# Patient Record
Sex: FEMALE | Race: WHITE | Employment: UNEMPLOYED | URBAN - METROPOLITAN AREA
[De-identification: names, ages, dates, MRNs, and addresses within clinical notes are randomized per-mention and may not be internally consistent; named-entity substitution may affect disease eponyms.]

---

## 2017-11-08 ENCOUNTER — OFFICE VISIT (OUTPATIENT)
Dept: ORTHOPEDIC SURGERY | Age: 32
End: 2017-11-08

## 2017-11-08 VITALS
DIASTOLIC BLOOD PRESSURE: 48 MMHG | HEIGHT: 65 IN | BODY MASS INDEX: 34.16 KG/M2 | HEART RATE: 64 BPM | SYSTOLIC BLOOD PRESSURE: 79 MMHG | WEIGHT: 205.03 LBS

## 2017-11-08 DIAGNOSIS — S39.012A STRAIN OF LUMBAR REGION, INITIAL ENCOUNTER: ICD-10-CM

## 2017-11-08 DIAGNOSIS — M54.50 LUMBAR SPINE PAIN: Primary | ICD-10-CM

## 2017-11-08 PROCEDURE — 72100 X-RAY EXAM L-S SPINE 2/3 VWS: CPT | Performed by: PHYSICIAN ASSISTANT

## 2017-11-08 PROCEDURE — G8427 DOCREV CUR MEDS BY ELIG CLIN: HCPCS | Performed by: PHYSICIAN ASSISTANT

## 2017-11-08 PROCEDURE — 99203 OFFICE O/P NEW LOW 30 MIN: CPT | Performed by: PHYSICIAN ASSISTANT

## 2017-11-08 PROCEDURE — G8417 CALC BMI ABV UP PARAM F/U: HCPCS | Performed by: PHYSICIAN ASSISTANT

## 2017-11-08 PROCEDURE — 1036F TOBACCO NON-USER: CPT | Performed by: PHYSICIAN ASSISTANT

## 2017-11-08 PROCEDURE — G8484 FLU IMMUNIZE NO ADMIN: HCPCS | Performed by: PHYSICIAN ASSISTANT

## 2017-11-08 RX ORDER — GABAPENTIN 300 MG/1
300 CAPSULE ORAL NIGHTLY
Qty: 15 CAPSULE | Refills: 0 | Status: SHIPPED | OUTPATIENT
Start: 2017-11-08 | End: 2017-11-20 | Stop reason: SDUPTHER

## 2017-11-08 RX ORDER — METHYLPREDNISOLONE 4 MG/1
TABLET ORAL
Qty: 1 KIT | Refills: 0 | Status: SHIPPED | OUTPATIENT
Start: 2017-11-08 | End: 2018-09-22

## 2017-11-08 RX ORDER — METHOCARBAMOL 500 MG/1
500 TABLET, FILM COATED ORAL 3 TIMES DAILY
Qty: 30 TABLET | Refills: 0 | Status: SHIPPED | OUTPATIENT
Start: 2017-11-08 | End: 2017-11-18

## 2017-11-09 NOTE — PROGRESS NOTES
CHIEF COMPLAINT:    Chief Complaint   Patient presents with    Back Pain     LBP ON/OFF FOR 3 YEARS       HISTORY OF PRESENT ILLNESS:                The patient is a 34 y.o. female she presents tonight to the after hours clinic for orthopedic evaluation of her low back, primarily right-sided. She is a history of some chronic low back issues and previously saw a pain management doctor although she states she is no longer receiving pain management. She states recently she slipped as well as and suffered an aggravation injury. She describes pain across the lumbar spine but no radiating symptoms. She denies any loss of bowel or bladder control. She also sees a psychiatrist who prescribes her Cymbalta for her pain. She states in the past she had an MRI of her back ordered but never got this study.   Past Medical History:   Diagnosis Date    Anxiety     Depression           The pain assessment was noted & is as follows:  Pain Assessment  Location of Pain: Back  Location Modifiers: Posterior  Severity of Pain: 7  Quality of Pain: Aching  Duration of Pain: Persistent  Frequency of Pain: Constant]      Work Status/Functionality:     Past Medical History: Medical history form was reviewed today & can be found in the media tab  Past Medical History:   Diagnosis Date    Anxiety     Depression       Past Surgical History:     Past Surgical History:   Procedure Laterality Date     SECTION  2005    DILATION AND CURETTAGE OF UTERUS  2006         Current Medications:     Current Outpatient Prescriptions:     methylPREDNISolone (MEDROL, HECTOR,) 4 MG tablet, Take by mouth as directed on package insert, Disp: 1 kit, Rfl: 0    gabapentin (NEURONTIN) 300 MG capsule, Take 1 capsule by mouth nightly, Disp: 15 capsule, Rfl: 0    methocarbamol (ROBAXIN) 500 MG tablet, Take 1 tablet by mouth 3 times daily for 10 days, Disp: 30 tablet, Rfl: 0    DULoxetine (CYMBALTA) 60 MG extended release capsule, Take 60

## 2017-11-20 RX ORDER — GABAPENTIN 300 MG/1
CAPSULE ORAL
Qty: 15 CAPSULE | Refills: 0 | Status: SHIPPED | OUTPATIENT
Start: 2017-11-20 | End: 2017-12-04 | Stop reason: SDUPTHER

## 2017-12-04 RX ORDER — GABAPENTIN 300 MG/1
CAPSULE ORAL
Qty: 15 CAPSULE | Refills: 0 | Status: SHIPPED | OUTPATIENT
Start: 2017-12-04 | End: 2018-09-23 | Stop reason: DRUGHIGH

## 2018-03-08 ENCOUNTER — OFFICE VISIT (OUTPATIENT)
Dept: ORTHOPEDIC SURGERY | Age: 33
End: 2018-03-08

## 2018-03-08 VITALS
WEIGHT: 205.03 LBS | DIASTOLIC BLOOD PRESSURE: 85 MMHG | HEIGHT: 65 IN | HEART RATE: 80 BPM | BODY MASS INDEX: 34.16 KG/M2 | SYSTOLIC BLOOD PRESSURE: 137 MMHG

## 2018-03-08 DIAGNOSIS — G89.29 CHRONIC BILATERAL LOW BACK PAIN WITHOUT SCIATICA: Primary | ICD-10-CM

## 2018-03-08 DIAGNOSIS — M54.50 CHRONIC BILATERAL LOW BACK PAIN WITHOUT SCIATICA: Primary | ICD-10-CM

## 2018-03-08 PROCEDURE — G8417 CALC BMI ABV UP PARAM F/U: HCPCS | Performed by: PHYSICAL MEDICINE & REHABILITATION

## 2018-03-08 PROCEDURE — G8427 DOCREV CUR MEDS BY ELIG CLIN: HCPCS | Performed by: PHYSICAL MEDICINE & REHABILITATION

## 2018-03-08 PROCEDURE — 99203 OFFICE O/P NEW LOW 30 MIN: CPT | Performed by: PHYSICAL MEDICINE & REHABILITATION

## 2018-03-08 PROCEDURE — 1036F TOBACCO NON-USER: CPT | Performed by: PHYSICAL MEDICINE & REHABILITATION

## 2018-03-08 PROCEDURE — G8484 FLU IMMUNIZE NO ADMIN: HCPCS | Performed by: PHYSICAL MEDICINE & REHABILITATION

## 2018-03-08 RX ORDER — GABAPENTIN 300 MG/1
CAPSULE ORAL
Qty: 60 CAPSULE | Refills: 3 | Status: SHIPPED | OUTPATIENT
Start: 2018-03-08 | End: 2018-07-06 | Stop reason: SDUPTHER

## 2018-03-08 RX ORDER — METHOCARBAMOL 750 MG/1
TABLET, FILM COATED ORAL
Qty: 60 TABLET | Refills: 3 | Status: ON HOLD | OUTPATIENT
Start: 2018-03-08 | End: 2018-09-23

## 2018-03-08 NOTE — PROGRESS NOTES
New Patient: SPINE    CHIEF COMPLAINT:    Chief Complaint   Patient presents with    Back Pain     lumbar       HISTORY OF PRESENT ILLNESS:                The patient is a 34 y.o. female history of depression, anxiety for history of chronic aching right greater than left low back pain. Symptoms are increased with prolonged sitting bending or lifting. Relief with heat and rest and stretching. Conservative care includes yoga, NSAIDs, MDP, Cymbalta, Flexeril, Robaxin, gabapentin. She denies any lower extremity radiating pain no progressive numbness tingling weakness. No recent bowel or bladder changes. Pain Assessment  Location of Pain: Back  Severity of Pain: 7  Quality of Pain: Other (Comment)  Frequency of Pain: Constant  Aggravating Factors: Other (Comment)  Relieving Factors: Other (Comment)    The pain assessment was noted & reviewed in the medical record today. Current/Past Treatment:   · Physical Therapy:  Yoga  · Chiropractic:   no  · Injection:   no  · Medications: NSAIDs, Flexeril, Robaxin, Gabapentin     · Surgery/Consult: no    Work Status/Functionality: n/a    Past Medical History: Medical history form was reviewed today & scanned into the media tab  Past Medical History:   Diagnosis Date    Anxiety     Depression       Past Surgical History:     Past Surgical History:   Procedure Laterality Date     SECTION  2005    DILATION AND CURETTAGE OF UTERUS  2006         Current Medications:     Current Outpatient Prescriptions:     gabapentin (NEURONTIN) 300 MG capsule, TAKE 1 CAPSULE BY MOUTH EVERY NIGHT, Disp: 15 capsule, Rfl: 0    methylPREDNISolone (MEDROL, HECTOR,) 4 MG tablet, Take by mouth as directed on package insert, Disp: 1 kit, Rfl: 0    DULoxetine (CYMBALTA) 60 MG extended release capsule, Take 60 mg by mouth daily, Disp: , Rfl:     prazosin (MINIPRESS) 2 MG capsule, Take 2 mg by mouth nightly, Disp: , Rfl:     norethindrone-ethinyl estradiol (MICROGESTIN) 1-20 or induration  · Sensation: Sensation is intact without deficit  · Coordination/Balance: Good coordination   LUMBAR/SACRAL EXAMINATION:  · Inspection: Local inspection shows no step-off or bruising. Lumbar alignment is normal.  Sagittal and Coronal balance is neutral.      · Palpation:   No evidence of tenderness at the midline. No tenderness bilaterally at the paraspinal or trochanters. There is no step-off or paraspinal spasm. · Range of Motion: Mild-moderate loss  · Strength:   Strength testing is 5/5 in all muscle groups tested. · Special Tests:   Straight leg raise and crossed SLR negative. Leg length and pelvis level.  0 out of 5 Dell's signs. · Skin: There are no rashes, ulcerations or lesions. · Reflexes: Reflexes are symmetrically 2+ at the patellar and ankle tendons. Clonus absent bilaterally at the feet. · Gait & station: normal unassisted   · Additional Examinations:   · RIGHT LOWER EXTREMITY: Inspection/examination of the right lower extremity does not show any tenderness, deformity or injury. Range of motion is full. There is no gross instability. There are no rashes, ulcerations or lesions. Strength and tone are normal.  ·   · LEFT LOWER EXTREMITY:  Inspection/examination of the left lower extremity does not show any tenderness, deformity or injury. Range of motion is full. There is no gross instability. There are no rashes, ulcerations or lesions.   Strength and tone are normal.    Diagnostic Testing:    Lumbar x-rays reviewed November 2017 showing mild scoliosis and mild DDD        Impression:  1) Chronic mechanical back pain  2) H/o dep, anx      Plan:   1) Gabapentin 300mg I po BID #60 3 refills  2) Robaxin 750mg I po BID PRN #60 3 refills  3) PT for above  4) F/u after PT if no improvement         [facility]

## 2018-07-06 ENCOUNTER — TELEPHONE (OUTPATIENT)
Dept: ORTHOPEDIC SURGERY | Age: 33
End: 2018-07-06

## 2018-07-06 DIAGNOSIS — M54.9 MECHANICAL BACK PAIN: Primary | ICD-10-CM

## 2018-07-06 RX ORDER — GABAPENTIN 300 MG/1
CAPSULE ORAL
Qty: 60 CAPSULE | Refills: 3 | Status: SHIPPED | OUTPATIENT
Start: 2018-07-06 | End: 2018-09-23 | Stop reason: DRUGHIGH

## 2018-09-22 ENCOUNTER — HOSPITAL ENCOUNTER (INPATIENT)
Age: 33
LOS: 8 days | Discharge: HOME | DRG: 751 | End: 2018-10-01
Attending: EMERGENCY MEDICINE | Admitting: PSYCHIATRY & NEUROLOGY
Payer: COMMERCIAL

## 2018-09-22 DIAGNOSIS — F11.20 UNCOMPLICATED OPIOID DEPENDENCE (HCC): ICD-10-CM

## 2018-09-22 DIAGNOSIS — E87.6 HYPOKALEMIA: ICD-10-CM

## 2018-09-22 DIAGNOSIS — R45.89 SUICIDE RISK: Primary | ICD-10-CM

## 2018-09-22 DIAGNOSIS — F15.10 METHAMPHETAMINE ABUSE (HCC): ICD-10-CM

## 2018-09-22 LAB
A/G RATIO: 1.6 (ref 1.1–2.2)
ALBUMIN SERPL-MCNC: 4.3 G/DL (ref 3.4–5)
ALP BLD-CCNC: 59 U/L (ref 40–129)
ALT SERPL-CCNC: 13 U/L (ref 10–40)
AMPHETAMINE SCREEN, URINE: POSITIVE
ANION GAP SERPL CALCULATED.3IONS-SCNC: 7 MMOL/L (ref 3–16)
AST SERPL-CCNC: 19 U/L (ref 15–37)
BARBITURATE SCREEN URINE: ABNORMAL
BASOPHILS ABSOLUTE: 0 K/UL (ref 0–0.2)
BASOPHILS RELATIVE PERCENT: 0.7 %
BENZODIAZEPINE SCREEN, URINE: ABNORMAL
BILIRUB SERPL-MCNC: 0.3 MG/DL (ref 0–1)
BUN BLDV-MCNC: 8 MG/DL (ref 7–20)
CALCIUM SERPL-MCNC: 9.3 MG/DL (ref 8.3–10.6)
CANNABINOID SCREEN URINE: ABNORMAL
CHLORIDE BLD-SCNC: 99 MMOL/L (ref 99–110)
CO2: 33 MMOL/L (ref 21–32)
COCAINE METABOLITE SCREEN URINE: ABNORMAL
CREAT SERPL-MCNC: 0.6 MG/DL (ref 0.6–1.1)
EOSINOPHILS ABSOLUTE: 0.1 K/UL (ref 0–0.6)
EOSINOPHILS RELATIVE PERCENT: 1 %
ETHANOL: NORMAL MG/DL (ref 0–0.08)
GFR AFRICAN AMERICAN: >60
GFR NON-AFRICAN AMERICAN: >60
GLOBULIN: 2.7 G/DL
GLUCOSE BLD-MCNC: 103 MG/DL (ref 70–99)
HCT VFR BLD CALC: 37.1 % (ref 36–48)
HEMOGLOBIN: 12.6 G/DL (ref 12–16)
LYMPHOCYTES ABSOLUTE: 1.3 K/UL (ref 1–5.1)
LYMPHOCYTES RELATIVE PERCENT: 18.1 %
Lab: ABNORMAL
MCH RBC QN AUTO: 30.5 PG (ref 26–34)
MCHC RBC AUTO-ENTMCNC: 34 G/DL (ref 31–36)
MCV RBC AUTO: 89.5 FL (ref 80–100)
METHADONE SCREEN, URINE: ABNORMAL
MONOCYTES ABSOLUTE: 0.5 K/UL (ref 0–1.3)
MONOCYTES RELATIVE PERCENT: 6.9 %
NEUTROPHILS ABSOLUTE: 5.1 K/UL (ref 1.7–7.7)
NEUTROPHILS RELATIVE PERCENT: 73.3 %
OPIATE SCREEN URINE: ABNORMAL
OXYCODONE URINE: ABNORMAL
PDW BLD-RTO: 13.1 % (ref 12.4–15.4)
PH UA: 6
PHENCYCLIDINE SCREEN URINE: ABNORMAL
PLATELET # BLD: 313 K/UL (ref 135–450)
PMV BLD AUTO: 8.1 FL (ref 5–10.5)
POTASSIUM SERPL-SCNC: 3.2 MMOL/L (ref 3.5–5.1)
PROPOXYPHENE SCREEN: ABNORMAL
RBC # BLD: 4.15 M/UL (ref 4–5.2)
SODIUM BLD-SCNC: 139 MMOL/L (ref 136–145)
TOTAL PROTEIN: 7 G/DL (ref 6.4–8.2)
WBC # BLD: 6.9 K/UL (ref 4–11)

## 2018-09-22 PROCEDURE — 99285 EMERGENCY DEPT VISIT HI MDM: CPT

## 2018-09-22 PROCEDURE — G0480 DRUG TEST DEF 1-7 CLASSES: HCPCS

## 2018-09-22 PROCEDURE — 80307 DRUG TEST PRSMV CHEM ANLYZR: CPT

## 2018-09-22 PROCEDURE — 80053 COMPREHEN METABOLIC PANEL: CPT

## 2018-09-22 PROCEDURE — 85025 COMPLETE CBC W/AUTO DIFF WBC: CPT

## 2018-09-22 RX ORDER — POTASSIUM CHLORIDE 20 MEQ/1
40 TABLET, EXTENDED RELEASE ORAL ONCE
Status: COMPLETED | OUTPATIENT
Start: 2018-09-22 | End: 2018-09-23

## 2018-09-23 PROBLEM — F11.20 OPIATE DEPENDENCE (HCC): Status: ACTIVE | Noted: 2018-09-23

## 2018-09-23 PROBLEM — F15.90 STIMULANT USE DISORDER: Status: ACTIVE | Noted: 2018-09-23

## 2018-09-23 PROBLEM — F33.9 MDD (MAJOR DEPRESSIVE DISORDER), RECURRENT EPISODE (HCC): Status: ACTIVE | Noted: 2018-09-23

## 2018-09-23 PROBLEM — F33.2 MDD (MAJOR DEPRESSIVE DISORDER), RECURRENT SEVERE, WITHOUT PSYCHOSIS (HCC): Status: ACTIVE | Noted: 2018-09-23

## 2018-09-23 PROCEDURE — 6370000000 HC RX 637 (ALT 250 FOR IP): Performed by: NURSE PRACTITIONER

## 2018-09-23 PROCEDURE — 6370000000 HC RX 637 (ALT 250 FOR IP): Performed by: EMERGENCY MEDICINE

## 2018-09-23 PROCEDURE — 1240000000 HC EMOTIONAL WELLNESS R&B

## 2018-09-23 RX ORDER — BUPRENORPHINE AND NALOXONE 8; 2 MG/1; MG/1
2 FILM, SOLUBLE BUCCAL; SUBLINGUAL DAILY
Status: DISCONTINUED | OUTPATIENT
Start: 2018-09-24 | End: 2018-10-01 | Stop reason: HOSPADM

## 2018-09-23 RX ORDER — ACETAMINOPHEN 325 MG/1
650 TABLET ORAL EVERY 4 HOURS PRN
Status: DISCONTINUED | OUTPATIENT
Start: 2018-09-23 | End: 2018-10-01 | Stop reason: HOSPADM

## 2018-09-23 RX ORDER — DULOXETIN HYDROCHLORIDE 60 MG/1
60 CAPSULE, DELAYED RELEASE ORAL DAILY
Status: DISCONTINUED | OUTPATIENT
Start: 2018-09-24 | End: 2018-09-29

## 2018-09-23 RX ORDER — GABAPENTIN 300 MG/1
300 CAPSULE ORAL 2 TIMES DAILY
Status: ON HOLD | COMMUNITY
End: 2018-09-23

## 2018-09-23 RX ORDER — NICOTINE 21 MG/24HR
1 PATCH, TRANSDERMAL 24 HOURS TRANSDERMAL DAILY PRN
Status: DISCONTINUED | OUTPATIENT
Start: 2018-09-23 | End: 2018-10-01 | Stop reason: HOSPADM

## 2018-09-23 RX ORDER — MAGNESIUM HYDROXIDE/ALUMINUM HYDROXICE/SIMETHICONE 120; 1200; 1200 MG/30ML; MG/30ML; MG/30ML
30 SUSPENSION ORAL EVERY 6 HOURS PRN
Status: DISCONTINUED | OUTPATIENT
Start: 2018-09-23 | End: 2018-10-01 | Stop reason: HOSPADM

## 2018-09-23 RX ORDER — PRAZOSIN HYDROCHLORIDE 1 MG/1
2 CAPSULE ORAL NIGHTLY
Status: DISCONTINUED | OUTPATIENT
Start: 2018-09-23 | End: 2018-09-29

## 2018-09-23 RX ORDER — CETIRIZINE HYDROCHLORIDE 10 MG/1
10 TABLET ORAL DAILY
Status: DISCONTINUED | OUTPATIENT
Start: 2018-09-24 | End: 2018-10-01 | Stop reason: HOSPADM

## 2018-09-23 RX ORDER — HYDROXYZINE PAMOATE 50 MG/1
50 CAPSULE ORAL 3 TIMES DAILY PRN
Status: DISCONTINUED | OUTPATIENT
Start: 2018-09-23 | End: 2018-10-01 | Stop reason: HOSPADM

## 2018-09-23 RX ORDER — BUPRENORPHINE AND NALOXONE 8; 2 MG/1; MG/1
2.5 FILM, SOLUBLE BUCCAL; SUBLINGUAL DAILY
Status: ON HOLD | COMMUNITY
End: 2018-10-01 | Stop reason: HOSPADM

## 2018-09-23 RX ORDER — OLANZAPINE 5 MG/1
5 TABLET ORAL 2 TIMES DAILY PRN
Status: DISCONTINUED | OUTPATIENT
Start: 2018-09-23 | End: 2018-10-01 | Stop reason: HOSPADM

## 2018-09-23 RX ADMIN — POTASSIUM CHLORIDE 40 MEQ: 20 TABLET, EXTENDED RELEASE ORAL at 01:05

## 2018-09-23 RX ADMIN — PRAZOSIN HYDROCHLORIDE 2 MG: 1 CAPSULE ORAL at 21:14

## 2018-09-23 ASSESSMENT — SLEEP AND FATIGUE QUESTIONNAIRES
DO YOU USE A SLEEP AID: NO
RESTFUL SLEEP: NO
DIFFICULTY ARISING: YES
AVERAGE NUMBER OF SLEEP HOURS: 3
DIFFICULTY STAYING ASLEEP: YES
DO YOU HAVE DIFFICULTY SLEEPING: YES
DIFFICULTY FALLING ASLEEP: YES

## 2018-09-23 ASSESSMENT — LIFESTYLE VARIABLES: HISTORY_ALCOHOL_USE: NO

## 2018-09-23 NOTE — ED NOTES
Pt resting quietly in bed, appears asleep, eyes closed, respirations even and easy. No signs of distress noted.

## 2018-09-23 NOTE — ED NOTES
Pt currently resting, even, non-labored respirations. No signs of distress. Will continue to monitor for safety.        Ramon England RN  09/23/18 1902

## 2018-09-23 NOTE — ED NOTES
Pt arrived ambulatory to [facility] with Kathia Ospina RN. Pt belongings placed in locker and pt roomed in B3. No further needs at this time. Will continue to monitor for safety.     DAVID Centeno

## 2018-09-24 PROBLEM — F15.10 METHAMPHETAMINE ABUSE, EPISODIC (HCC): Chronic | Status: ACTIVE | Noted: 2018-09-23

## 2018-09-24 PROCEDURE — 6370000000 HC RX 637 (ALT 250 FOR IP): Performed by: NURSE PRACTITIONER

## 2018-09-24 PROCEDURE — 99223 1ST HOSP IP/OBS HIGH 75: CPT | Performed by: PSYCHIATRY & NEUROLOGY

## 2018-09-24 PROCEDURE — 1240000000 HC EMOTIONAL WELLNESS R&B

## 2018-09-24 PROCEDURE — 99223 1ST HOSP IP/OBS HIGH 75: CPT | Performed by: PHYSICIAN ASSISTANT

## 2018-09-24 PROCEDURE — 6360000002 HC RX W HCPCS: Performed by: NURSE PRACTITIONER

## 2018-09-24 PROCEDURE — 6370000000 HC RX 637 (ALT 250 FOR IP): Performed by: PHYSICIAN ASSISTANT

## 2018-09-24 RX ORDER — ALBUTEROL SULFATE 90 UG/1
2 AEROSOL, METERED RESPIRATORY (INHALATION) EVERY 6 HOURS PRN
Status: DISCONTINUED | OUTPATIENT
Start: 2018-09-24 | End: 2018-10-01 | Stop reason: HOSPADM

## 2018-09-24 RX ADMIN — BUPRENORPHINE HYDROCHLORIDE, NALOXONE HYDROCHLORIDE 2 FILM: 8; 2 FILM, SOLUBLE BUCCAL; SUBLINGUAL at 10:20

## 2018-09-24 RX ADMIN — CETIRIZINE HYDROCHLORIDE 10 MG: 10 TABLET ORAL at 10:20

## 2018-09-24 RX ADMIN — DULOXETINE HYDROCHLORIDE 60 MG: 60 CAPSULE, DELAYED RELEASE ORAL at 10:20

## 2018-09-24 ASSESSMENT — PAIN SCALES - GENERAL: PAINLEVEL_OUTOF10: 0

## 2018-09-24 NOTE — PROGRESS NOTES
SPO2 (COPD values may differ): Greater than or equal to 92% on room air = 0    Peak Flow (asthma only): not applicable = 0    RSI: 0-4 = See once and convert to home regimen or discontinue        Plan       Goals: medication delivery, mobilize retained secretions, volume expansion and improve oxygenation    Patient/caregiver was educated on the proper method of use for Respiratory Care Devices:  Yes      Level of patient/caregiver understanding able to:   [] Verbalize understanding   [] Demonstrate understanding       [] Teach back        [] Needs reinforcement       []  No available caregiver               []  Other:     Response to education:  Excellent     Is patient being placed on Home Treatment Regimen? NA     Does the patient have everything they need prior to discharge? NA     Comments: chart reviewed and pt assesed    Plan of Care: pt to remain on current regiment    Electronically signed by Sunny Huston RCP on 9/24/2018 at 4:17 PM    Respiratory Protocol Guidelines     1. Assessment and treatment by Respiratory Therapy will be initiated for medication and therapeutic interventions upon initiation of aerosolized medication. 2. Physician will be contacted for respiratory rate (RR) greater than 35 breaths per minute. Therapy will be held for heart rate (HR) greater than 140 beats per minute, pending direction from physician. 3. Bronchodilators will be administered via Metered Dose Inhaler (MDI) with spacer when the following criteria are met:  a. Alert and cooperative     b. HR < 140 bpm  c. RR < 30 bpm                d. Can demonstrate a 23 second inspiratory hold  4. Bronchodilators will be administered via Hand Held Nebulizer JULIO CÉSAR [facility]) to patients when ANY of the following criteria are met  a. Incognizant or uncooperative          b. Patients treated with HHN at Home        c. Unable to demonstrate proper use of MDI with spacer     d. RR > 30 bpm   5.  Bronchodilators will be delivered via Metered

## 2018-09-24 NOTE — PLAN OF CARE
Problem: Depressive Behavior With or Without Suicide Precautions:  Goal: Absence of self-harm  Absence of self-harm   Outcome: Ongoing  Patient rates Depression 8/10 and Anxiety 7/10. Patient denies SI/HI/A/V/H. Patient attended wrap up group. Patient has meth. Sores on her bilateral arms and bilateral legs and cheek area. Meth. Sores are dry and intact. No sweeping from any of the sores. Patient denies any itching or pain from sores. Patient resting quietly in bed with eyes closed.

## 2018-09-25 LAB
ANION GAP SERPL CALCULATED.3IONS-SCNC: 9 MMOL/L (ref 3–16)
BUN BLDV-MCNC: 7 MG/DL (ref 7–20)
CALCIUM SERPL-MCNC: 9 MG/DL (ref 8.3–10.6)
CHLORIDE BLD-SCNC: 101 MMOL/L (ref 99–110)
CO2: 30 MMOL/L (ref 21–32)
CREAT SERPL-MCNC: <0.5 MG/DL (ref 0.6–1.1)
GFR AFRICAN AMERICAN: >60
GFR NON-AFRICAN AMERICAN: >60
GLUCOSE BLD-MCNC: 83 MG/DL (ref 70–99)
POTASSIUM SERPL-SCNC: 3 MMOL/L (ref 3.5–5.1)
SODIUM BLD-SCNC: 140 MMOL/L (ref 136–145)

## 2018-09-25 PROCEDURE — 99233 SBSQ HOSP IP/OBS HIGH 50: CPT | Performed by: PSYCHIATRY & NEUROLOGY

## 2018-09-25 PROCEDURE — 6370000000 HC RX 637 (ALT 250 FOR IP): Performed by: NURSE PRACTITIONER

## 2018-09-25 PROCEDURE — 6360000002 HC RX W HCPCS: Performed by: NURSE PRACTITIONER

## 2018-09-25 PROCEDURE — 1240000000 HC EMOTIONAL WELLNESS R&B

## 2018-09-25 PROCEDURE — 80048 BASIC METABOLIC PNL TOTAL CA: CPT

## 2018-09-25 PROCEDURE — 36415 COLL VENOUS BLD VENIPUNCTURE: CPT

## 2018-09-25 PROCEDURE — 6370000000 HC RX 637 (ALT 250 FOR IP): Performed by: PSYCHIATRY & NEUROLOGY

## 2018-09-25 RX ORDER — GABAPENTIN 300 MG/1
300 CAPSULE ORAL 2 TIMES DAILY
Status: DISCONTINUED | OUTPATIENT
Start: 2018-09-25 | End: 2018-10-01

## 2018-09-25 RX ADMIN — GABAPENTIN 300 MG: 300 CAPSULE ORAL at 20:44

## 2018-09-25 RX ADMIN — BUPRENORPHINE HYDROCHLORIDE, NALOXONE HYDROCHLORIDE 2 FILM: 8; 2 FILM, SOLUBLE BUCCAL; SUBLINGUAL at 08:39

## 2018-09-25 RX ADMIN — PRAZOSIN HYDROCHLORIDE 2 MG: 1 CAPSULE ORAL at 20:44

## 2018-09-25 RX ADMIN — CETIRIZINE HYDROCHLORIDE 10 MG: 10 TABLET ORAL at 08:38

## 2018-09-25 RX ADMIN — DULOXETINE HYDROCHLORIDE 60 MG: 60 CAPSULE, DELAYED RELEASE ORAL at 08:38

## 2018-09-25 RX ADMIN — GABAPENTIN 300 MG: 300 CAPSULE ORAL at 12:45

## 2018-09-25 NOTE — BH NOTE
Writer contacted CPS to file report due to pt's admission of using meth in the home with her 3 yo son and 15 yo daughter. Report will be reviewed at 1:00pm meeting. Writer provided contact information for the unit for callback on status.     Tania Garcia MSW, LSW

## 2018-09-26 PROBLEM — E44.1 MILD PROTEIN-CALORIE MALNUTRITION (HCC): Status: ACTIVE | Noted: 2018-09-26

## 2018-09-26 PROCEDURE — 99233 SBSQ HOSP IP/OBS HIGH 50: CPT | Performed by: PSYCHIATRY & NEUROLOGY

## 2018-09-26 PROCEDURE — 6360000002 HC RX W HCPCS: Performed by: NURSE PRACTITIONER

## 2018-09-26 PROCEDURE — 6370000000 HC RX 637 (ALT 250 FOR IP): Performed by: PSYCHIATRY & NEUROLOGY

## 2018-09-26 PROCEDURE — 1240000000 HC EMOTIONAL WELLNESS R&B

## 2018-09-26 PROCEDURE — 6370000000 HC RX 637 (ALT 250 FOR IP): Performed by: NURSE PRACTITIONER

## 2018-09-26 RX ORDER — BACITRACIN ZINC AND POLYMYXIN B SULFATE 500; 1000 [USP'U]/G; [USP'U]/G
OINTMENT TOPICAL 2 TIMES DAILY
Status: DISCONTINUED | OUTPATIENT
Start: 2018-09-26 | End: 2018-10-01 | Stop reason: HOSPADM

## 2018-09-26 RX ADMIN — BACITRACIN ZINC AND POLYMYXIN B SULFATE: at 21:20

## 2018-09-26 RX ADMIN — CETIRIZINE HYDROCHLORIDE 10 MG: 10 TABLET ORAL at 08:20

## 2018-09-26 RX ADMIN — DULOXETINE HYDROCHLORIDE 60 MG: 60 CAPSULE, DELAYED RELEASE ORAL at 08:20

## 2018-09-26 RX ADMIN — BACITRACIN ZINC AND POLYMYXIN B SULFATE 28.3 G: at 13:00

## 2018-09-26 RX ADMIN — BUPRENORPHINE HYDROCHLORIDE, NALOXONE HYDROCHLORIDE 2 FILM: 8; 2 FILM, SOLUBLE BUCCAL; SUBLINGUAL at 08:20

## 2018-09-26 RX ADMIN — GABAPENTIN 300 MG: 300 CAPSULE ORAL at 08:20

## 2018-09-26 RX ADMIN — GABAPENTIN 300 MG: 300 CAPSULE ORAL at 20:30

## 2018-09-26 ASSESSMENT — PAIN SCALES - GENERAL: PAINLEVEL_OUTOF10: 0

## 2018-09-26 NOTE — PROGRESS NOTES
Comment: High concentrations of ephedrine/pseudoephedrine or  phenylpropanolamine may cause false positive results  for amphetamine. Therefore, confirmatory testing for  amphetamine should be considered if clinically indicated.  Barbiturate Screen, Ur 09/22/2018 Neg  Negative <200 ng/mL Final    Benzodiazepine Screen, Urine 09/22/2018 Neg  Negative <200 ng/mL Final    Cannabinoid Scrn, Ur 09/22/2018 Neg  Negative <50 ng/mL Final    Cocaine Metabolite Screen, Urine 09/22/2018 Neg  Negative <300 ng/mL Final    Opiate Scrn, Ur 09/22/2018 Neg  Negative <300 ng/mL Final    Comment: \"Therapeutic levels of pain medication, especially oxycontin and synthetic  opioids, may not be detected by this Methodology. Pain management screen  panel  Drug panel-PM-Hi Res Ur, Interp (PAIN) should be considered for drug  monitoring \".  PCP Screen, Urine 09/22/2018 Neg  Negative <25 ng/mL Final    Methadone Screen, Urine 09/22/2018 Neg  Negative <300 ng/mL Final    Propoxyphene Scrn, Ur 09/22/2018 Neg  Negative <300 ng/mL Final    pH, UA 09/22/2018 6.0   Final    Comment: Urine pH less than 5.0 or greater than 8.0 may indicate sample adulteration. Another sample should be collected if clinically  indicated.  Drug Screen Comment: 09/22/2018 see below   Final    Comment: This method is a screening test to detect only these drug  classes as part of a medical workup. Confirmatory testing  by another method should be ordered if clinically indicated.       Oxycodone Urine 09/22/2018 Neg  Negative <100 ng/ml Final    Sodium 09/25/2018 140  136 - 145 mmol/L Final    Potassium 09/25/2018 3.0* 3.5 - 5.1 mmol/L Final    Chloride 09/25/2018 101  99 - 110 mmol/L Final    CO2 09/25/2018 30  21 - 32 mmol/L Final    Anion Gap 09/25/2018 9  3 - 16 Final    Glucose 09/25/2018 83  70 - 99 mg/dL Final    BUN 09/25/2018 7  7 - 20 mg/dL Final    CREATININE 09/25/2018 <0.5* 0.6 - 1.1 mg/dL Final    GFR Non-African American 09/25/2018 >60  >60 Final    Comment: >60 mL/min/1.73m2 EGFR, calc. for ages 25 and older using the  MDRD formula (not corrected for weight), is valid for stable  renal function.  GFR  09/25/2018 >60  >60 Final    Comment: Chronic Kidney Disease: less than 60 ml/min/1.73 sq.m. Kidney Failure: less than 15 ml/min/1.73 sq.m. Results valid for patients 18 years and older.  Calcium 09/25/2018 9.0  8.3 - 10.6 mg/dL Final            Medications  Current Facility-Administered Medications: gabapentin (NEURONTIN) capsule 300 mg, 300 mg, Oral, BID  albuterol sulfate  (90 Base) MCG/ACT inhaler 2 puff, 2 puff, Inhalation, Q6H PRN  buprenorphine-naloxone (SUBOXONE) 8-2 MG SL film 2 Film, 2 Film, Sublingual, Daily  cetirizine (ZYRTEC) tablet 10 mg, 10 mg, Oral, Daily  DULoxetine (CYMBALTA) extended release capsule 60 mg, 60 mg, Oral, Daily  prazosin (MINIPRESS) capsule 2 mg, 2 mg, Oral, Nightly  acetaminophen (TYLENOL) tablet 650 mg, 650 mg, Oral, Q4H PRN  hydrOXYzine (VISTARIL) capsule 50 mg, 50 mg, Oral, TID PRN  OLANZapine (ZYPREXA) tablet 5 mg, 5 mg, Oral, BID PRN  magnesium hydroxide (MILK OF MAGNESIA) 400 MG/5ML suspension 30 mL, 30 mL, Oral, Daily PRN  aluminum & magnesium hydroxide-simethicone (MAALOX) 200-200-20 MG/5ML suspension 30 mL, 30 mL, Oral, Q6H PRN  nicotine (NICODERM CQ) 21 MG/24HR 1 patch, 1 patch, Transdermal, Daily PRN  nicotine polacrilex (NICORETTE) gum 2 mg, 2 mg, Oral, Q2H PRN    ASSESSMENT AND PLAN    Active Problems:    Severe episode of recurrent major depressive disorder, without psychotic features (HCC)    MDD (major depressive disorder), recurrent severe, without psychosis (HCC)    Opiate dependence (HCC)    MDD (major depressive disorder), recurrent episode ([facility] [address]    Suicide risk    Mild intermittent asthma without complication    Hypokalemia    Cocaine abuse ([address]  Resolved Problems:    * No resolved hospital problems. *       1. Patient s symptoms   are

## 2018-09-26 NOTE — PLAN OF CARE
Problem: Depressive Behavior With or Without Suicide Precautions:  Goal: Able to verbalize and/or display a decrease in depressive symptoms  Able to verbalize and/or display a decrease in depressive symptoms   Outcome: Ongoing  Pt states she feels better today and her affect is brighter when nurse talks to her but she has not attended groups. She has stayed in bed all day except meals and medications. Will continue to encourage group participation. Jasmyn REYNOLDS, RN-BC  Goal: Ability to disclose and discuss suicidal ideas will improve  Ability to disclose and discuss suicidal ideas will improve   Outcome: Met This Shift  Pt denies suicidal ideations today. Will continue to monitor.   Jasmyn REYNOLDS, RN-BC

## 2018-09-27 PROCEDURE — 99233 SBSQ HOSP IP/OBS HIGH 50: CPT | Performed by: PSYCHIATRY & NEUROLOGY

## 2018-09-27 PROCEDURE — 6370000000 HC RX 637 (ALT 250 FOR IP): Performed by: PSYCHIATRY & NEUROLOGY

## 2018-09-27 PROCEDURE — 6360000002 HC RX W HCPCS: Performed by: NURSE PRACTITIONER

## 2018-09-27 PROCEDURE — 6370000000 HC RX 637 (ALT 250 FOR IP): Performed by: NURSE PRACTITIONER

## 2018-09-27 PROCEDURE — 1240000000 HC EMOTIONAL WELLNESS R&B

## 2018-09-27 RX ORDER — BUPROPION HYDROCHLORIDE 150 MG/1
150 TABLET ORAL DAILY
Status: DISCONTINUED | OUTPATIENT
Start: 2018-09-27 | End: 2018-10-01 | Stop reason: HOSPADM

## 2018-09-27 RX ADMIN — DULOXETINE HYDROCHLORIDE 60 MG: 60 CAPSULE, DELAYED RELEASE ORAL at 08:12

## 2018-09-27 RX ADMIN — CETIRIZINE HYDROCHLORIDE 10 MG: 10 TABLET ORAL at 08:12

## 2018-09-27 RX ADMIN — GABAPENTIN 300 MG: 300 CAPSULE ORAL at 20:55

## 2018-09-27 RX ADMIN — BUPRENORPHINE HYDROCHLORIDE, NALOXONE HYDROCHLORIDE 2 FILM: 8; 2 FILM, SOLUBLE BUCCAL; SUBLINGUAL at 08:12

## 2018-09-27 RX ADMIN — GABAPENTIN 300 MG: 300 CAPSULE ORAL at 08:12

## 2018-09-27 RX ADMIN — PRAZOSIN HYDROCHLORIDE 2 MG: 1 CAPSULE ORAL at 20:55

## 2018-09-27 RX ADMIN — BUPROPION HYDROCHLORIDE 150 MG: 150 TABLET, FILM COATED, EXTENDED RELEASE ORAL at 13:38

## 2018-09-27 NOTE — PROGRESS NOTES
Final    Platelets 36/51/6309 313  135 - 450 K/uL Final    MPV 09/22/2018 8.1  5.0 - 10.5 fL Final    Neutrophils % 09/22/2018 73.3  % Final    Lymphocytes % 09/22/2018 18.1  % Final    Monocytes % 09/22/2018 6.9  % Final    Eosinophils % 09/22/2018 1.0  % Final    Basophils % 09/22/2018 0.7  % Final    Neutrophils # 09/22/2018 5.1  1.7 - 7.7 K/uL Final    Lymphocytes # 09/22/2018 1.3  1.0 - 5.1 K/uL Final    Monocytes # 09/22/2018 0.5  0.0 - 1.3 K/uL Final    Eosinophils # 09/22/2018 0.1  0.0 - 0.6 K/uL Final    Basophils # 09/22/2018 0.0  0.0 - 0.2 K/uL Final    Sodium 09/22/2018 139  136 - 145 mmol/L Final    Potassium 09/22/2018 3.2* 3.5 - 5.1 mmol/L Final    Chloride 09/22/2018 99  99 - 110 mmol/L Final    CO2 09/22/2018 33* 21 - 32 mmol/L Final    Anion Gap 09/22/2018 7  3 - 16 Final    Glucose 09/22/2018 103* 70 - 99 mg/dL Final    BUN 09/22/2018 8  7 - 20 mg/dL Final    CREATININE 09/22/2018 0.6  0.6 - 1.1 mg/dL Final    GFR Non- 09/22/2018 >60  >60 Final    Comment: >60 mL/min/1.73m2 EGFR, calc. for ages 25 and older using the  MDRD formula (not corrected for weight), is valid for stable  renal function.  GFR  09/22/2018 >60  >60 Final    Comment: Chronic Kidney Disease: less than 60 ml/min/1.73 sq.m. Kidney Failure: less than 15 ml/min/1.73 sq.m. Results valid for patients 18 years and older.       Calcium 09/22/2018 9.3  8.3 - 10.6 mg/dL Final    Total Protein 09/22/2018 7.0  6.4 - 8.2 g/dL Final    Alb 09/22/2018 4.3  3.4 - 5.0 g/dL Final    Albumin/Globulin Ratio 09/22/2018 1.6  1.1 - 2.2 Final    Total Bilirubin 09/22/2018 0.3  0.0 - 1.0 mg/dL Final    Alkaline Phosphatase 09/22/2018 59  40 - 129 U/L Final    ALT 09/22/2018 13  10 - 40 U/L Final    AST 09/22/2018 19  15 - 37 U/L Final    Globulin 09/22/2018 2.7  g/dL Final    Ethanol Lvl 09/22/2018 None Detected  mg/dL Final    Comment:    None Detected  Conversion [facility])    Suicide risk    Mild intermittent asthma without complication    Hypokalemia    Cocaine abuse (HCC)    Mild protein-calorie malnutrition ([facility] [address]  Resolved Problems:    * No resolved hospital problems. *       1. Patient s symptoms   are improving. Hopeful  move to inapt rehab. Wellbutrin  mg AM for depression  2. Probable discharge is 1-2 days   3. Discharge planning is incomplete  4. Suicidal ideation is better  5. Total time with patient was 40 minutes and more than 50 % of that time was spent counseling the patient on their symptoms, treatment and expected goals.

## 2018-09-28 PROCEDURE — 6370000000 HC RX 637 (ALT 250 FOR IP): Performed by: PSYCHIATRY & NEUROLOGY

## 2018-09-28 PROCEDURE — 1240000000 HC EMOTIONAL WELLNESS R&B

## 2018-09-28 PROCEDURE — 99233 SBSQ HOSP IP/OBS HIGH 50: CPT | Performed by: PSYCHIATRY & NEUROLOGY

## 2018-09-28 PROCEDURE — 6360000002 HC RX W HCPCS: Performed by: NURSE PRACTITIONER

## 2018-09-28 PROCEDURE — 6370000000 HC RX 637 (ALT 250 FOR IP): Performed by: NURSE PRACTITIONER

## 2018-09-28 RX ADMIN — BUPROPION HYDROCHLORIDE 150 MG: 150 TABLET, FILM COATED, EXTENDED RELEASE ORAL at 08:30

## 2018-09-28 RX ADMIN — DULOXETINE HYDROCHLORIDE 60 MG: 60 CAPSULE, DELAYED RELEASE ORAL at 08:29

## 2018-09-28 RX ADMIN — PRAZOSIN HYDROCHLORIDE 2 MG: 1 CAPSULE ORAL at 21:02

## 2018-09-28 RX ADMIN — GABAPENTIN 300 MG: 300 CAPSULE ORAL at 21:02

## 2018-09-28 RX ADMIN — BUPRENORPHINE HYDROCHLORIDE, NALOXONE HYDROCHLORIDE 2 FILM: 8; 2 FILM, SOLUBLE BUCCAL; SUBLINGUAL at 08:29

## 2018-09-28 RX ADMIN — CETIRIZINE HYDROCHLORIDE 10 MG: 10 TABLET ORAL at 08:29

## 2018-09-28 RX ADMIN — ALUMINUM HYDROXIDE, MAGNESIUM HYDROXIDE, AND SIMETHICONE 30 ML: 200; 200; 20 SUSPENSION ORAL at 18:20

## 2018-09-28 RX ADMIN — GABAPENTIN 300 MG: 300 CAPSULE ORAL at 08:30

## 2018-09-28 RX ADMIN — BACITRACIN ZINC AND POLYMYXIN B SULFATE 28.3 G: at 08:37

## 2018-09-28 NOTE — PROGRESS NOTES
09/22/2018 73.3  % Final    Lymphocytes % 09/22/2018 18.1  % Final    Monocytes % 09/22/2018 6.9  % Final    Eosinophils % 09/22/2018 1.0  % Final    Basophils % 09/22/2018 0.7  % Final    Neutrophils # 09/22/2018 5.1  1.7 - 7.7 K/uL Final    Lymphocytes # 09/22/2018 1.3  1.0 - 5.1 K/uL Final    Monocytes # 09/22/2018 0.5  0.0 - 1.3 K/uL Final    Eosinophils # 09/22/2018 0.1  0.0 - 0.6 K/uL Final    Basophils # 09/22/2018 0.0  0.0 - 0.2 K/uL Final    Sodium 09/22/2018 139  136 - 145 mmol/L Final    Potassium 09/22/2018 3.2* 3.5 - 5.1 mmol/L Final    Chloride 09/22/2018 99  99 - 110 mmol/L Final    CO2 09/22/2018 33* 21 - 32 mmol/L Final    Anion Gap 09/22/2018 7  3 - 16 Final    Glucose 09/22/2018 103* 70 - 99 mg/dL Final    BUN 09/22/2018 8  7 - 20 mg/dL Final    CREATININE 09/22/2018 0.6  0.6 - 1.1 mg/dL Final    GFR Non- 09/22/2018 >60  >60 Final    Comment: >60 mL/min/1.73m2 EGFR, calc. for ages 25 and older using the  MDRD formula (not corrected for weight), is valid for stable  renal function.  GFR  09/22/2018 >60  >60 Final    Comment: Chronic Kidney Disease: less than 60 ml/min/1.73 sq.m. Kidney Failure: less than 15 ml/min/1.73 sq.m. Results valid for patients 18 years and older.       Calcium 09/22/2018 9.3  8.3 - 10.6 mg/dL Final    Total Protein 09/22/2018 7.0  6.4 - 8.2 g/dL Final    Alb 09/22/2018 4.3  3.4 - 5.0 g/dL Final    Albumin/Globulin Ratio 09/22/2018 1.6  1.1 - 2.2 Final    Total Bilirubin 09/22/2018 0.3  0.0 - 1.0 mg/dL Final    Alkaline Phosphatase 09/22/2018 59  40 - 129 U/L Final    ALT 09/22/2018 13  10 - 40 U/L Final    AST 09/22/2018 19  15 - 37 U/L Final    Globulin 09/22/2018 2.7  g/dL Final    Ethanol Lvl 09/22/2018 None Detected  mg/dL Final    Comment:    None Detected  Conversion factor:  100 mg/dl = .100 g/dl  For Medical Purposes Only      Amphetamine Screen, Urine 09/22/2018 POSITIVE* Negative <1000ng/mL American 09/25/2018 >60  >60 Final    Comment: >60 mL/min/1.73m2 EGFR, calc. for ages 25 and older using the  MDRD formula (not corrected for weight), is valid for stable  renal function.  GFR  09/25/2018 >60  >60 Final    Comment: Chronic Kidney Disease: less than 60 ml/min/1.73 sq.m. Kidney Failure: less than 15 ml/min/1.73 sq.m. Results valid for patients 18 years and older.       Calcium 09/25/2018 9.0  8.3 - 10.6 mg/dL Final            Medications  Current Facility-Administered Medications: buPROPion (WELLBUTRIN XL) extended release tablet 150 mg, 150 mg, Oral, Daily  bacitracin-polymyxin b (POLYSPORIN) ointment, , Topical, BID  gabapentin (NEURONTIN) capsule 300 mg, 300 mg, Oral, BID  albuterol sulfate  (90 Base) MCG/ACT inhaler 2 puff, 2 puff, Inhalation, Q6H PRN  buprenorphine-naloxone (SUBOXONE) 8-2 MG SL film 2 Film, 2 Film, Sublingual, Daily  cetirizine (ZYRTEC) tablet 10 mg, 10 mg, Oral, Daily  DULoxetine (CYMBALTA) extended release capsule 60 mg, 60 mg, Oral, Daily  prazosin (MINIPRESS) capsule 2 mg, 2 mg, Oral, Nightly  acetaminophen (TYLENOL) tablet 650 mg, 650 mg, Oral, Q4H PRN  hydrOXYzine (VISTARIL) capsule 50 mg, 50 mg, Oral, TID PRN  OLANZapine (ZYPREXA) tablet 5 mg, 5 mg, Oral, BID PRN  magnesium hydroxide (MILK OF MAGNESIA) 400 MG/5ML suspension 30 mL, 30 mL, Oral, Daily PRN  aluminum & magnesium hydroxide-simethicone (MAALOX) 200-200-20 MG/5ML suspension 30 mL, 30 mL, Oral, Q6H PRN  nicotine (NICODERM CQ) 21 MG/24HR 1 patch, 1 patch, Transdermal, Daily PRN  nicotine polacrilex (NICORETTE) gum 2 mg, 2 mg, Oral, Q2H PRN    ASSESSMENT AND PLAN    Active Problems:    Severe episode of recurrent major depressive disorder, without psychotic features (HCC)    MDD (major depressive disorder), recurrent severe, without psychosis (HCC)    Opiate dependence (HCC)    MDD (major depressive disorder), recurrent episode ([facility]    Suicide risk    Mild intermittent asthma

## 2018-09-29 PROCEDURE — 6370000000 HC RX 637 (ALT 250 FOR IP): Performed by: PSYCHIATRY & NEUROLOGY

## 2018-09-29 PROCEDURE — 6370000000 HC RX 637 (ALT 250 FOR IP): Performed by: NURSE PRACTITIONER

## 2018-09-29 PROCEDURE — 1240000000 HC EMOTIONAL WELLNESS R&B

## 2018-09-29 PROCEDURE — 6360000002 HC RX W HCPCS: Performed by: NURSE PRACTITIONER

## 2018-09-29 PROCEDURE — 99233 SBSQ HOSP IP/OBS HIGH 50: CPT | Performed by: PSYCHIATRY & NEUROLOGY

## 2018-09-29 RX ORDER — FLUOXETINE HYDROCHLORIDE 20 MG/1
20 CAPSULE ORAL DAILY
Status: DISCONTINUED | OUTPATIENT
Start: 2018-09-29 | End: 2018-10-01 | Stop reason: HOSPADM

## 2018-09-29 RX ORDER — MIRTAZAPINE 15 MG/1
30 TABLET, ORALLY DISINTEGRATING ORAL NIGHTLY
Status: DISCONTINUED | OUTPATIENT
Start: 2018-09-29 | End: 2018-10-01 | Stop reason: HOSPADM

## 2018-09-29 RX ORDER — LOPERAMIDE HYDROCHLORIDE 2 MG/1
2 CAPSULE ORAL 4 TIMES DAILY PRN
Status: DISCONTINUED | OUTPATIENT
Start: 2018-09-29 | End: 2018-10-01 | Stop reason: HOSPADM

## 2018-09-29 RX ADMIN — LOPERAMIDE HYDROCHLORIDE 2 MG: 2 CAPSULE ORAL at 15:26

## 2018-09-29 RX ADMIN — DULOXETINE HYDROCHLORIDE 60 MG: 60 CAPSULE, DELAYED RELEASE ORAL at 08:30

## 2018-09-29 RX ADMIN — GABAPENTIN 300 MG: 300 CAPSULE ORAL at 08:30

## 2018-09-29 RX ADMIN — MIRTAZAPINE 30 MG: 15 TABLET, ORALLY DISINTEGRATING ORAL at 20:08

## 2018-09-29 RX ADMIN — BACITRACIN ZINC AND POLYMYXIN B SULFATE 28.3 G: at 08:33

## 2018-09-29 RX ADMIN — ALUMINUM HYDROXIDE, MAGNESIUM HYDROXIDE, AND SIMETHICONE 30 ML: 200; 200; 20 SUSPENSION ORAL at 20:08

## 2018-09-29 RX ADMIN — FLUOXETINE HYDROCHLORIDE 20 MG: 20 CAPSULE ORAL at 12:05

## 2018-09-29 RX ADMIN — BUPRENORPHINE HYDROCHLORIDE, NALOXONE HYDROCHLORIDE 2 FILM: 8; 2 FILM, SOLUBLE BUCCAL; SUBLINGUAL at 08:32

## 2018-09-29 RX ADMIN — CETIRIZINE HYDROCHLORIDE 10 MG: 10 TABLET ORAL at 08:30

## 2018-09-29 RX ADMIN — ALUMINUM HYDROXIDE, MAGNESIUM HYDROXIDE, AND SIMETHICONE 30 ML: 200; 200; 20 SUSPENSION ORAL at 08:29

## 2018-09-29 RX ADMIN — GABAPENTIN 300 MG: 300 CAPSULE ORAL at 20:08

## 2018-09-29 RX ADMIN — BUPROPION HYDROCHLORIDE 150 MG: 150 TABLET, FILM COATED, EXTENDED RELEASE ORAL at 08:30

## 2018-09-29 NOTE — PLAN OF CARE
Problem: Depressive Behavior With or Without Suicide Precautions:  Goal: Ability to disclose and discuss suicidal ideas will improve  Ability to disclose and discuss suicidal ideas will improve   Outcome: Ongoing  Pt denies SI/HI. Pt has been calling inpatient rehab facilities today looking for placement. She states she has had no luck getting a live person, but has left several messages at multiple places.

## 2018-09-29 NOTE — PLAN OF CARE
Problem: Depressive Behavior With or Without Suicide Precautions:  Goal: Able to verbalize and/or display a decrease in depressive symptoms  Able to verbalize and/or display a decrease in depressive symptoms   Outcome: Ongoing                                                                      Group Therapy Note    Date: 9/29/2018  Start Time: 10:30am  End Time:  12:00pm  Number of Participants: 9    Type of Group: Art Therapy    Patient's Goal:  Pts were directed to choose their art therapy directive: creating a freeform mandala, completing a future vision board and/or creating a free piece of artwork. Pts were encouraged to share their choice of creative expression with the group upon completion. Pts were encouraged to focus on self-expression, freedom of expression, decision making, problem solving and experimenting with new art materials and processes. Notes:  Joseph Gutierrez chose to focus on creating a future vision board for her duration of time in group. She was future oriented and goal directed and was able to share her ideas with the group facilitator. Status After Intervention:  Improved    Participation Level:  Active Listener and Interactive    Participation Quality: Appropriate, Attentive and Sharing      Speech:  normal      Thought Process/Content: Logical  Linear      Affective Functioning: Constricted/Restricted      Mood: anxious and depressed (decreased)      Level of consciousness:  Alert, Oriented x4 and Attentive      Response to Learning: Able to verbalize current knowledge/experience, Able to verbalize/acknowledge new learning, Able to retain information, Capable of insight and Progressing to goal      Endings: None Reported    Modes of Intervention: Education, Support, Exploration, Clarifying, Problem-solving and Activity      Discipline Responsible: Psychoeducational Specialist      Signature:  Jarret Rollins MS, ATR-BC

## 2018-09-30 PROCEDURE — 1240000000 HC EMOTIONAL WELLNESS R&B

## 2018-09-30 PROCEDURE — 6360000002 HC RX W HCPCS: Performed by: NURSE PRACTITIONER

## 2018-09-30 PROCEDURE — 6370000000 HC RX 637 (ALT 250 FOR IP): Performed by: PSYCHIATRY & NEUROLOGY

## 2018-09-30 PROCEDURE — 6370000000 HC RX 637 (ALT 250 FOR IP): Performed by: NURSE PRACTITIONER

## 2018-09-30 RX ADMIN — MIRTAZAPINE 30 MG: 15 TABLET, ORALLY DISINTEGRATING ORAL at 20:08

## 2018-09-30 RX ADMIN — FLUOXETINE HYDROCHLORIDE 20 MG: 20 CAPSULE ORAL at 08:57

## 2018-09-30 RX ADMIN — GABAPENTIN 300 MG: 300 CAPSULE ORAL at 20:08

## 2018-09-30 RX ADMIN — BACITRACIN ZINC AND POLYMYXIN B SULFATE 28.3 G: at 08:56

## 2018-09-30 RX ADMIN — GABAPENTIN 300 MG: 300 CAPSULE ORAL at 08:57

## 2018-09-30 RX ADMIN — BUPROPION HYDROCHLORIDE 150 MG: 150 TABLET, FILM COATED, EXTENDED RELEASE ORAL at 08:57

## 2018-09-30 RX ADMIN — BUPRENORPHINE HYDROCHLORIDE, NALOXONE HYDROCHLORIDE 2 FILM: 8; 2 FILM, SOLUBLE BUCCAL; SUBLINGUAL at 08:57

## 2018-09-30 RX ADMIN — CETIRIZINE HYDROCHLORIDE 10 MG: 10 TABLET ORAL at 08:57
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
## 2018-09-30 NOTE — PLAN OF CARE
Problem: Depressive Behavior With or Without Suicide Precautions:  Goal: Able to verbalize and/or display a decrease in depressive symptoms  Able to verbalize and/or display a decrease in depressive symptoms   Outcome: Ongoing  Pt in bed most of shift. Pt complaining of inability to sleep more than 45 minutes at a time. Pt states that when she does fall asleep she has nightmares and wakes up scared. Pt states she can not get rest, and doesn't feel rested.

## 2018-10-01 VITALS
BODY MASS INDEX: 30.82 KG/M2 | OXYGEN SATURATION: 99 % | RESPIRATION RATE: 14 BRPM | DIASTOLIC BLOOD PRESSURE: 69 MMHG | HEIGHT: 65 IN | TEMPERATURE: 99.8 F | WEIGHT: 185 LBS | SYSTOLIC BLOOD PRESSURE: 104 MMHG | HEART RATE: 97 BPM

## 2018-10-01 PROCEDURE — 99239 HOSP IP/OBS DSCHRG MGMT >30: CPT | Performed by: PSYCHIATRY & NEUROLOGY

## 2018-10-01 PROCEDURE — 5130000000 HC BRIDGE APPOINTMENT

## 2018-10-01 PROCEDURE — 6360000002 HC RX W HCPCS: Performed by: NURSE PRACTITIONER

## 2018-10-01 PROCEDURE — 6370000000 HC RX 637 (ALT 250 FOR IP): Performed by: PSYCHIATRY & NEUROLOGY

## 2018-10-01 PROCEDURE — 6370000000 HC RX 637 (ALT 250 FOR IP): Performed by: NURSE PRACTITIONER

## 2018-10-01 RX ORDER — GABAPENTIN 400 MG/1
400 CAPSULE ORAL 3 TIMES DAILY
Qty: 90 CAPSULE | Refills: 3 | Status: SHIPPED | OUTPATIENT
Start: 2018-10-01 | End: 2018-11-21

## 2018-10-01 RX ORDER — BUPROPION HYDROCHLORIDE 150 MG/1
150 TABLET ORAL DAILY
Qty: 30 TABLET | Refills: 0 | Status: SHIPPED | OUTPATIENT
Start: 2018-10-02 | End: 2020-07-08

## 2018-10-01 RX ORDER — CETIRIZINE HYDROCHLORIDE 10 MG/1
10 TABLET ORAL DAILY
Qty: 30 TABLET | Refills: 0 | Status: ON HOLD | OUTPATIENT
Start: 2018-10-02 | End: 2018-11-05 | Stop reason: HOSPADM

## 2018-10-01 RX ORDER — MIRTAZAPINE 30 MG/1
30 TABLET, FILM COATED ORAL NIGHTLY
Qty: 30 TABLET | Refills: 0 | Status: ON HOLD | OUTPATIENT
Start: 2018-10-01 | End: 2018-11-05 | Stop reason: HOSPADM

## 2018-10-01 RX ORDER — BUPRENORPHINE AND NALOXONE 8; 2 MG/1; MG/1
2 FILM, SOLUBLE BUCCAL; SUBLINGUAL DAILY
Qty: 14 EACH | Refills: 0 | Status: SHIPPED | OUTPATIENT
Start: 2018-10-02 | End: 2018-10-09

## 2018-10-01 RX ORDER — FLUOXETINE HYDROCHLORIDE 20 MG/1
20 CAPSULE ORAL DAILY
Qty: 30 CAPSULE | Refills: 0 | Status: ON HOLD | OUTPATIENT
Start: 2018-10-02 | End: 2018-11-05 | Stop reason: HOSPADM

## 2018-10-01 RX ORDER — GABAPENTIN 400 MG/1
400 CAPSULE ORAL 3 TIMES DAILY
Status: DISCONTINUED | OUTPATIENT
Start: 2018-10-01 | End: 2018-10-01 | Stop reason: HOSPADM

## 2018-10-01 RX ADMIN — ALUMINUM HYDROXIDE, MAGNESIUM HYDROXIDE, AND SIMETHICONE 30 ML: 200; 200; 20 SUSPENSION ORAL at 08:20

## 2018-10-01 RX ADMIN — BUPROPION HYDROCHLORIDE 150 MG: 150 TABLET, FILM COATED, EXTENDED RELEASE ORAL at 08:21

## 2018-10-01 RX ADMIN — GABAPENTIN 300 MG: 300 CAPSULE ORAL at 08:21

## 2018-10-01 RX ADMIN — FLUOXETINE HYDROCHLORIDE 20 MG: 20 CAPSULE ORAL at 08:21

## 2018-10-01 RX ADMIN — GABAPENTIN 400 MG: 400 CAPSULE ORAL at 14:55

## 2018-10-01 RX ADMIN — BACITRACIN ZINC AND POLYMYXIN B SULFATE 28.3 G: at 09:01

## 2018-10-01 RX ADMIN — CETIRIZINE HYDROCHLORIDE 10 MG: 10 TABLET ORAL at 08:21

## 2018-10-01 RX ADMIN — BUPRENORPHINE HYDROCHLORIDE, NALOXONE HYDROCHLORIDE 2 FILM: 8; 2 FILM, SOLUBLE BUCCAL; SUBLINGUAL at 08:22

## 2018-10-01 NOTE — PROGRESS NOTES
Department of Psychiatry  Attending Progress Note  Chief Complaint: Depressed mood  Ayad Mariscal is still having significant GI sxs. Diarrhea over the weekend. No SI and is still waiting for inpat rehab although she now has legal issues that she needs to resolve. Discussed DC today or tomorrow. Patient's chart was reviewed and collaborated with  about the treatment plan. SUBJECTIVE:    Patient is feeling better. Suicidal ideation:  denies suicidal ideation. Patient does not have medication side effects. ROS: Patient has new complaints: no  Sleeping adequately:  Yes   Appetite adequate: Yes  Attending groups: Yes  Visitors:No    OBJECTIVE    Physical  VITALS:  /69   Pulse 97   Temp 99.8 °F (37.7 °C) (Oral)   Resp 14   Ht 5' 5\" (1.651 m)   Wt 185 lb (83.9 kg)   SpO2 99%   Breastfeeding? No   BMI 30.79 kg/m²     Mental Status Examination:  Patients appearance was street clothes. Thoughts are Goal directed. Homicidal ideations none. No abnormal movements, tics or mannerisms. Memory intact Aims 0. Concentration Good. Alert and oriented X 4. Insight and Judgement impaired insight. Patient was cooperative.  Patient gait normal. Mood within normal limits, affect normal affect Hallucinations Absent, suicidal ideations no specific plan to harm self Speech normal volume  Data  Labs:   Admission on 09/22/2018   Component Date Value Ref Range Status    WBC 09/22/2018 6.9  4.0 - 11.0 K/uL Final    RBC 09/22/2018 4.15  4.00 - 5.20 M/uL Final    Hemoglobin 09/22/2018 12.6  12.0 - 16.0 g/dL Final    Hematocrit 09/22/2018 37.1  36.0 - 48.0 % Final    MCV 09/22/2018 89.5  80.0 - 100.0 fL Final    MCH 09/22/2018 30.5  26.0 - 34.0 pg Final    MCHC 09/22/2018 34.0  31.0 - 36.0 g/dL Final    RDW 09/22/2018 13.1  12.4 - 15.4 % Final    Platelets 90/37/9872 313  135 - 450 K/uL Final    MPV 09/22/2018 8.1  5.0 - 10.5 fL Final    Neutrophils % 09/22/2018 73.3  % Final    Lymphocytes % 09/22/2018

## 2018-10-28 ENCOUNTER — HOSPITAL ENCOUNTER (INPATIENT)
Age: 33
LOS: 3 days | Discharge: HOME | DRG: 751 | End: 2018-11-05
Attending: EMERGENCY MEDICINE | Admitting: PSYCHIATRY & NEUROLOGY
Payer: COMMERCIAL

## 2018-10-28 DIAGNOSIS — R45.851 SUICIDAL IDEATION: Primary | ICD-10-CM

## 2018-10-28 DIAGNOSIS — F11.21 OPIOID DEPENDENCE IN REMISSION (HCC): ICD-10-CM

## 2018-10-28 DIAGNOSIS — E87.6 HYPOKALEMIA: ICD-10-CM

## 2018-10-28 LAB
A/G RATIO: 1.9 (ref 1.1–2.2)
ACETAMINOPHEN LEVEL: <5 UG/ML (ref 10–30)
ALBUMIN SERPL-MCNC: 4.4 G/DL (ref 3.4–5)
ALP BLD-CCNC: 56 U/L (ref 40–129)
ALT SERPL-CCNC: 7 U/L (ref 10–40)
AMORPHOUS: ABNORMAL /HPF
AMPHETAMINE SCREEN, URINE: POSITIVE
ANION GAP SERPL CALCULATED.3IONS-SCNC: 10 MMOL/L (ref 3–16)
AST SERPL-CCNC: 11 U/L (ref 15–37)
BACTERIA: ABNORMAL /HPF
BARBITURATE SCREEN URINE: ABNORMAL
BASOPHILS ABSOLUTE: 0 K/UL (ref 0–0.2)
BASOPHILS RELATIVE PERCENT: 0.1 %
BENZODIAZEPINE SCREEN, URINE: ABNORMAL
BILIRUB SERPL-MCNC: 0.3 MG/DL (ref 0–1)
BILIRUBIN URINE: ABNORMAL
BLOOD, URINE: ABNORMAL
BUN BLDV-MCNC: 7 MG/DL (ref 7–20)
CALCIUM SERPL-MCNC: 9 MG/DL (ref 8.3–10.6)
CANNABINOID SCREEN URINE: ABNORMAL
CHLORIDE BLD-SCNC: 104 MMOL/L (ref 99–110)
CLARITY: ABNORMAL
CO2: 27 MMOL/L (ref 21–32)
COCAINE METABOLITE SCREEN URINE: ABNORMAL
COLOR: YELLOW
CREAT SERPL-MCNC: 0.8 MG/DL (ref 0.6–1.1)
EOSINOPHILS ABSOLUTE: 0.1 K/UL (ref 0–0.6)
EOSINOPHILS RELATIVE PERCENT: 1.9 %
EPITHELIAL CELLS, UA: ABNORMAL /HPF
ETHANOL: NORMAL MG/DL (ref 0–0.08)
GFR AFRICAN AMERICAN: >60
GFR NON-AFRICAN AMERICAN: >60
GLOBULIN: 2.3 G/DL
GLUCOSE BLD-MCNC: 122 MG/DL (ref 70–99)
GLUCOSE URINE: NEGATIVE MG/DL
HCG QUALITATIVE: NEGATIVE
HCT VFR BLD CALC: 35.9 % (ref 36–48)
HEMOGLOBIN: 12.2 G/DL (ref 12–16)
KETONES, URINE: ABNORMAL MG/DL
LEUKOCYTE ESTERASE, URINE: NEGATIVE
LYMPHOCYTES ABSOLUTE: 1.4 K/UL (ref 1–5.1)
LYMPHOCYTES RELATIVE PERCENT: 18.6 %
Lab: ABNORMAL
MCH RBC QN AUTO: 30.4 PG (ref 26–34)
MCHC RBC AUTO-ENTMCNC: 34 G/DL (ref 31–36)
MCV RBC AUTO: 89.2 FL (ref 80–100)
METHADONE SCREEN, URINE: ABNORMAL
MICROSCOPIC EXAMINATION: YES
MONOCYTES ABSOLUTE: 0.6 K/UL (ref 0–1.3)
MONOCYTES RELATIVE PERCENT: 7.5 %
MUCUS: ABNORMAL /LPF
NEUTROPHILS ABSOLUTE: 5.3 K/UL (ref 1.7–7.7)
NEUTROPHILS RELATIVE PERCENT: 71.9 %
NITRITE, URINE: NEGATIVE
OPIATE SCREEN URINE: ABNORMAL
OXYCODONE URINE: ABNORMAL
PDW BLD-RTO: 13.3 % (ref 12.4–15.4)
PH UA: 6
PH UA: 6
PHENCYCLIDINE SCREEN URINE: ABNORMAL
PLATELET # BLD: 288 K/UL (ref 135–450)
PMV BLD AUTO: 9 FL (ref 5–10.5)
POTASSIUM SERPL-SCNC: 2.8 MMOL/L (ref 3.5–5.1)
PROPOXYPHENE SCREEN: ABNORMAL
PROTEIN UA: ABNORMAL MG/DL
RBC # BLD: 4.03 M/UL (ref 4–5.2)
RBC UA: ABNORMAL /HPF (ref 0–2)
SALICYLATE, SERUM: <0.3 MG/DL (ref 15–30)
SODIUM BLD-SCNC: 141 MMOL/L (ref 136–145)
SPECIFIC GRAVITY UA: >=1.03
TOTAL CK: 74 U/L (ref 26–192)
TOTAL PROTEIN: 6.7 G/DL (ref 6.4–8.2)
URINE REFLEX TO CULTURE: ABNORMAL
URINE TYPE: ABNORMAL
UROBILINOGEN, URINE: 0.2 E.U./DL
WBC # BLD: 7.3 K/UL (ref 4–11)
WBC UA: ABNORMAL /HPF (ref 0–5)

## 2018-10-28 PROCEDURE — 80307 DRUG TEST PRSMV CHEM ANLYZR: CPT

## 2018-10-28 PROCEDURE — 84703 CHORIONIC GONADOTROPIN ASSAY: CPT

## 2018-10-28 PROCEDURE — G0480 DRUG TEST DEF 1-7 CLASSES: HCPCS

## 2018-10-28 PROCEDURE — 6360000002 HC RX W HCPCS: Performed by: EMERGENCY MEDICINE

## 2018-10-28 PROCEDURE — 81001 URINALYSIS AUTO W/SCOPE: CPT

## 2018-10-28 PROCEDURE — 99284 EMERGENCY DEPT VISIT MOD MDM: CPT

## 2018-10-28 PROCEDURE — 6370000000 HC RX 637 (ALT 250 FOR IP): Performed by: NURSE PRACTITIONER

## 2018-10-28 PROCEDURE — 93005 ELECTROCARDIOGRAM TRACING: CPT | Performed by: NURSE PRACTITIONER

## 2018-10-28 PROCEDURE — 85025 COMPLETE CBC W/AUTO DIFF WBC: CPT

## 2018-10-28 PROCEDURE — 80053 COMPREHEN METABOLIC PANEL: CPT

## 2018-10-28 PROCEDURE — 82550 ASSAY OF CK (CPK): CPT

## 2018-10-28 RX ORDER — POTASSIUM CHLORIDE 20 MEQ/1
40 TABLET, EXTENDED RELEASE ORAL ONCE
Status: COMPLETED | OUTPATIENT
Start: 2018-10-28 | End: 2018-10-28

## 2018-10-28 RX ORDER — BUPRENORPHINE AND NALOXONE 8; 2 MG/1; MG/1
1 FILM, SOLUBLE BUCCAL; SUBLINGUAL DAILY
Status: DISCONTINUED | OUTPATIENT
Start: 2018-10-28 | End: 2018-11-05 | Stop reason: HOSPADM

## 2018-10-28 RX ORDER — POTASSIUM CHLORIDE 20 MEQ/1
40 TABLET, EXTENDED RELEASE ORAL ONCE
Status: COMPLETED | OUTPATIENT
Start: 2018-10-28 | End: 2018-10-29

## 2018-10-28 RX ORDER — ONDANSETRON 4 MG/1
8 TABLET, ORALLY DISINTEGRATING ORAL ONCE
Status: DISCONTINUED | OUTPATIENT
Start: 2018-10-28 | End: 2018-11-05 | Stop reason: HOSPADM

## 2018-10-28 RX ADMIN — BUPRENORPHINE HYDROCHLORIDE, NALOXONE HYDROCHLORIDE 1 FILM: 8; 2 FILM, SOLUBLE BUCCAL; SUBLINGUAL at 21:23

## 2018-10-28 RX ADMIN — POTASSIUM CHLORIDE 40 MEQ: 20 TABLET, EXTENDED RELEASE ORAL at 21:00

## 2018-10-28 ASSESSMENT — ENCOUNTER SYMPTOMS
SHORTNESS OF BREATH: 0
ABDOMINAL PAIN: 0

## 2018-10-28 NOTE — ED PROVIDER NOTES
run out of her Suboxone. Patient reports last time she took her Suboxone was one week ago. She states that she's had suicidal ideation since then. On exam she is awake and alert hemodynamically stable and nontoxic in appearance. Patient is tearful throughout the conversation. Lab values have been been reviewed and interpreted. Patient was found to be hypokalemic. She was provided with Zofran and potassium chloride. She was given 1 dose of Suboxone and given instructions to follow up with [facility] in the morning if she was ultimately discharged home tonight. At this time she was considered medically cleared and was consulted with behavior health for evaluation and assistance a final disposition. The patient tolerated their visit well. They were seen and evaluated by the attendingphysician, No att. providers found who agreed with the assessment and plan. The patient and / or the family were informed of the results of any tests, a time was given to answer questions, a plan was proposed and they agreed Steve Honeycutt. FINAL IMPRESSION      1. Suicidal ideation    2.  Hypokalemia          DISPOSITION/PLAN   DISPOSITION Ed Observation 10/28/2018 08:54:02 PM      PATIENT REFERRED TO:  ISSA Mason CNP  [address] [address]  [phone]    Schedule an appointment as soon as possible for a visit in 2 days  for re-evaluation of potassium    [facility] [facility] ED  [address] [facility]  [phone]    If symptoms worsen      DISCHARGE MEDICATIONS:  New Prescriptions    No medications on file       DISCONTINUED MEDICATIONS:  Discontinued Medications    No medications on file              (Please note that portions of this note were completed with a voice recognition program.  Efforts were made to edit the dictations but occasionally words are mis-transcribed.)    ISSA Cook CNP (electronically signed)     ISSA Cook - CNP  10/28/18 8325

## 2018-10-29 PROBLEM — F39 MOOD DISORDER (HCC): Status: ACTIVE | Noted: 2018-10-29

## 2018-10-29 PROCEDURE — G0378 HOSPITAL OBSERVATION PER HR: HCPCS

## 2018-10-29 PROCEDURE — 6360000002 HC RX W HCPCS: Performed by: EMERGENCY MEDICINE

## 2018-10-29 PROCEDURE — 93010 ELECTROCARDIOGRAM REPORT: CPT | Performed by: INTERNAL MEDICINE

## 2018-10-29 PROCEDURE — 6370000000 HC RX 637 (ALT 250 FOR IP): Performed by: PSYCHIATRY & NEUROLOGY

## 2018-10-29 PROCEDURE — 6370000000 HC RX 637 (ALT 250 FOR IP): Performed by: EMERGENCY MEDICINE

## 2018-10-29 RX ORDER — DIMETHICONE, OXYBENZONE, AND PADIMATE O 2; 2.5; 6.6 G/100G; G/100G; G/100G
STICK TOPICAL PRN
Status: DISCONTINUED | OUTPATIENT
Start: 2018-10-29 | End: 2018-11-05 | Stop reason: HOSPADM

## 2018-10-29 RX ORDER — IBUPROFEN 400 MG/1
400 TABLET ORAL EVERY 6 HOURS PRN
Status: DISCONTINUED | OUTPATIENT
Start: 2018-10-29 | End: 2018-11-05 | Stop reason: HOSPADM

## 2018-10-29 RX ORDER — MAGNESIUM HYDROXIDE/ALUMINUM HYDROXICE/SIMETHICONE 120; 1200; 1200 MG/30ML; MG/30ML; MG/30ML
30 SUSPENSION ORAL EVERY 6 HOURS PRN
Status: DISCONTINUED | OUTPATIENT
Start: 2018-10-29 | End: 2018-11-05 | Stop reason: HOSPADM

## 2018-10-29 RX ORDER — ACETAMINOPHEN 325 MG/1
650 TABLET ORAL EVERY 4 HOURS PRN
Status: DISCONTINUED | OUTPATIENT
Start: 2018-10-29 | End: 2018-11-05 | Stop reason: HOSPADM

## 2018-10-29 RX ORDER — HALOPERIDOL 5 MG/ML
5 INJECTION INTRAMUSCULAR EVERY 6 HOURS PRN
Status: DISCONTINUED | OUTPATIENT
Start: 2018-10-29 | End: 2018-11-05 | Stop reason: HOSPADM

## 2018-10-29 RX ORDER — DULOXETIN HYDROCHLORIDE 60 MG/1
60 CAPSULE, DELAYED RELEASE ORAL DAILY
Status: ON HOLD | COMMUNITY
End: 2020-07-16 | Stop reason: HOSPADM

## 2018-10-29 RX ORDER — HYDROXYZINE PAMOATE 50 MG/1
50 CAPSULE ORAL 3 TIMES DAILY PRN
Status: DISCONTINUED | OUTPATIENT
Start: 2018-10-29 | End: 2018-11-05 | Stop reason: HOSPADM

## 2018-10-29 RX ORDER — METHOCARBAMOL 750 MG/1
750 TABLET, FILM COATED ORAL 2 TIMES DAILY
Status: ON HOLD | COMMUNITY
End: 2018-11-05 | Stop reason: HOSPADM

## 2018-10-29 RX ORDER — BISACODYL 10 MG
10 SUPPOSITORY, RECTAL RECTAL DAILY PRN
Status: DISCONTINUED | OUTPATIENT
Start: 2018-10-29 | End: 2018-11-05 | Stop reason: HOSPADM

## 2018-10-29 RX ORDER — TRAZODONE HYDROCHLORIDE 50 MG/1
50 TABLET ORAL NIGHTLY PRN
Status: DISCONTINUED | OUTPATIENT
Start: 2018-10-29 | End: 2018-11-05 | Stop reason: HOSPADM

## 2018-10-29 RX ORDER — NICOTINE 21 MG/24HR
1 PATCH, TRANSDERMAL 24 HOURS TRANSDERMAL DAILY
Status: DISCONTINUED | OUTPATIENT
Start: 2018-10-29 | End: 2018-10-30

## 2018-10-29 RX ADMIN — TRAZODONE HYDROCHLORIDE 50 MG: 100 TABLET ORAL at 21:57

## 2018-10-29 RX ADMIN — BUPRENORPHINE HYDROCHLORIDE, NALOXONE HYDROCHLORIDE 1 FILM: 8; 2 FILM, SOLUBLE BUCCAL; SUBLINGUAL at 09:23

## 2018-10-29 RX ADMIN — POTASSIUM CHLORIDE 40 MEQ: 20 TABLET, EXTENDED RELEASE ORAL at 07:07

## 2018-10-29 ASSESSMENT — SLEEP AND FATIGUE QUESTIONNAIRES
AVERAGE NUMBER OF SLEEP HOURS: 5
SLEEP PATTERN: DISTURBED/INTERRUPTED SLEEP;NIGHTMARES/TERRORS
DO YOU HAVE DIFFICULTY SLEEPING: YES
DO YOU USE A SLEEP AID: YES
RESTFUL SLEEP: NO
DIFFICULTY STAYING ASLEEP: YES
DIFFICULTY ARISING: YES
DIFFICULTY FALLING ASLEEP: NO

## 2018-10-29 ASSESSMENT — LIFESTYLE VARIABLES: HISTORY_ALCOHOL_USE: NO

## 2018-10-29 ASSESSMENT — PATIENT HEALTH QUESTIONNAIRE - PHQ9: SUM OF ALL RESPONSES TO PHQ QUESTIONS 1-9: 26

## 2018-10-29 NOTE — ED NOTES
Call placed to [facility] for outreach team follow up with pt.  Awaiting return call from outreach team.     Jerry Storm [facility] [place], [place]  10/29/18 2598
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
## 2018-10-29 NOTE — ED NOTES
Panic lab called for K+ at 2.8. Information given to NASH PARSONS-BRANT Cartagena, CON  10/28/18 2050

## 2018-10-30 PROCEDURE — 6360000002 HC RX W HCPCS: Performed by: EMERGENCY MEDICINE

## 2018-10-30 PROCEDURE — G0378 HOSPITAL OBSERVATION PER HR: HCPCS

## 2018-10-30 PROCEDURE — 6370000000 HC RX 637 (ALT 250 FOR IP): Performed by: PSYCHIATRY & NEUROLOGY

## 2018-10-30 PROCEDURE — 6370000000 HC RX 637 (ALT 250 FOR IP): Performed by: PHYSICIAN ASSISTANT

## 2018-10-30 PROCEDURE — 99223 1ST HOSP IP/OBS HIGH 75: CPT | Performed by: PSYCHIATRY & NEUROLOGY

## 2018-10-30 RX ORDER — MIRTAZAPINE 15 MG/1
30 TABLET, ORALLY DISINTEGRATING ORAL NIGHTLY
Status: DISCONTINUED | OUTPATIENT
Start: 2018-10-30 | End: 2018-11-01

## 2018-10-30 RX ORDER — FLUOXETINE HYDROCHLORIDE 20 MG/1
20 CAPSULE ORAL DAILY
Status: DISCONTINUED | OUTPATIENT
Start: 2018-10-30 | End: 2018-11-02

## 2018-10-30 RX ORDER — BUPROPION HYDROCHLORIDE 100 MG/1
100 TABLET, EXTENDED RELEASE ORAL 2 TIMES DAILY
Status: DISCONTINUED | OUTPATIENT
Start: 2018-10-30 | End: 2018-11-05

## 2018-10-30 RX ORDER — CETIRIZINE HYDROCHLORIDE 10 MG/1
10 TABLET ORAL DAILY
Status: DISCONTINUED | OUTPATIENT
Start: 2018-10-30 | End: 2018-11-05 | Stop reason: HOSPADM

## 2018-10-30 RX ORDER — GABAPENTIN 300 MG/1
300 CAPSULE ORAL 3 TIMES DAILY
Status: DISCONTINUED | OUTPATIENT
Start: 2018-10-30 | End: 2018-11-02

## 2018-10-30 RX ADMIN — CETIRIZINE HYDROCHLORIDE 10 MG: 10 TABLET ORAL at 20:58

## 2018-10-30 RX ADMIN — GABAPENTIN 300 MG: 300 CAPSULE ORAL at 20:52

## 2018-10-30 RX ADMIN — MIRTAZAPINE 30 MG: 15 TABLET, ORALLY DISINTEGRATING ORAL at 20:52

## 2018-10-30 RX ADMIN — MUPIROCIN: 20 OINTMENT TOPICAL at 22:05

## 2018-10-30 RX ADMIN — FLUOXETINE 20 MG: 20 CAPSULE ORAL at 12:29

## 2018-10-30 RX ADMIN — GABAPENTIN 300 MG: 300 CAPSULE ORAL at 14:10

## 2018-10-30 RX ADMIN — BUPRENORPHINE HYDROCHLORIDE, NALOXONE HYDROCHLORIDE 1 FILM: 8; 2 FILM, SOLUBLE BUCCAL; SUBLINGUAL at 10:02

## 2018-10-30 RX ADMIN — BUPROPION HYDROCHLORIDE 100 MG: 100 TABLET, EXTENDED RELEASE ORAL at 12:29

## 2018-10-30 RX ADMIN — BUPROPION HYDROCHLORIDE 100 MG: 100 TABLET, EXTENDED RELEASE ORAL at 20:52

## 2018-10-30 NOTE — H&P
Hospital Medicine History & Physical      PCP: ISSA Horne CNP    Date of Admission: 10/28/2018    Date of Service: Pt seen/examined on 10/30/2018    Chief Complaint:    Chief Complaint   Patient presents with    Psychiatric Evaluation     patient states that she has been feeling suicidal this week. pt. states that she was here about 1 month ago to help with her suicidal thoughts. pt. states that she got out and then ran out of her medication and has been unable to get any more. pt. has been out of medication x1 week (suboxone). History Of Present Illness: The patient is a 28 y.o. female with anxiety, PTSD, depression, asthma, substance abuse who presented to [facility] with complaint of SI. Patient was seen and evaluated in the ED by the ED medical provider, patient was medically cleared for admission to [facility]. This note serves as an admission medical H&P.    PCP: ISSA Horne CNP  Tobacco use: denies  ETOH use: denies   Illicit drug use: yes, currently enrolled in Present     Patient denies any medical complaints    Past Medical History:        Diagnosis Date    Anxiety     Chronic post-traumatic stress disorder (PTSD) 10/29/2018    Depression     Mild intermittent asthma without complication     Stimulant use disorder 2018       Past Surgical History:        Procedure Laterality Date     SECTION  2005    DILATION AND CURETTAGE OF UTERUS  2006           Medications Prior to Admission:    Prior to Admission medications    Medication Sig Start Date End Date Taking? Authorizing Provider   DULoxetine (CYMBALTA) 60 MG extended release capsule Take 60 mg by mouth daily   Yes Historical Provider, MD   methocarbamol (ROBAXIN) 750 MG tablet Take 750 mg by mouth 2 times daily   Yes Historical Provider, MD   gabapentin (NEURONTIN) 400 MG capsule Take 1 capsule by mouth 3 times daily for 30 days. . 10/1/18 10/31/18 Yes Dana Chiu MD   buPROPion
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
## 2018-10-30 NOTE — PROGRESS NOTES
Mother called concerning daughter. Says daughter has mood swings. She said she can go very high where she is on top of the world or very low where she can't get out of bed. There is no in between for patient per mother. Mother states that when patient is good she feels no need to follow up with doctor or counselor but then she gets low she is unable to get out of bed to see anyone. Mother also states that her daughter is doing drugs. She thinks her daughter needs an inpatient rehab and also thinks that daughter has never been diagnosed correctively. Mother says she will get straight for a few days but it is a repetitive cycle. Will pass along to treatment team. Will continue to monitor.

## 2018-10-30 NOTE — PLAN OF CARE
Problem: Altered Mood, Depressive Behavior:  Goal: Absence of self-harm  Absence of self-harm   Outcome: Ongoing  Pt asking several times for her Suboxone. Stated she is supposed to get it twice daily. Let her know that it was not due tonight, only in the AM.  Talked with pt about her suicidal feelings. Stated she could contract for safety at this time, but was irritable about meds. She said generally that if people that were supposed to help her didn't help her, that she wasn't sure about the suicidal thoughts. Did give pt Trazodone 50 mg at 2157/  Pt appeared to sleep well the majority of the night.

## 2018-10-31 LAB
ANION GAP SERPL CALCULATED.3IONS-SCNC: 6 MMOL/L (ref 3–16)
BUN BLDV-MCNC: 13 MG/DL (ref 7–20)
CALCIUM SERPL-MCNC: 8.3 MG/DL (ref 8.3–10.6)
CHLORIDE BLD-SCNC: 106 MMOL/L (ref 99–110)
CO2: 28 MMOL/L (ref 21–32)
CREAT SERPL-MCNC: 0.8 MG/DL (ref 0.6–1.1)
EKG ATRIAL RATE: 68 BPM
EKG DIAGNOSIS: NORMAL
EKG P AXIS: 65 DEGREES
EKG P-R INTERVAL: 140 MS
EKG Q-T INTERVAL: 434 MS
EKG QRS DURATION: 92 MS
EKG QTC CALCULATION (BAZETT): 461 MS
EKG R AXIS: 91 DEGREES
EKG T AXIS: 84 DEGREES
EKG VENTRICULAR RATE: 68 BPM
GFR AFRICAN AMERICAN: >60
GFR NON-AFRICAN AMERICAN: >60
GLUCOSE BLD-MCNC: 110 MG/DL (ref 70–99)
POTASSIUM REFLEX MAGNESIUM: 3.6 MMOL/L (ref 3.5–5.1)
SODIUM BLD-SCNC: 140 MMOL/L (ref 136–145)

## 2018-10-31 PROCEDURE — G0378 HOSPITAL OBSERVATION PER HR: HCPCS

## 2018-10-31 PROCEDURE — 6360000002 HC RX W HCPCS: Performed by: EMERGENCY MEDICINE

## 2018-10-31 PROCEDURE — 99233 SBSQ HOSP IP/OBS HIGH 50: CPT | Performed by: PSYCHIATRY & NEUROLOGY

## 2018-10-31 PROCEDURE — 6370000000 HC RX 637 (ALT 250 FOR IP): Performed by: PHYSICIAN ASSISTANT

## 2018-10-31 PROCEDURE — 6370000000 HC RX 637 (ALT 250 FOR IP): Performed by: PSYCHIATRY & NEUROLOGY

## 2018-10-31 PROCEDURE — 99221 1ST HOSP IP/OBS SF/LOW 40: CPT | Performed by: PHYSICIAN ASSISTANT

## 2018-10-31 PROCEDURE — 80048 BASIC METABOLIC PNL TOTAL CA: CPT

## 2018-10-31 PROCEDURE — 36415 COLL VENOUS BLD VENIPUNCTURE: CPT

## 2018-10-31 RX ADMIN — BUPROPION HYDROCHLORIDE 100 MG: 100 TABLET, EXTENDED RELEASE ORAL at 20:44

## 2018-10-31 RX ADMIN — MUPIROCIN: 20 OINTMENT TOPICAL at 10:10

## 2018-10-31 RX ADMIN — GABAPENTIN 300 MG: 300 CAPSULE ORAL at 14:24

## 2018-10-31 RX ADMIN — GABAPENTIN 300 MG: 300 CAPSULE ORAL at 10:02

## 2018-10-31 RX ADMIN — BUPROPION HYDROCHLORIDE 100 MG: 100 TABLET, EXTENDED RELEASE ORAL at 10:02

## 2018-10-31 RX ADMIN — CETIRIZINE HYDROCHLORIDE 10 MG: 10 TABLET ORAL at 10:02

## 2018-10-31 RX ADMIN — HYDROXYZINE PAMOATE 50 MG: 50 CAPSULE ORAL at 20:45

## 2018-10-31 RX ADMIN — BUPRENORPHINE HYDROCHLORIDE, NALOXONE HYDROCHLORIDE 1 FILM: 8; 2 FILM, SOLUBLE BUCCAL; SUBLINGUAL at 10:02

## 2018-10-31 RX ADMIN — GABAPENTIN 300 MG: 300 CAPSULE ORAL at 20:45

## 2018-10-31 RX ADMIN — FLUOXETINE 20 MG: 20 CAPSULE ORAL at 10:02

## 2018-10-31 RX ADMIN — MUPIROCIN: 20 OINTMENT TOPICAL at 20:44

## 2018-10-31 RX ADMIN — TRAZODONE HYDROCHLORIDE 50 MG: 100 TABLET ORAL at 20:45

## 2018-10-31 NOTE — PLAN OF CARE
[address] [facility]  Initial Interdisciplinary Treatment Plan NOTE    Review Date & Time: 10/31/18 8547    Patient was not in treatment team    Admission Type:   Admission Type: Voluntary    Reason for admission:  Reason for Admission: Depression      Estimated Length of Stay Update:  2-3 days  Estimated Discharge Date Update: 11/2/18-11/3/18    PATIENT STRENGTHS:  Patient Strengths Strengths: Communication, No significant Physical Illness  Patient Strengths and Limitations:Limitations: Tendency to isolate self, Difficult relationships / poor social skills, General negative or hopeless attitude about future/recovery, Apathetic / unmotivated, Hopeless about future, Difficulty problem solving/relies on others to help solve problems  Addictive Behavior:Addictive Behavior  In the past 3 months, have you felt or has someone told you that you have a problem with:  : Eating (too much/too little)  Do you have a history of Chemical Use?: Yes  Do you have a history of Alcohol Use?: No  Do you have a history of Street Drug Abuse?: Yes  Histroy of Prescripton Drug Abuse?: Yes  Medical Problems:  Past Medical History:   Diagnosis Date    Anxiety     Chronic post-traumatic stress disorder (PTSD) 10/29/2018    Depression     Mild intermittent asthma without complication     Stimulant use disorder 9/23/2018       EDUCATION:   Learner Progress Toward Treatment Goals: Reviewed goals and plan of care    Method: Individual    Outcome: Verbalized understanding    PATIENT GOALS: none    PLAN/TREATMENT RECOMMENDATIONS UPDATE:CPS report, encourage groups and medications    GOALS UPDATE:   Time frame for Short-Term Goals: 2 days    Sana Gonzales RN
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
## 2018-10-31 NOTE — PROGRESS NOTES
10/28/2018 2.3  g/dL Final    hCG Qual 10/28/2018 Negative  Detects HCG level >10 MIU/mL Final    Amphetamine Screen, Urine 10/28/2018 POSITIVE* Negative <1000ng/mL Final    Comment: High concentrations of ephedrine/pseudoephedrine or  phenylpropanolamine may cause false positive results  for amphetamine. Therefore, confirmatory testing for  amphetamine should be considered if clinically indicated.  Barbiturate Screen, Ur 10/28/2018 Neg  Negative <200 ng/mL Final    Benzodiazepine Screen, Urine 10/28/2018 Neg  Negative <200 ng/mL Final    Cannabinoid Scrn, Ur 10/28/2018 Neg  Negative <50 ng/mL Final    Cocaine Metabolite Screen, Urine 10/28/2018 Neg  Negative <300 ng/mL Final    Opiate Scrn, Ur 10/28/2018 Neg  Negative <300 ng/mL Final    Comment: \"Therapeutic levels of pain medication, especially oxycontin and synthetic  opioids, may not be detected by this Methodology. Pain management screen  panel  Drug panel-PM-Hi Res Ur, Interp (PAIN) should be considered for drug  monitoring \".  PCP Screen, Urine 10/28/2018 Neg  Negative <25 ng/mL Final    Methadone Screen, Urine 10/28/2018 Neg  Negative <300 ng/mL Final    Propoxyphene Scrn, Ur 10/28/2018 Neg  Negative <300 ng/mL Final    pH, UA 10/28/2018 6.0   Final    Comment: Urine pH less than 5.0 or greater than 8.0 may indicate sample adulteration. Another sample should be collected if clinically  indicated.  Drug Screen Comment: 10/28/2018 see below   Final    Comment: This method is a screening test to detect only these drug  classes as part of a medical workup. Confirmatory testing  by another method should be ordered if clinically indicated.       Oxycodone Urine 10/28/2018 Neg  Negative <100 ng/ml Final    Color, UA 10/28/2018 Yellow  Straw/Yellow Final    Clarity, UA 10/28/2018 SL CLOUDY* Clear Final    Glucose, Ur 10/28/2018 Negative  Negative mg/dL Final    Bilirubin Urine 10/28/2018 SMALL* Negative Final    Ketones, Urine 10/28/2018 TRACE* Negative mg/dL Final    Specific Gravity, UA 10/28/2018 >=1.030  1.005 - 1.030 Final    Blood, Urine 10/28/2018 SMALL* Negative Final    pH, UA 10/28/2018 6.0  5.0 - 8.0 Final    Protein, UA 10/28/2018 TRACE* Negative mg/dL Final    Urobilinogen, Urine 10/28/2018 0.2  <2.0 E.U./dL Final    Nitrite, Urine 10/28/2018 Negative  Negative Final    Leukocyte Esterase, Urine 10/28/2018 Negative  Negative Final    Microscopic Examination 10/28/2018 YES   Final    Urine Reflex to Culture 10/28/2018 Not Indicated   Final    Urine Type 10/28/2018 Not Specified   Final    Acetaminophen Level 10/28/2018 <5* 10 - 30 ug/mL Final    Comment: Therapeutic Range: 10.0-30.0 ug/mL  Toxic: >712.1 ug/mL      Salicylate, Serum 14/71/4187 <0.3* 15.0 - 30.0 mg/dL Final    Comment: Therapeutic Range: 15.0-30.0 mg/dL  Toxic: >30.0 mg/dL      Ethanol Lvl 10/28/2018 None Detected  mg/dL Final    Comment:    None Detected  Conversion factor:  100 mg/dl = .100 g/dl  For Medical Purposes Only      Ventricular Rate 10/28/2018 68  BPM Final    Atrial Rate 10/28/2018 68  BPM Final    P-R Interval 10/28/2018 140  ms Final    QRS Duration 10/28/2018 92  ms Final    Q-T Interval 10/28/2018 434  ms Final    QTc Calculation (Bazett) 10/28/2018 461  ms Final    P Axis 10/28/2018 65  degrees Final    R Axis 10/28/2018 91  degrees Final    T Axis 10/28/2018 84  degrees Final    Diagnosis 10/28/2018 Normal sinus rhythmRightward axisNonspecific ST abnormalityNo previous ECGs availableConfirmed by JOCELYNN LANIER, VISHAL ([id number]) on 10/29/2018 8:22:09 AM   Final    Total CK 10/28/2018 74  26 - 192 U/L Final    Mucus, UA 10/28/2018 4+* /LPF Final    WBC, UA 10/28/2018 3-5  0 - 5 /HPF Final    RBC, UA 10/28/2018 5-10* 0 - 2 /HPF Final    Epi Cells 10/28/2018 10-20  /HPF Final    Bacteria, UA 10/28/2018 4+* /HPF Final    Amorphous, UA 10/28/2018 1+* /HPF Final    Sodium 10/31/2018 140  136 - 145 mmol/L Final    Potassium reflex Magnesium 10/31/2018 3.6  3.5 - 5.1 mmol/L Final    Chloride 10/31/2018 106  99 - 110 mmol/L Final    CO2 10/31/2018 28  21 - 32 mmol/L Final    Anion Gap 10/31/2018 6  3 - 16 Final    Glucose 10/31/2018 110* 70 - 99 mg/dL Final    BUN 10/31/2018 13  7 - 20 mg/dL Final    CREATININE 10/31/2018 0.8  0.6 - 1.1 mg/dL Final    GFR Non- 10/31/2018 >60  >60 Final    Comment: >60 mL/min/1.73m2 EGFR, calc. for ages 25 and older using the  MDRD formula (not corrected for weight), is valid for stable  renal function.  GFR  10/31/2018 >60  >60 Final    Comment: Chronic Kidney Disease: less than 60 ml/min/1.73 sq.m. Kidney Failure: less than 15 ml/min/1.73 sq.m. Results valid for patients 18 years and older.       Calcium 10/31/2018 8.3  8.3 - 10.6 mg/dL Final            Medications  Current Facility-Administered Medications: FLUoxetine (PROZAC) capsule 20 mg, 20 mg, Oral, Daily  buPROPion [facility]) extended release tablet 100 mg, 100 mg, Oral, BID  mirtazapine (REMERON SOL-TAB) disintegrating tablet 30 mg, 30 mg, Oral, Nightly  gabapentin (NEURONTIN) capsule 300 mg, 300 mg, Oral, TID  mupirocin (BACTROBAN) 2 % ointment, , Topical, BID  cetirizine (ZYRTEC) tablet 10 mg, 10 mg, Oral, Daily  acetaminophen (TYLENOL) tablet 650 mg, 650 mg, Oral, Q4H PRN  ibuprofen (ADVIL;MOTRIN) tablet 400 mg, 400 mg, Oral, Q6H PRN  hydrOXYzine (VISTARIL) capsule 50 mg, 50 mg, Oral, TID PRN  haloperidol lactate (HALDOL) injection 5 mg, 5 mg, Intramuscular, Q6H PRN  traZODone (DESYREL) tablet 50 mg, 50 mg, Oral, Nightly PRN  magnesium hydroxide (MILK OF MAGNESIA) 400 MG/5ML suspension 30 mL, 30 mL, Oral, Daily PRN  bisacodyl (DULCOLAX) suppository 10 mg, 10 mg, Rectal, Daily PRN  aluminum & magnesium hydroxide-simethicone (MAALOX) 200-200-20 MG/5ML suspension 30 mL, 30 mL, Oral, Q6H PRN  medicated lip balm (BLISTEX/CARMEX) stick, , Topical, PRN  ondansetron (ZOFRAN-ODT)

## 2018-11-01 PROCEDURE — 6370000000 HC RX 637 (ALT 250 FOR IP): Performed by: PSYCHIATRY & NEUROLOGY

## 2018-11-01 PROCEDURE — 6360000002 HC RX W HCPCS: Performed by: EMERGENCY MEDICINE

## 2018-11-01 PROCEDURE — 6370000000 HC RX 637 (ALT 250 FOR IP): Performed by: PHYSICIAN ASSISTANT

## 2018-11-01 PROCEDURE — G0378 HOSPITAL OBSERVATION PER HR: HCPCS

## 2018-11-01 PROCEDURE — 99233 SBSQ HOSP IP/OBS HIGH 50: CPT | Performed by: PSYCHIATRY & NEUROLOGY

## 2018-11-01 RX ORDER — PRAZOSIN HYDROCHLORIDE 1 MG/1
1 CAPSULE ORAL NIGHTLY
Status: DISCONTINUED | OUTPATIENT
Start: 2018-11-01 | End: 2018-11-05

## 2018-11-01 RX ADMIN — PRAZOSIN HYDROCHLORIDE 1 MG: 1 CAPSULE ORAL at 20:46

## 2018-11-01 RX ADMIN — GABAPENTIN 300 MG: 300 CAPSULE ORAL at 20:47

## 2018-11-01 RX ADMIN — TRAZODONE HYDROCHLORIDE 50 MG: 100 TABLET ORAL at 20:58

## 2018-11-01 RX ADMIN — BUPROPION HYDROCHLORIDE 100 MG: 100 TABLET, EXTENDED RELEASE ORAL at 08:46

## 2018-11-01 RX ADMIN — CETIRIZINE HYDROCHLORIDE 10 MG: 10 TABLET ORAL at 08:45

## 2018-11-01 RX ADMIN — ALUMINUM HYDROXIDE, MAGNESIUM HYDROXIDE, AND SIMETHICONE 30 ML: 200; 200; 20 SUSPENSION ORAL at 21:47

## 2018-11-01 RX ADMIN — MUPIROCIN: 20 OINTMENT TOPICAL at 08:46

## 2018-11-01 RX ADMIN — MUPIROCIN: 20 OINTMENT TOPICAL at 20:45

## 2018-11-01 RX ADMIN — GABAPENTIN 300 MG: 300 CAPSULE ORAL at 08:45

## 2018-11-01 RX ADMIN — GABAPENTIN 300 MG: 300 CAPSULE ORAL at 14:00

## 2018-11-01 RX ADMIN — FLUOXETINE 20 MG: 20 CAPSULE ORAL at 08:46

## 2018-11-01 RX ADMIN — BUPRENORPHINE HYDROCHLORIDE, NALOXONE HYDROCHLORIDE 1 FILM: 8; 2 FILM, SOLUBLE BUCCAL; SUBLINGUAL at 08:46

## 2018-11-01 RX ADMIN — MAGNESIUM HYDROXIDE 30 ML: 400 SUSPENSION ORAL at 14:00

## 2018-11-01 RX ADMIN — BUPROPION HYDROCHLORIDE 100 MG: 100 TABLET, EXTENDED RELEASE ORAL at 20:46

## 2018-11-01 NOTE — PROGRESS NOTES
mg/dL Final    Urobilinogen, Urine 10/28/2018 0.2  <2.0 E.U./dL Final    Nitrite, Urine 10/28/2018 Negative  Negative Final    Leukocyte Esterase, Urine 10/28/2018 Negative  Negative Final    Microscopic Examination 10/28/2018 YES   Final    Urine Reflex to Culture 10/28/2018 Not Indicated   Final    Urine Type 10/28/2018 Not Specified   Final    Acetaminophen Level 10/28/2018 <5* 10 - 30 ug/mL Final    Comment: Therapeutic Range: 10.0-30.0 ug/mL  Toxic: >966.0 ug/mL      Salicylate, Serum 52/84/4172 <0.3* 15.0 - 30.0 mg/dL Final    Comment: Therapeutic Range: 15.0-30.0 mg/dL  Toxic: >30.0 mg/dL      Ethanol Lvl 10/28/2018 None Detected  mg/dL Final    Comment:    None Detected  Conversion factor:  100 mg/dl = .100 g/dl  For Medical Purposes Only      Ventricular Rate 10/28/2018 68  BPM Final    Atrial Rate 10/28/2018 68  BPM Final    P-R Interval 10/28/2018 140  ms Final    QRS Duration 10/28/2018 92  ms Final    Q-T Interval 10/28/2018 434  ms Final    QTc Calculation (Bazett) 10/28/2018 461  ms Final    P Axis 10/28/2018 65  degrees Final    R Axis 10/28/2018 91  degrees Final    T Axis 10/28/2018 84  degrees Final    Diagnosis 10/28/2018 Normal sinus rhythmRightward axisNonspecific ST abnormalityNo previous ECGs availableConfirmed by VISHAL CABEZAS MD ([id number]) on 10/29/2018 8:22:09 AM   Final    Total CK 10/28/2018 74  26 - 192 U/L Final    Mucus, UA 10/28/2018 4+* /LPF Final    WBC, UA 10/28/2018 3-5  0 - 5 /HPF Final    RBC, UA 10/28/2018 5-10* 0 - 2 /HPF Final    Epi Cells 10/28/2018 10-20  /HPF Final    Bacteria, UA 10/28/2018 4+* /HPF Final    Amorphous, UA 10/28/2018 1+* /HPF Final    Sodium 10/31/2018 140  136 - 145 mmol/L Final    Potassium reflex Magnesium 10/31/2018 3.6  3.5 - 5.1 mmol/L Final    Chloride 10/31/2018 106  99 - 110 mmol/L Final    CO2 10/31/2018 28  21 - 32 mmol/L Final    Anion Gap 10/31/2018 6  3 - 16 Final    Glucose 10/31/2018 110* 70 - 99 mg/dL Final features ([facility]  Active Problems:    Opiate dependence (HCC)    Amphetamine abuse (HCC)    Suicidal ideation    Seasonal allergies  Resolved Problems:    * No resolved hospital problems. *       1. Patient s symptoms   show no change. Add minipress for nightmares 1  Mg HS  2. Probable discharge is 1-2 days   3. Discharge planning is incomplete. Needs rehab  4. Suicidal ideation is better  5. Total time with patient was 40 minutes and more than 50 % of that time was spent counseling the patient on their symptoms, treatment and expected goals.

## 2018-11-02 PROBLEM — F32.9 MAJOR DEPRESSION, CHRONIC: Status: ACTIVE | Noted: 2018-11-02

## 2018-11-02 PROCEDURE — 6360000002 HC RX W HCPCS: Performed by: EMERGENCY MEDICINE

## 2018-11-02 PROCEDURE — 6370000000 HC RX 637 (ALT 250 FOR IP): Performed by: PHYSICIAN ASSISTANT

## 2018-11-02 PROCEDURE — 6370000000 HC RX 637 (ALT 250 FOR IP): Performed by: PSYCHIATRY & NEUROLOGY

## 2018-11-02 PROCEDURE — 1240000000 HC EMOTIONAL WELLNESS R&B

## 2018-11-02 PROCEDURE — 99233 SBSQ HOSP IP/OBS HIGH 50: CPT | Performed by: PSYCHIATRY & NEUROLOGY

## 2018-11-02 RX ORDER — GABAPENTIN 400 MG/1
400 CAPSULE ORAL 3 TIMES DAILY
Status: DISCONTINUED | OUTPATIENT
Start: 2018-11-02 | End: 2018-11-05 | Stop reason: HOSPADM

## 2018-11-02 RX ORDER — DULOXETIN HYDROCHLORIDE 30 MG/1
30 CAPSULE, DELAYED RELEASE ORAL DAILY
Status: DISCONTINUED | OUTPATIENT
Start: 2018-11-02 | End: 2018-11-05

## 2018-11-02 RX ADMIN — DULOXETINE 30 MG: 30 CAPSULE, DELAYED RELEASE ORAL at 10:07

## 2018-11-02 RX ADMIN — GABAPENTIN 300 MG: 300 CAPSULE ORAL at 08:39

## 2018-11-02 RX ADMIN — FLUOXETINE 20 MG: 20 CAPSULE ORAL at 08:39

## 2018-11-02 RX ADMIN — BUPROPION HYDROCHLORIDE 100 MG: 100 TABLET, EXTENDED RELEASE ORAL at 08:39

## 2018-11-02 RX ADMIN — MUPIROCIN: 20 OINTMENT TOPICAL at 09:00

## 2018-11-02 RX ADMIN — ALUMINUM HYDROXIDE, MAGNESIUM HYDROXIDE, AND SIMETHICONE 30 ML: 200; 200; 20 SUSPENSION ORAL at 08:43

## 2018-11-02 RX ADMIN — TRAZODONE HYDROCHLORIDE 50 MG: 100 TABLET ORAL at 21:12

## 2018-11-02 RX ADMIN — MUPIROCIN: 20 OINTMENT TOPICAL at 22:01

## 2018-11-02 RX ADMIN — CETIRIZINE HYDROCHLORIDE 10 MG: 10 TABLET ORAL at 08:39

## 2018-11-02 RX ADMIN — BUPROPION HYDROCHLORIDE 100 MG: 100 TABLET, EXTENDED RELEASE ORAL at 21:10

## 2018-11-02 RX ADMIN — BUPRENORPHINE HYDROCHLORIDE, NALOXONE HYDROCHLORIDE 1 FILM: 8; 2 FILM, SOLUBLE BUCCAL; SUBLINGUAL at 08:39

## 2018-11-02 RX ADMIN — PRAZOSIN HYDROCHLORIDE 1 MG: 1 CAPSULE ORAL at 21:10

## 2018-11-02 RX ADMIN — GABAPENTIN 400 MG: 400 CAPSULE ORAL at 21:10

## 2018-11-02 RX ADMIN — GABAPENTIN 400 MG: 400 CAPSULE ORAL at 13:46

## 2018-11-02 NOTE — BH NOTE
Writer spoke with CPS investigator Luis Daily [phone] who wishes to be updated with pertinent information as it comes up and to be informed of a discharge date when it is determined. Social work will collaborate.

## 2018-11-02 NOTE — PROGRESS NOTES
10/28/2018 9.0  5.0 - 10.5 fL Final    Neutrophils % 10/28/2018 71.9  % Final    Lymphocytes % 10/28/2018 18.6  % Final    Monocytes % 10/28/2018 7.5  % Final    Eosinophils % 10/28/2018 1.9  % Final    Basophils % 10/28/2018 0.1  % Final    Neutrophils # 10/28/2018 5.3  1.7 - 7.7 K/uL Final    Lymphocytes # 10/28/2018 1.4  1.0 - 5.1 K/uL Final    Monocytes # 10/28/2018 0.6  0.0 - 1.3 K/uL Final    Eosinophils # 10/28/2018 0.1  0.0 - 0.6 K/uL Final    Basophils # 10/28/2018 0.0  0.0 - 0.2 K/uL Final    Sodium 10/28/2018 141  136 - 145 mmol/L Final    Potassium 10/28/2018 2.8* 3.5 - 5.1 mmol/L Final    Chloride 10/28/2018 104  99 - 110 mmol/L Final    CO2 10/28/2018 27  21 - 32 mmol/L Final    Anion Gap 10/28/2018 10  3 - 16 Final    Glucose 10/28/2018 122* 70 - 99 mg/dL Final    BUN 10/28/2018 7  7 - 20 mg/dL Final    CREATININE 10/28/2018 0.8  0.6 - 1.1 mg/dL Final    GFR Non- 10/28/2018 >60  >60 Final    Comment: >60 mL/min/1.73m2 EGFR, calc. for ages 25 and older using the  MDRD formula (not corrected for weight), is valid for stable  renal function.  GFR  10/28/2018 >60  >60 Final    Comment: Chronic Kidney Disease: less than 60 ml/min/1.73 sq.m. Kidney Failure: less than 15 ml/min/1.73 sq.m. Results valid for patients 18 years and older.       Calcium 10/28/2018 9.0  8.3 - 10.6 mg/dL Final    Total Protein 10/28/2018 6.7  6.4 - 8.2 g/dL Final    Alb 10/28/2018 4.4  3.4 - 5.0 g/dL Final    Albumin/Globulin Ratio 10/28/2018 1.9  1.1 - 2.2 Final    Total Bilirubin 10/28/2018 0.3  0.0 - 1.0 mg/dL Final    Alkaline Phosphatase 10/28/2018 56  40 - 129 U/L Final    ALT 10/28/2018 7* 10 - 40 U/L Final    AST 10/28/2018 11* 15 - 37 U/L Final    Globulin 10/28/2018 2.3  g/dL Final    hCG Qual 10/28/2018 Negative  Detects HCG level >10 MIU/mL Final    Amphetamine Screen, Urine 10/28/2018 POSITIVE* Negative <1000ng/mL Final    Comment: High concentrations of ephedrine/pseudoephedrine or  phenylpropanolamine may cause false positive results  for amphetamine. Therefore, confirmatory testing for  amphetamine should be considered if clinically indicated.  Barbiturate Screen, Ur 10/28/2018 Neg  Negative <200 ng/mL Final    Benzodiazepine Screen, Urine 10/28/2018 Neg  Negative <200 ng/mL Final    Cannabinoid Scrn, Ur 10/28/2018 Neg  Negative <50 ng/mL Final    Cocaine Metabolite Screen, Urine 10/28/2018 Neg  Negative <300 ng/mL Final    Opiate Scrn, Ur 10/28/2018 Neg  Negative <300 ng/mL Final    Comment: \"Therapeutic levels of pain medication, especially oxycontin and synthetic  opioids, may not be detected by this Methodology. Pain management screen  panel  Drug panel-PM-Hi Res Ur, Interp (PAIN) should be considered for drug  monitoring \".  PCP Screen, Urine 10/28/2018 Neg  Negative <25 ng/mL Final    Methadone Screen, Urine 10/28/2018 Neg  Negative <300 ng/mL Final    Propoxyphene Scrn, Ur 10/28/2018 Neg  Negative <300 ng/mL Final    pH, UA 10/28/2018 6.0   Final    Comment: Urine pH less than 5.0 or greater than 8.0 may indicate sample adulteration. Another sample should be collected if clinically  indicated.  Drug Screen Comment: 10/28/2018 see below   Final    Comment: This method is a screening test to detect only these drug  classes as part of a medical workup. Confirmatory testing  by another method should be ordered if clinically indicated.       Oxycodone Urine 10/28/2018 Neg  Negative <100 ng/ml Final    Color, UA 10/28/2018 Yellow  Straw/Yellow Final    Clarity, UA 10/28/2018 SL CLOUDY* Clear Final    Glucose, Ur 10/28/2018 Negative  Negative mg/dL Final    Bilirubin Urine 10/28/2018 SMALL* Negative Final    Ketones, Urine 10/28/2018 TRACE* Negative mg/dL Final    Specific Gravity, UA 10/28/2018 >=1.030  1.005 - 1.030 Final    Blood, Urine 10/28/2018 SMALL* Negative Final    pH, UA 10/28/2018 6.0  5.0 - 8.0 Final

## 2018-11-03 PROCEDURE — 6370000000 HC RX 637 (ALT 250 FOR IP): Performed by: PHYSICIAN ASSISTANT

## 2018-11-03 PROCEDURE — 1240000000 HC EMOTIONAL WELLNESS R&B

## 2018-11-03 PROCEDURE — 6370000000 HC RX 637 (ALT 250 FOR IP): Performed by: PSYCHIATRY & NEUROLOGY

## 2018-11-03 PROCEDURE — 99233 SBSQ HOSP IP/OBS HIGH 50: CPT | Performed by: NURSE PRACTITIONER

## 2018-11-03 PROCEDURE — 6360000002 HC RX W HCPCS: Performed by: EMERGENCY MEDICINE

## 2018-11-03 RX ADMIN — PRAZOSIN HYDROCHLORIDE 1 MG: 1 CAPSULE ORAL at 21:15

## 2018-11-03 RX ADMIN — TRAZODONE HYDROCHLORIDE 50 MG: 100 TABLET ORAL at 21:21

## 2018-11-03 RX ADMIN — ALUMINUM HYDROXIDE, MAGNESIUM HYDROXIDE, AND SIMETHICONE 30 ML: 200; 200; 20 SUSPENSION ORAL at 21:15

## 2018-11-03 RX ADMIN — BUPROPION HYDROCHLORIDE 100 MG: 100 TABLET, EXTENDED RELEASE ORAL at 08:04

## 2018-11-03 RX ADMIN — GABAPENTIN 400 MG: 400 CAPSULE ORAL at 21:07

## 2018-11-03 RX ADMIN — BUPROPION HYDROCHLORIDE 100 MG: 100 TABLET, EXTENDED RELEASE ORAL at 21:06

## 2018-11-03 RX ADMIN — GABAPENTIN 400 MG: 400 CAPSULE ORAL at 15:20

## 2018-11-03 RX ADMIN — CETIRIZINE HYDROCHLORIDE 10 MG: 10 TABLET ORAL at 08:04

## 2018-11-03 RX ADMIN — BUPRENORPHINE HYDROCHLORIDE, NALOXONE HYDROCHLORIDE 1 FILM: 8; 2 FILM, SOLUBLE BUCCAL; SUBLINGUAL at 08:03

## 2018-11-03 RX ADMIN — DULOXETINE 30 MG: 30 CAPSULE, DELAYED RELEASE ORAL at 08:04

## 2018-11-03 RX ADMIN — GABAPENTIN 400 MG: 400 CAPSULE ORAL at 08:04

## 2018-11-03 RX ADMIN — MUPIROCIN: 20 OINTMENT TOPICAL at 08:03

## 2018-11-03 ASSESSMENT — ENCOUNTER SYMPTOMS
NAUSEA: 1
BACK PAIN: 1

## 2018-11-03 NOTE — PLAN OF CARE
Problem: Altered Mood, Depressive Behavior:  Goal: Able to verbalize and/or display a decrease in depressive symptoms  Able to verbalize and/or display a decrease in depressive symptoms   Outcome: Not Met This Shift  Patient was invited to attend 1:30 pm Coping Skills group by therapist and patient declined.

## 2018-11-03 NOTE — PROGRESS NOTES
ondansetron  8 mg Oral Once    buprenorphine-naloxone  1 Film Sublingual Daily       PRN Meds  acetaminophen, ibuprofen, hydrOXYzine, haloperidol lactate, traZODone, magnesium hydroxide, bisacodyl, aluminum & magnesium hydroxide-simethicone, medicated lip balm    OBJECTIVE  Labs:  No results found for this or any previous visit (from the past 24 hour(s)).     Physical Assessment:  Constitutional: No acute distress noted  HEENT: Atraumatic  Cardiovascular: VSS  Respiratory: no distress noted  Neurological: No cranial nerve abnormalities apparent  MS: No abnormalities apparent  Gait: WNL  Psychiatric: Please see below    Vital Signs:  Vitals:    11/02/18 0822 11/02/18 2012 11/02/18 2110 11/03/18 0847   BP: (!) 96/55 (!) 92/54 105/65 99/68   Pulse: 78 71  89   Resp: 14 16     Temp: 97.6 °F (36.4 °C) 98.4 °F (36.9 °C)  98.1 °F (36.7 °C)   TempSrc: Oral Oral  Oral   SpO2:       Weight:       Height:             PSYCHIATRIC ASSESSMENT   Sleep: [] 0-3  [] 4-6 [] 7-9   [x] 10+  Appetite adequate:  [x] Yes  [] No  Attending to hygiene:[x] Yes  [] No   EPS: [] Yes  [x] No  Attending groups: [] Yes  [x] No  Patient reports side effects from psychiatric medications: [x] Yes [] No   Patient on more than 1 Antipsychotic: [] Yes  [x] No    PSYCHIATRIC EXAMINATION / MENTAL STATUS EXAM:     Appearance/Hygiene:  well-appearing and street clothes   Motor Behavior: WNL   Gait: WNL  Attitude: cooperative  Affect: normal affect   Speech: normal pitch and normal volume  Mood: dysthymic   Thought Processes: Goal directed  Perceptions: Absent   Thought content: superficial, somatic   Suicidal ideation:  no specific plan to harm self   Homicidal ideation:  none  Cognition: Symptoms are not currently causing impairment   Orientation: A&Ox4   Memory: intact  Concentration: Fair    Insight: limited  Judgement: poor          Diagnoses  MDD, severe, recurrent without psychosis  Opiate use disorder  Stimulant use disorder      Plan   Reviewed

## 2018-11-04 PROCEDURE — 6370000000 HC RX 637 (ALT 250 FOR IP): Performed by: PHYSICIAN ASSISTANT

## 2018-11-04 PROCEDURE — 6370000000 HC RX 637 (ALT 250 FOR IP): Performed by: PSYCHIATRY & NEUROLOGY

## 2018-11-04 PROCEDURE — 6360000002 HC RX W HCPCS: Performed by: EMERGENCY MEDICINE

## 2018-11-04 PROCEDURE — 1240000000 HC EMOTIONAL WELLNESS R&B

## 2018-11-04 RX ADMIN — MUPIROCIN: 20 OINTMENT TOPICAL at 21:00

## 2018-11-04 RX ADMIN — BUPROPION HYDROCHLORIDE 100 MG: 100 TABLET, EXTENDED RELEASE ORAL at 08:45

## 2018-11-04 RX ADMIN — GABAPENTIN 400 MG: 400 CAPSULE ORAL at 21:30

## 2018-11-04 RX ADMIN — PRAZOSIN HYDROCHLORIDE 1 MG: 1 CAPSULE ORAL at 21:35

## 2018-11-04 RX ADMIN — DULOXETINE 30 MG: 30 CAPSULE, DELAYED RELEASE ORAL at 08:45

## 2018-11-04 RX ADMIN — CETIRIZINE HYDROCHLORIDE 10 MG: 10 TABLET ORAL at 08:45

## 2018-11-04 RX ADMIN — BUPROPION HYDROCHLORIDE 100 MG: 100 TABLET, EXTENDED RELEASE ORAL at 21:34

## 2018-11-04 RX ADMIN — MUPIROCIN: 20 OINTMENT TOPICAL at 08:45

## 2018-11-04 RX ADMIN — TRAZODONE HYDROCHLORIDE 50 MG: 100 TABLET ORAL at 21:35

## 2018-11-04 RX ADMIN — GABAPENTIN 400 MG: 400 CAPSULE ORAL at 08:45

## 2018-11-04 RX ADMIN — GABAPENTIN 400 MG: 400 CAPSULE ORAL at 14:07

## 2018-11-04 RX ADMIN — BUPRENORPHINE HYDROCHLORIDE, NALOXONE HYDROCHLORIDE 1 FILM: 8; 2 FILM, SOLUBLE BUCCAL; SUBLINGUAL at 08:45

## 2018-11-05 VITALS
TEMPERATURE: 98.2 F | RESPIRATION RATE: 16 BRPM | BODY MASS INDEX: 29.99 KG/M2 | WEIGHT: 180 LBS | OXYGEN SATURATION: 98 % | DIASTOLIC BLOOD PRESSURE: 59 MMHG | HEART RATE: 80 BPM | HEIGHT: 65 IN | SYSTOLIC BLOOD PRESSURE: 83 MMHG

## 2018-11-05 PROCEDURE — 5130000000 HC BRIDGE APPOINTMENT

## 2018-11-05 PROCEDURE — 6370000000 HC RX 637 (ALT 250 FOR IP): Performed by: PSYCHIATRY & NEUROLOGY

## 2018-11-05 PROCEDURE — 99239 HOSP IP/OBS DSCHRG MGMT >30: CPT | Performed by: PSYCHIATRY & NEUROLOGY

## 2018-11-05 PROCEDURE — 6370000000 HC RX 637 (ALT 250 FOR IP): Performed by: PHYSICIAN ASSISTANT

## 2018-11-05 PROCEDURE — 6360000002 HC RX W HCPCS: Performed by: EMERGENCY MEDICINE

## 2018-11-05 RX ORDER — GABAPENTIN 400 MG/1
400 CAPSULE ORAL 3 TIMES DAILY
Qty: 90 CAPSULE | Refills: 0 | Status: ON HOLD | OUTPATIENT
Start: 2018-11-05 | End: 2020-07-16 | Stop reason: HOSPADM

## 2018-11-05 RX ORDER — PRAZOSIN HYDROCHLORIDE 2 MG/1
2 CAPSULE ORAL NIGHTLY
Qty: 30 CAPSULE | Refills: 0 | Status: ON HOLD | OUTPATIENT
Start: 2018-11-05 | End: 2020-07-16 | Stop reason: HOSPADM

## 2018-11-05 RX ORDER — CETIRIZINE HYDROCHLORIDE 10 MG/1
10 TABLET ORAL DAILY
Qty: 30 TABLET | Refills: 0 | Status: ON HOLD | OUTPATIENT
Start: 2018-11-06 | End: 2020-07-28 | Stop reason: SDUPTHER

## 2018-11-05 RX ORDER — DULOXETIN HYDROCHLORIDE 60 MG/1
60 CAPSULE, DELAYED RELEASE ORAL DAILY
Qty: 30 CAPSULE | Refills: 0 | Status: SHIPPED | OUTPATIENT
Start: 2018-11-06 | End: 2020-07-08

## 2018-11-05 RX ORDER — DULOXETIN HYDROCHLORIDE 60 MG/1
60 CAPSULE, DELAYED RELEASE ORAL DAILY
Status: DISCONTINUED | OUTPATIENT
Start: 2018-11-06 | End: 2018-11-05 | Stop reason: HOSPADM

## 2018-11-05 RX ORDER — BUPROPION HYDROCHLORIDE 150 MG/1
150 TABLET, EXTENDED RELEASE ORAL 2 TIMES DAILY
Status: DISCONTINUED | OUTPATIENT
Start: 2018-11-05 | End: 2018-11-05 | Stop reason: HOSPADM

## 2018-11-05 RX ORDER — BUPRENORPHINE AND NALOXONE 8; 2 MG/1; MG/1
1 FILM, SOLUBLE BUCCAL; SUBLINGUAL DAILY
Qty: 7 EACH | Refills: 0 | Status: SHIPPED | OUTPATIENT
Start: 2018-11-06 | End: 2018-11-13

## 2018-11-05 RX ORDER — PRAZOSIN HYDROCHLORIDE 1 MG/1
2 CAPSULE ORAL NIGHTLY
Status: DISCONTINUED | OUTPATIENT
Start: 2018-11-05 | End: 2018-11-05 | Stop reason: HOSPADM

## 2018-11-05 RX ORDER — BUPROPION HYDROCHLORIDE 150 MG/1
150 TABLET, EXTENDED RELEASE ORAL 2 TIMES DAILY
Qty: 60 TABLET | Refills: 0 | Status: ON HOLD | OUTPATIENT
Start: 2018-11-05 | End: 2020-07-16 | Stop reason: HOSPADM

## 2018-11-05 RX ADMIN — BUPROPION HYDROCHLORIDE 100 MG: 100 TABLET, EXTENDED RELEASE ORAL at 08:26

## 2018-11-05 RX ADMIN — MUPIROCIN: 20 OINTMENT TOPICAL at 08:28

## 2018-11-05 RX ADMIN — CETIRIZINE HYDROCHLORIDE 10 MG: 10 TABLET ORAL at 08:26

## 2018-11-05 RX ADMIN — DULOXETINE 30 MG: 30 CAPSULE, DELAYED RELEASE ORAL at 08:26

## 2018-11-05 RX ADMIN — GABAPENTIN 400 MG: 400 CAPSULE ORAL at 08:26

## 2018-11-05 RX ADMIN — BUPRENORPHINE HYDROCHLORIDE, NALOXONE HYDROCHLORIDE 1 FILM: 8; 2 FILM, SOLUBLE BUCCAL; SUBLINGUAL at 08:26

## 2018-11-05 ASSESSMENT — PAIN SCALES - GENERAL: PAINLEVEL_OUTOF10: 0

## 2018-11-05 NOTE — PROGRESS NOTES
Chief Complaint:    Patients chart was reviewed and collaborated with staff as well around discharge planning. Reviewed with the patient. Dictated discharge summary. At this time patient is not probateable or holdable and is not suicidal/homicidal. Spent 35 minutes on discharge, and more then 50 % of that time was spent with counseling patient on discharge goals.

## 2018-11-21 ENCOUNTER — HOSPITAL ENCOUNTER (EMERGENCY)
Age: 33
Discharge: HOME | End: 2018-11-21
Attending: EMERGENCY MEDICINE
Payer: COMMERCIAL

## 2018-11-21 VITALS
OXYGEN SATURATION: 99 % | HEART RATE: 82 BPM | HEIGHT: 65 IN | RESPIRATION RATE: 16 BRPM | BODY MASS INDEX: 29.99 KG/M2 | DIASTOLIC BLOOD PRESSURE: 96 MMHG | TEMPERATURE: 97.9 F | WEIGHT: 180 LBS | SYSTOLIC BLOOD PRESSURE: 135 MMHG

## 2018-11-21 DIAGNOSIS — F19.10 POLYSUBSTANCE ABUSE (HCC): ICD-10-CM

## 2018-11-21 DIAGNOSIS — M79.10 MYALGIA: ICD-10-CM

## 2018-11-21 DIAGNOSIS — F41.1 GENERALIZED ANXIETY DISORDER: Primary | ICD-10-CM

## 2018-11-21 LAB
A/G RATIO: 2 (ref 1.1–2.2)
ALBUMIN SERPL-MCNC: 4.5 G/DL (ref 3.4–5)
ALP BLD-CCNC: 56 U/L (ref 40–129)
ALT SERPL-CCNC: 17 U/L (ref 10–40)
AMPHETAMINE SCREEN, URINE: POSITIVE
ANION GAP SERPL CALCULATED.3IONS-SCNC: 12 MMOL/L (ref 3–16)
AST SERPL-CCNC: 15 U/L (ref 15–37)
BARBITURATE SCREEN URINE: ABNORMAL
BASOPHILS ABSOLUTE: 0.1 K/UL (ref 0–0.2)
BASOPHILS RELATIVE PERCENT: 0.9 %
BENZODIAZEPINE SCREEN, URINE: ABNORMAL
BILIRUB SERPL-MCNC: 0.3 MG/DL (ref 0–1)
BILIRUBIN URINE: NEGATIVE
BLOOD, URINE: ABNORMAL
BUN BLDV-MCNC: 8 MG/DL (ref 7–20)
CALCIUM SERPL-MCNC: 8.9 MG/DL (ref 8.3–10.6)
CANNABINOID SCREEN URINE: ABNORMAL
CHLORIDE BLD-SCNC: 104 MMOL/L (ref 99–110)
CLARITY: ABNORMAL
CO2: 27 MMOL/L (ref 21–32)
COCAINE METABOLITE SCREEN URINE: POSITIVE
COLOR: YELLOW
CREAT SERPL-MCNC: 0.6 MG/DL (ref 0.6–1.1)
EOSINOPHILS ABSOLUTE: 0 K/UL (ref 0–0.6)
EOSINOPHILS RELATIVE PERCENT: 0.4 %
EPITHELIAL CELLS, UA: ABNORMAL /HPF
ETHANOL: NORMAL MG/DL (ref 0–0.08)
GFR AFRICAN AMERICAN: >60
GFR NON-AFRICAN AMERICAN: >60
GLOBULIN: 2.3 G/DL
GLUCOSE BLD-MCNC: 103 MG/DL (ref 70–99)
GLUCOSE URINE: NEGATIVE MG/DL
HCG QUALITATIVE: NEGATIVE
HCT VFR BLD CALC: 37.6 % (ref 36–48)
HEMOGLOBIN: 12.8 G/DL (ref 12–16)
KETONES, URINE: 15 MG/DL
LEUKOCYTE ESTERASE, URINE: NEGATIVE
LYMPHOCYTES ABSOLUTE: 1.3 K/UL (ref 1–5.1)
LYMPHOCYTES RELATIVE PERCENT: 19.4 %
Lab: ABNORMAL
MAGNESIUM: 2 MG/DL (ref 1.8–2.4)
MCH RBC QN AUTO: 30.6 PG (ref 26–34)
MCHC RBC AUTO-ENTMCNC: 34 G/DL (ref 31–36)
MCV RBC AUTO: 90.2 FL (ref 80–100)
METHADONE SCREEN, URINE: ABNORMAL
MICROSCOPIC EXAMINATION: YES
MONOCYTES ABSOLUTE: 0.5 K/UL (ref 0–1.3)
MONOCYTES RELATIVE PERCENT: 7.2 %
MUCUS: ABNORMAL /LPF
NEUTROPHILS ABSOLUTE: 4.9 K/UL (ref 1.7–7.7)
NEUTROPHILS RELATIVE PERCENT: 72.1 %
NITRITE, URINE: NEGATIVE
OPIATE SCREEN URINE: ABNORMAL
OXYCODONE URINE: ABNORMAL
PDW BLD-RTO: 13.7 % (ref 12.4–15.4)
PH UA: 6.5
PH UA: 6.5
PHENCYCLIDINE SCREEN URINE: ABNORMAL
PLATELET # BLD: 305 K/UL (ref 135–450)
PMV BLD AUTO: 8.6 FL (ref 5–10.5)
POTASSIUM REFLEX MAGNESIUM: 3 MMOL/L (ref 3.5–5.1)
PROPOXYPHENE SCREEN: ABNORMAL
PROTEIN UA: NEGATIVE MG/DL
RBC # BLD: 4.16 M/UL (ref 4–5.2)
RBC UA: ABNORMAL /HPF (ref 0–2)
SODIUM BLD-SCNC: 143 MMOL/L (ref 136–145)
SPECIFIC GRAVITY UA: 1.02
TOTAL PROTEIN: 6.8 G/DL (ref 6.4–8.2)
URINE REFLEX TO CULTURE: ABNORMAL
URINE TYPE: ABNORMAL
UROBILINOGEN, URINE: 1 E.U./DL
WBC # BLD: 6.8 K/UL (ref 4–11)
WBC UA: ABNORMAL /HPF (ref 0–5)

## 2018-11-21 PROCEDURE — 80307 DRUG TEST PRSMV CHEM ANLYZR: CPT

## 2018-11-21 PROCEDURE — 85025 COMPLETE CBC W/AUTO DIFF WBC: CPT

## 2018-11-21 PROCEDURE — 96374 THER/PROPH/DIAG INJ IV PUSH: CPT

## 2018-11-21 PROCEDURE — 96361 HYDRATE IV INFUSION ADD-ON: CPT

## 2018-11-21 PROCEDURE — G0480 DRUG TEST DEF 1-7 CLASSES: HCPCS

## 2018-11-21 PROCEDURE — 81001 URINALYSIS AUTO W/SCOPE: CPT

## 2018-11-21 PROCEDURE — 6360000002 HC RX W HCPCS: Performed by: EMERGENCY MEDICINE

## 2018-11-21 PROCEDURE — 80053 COMPREHEN METABOLIC PANEL: CPT

## 2018-11-21 PROCEDURE — 99283 EMERGENCY DEPT VISIT LOW MDM: CPT

## 2018-11-21 PROCEDURE — 2580000003 HC RX 258: Performed by: EMERGENCY MEDICINE

## 2018-11-21 PROCEDURE — 83735 ASSAY OF MAGNESIUM: CPT

## 2018-11-21 PROCEDURE — 6370000000 HC RX 637 (ALT 250 FOR IP): Performed by: PHYSICIAN ASSISTANT

## 2018-11-21 PROCEDURE — 84703 CHORIONIC GONADOTROPIN ASSAY: CPT

## 2018-11-21 RX ORDER — NAPROXEN 500 MG/1
500 TABLET ORAL 2 TIMES DAILY
Qty: 20 TABLET | Refills: 0 | Status: ON HOLD | OUTPATIENT
Start: 2018-11-21 | End: 2020-07-16 | Stop reason: HOSPADM

## 2018-11-21 RX ORDER — HYDROXYZINE PAMOATE 50 MG/1
50 CAPSULE ORAL ONCE
Status: COMPLETED | OUTPATIENT
Start: 2018-11-21 | End: 2018-11-21

## 2018-11-21 RX ORDER — PREGABALIN 25 MG/1
25 CAPSULE ORAL 3 TIMES DAILY
COMMUNITY
End: 2018-12-03

## 2018-11-21 RX ORDER — 0.9 % SODIUM CHLORIDE 0.9 %
1000 INTRAVENOUS SOLUTION INTRAVENOUS ONCE
Status: COMPLETED | OUTPATIENT
Start: 2018-11-21 | End: 2018-11-21

## 2018-11-21 RX ORDER — POTASSIUM CHLORIDE 20 MEQ/1
40 TABLET, EXTENDED RELEASE ORAL ONCE
Status: COMPLETED | OUTPATIENT
Start: 2018-11-21 | End: 2018-11-21

## 2018-11-21 RX ORDER — HYDROXYZINE PAMOATE 25 MG/1
25-50 CAPSULE ORAL 3 TIMES DAILY PRN
Qty: 30 CAPSULE | Refills: 0 | Status: SHIPPED | OUTPATIENT
Start: 2018-11-21 | End: 2018-12-03

## 2018-11-21 RX ORDER — KETOROLAC TROMETHAMINE 30 MG/ML
30 INJECTION, SOLUTION INTRAMUSCULAR; INTRAVENOUS ONCE
Status: COMPLETED | OUTPATIENT
Start: 2018-11-21 | End: 2018-11-21

## 2018-11-21 RX ADMIN — SODIUM CHLORIDE 1000 ML: 9 INJECTION, SOLUTION INTRAVENOUS at 18:39

## 2018-11-21 RX ADMIN — KETOROLAC TROMETHAMINE 30 MG: 30 INJECTION, SOLUTION INTRAMUSCULAR at 18:39

## 2018-11-21 RX ADMIN — POTASSIUM CHLORIDE 40 MEQ: 20 TABLET, EXTENDED RELEASE ORAL at 19:37

## 2018-11-21 RX ADMIN — HYDROXYZINE PAMOATE 50 MG: 50 CAPSULE ORAL at 19:37

## 2018-11-21 ASSESSMENT — PAIN SCALES - GENERAL
PAINLEVEL_OUTOF10: 10
PAINLEVEL_OUTOF10: 9
PAINLEVEL_OUTOF10: 10

## 2018-11-21 ASSESSMENT — PAIN DESCRIPTION - PAIN TYPE: TYPE: ACUTE PAIN

## 2018-11-21 NOTE — ED NOTES
Pt had concerns about her other diagnoses and medications when at [facility] and discontinued her care. Pt stated that dealing with her mental health and fibromyalgia diagnoses should be her main priority right now. Pt stated consent for a follow-up in about a week to see if she needs any addiction resources at that time. Informed PA of pts concerns about her mental health.      Umair Vallejo  11/21/18 5011

## 2018-11-22 ASSESSMENT — ENCOUNTER SYMPTOMS
RHINORRHEA: 0
FACIAL SWELLING: 0
BACK PAIN: 0
COUGH: 0
CONSTIPATION: 0
DIARRHEA: 0
CHEST TIGHTNESS: 0
EYE PAIN: 0
SHORTNESS OF BREATH: 0
NAUSEA: 0
ABDOMINAL PAIN: 0
EYE REDNESS: 0
SORE THROAT: 0

## 2018-11-22 NOTE — ED PROVIDER NOTES
[address] ED  eMERGENCY dEPARTMENT eNCOUnter        Pt Name: Luiz Alas  MRN: [id number]  [place] 1985  Date of evaluation: 11/21/2018  Provider: Niesha Plaza PA-C  PCP: ISSA Umanzor CNP    This patient was seen and evaluated by the attending physician Elissa Bentley M.D.            [address] [place] [address]       Chief Complaint   Patient presents with    Withdrawal     Pt states \" I am going through withdraw from Suboxone, I was on it for 6 months and have not had it for 2 1/2 week. \"        HISTORY OF PRESENT ILLNESS   (Location/Symptom, Timing/Onset, Context/Setting, Quality, Duration, Modifying Factors, Severity)  Note limiting factors. Luiz Alas is a 28 y.o. female presenting for evaluation of withdrawal symptoms. She states that she is withdrawing from Suboxone. Stated that she is preferring prescribed Suboxone for heroin dependence. She states that she has not had Suboxone in 2 weeks. States that an attempt to curb her Suboxone withdrawal symptoms she did use some meth earlier this week. She states that she is exhibiting symptoms in the form of myalgias in her arms. She does report that she has a history of fibromyalgia. She says her fibromyalgia symptoms due to medically present this way. She states that she has had no luck getting into a psychiatrist or a primary care provider to help manage her psychological issues in her pain. She states that she typically takes Neurontin and Lyrica for these but she has not had any of these medications for some time. She denies any nausea or vomiting. She denies any abdominal cramping. Denies having any seizures. Denies any other substances being used. She denies any alcohol use. Nursing Notes were all reviewed and agreed with or any disagreements were addressed  in the HPI.     REVIEW OF SYSTEMS    (2-9 systems for level 4, 10 or more for level 5)     Review of Systems   Constitutional: Negative for diaphoresis, (MINIPRESS) 2 MG capsule Take 1 capsule by mouth nightly, Disp-30 capsule, R-0Normal      buPROPion (WELLBUTRIN SR) 150 MG extended release tablet Take 1 tablet by mouth 2 times daily, Disp-60 tablet, R-0Normal      !! DULoxetine (CYMBALTA) 60 MG extended release capsule Take 60 mg by mouth dailyHistorical Med      buPROPion (WELLBUTRIN XL) 150 MG extended release tablet Take 1 tablet by mouth daily, Disp-30 tablet, R-0Normal       !! - Potential duplicate medications found. Please discuss with provider. ALLERGIES     Ceclor [cefaclor]    FAMILYHISTORY       Family History   Problem Relation Age of Onset    Cancer Father         lung    Mental Illness Father     Diabetes Maternal Grandmother     Hypertension Maternal Grandmother     Heart Failure Maternal Grandmother     Depression Paternal Grandmother     Diabetes Paternal Grandmother     Heart Failure Paternal Grandmother     Mental Illness Paternal Grandmother     Hypertension Mother     Diabetes Paternal Grandfather     Heart Failure Paternal Grandfather     Mental Illness Paternal Grandfather           SOCIAL HISTORY       Social History     Social History    Marital status: Single     Spouse name: N/A    Number of children: 2    Years of education: 15     Occupational History    unemployed past 2+ year      Social History Main Topics    Smoking status: Never Smoker    Smokeless tobacco: Never Used    Alcohol use No    Drug use: Yes     Types: Methamphetamines, IV      Comment: States last use was last week. States, \"it's not the first thing I'd like to do, I'd rather be on my medication. \"    Sexual activity: Yes     Partners: Male     Other Topics Concern    None     Social History Narrative    None       SCREENINGS             PHYSICAL EXAM    (up to 7 for level 4, 8 or more for level 5)     ED Triage Vitals [11/21/18 1805]   BP Temp Temp Source Pulse Resp SpO2 Height Weight   (!) 134/98 97.8 °F (36.6 °C) Temporal 85 16 100 % 5' 5\" (1.651 m) 180 lb (81.6 kg)       Physical Exam   Constitutional: She is oriented to person, place, and time. She appears well-developed and well-nourished. HENT:   Head: Normocephalic and atraumatic. Nose: Nose normal.   Eyes: Right eye exhibits no discharge. Left eye exhibits no discharge. Neck: Normal range of motion. Neck supple. Cardiovascular: Normal rate, regular rhythm and normal heart sounds. Exam reveals no gallop and no friction rub. No murmur heard. Pulmonary/Chest: Effort normal and breath sounds normal. No respiratory distress. She has no wheezes. She has no rales. Musculoskeletal: Normal range of motion. Neurological: She is alert and oriented to person, place, and time. Skin: Skin is warm and dry. She is not diaphoretic. Psychiatric: Her speech is normal. Judgment and thought content normal. She is agitated. Cognition and memory are not impaired. She exhibits a depressed mood. She expresses no suicidal ideation. She expresses no suicidal plans. She exhibits normal recent memory and normal remote memory. Nursing note and vitals reviewed.       DIAGNOSTIC RESULTS   LABS:    Labs Reviewed   COMPREHENSIVE METABOLIC PANEL W/ REFLEX TO MG FOR LOW K - Abnormal; Notable for the following:        Result Value    Potassium reflex Magnesium 3.0 (*)     Glucose 103 (*)     All other components within normal limits    Narrative:     Performed at:  [facility]) - [facility],  [place], [facility]   Phone [phone]   URINE RT REFLEX TO CULTURE - Abnormal; Notable for the following:     Clarity, UA SL CLOUDY (*)     Ketones, Urine 15 (*)     Blood, Urine TRACE-INTACT (*)     All other components within normal limits    Narrative:     Performed at:  [facility]) - [facility],  [place], [facility]   Phone [phone]   [address] - Abnormal; Notable for the following:     Amphetamine Screen, Urine POSITIVE (*)     Cocaine Metabolite Screen, Urine POSITIVE (*)     All other components within normal limits    Narrative:     Performed at:  [facility] [address],  [place], [facility]   Phone [phone]   MICROSCOPIC URINALYSIS - Abnormal; Notable for the following:     Mucus, UA 2+ (*)     RBC, UA 5-10 (*)     All other components within normal limits    Narrative:     Performed at:  [facility] [address],  [place], [facility]   Phone [phone]   CBC WITH AUTO DIFFERENTIAL    Narrative:     Performed at:  [facility] [address],  [place], [facility]   Phone [phone]   ETHANOL    Narrative:     Performed at:  [facility] [address],  [place], [facility]   Phone [phone]   HCG, SERUM, QUALITATIVE    Narrative:     Performed at:  [facility] [address],  [place], [facility]   Phone [phone]   MAGNESIUM    Narrative:     Performed at:  [facility]) [facility],  [place], [facility]   Phone [phone]       All other labs were within normal range or not returned as of this dictation. EKG: All EKG's are interpreted by the Emergency Department Physician who either signs orCo-signs this chart in the absence of a cardiologist.  Please see their note for interpretation of EKG. RADIOLOGY:   Non-plain film images such as CT, Ultrasound and MRI are read by the radiologist. Plain radiographic images are visualized andpreliminarily interpreted by the  ED Provider with the below findings:        Interpretation perthe Radiologist below, if available at the time of this note:    No orders to display     No results found.       PROCEDURES   Unless otherwise noted below, none     Procedures    CRITICAL CARE TIME   N/A    CONSULTS:  None      EMERGENCY DEPARTMENT COURSE and DIFFERENTIALDIAGNOSIS/MDM:   Vitals:    Vitals:    11/21/18 1805 11/21/18 2049   BP: (!) 134/98 (!) 135/96   Pulse: 85 82   Resp: 16 16   Temp: 97.8 °F (36.6 °C) 97.9 °F (36.6 °C)   TempSrc: Temporal Oral   SpO2: 100% 99%   Weight: 180 lb (81.6 kg)    Height: 5' 5\" (1.651 m)        Patient was given thefollowing medications:  Medications   ketorolac (TORADOL) injection 30 mg (30 mg Intravenous Given 11/21/18 1839)   0.9 % sodium chloride bolus (0 mLs Intravenous Stopped 11/21/18 2030)   potassium chloride (KLOR-CON M) extended release tablet 40 mEq (40 mEq Oral Given 11/21/18 1937)   hydrOXYzine (VISTARIL) capsule 50 mg (50 mg Oral Given 11/21/18 1937)       This patient presented to the emergency department for evaluation of substance abuse. At presentation, vital signs were stable. [facility] Physical Exam findings were as noted above. Labs were drawn which showed low potassium at 3. This was supplemented with 40 mEq of potassium chloride by mouth which patient did tolerate well. She was started on a 1 L bolus of normal saline. She was rather distressed and tearful. Therefore she was given some Vistaril for her anxiety, this did help her anxiety some. She is not exhibiting any signs and symptoms of acute withdrawal with her vital signs. She was reporting pain in her arms and her muscles however, she did have a full range of motion in all extremities with good  strength and neurological findings. She was given some Toradol for her pain which she did tolerate well. She did report that her pain was less severe after administration of this medication and some observation. Urinalysis revealed no acute urinary tract infection. Urine drug screen did show that she is testing positive today for amphetamines and cocaine. I splinted the patient that she needs to follow up with an outpatient rehab facility. She has tried [facility] and she has tried [facility].   She reports the

## 2018-12-03 ENCOUNTER — HOSPITAL ENCOUNTER (EMERGENCY)
Age: 33
Discharge: HOME | End: 2018-12-03
Attending: EMERGENCY MEDICINE
Payer: COMMERCIAL

## 2018-12-03 ENCOUNTER — APPOINTMENT (OUTPATIENT)
Dept: GENERAL RADIOLOGY | Age: 33
End: 2018-12-03
Payer: COMMERCIAL

## 2018-12-03 VITALS
RESPIRATION RATE: 16 BRPM | HEART RATE: 84 BPM | WEIGHT: 175 LBS | BODY MASS INDEX: 29.16 KG/M2 | SYSTOLIC BLOOD PRESSURE: 132 MMHG | OXYGEN SATURATION: 100 % | TEMPERATURE: 98.5 F | DIASTOLIC BLOOD PRESSURE: 90 MMHG | HEIGHT: 65 IN

## 2018-12-03 DIAGNOSIS — F39 MOOD DISORDER (HCC): ICD-10-CM

## 2018-12-03 DIAGNOSIS — F43.10 PTSD (POST-TRAUMATIC STRESS DISORDER): ICD-10-CM

## 2018-12-03 DIAGNOSIS — M54.12 CERVICAL RADICULOPATHY: Primary | ICD-10-CM

## 2018-12-03 DIAGNOSIS — M25.512 ACUTE PAIN OF LEFT SHOULDER: ICD-10-CM

## 2018-12-03 LAB
A/G RATIO: 1.7 (ref 1.1–2.2)
ALBUMIN SERPL-MCNC: 4.9 G/DL (ref 3.4–5)
ALP BLD-CCNC: 66 U/L (ref 40–129)
ALT SERPL-CCNC: 9 U/L (ref 10–40)
AMPHETAMINE SCREEN, URINE: NORMAL
ANION GAP SERPL CALCULATED.3IONS-SCNC: 11 MMOL/L (ref 3–16)
AST SERPL-CCNC: 15 U/L (ref 15–37)
BACTERIA: ABNORMAL /HPF
BARBITURATE SCREEN URINE: NORMAL
BASOPHILS ABSOLUTE: 0.1 K/UL (ref 0–0.2)
BASOPHILS RELATIVE PERCENT: 0.9 %
BENZODIAZEPINE SCREEN, URINE: NORMAL
BILIRUB SERPL-MCNC: 0.3 MG/DL (ref 0–1)
BILIRUBIN URINE: NEGATIVE
BLOOD, URINE: ABNORMAL
BUN BLDV-MCNC: 5 MG/DL (ref 7–20)
CALCIUM SERPL-MCNC: 9.4 MG/DL (ref 8.3–10.6)
CANNABINOID SCREEN URINE: NORMAL
CHLORIDE BLD-SCNC: 103 MMOL/L (ref 99–110)
CLARITY: CLEAR
CO2: 27 MMOL/L (ref 21–32)
COCAINE METABOLITE SCREEN URINE: NORMAL
COLOR: YELLOW
CREAT SERPL-MCNC: 0.8 MG/DL (ref 0.6–1.1)
EOSINOPHILS ABSOLUTE: 0 K/UL (ref 0–0.6)
EOSINOPHILS RELATIVE PERCENT: 0.4 %
EPITHELIAL CELLS, UA: ABNORMAL /HPF
ETHANOL: NORMAL MG/DL (ref 0–0.08)
GFR AFRICAN AMERICAN: >60
GFR NON-AFRICAN AMERICAN: >60
GLOBULIN: 2.9 G/DL
GLUCOSE BLD-MCNC: 79 MG/DL (ref 70–99)
GLUCOSE URINE: NEGATIVE MG/DL
HCG(URINE) PREGNANCY TEST: NEGATIVE
HCT VFR BLD CALC: 38.8 % (ref 36–48)
HEMOGLOBIN: 13.6 G/DL (ref 12–16)
KETONES, URINE: NEGATIVE MG/DL
LEUKOCYTE ESTERASE, URINE: NEGATIVE
LYMPHOCYTES ABSOLUTE: 2.2 K/UL (ref 1–5.1)
LYMPHOCYTES RELATIVE PERCENT: 22 %
Lab: NORMAL
MAGNESIUM: 2.3 MG/DL (ref 1.8–2.4)
MCH RBC QN AUTO: 31.3 PG (ref 26–34)
MCHC RBC AUTO-ENTMCNC: 34.9 G/DL (ref 31–36)
MCV RBC AUTO: 89.5 FL (ref 80–100)
METHADONE SCREEN, URINE: NORMAL
MICROSCOPIC EXAMINATION: YES
MONOCYTES ABSOLUTE: 0.7 K/UL (ref 0–1.3)
MONOCYTES RELATIVE PERCENT: 7.6 %
NEUTROPHILS ABSOLUTE: 6.8 K/UL (ref 1.7–7.7)
NEUTROPHILS RELATIVE PERCENT: 69.1 %
NITRITE, URINE: NEGATIVE
OPIATE SCREEN URINE: NORMAL
OXYCODONE URINE: NORMAL
PDW BLD-RTO: 13.9 % (ref 12.4–15.4)
PH UA: 7
PH UA: 7
PHENCYCLIDINE SCREEN URINE: NORMAL
PLATELET # BLD: 332 K/UL (ref 135–450)
PMV BLD AUTO: 8.5 FL (ref 5–10.5)
POTASSIUM REFLEX MAGNESIUM: 2.7 MMOL/L (ref 3.5–5.1)
PROPOXYPHENE SCREEN: NORMAL
PROTEIN UA: NEGATIVE MG/DL
RBC # BLD: 4.34 M/UL (ref 4–5.2)
RBC UA: ABNORMAL /HPF (ref 0–2)
SODIUM BLD-SCNC: 141 MMOL/L (ref 136–145)
SPECIFIC GRAVITY UA: <=1.005
TOTAL PROTEIN: 7.8 G/DL (ref 6.4–8.2)
URINE REFLEX TO CULTURE: ABNORMAL
URINE TYPE: ABNORMAL
UROBILINOGEN, URINE: 0.2 E.U./DL
WBC # BLD: 9.8 K/UL (ref 4–11)
WBC UA: ABNORMAL /HPF (ref 0–5)

## 2018-12-03 PROCEDURE — 80053 COMPREHEN METABOLIC PANEL: CPT

## 2018-12-03 PROCEDURE — 83735 ASSAY OF MAGNESIUM: CPT

## 2018-12-03 PROCEDURE — G0480 DRUG TEST DEF 1-7 CLASSES: HCPCS

## 2018-12-03 PROCEDURE — 80307 DRUG TEST PRSMV CHEM ANLYZR: CPT

## 2018-12-03 PROCEDURE — 73030 X-RAY EXAM OF SHOULDER: CPT

## 2018-12-03 PROCEDURE — 6370000000 HC RX 637 (ALT 250 FOR IP): Performed by: EMERGENCY MEDICINE

## 2018-12-03 PROCEDURE — 84703 CHORIONIC GONADOTROPIN ASSAY: CPT

## 2018-12-03 PROCEDURE — 99285 EMERGENCY DEPT VISIT HI MDM: CPT

## 2018-12-03 PROCEDURE — 85025 COMPLETE CBC W/AUTO DIFF WBC: CPT

## 2018-12-03 PROCEDURE — 81001 URINALYSIS AUTO W/SCOPE: CPT

## 2018-12-03 RX ORDER — POTASSIUM CHLORIDE 1500 MG/1
20 TABLET, FILM COATED, EXTENDED RELEASE ORAL
Qty: 5 TABLET | Refills: 0 | Status: ON HOLD | OUTPATIENT
Start: 2018-12-03 | End: 2020-07-16 | Stop reason: HOSPADM

## 2018-12-03 RX ORDER — NAPROXEN 500 MG/1
500 TABLET ORAL ONCE
Status: COMPLETED | OUTPATIENT
Start: 2018-12-03 | End: 2018-12-03

## 2018-12-03 RX ORDER — POTASSIUM CHLORIDE 20 MEQ/1
40 TABLET, EXTENDED RELEASE ORAL ONCE
Status: COMPLETED | OUTPATIENT
Start: 2018-12-03 | End: 2018-12-03

## 2018-12-03 RX ORDER — PREDNISONE 20 MG/1
60 TABLET ORAL ONCE
Status: COMPLETED | OUTPATIENT
Start: 2018-12-03 | End: 2018-12-03

## 2018-12-03 RX ORDER — METHYLPREDNISOLONE 4 MG/1
TABLET ORAL
Qty: 1 KIT | Refills: 0 | Status: SHIPPED | OUTPATIENT
Start: 2018-12-03 | End: 2020-07-08

## 2018-12-03 RX ADMIN — POTASSIUM CHLORIDE 40 MEQ: 20 TABLET, EXTENDED RELEASE ORAL at 20:36

## 2018-12-03 RX ADMIN — PREDNISONE 60 MG: 20 TABLET ORAL at 22:02

## 2018-12-03 RX ADMIN — NAPROXEN 500 MG: 500 TABLET ORAL at 18:45

## 2018-12-03 ASSESSMENT — PAIN SCALES - GENERAL: PAINLEVEL_OUTOF10: 7

## 2018-12-03 ASSESSMENT — PAIN DESCRIPTION - ORIENTATION: ORIENTATION: LEFT

## 2018-12-03 ASSESSMENT — PAIN DESCRIPTION - DESCRIPTORS: DESCRIPTORS: BURNING

## 2018-12-03 ASSESSMENT — PAIN DESCRIPTION - PAIN TYPE: TYPE: ACUTE PAIN

## 2018-12-03 ASSESSMENT — PAIN DESCRIPTION - LOCATION: LOCATION: SHOULDER

## 2019-06-08 NOTE — ED PROVIDER NOTES
capsule by mouth daily 30 capsule 0    cetirizine (ZYRTEC) 10 MG tablet Take 1 tablet by mouth daily 30 tablet 0    prazosin (MINIPRESS) 2 MG capsule Take 1 capsule by mouth nightly 30 capsule 0    buPROPion (WELLBUTRIN SR) 150 MG extended release tablet Take 1 tablet by mouth 2 times daily 60 tablet 0    DULoxetine (CYMBALTA) 60 MG extended release capsule Take 60 mg by mouth daily      buPROPion (WELLBUTRIN XL) 150 MG extended release tablet Take 1 tablet by mouth daily 30 tablet 0    naproxen (NAPROSYN) 500 MG tablet Take 1 tablet by mouth 2 times daily for 20 doses 20 tablet 0     Allergies   Allergen Reactions    Ceclor [Cefaclor] Rash       REVIEW OF SYSTEMS  10 systems reviewed, pertinent positives per HPI otherwise noted to be negative. PHYSICAL EXAM  BP (!) 132/90   Pulse 84   Temp 98.5 °F (36.9 °C) (Oral)   Resp 16   Ht 5' 5\" (1.651 m)   Wt 175 lb (79.4 kg)   LMP 11/07/2018   SpO2 100%   BMI 29.12 kg/m²    GENERAL APPEARANCE: Awake and alert. Cooperative. No acute distress. HENT: Normocephalic. Atraumatic. Mucous membranes are moist.  No drooling or stridor. NECK: Supple. No central C-spine tenderness or step-offs. Full neck range of motion without limitation. EYES: PERRL. EOM's grossly intact. HEART/CHEST: RRR. No murmurs. 2+ radial pulses bilaterally. LUNGS: Respirations unlabored. CTAB. Good air exchange. Speaking comfortably in full sentences. ABDOMEN: No tenderness. Soft. Non-distended. No masses. No organomegaly. No guarding or rebound. MUSCULOSKELETAL: No extremity edema. Compartments soft. No deformity. Mild tenderness along the trapezius muscle area, diffusely in the left shoulder and into the left arm. No soft tissue swelling or erythema. All extremities neurovascularly intact. SKIN: Warm and dry. No acute rashes. NEUROLOGICAL: Alert and oriented. CN's 2-12 intact. No gross facial drooping. Strength 5/5, sensation intact.   Specifically, motor and sensory
08-Jun-2019 10:28

## 2020-07-03 ENCOUNTER — HOSPITAL ENCOUNTER (EMERGENCY)
Age: 35
Discharge: LEFT BEFORE DISCHARGE | End: 2020-07-03
Attending: EMERGENCY MEDICINE
Payer: COMMERCIAL

## 2020-07-03 VITALS
SYSTOLIC BLOOD PRESSURE: 133 MMHG | RESPIRATION RATE: 24 BRPM | OXYGEN SATURATION: 95 % | DIASTOLIC BLOOD PRESSURE: 99 MMHG | HEART RATE: 115 BPM

## 2020-07-03 PROCEDURE — 99284 EMERGENCY DEPT VISIT MOD MDM: CPT

## 2020-07-03 ASSESSMENT — ENCOUNTER SYMPTOMS
DIARRHEA: 0
SORE THROAT: 0
NAUSEA: 0
EYE DISCHARGE: 0
BACK PAIN: 0
VOMITING: 0
COUGH: 0
ABDOMINAL PAIN: 0

## 2020-07-03 NOTE — ED NOTES
Pt denies SI and HI, denies hearing voices, states she has been worried about her safety for the last few days due to court cases she is involved in. Pt states police were around her home tonight, she did not call them, but she spoke with a  about her safety and then was forced to come to hospital. Pt is requesting to speak with psych doctor. Pt mostly cooperative with staff but currently refusing lab work and to provide a urine sample at this time. ED MD at bedside.       Unique Veronica RN  07/03/20 8205

## 2020-07-03 NOTE — ED PROVIDER NOTES
[address] ED  EMERGENCY DEPARTMENT ENCOUNTER        Pt Name: Carlyle Cabezas  MRN: [id number]  [place] 1985  Date of evaluation: 7/3/2020  Provider: Nnamdi Raymundo MD  PCP: ISSA Lee - [facility]  ED Attending: Nnamdi Raymundo MD    CHIEF COMPLAINT       Chief Complaint   Patient presents with   [facility]     requesting to speak with psych Dr she saw last time she was here, she is unsure of his name though. HISTORY OF PRESENT ILLNESS   (Location/Symptom, Timing/Onset, Context/Setting, Quality, Duration, Modifying Factors, Severity)  Note limiting factors. Carlyle Cabezas is a 38 y.o. female brought in by EMS for possible psychiatric evaluation. EMS was summoned after the 's office apparently received a phone call of a woman in a parking lot yelling about her baby being in danger. Patient apparently approached the 's officers when they arrived and said that she herself was in danger. She says that she is holding national security secrets and is worried that people are coming to get her. She also apparently says that she was interviewed by government agents earlier today about this. She states that she is also involved in some legal case with her mother. Her child was apparently placed into child protective services about 3 weeks ago. Patient at this time denies that there is anything medically going on with her and just wants to talk to behavioral.      Prior to any contact with EMS, someone alleging to be the patient's mother called over stating that her daughter was being brought over by ambulance for evaluation. Mother expressed concern over possible drug abuse. History is obtained from EMS, the patient. REVIEW OF SYSTEMS    (2-9 systems for level 4, 10 or more for level 5)     Review of Systems   Constitutional: Negative for chills and fever. HENT: Negative for congestion and sore throat. Eyes: Negative for discharge. Respiratory: Negative for cough. Cardiovascular: Negative for chest pain. Gastrointestinal: Negative for abdominal pain, diarrhea, nausea and vomiting. Endocrine: Negative for polydipsia. Genitourinary: Negative for dysuria and urgency. Musculoskeletal: Negative for back pain and myalgias. Skin: Negative for rash. Neurological: Negative for weakness and headaches. Hematological: Does not bruise/bleed easily. Psychiatric/Behavioral: Negative for agitation, confusion, dysphoric mood, hallucinations, self-injury and suicidal ideas. The patient is nervous/anxious. Positives and Pertinent negatives as per HPI. Except as noted above in the ROS, all other systems were reviewed and negative. PAST MEDICAL HISTORY     Past Medical History:   Diagnosis Date    Anxiety     Chronic post-traumatic stress disorder (PTSD) 10/29/2018    Depression     Mild intermittent asthma without complication     Stimulant use disorder 2018         SURGICAL HISTORY     Past Surgical History:   Procedure Laterality Date     SECTION  2005    DILATION AND CURETTAGE OF UTERUS  2006             [address]       Current Discharge Medication List      CONTINUE these medications which have NOT CHANGED    Details   methylPREDNISolone (MEDROL, HECTOR,) 4 MG tablet Take by mouth as directed by kit. Qty: 1 kit, Refills: 0      potassium chloride (KLOR-CON M) 20 MEQ TBCR extended release tablet Take 1 tablet by mouth daily (with breakfast) for 5 days  Qty: 5 tablet, Refills: 0      naproxen (NAPROSYN) 500 MG tablet Take 1 tablet by mouth 2 times daily for 20 doses  Qty: 20 tablet, Refills: 0      gabapentin (NEURONTIN) 400 MG capsule Take 1 capsule by mouth 3 times daily for 30 days. Oliverio Abdi: 90 capsule, Refills: 0      !!  DULoxetine (CYMBALTA) 60 MG extended release capsule Take 1 capsule by mouth daily  Qty: 30 capsule, Refills: 0      cetirizine (ZYRTEC) 10 MG tablet Take 1 tablet by mouth daily  Qty: 30 tablet, Refills: 0      prazosin (MINIPRESS) 2 MG capsule Take 1 capsule by mouth nightly  Qty: 30 capsule, Refills: 0      buPROPion (WELLBUTRIN SR) 150 MG extended release tablet Take 1 tablet by mouth 2 times daily  Qty: 60 tablet, Refills: 0      !! DULoxetine (CYMBALTA) 60 MG extended release capsule Take 60 mg by mouth daily      buPROPion (WELLBUTRIN XL) 150 MG extended release tablet Take 1 tablet by mouth daily  Qty: 30 tablet, Refills: 0       !! - Potential duplicate medications found. Please discuss with provider.             ALLERGIES     Ceclor [cefaclor]    FAMILYHISTORY       Family History   Problem Relation Age of Onset   [facility] Cancer Father         lung    Mental Illness Father     Diabetes Maternal Grandmother     Hypertension Maternal Grandmother     Heart Failure Maternal Grandmother     Depression Paternal Grandmother     Diabetes Paternal Grandmother     Heart Failure Paternal Grandmother     Mental Illness Paternal Grandmother     Hypertension Mother     Diabetes Paternal Grandfather     Heart Failure Paternal Grandfather     Mental Illness Paternal Grandfather           SOCIAL HISTORY       Social History     Socioeconomic History    Marital status: Single     Spouse name: None    Number of children: 2    Years of education: 15    Highest education level: None   Occupational History    Occupation: unemployed past 2+ year   Social Needs    Financial resource strain: None    Food insecurity     Worry: None     Inability: None    Transportation needs     Medical: None     Non-medical: None   Tobacco Use    Smoking status: Never Smoker    Smokeless tobacco: Never Used   Substance and Sexual Activity    Alcohol use: No     Alcohol/week: 1.0 standard drinks     Types: 1 Standard drinks or equivalent per week    Drug use: Not Currently     Types: Methamphetamines, IV     Comment: States last use was 1 year ago    Sexual activity: Yes     Partners: Male Lifestyle    Physical activity     Days per week: None     Minutes per session: None    Stress: None   Relationships    Social connections     Talks on phone: None     Gets together: None     Attends Hoahaoism service: None     Active member of club or organization: None     Attends meetings of clubs or organizations: None     Relationship status: None    Intimate partner violence     Fear of current or ex partner: None     Emotionally abused: None     Physically abused: None     Forced sexual activity: None   Other Topics Concern    None   Social History Narrative    None       SCREENINGS             PHYSICAL EXAM    (up to 7 for level 4, 8 or more for level 5)     ED Triage Vitals [07/03/20 0037]   BP Temp Temp src Pulse Resp SpO2 Height Weight   (!) 133/99 -- -- 115 24 95 % -- --       Physical Exam  Constitutional:       General: She is not in acute distress. Appearance: She is well-developed. HENT:      Head: Normocephalic and atraumatic. Right Ear: External ear normal.      Left Ear: External ear normal.      Nose: Nose normal.      Mouth/Throat:      Pharynx: No oropharyngeal exudate. Eyes:      Conjunctiva/sclera: Conjunctivae normal.      Pupils: Pupils are equal, round, and reactive to light. Neck:      Musculoskeletal: Normal range of motion and neck supple. Cardiovascular:      Rate and Rhythm: Regular rhythm. Tachycardia present. Heart sounds: Normal heart sounds. No murmur. No friction rub. No gallop. Pulmonary:      Effort: Pulmonary effort is normal. No respiratory distress. Breath sounds: Normal breath sounds. No wheezing. Abdominal:      General: Bowel sounds are normal. There is no distension. Palpations: Abdomen is soft. Tenderness: There is no abdominal tenderness. There is no guarding or rebound. Musculoskeletal: Normal range of motion. General: No tenderness. Lymphadenopathy:      Cervical: No cervical adenopathy.    Skin: General: Skin is warm and dry. Findings: No rash. Neurological:      Mental Status: She is alert and oriented to person, place, and time. GCS: GCS eye subscore is 4. GCS verbal subscore is 5. GCS motor subscore is 6. Cranial Nerves: Cranial nerves are intact. No cranial nerve deficit or dysarthria. Gait: Gait is intact. Psychiatric:         Attention and Perception: Attention normal.         Mood and Affect: Mood is anxious. Affect is labile. Speech: Speech is tangential.         Thought Content: Thought content is paranoid and delusional. Thought content does not include homicidal or suicidal ideation. DIAGNOSTIC RESULTS   LABS:    No results found for this visit on 07/03/20. All other labs were within normal range ornot returned as of this dictation. EKG: All EKG's are interpreted by the Emergency Department Physician who either signs or Co-signs this chart in the absence of a cardiologist.  Please see their note for interpretation of EKG. RADIOLOGY:   Non-plain film images such as CT, Ultrasound and MRI are read by the radiologist.  Plain radiographic images are visualized and preliminarily interpreted by the ED Provider with the belowfindings:    Interpretation per the Radiologist below, if available at the time of this note:    No orders to display         PROCEDURES   Unless otherwise noted below, none     Procedures    CRITICAL CARE TIME   N/A    CONSULTS:  None      EMERGENCY DEPARTMENT COURSE and DIFFERENTIAL DIAGNOSIS/MDM:   Vitals:    Vitals:    07/03/20 0037   BP: (!) 133/99   Pulse: 115   Resp: 24   SpO2: 95%       Patient was given the following medications:  Medications - No data to display    I evaluated the patient. Patient at this time is refusing any blood work or urinalysis. I expressed my concern about the patient's paranoia and delusions however she at this time does not want any medical evaluation.   I then discussed the case with the behavioral team.  They came out and attempted to evaluate the patient however the patient now is stating that she does not want to be evaluated at all including any behavioral evaluation. At this time I do not have any reason to hold the patient against her will as she is not homicidal, not suicidal, and appears to understand her situation. Patient at this point has decided to leave [address] I have recommended admission to the hospital, but Parul Angel refuses. The risks (including but not limited to suffering and death) as well as the benefits were explained to the patient. Questions were sought and answered and the patient voiced understanding. However, Parul Angel refuses admission. I have encouraged the patient to return to have their evaluation completed as we are glad to do so. I have also instructed Parul Angel on the importance of follow-up and to return for any worsening or worrisome concerns. Parul Angel appears competent to make medical decisions at this time. AMA form signed and placed on the chart. The patient understands the importance of follow up and reasons to return. FINAL IMPRESSION      1. Acute paranoia ([address]    2. Delusions ([address]    3.  Tachycardia          DISPOSITION/PLAN   DISPOSITION [place] 07/03/2020 01:07:30 AM      PATIENT REFERRED TO:  ISSA Knapp CNP  [address] [place] [address]  [address]  [address] [place] [address]  Schedule an appointment as soon as possible for a visit in 1 week      [facility] [place] [facility]  [address] [address]  [phone]  Go to   If symptoms worsen      DISCHARGE MEDICATIONS:  Current Discharge Medication List          DISCONTINUED MEDICATIONS:  Current Discharge Medication List                 (Please note that portions of this note were completed with a voice recognition program.  Efforts were made to edit the dictations but occasionally words are mis-transcribed.)    Olvin De La Rosa MD(electronically signed)      Olvin De La Rosa MD  07/03/20 1875

## 2020-07-03 NOTE — ED NOTES
Pt refusing everything. Registration attempting to register pt so discharge info can be printed and given. Pt leaving AMA.       Shravan Jeronimo RN  07/03/20 0620

## 2020-07-03 NOTE — ED NOTES
Attempted to speak with patient who informed this RN that she was brought here due to a pending investigation involving her mother - she states that now that she knows her mother has called here and talked to the doctor that this was a huge waste of time - explained to the patient that we are here to help her - patient states that she absolutely declines to have anything done in regards to blood drawn or urine given - when asked how she would like us to help she stated that she simply wanted a single therapy session with the psychiatrist however she doesn't want anything to do with that now since she feels that HIPAA has been violated with her mother calling in - attempted to reassure the patient that HIPAA had not been violated and that we truly want to help her - patient continue to decline any help at this time - ED MD made aware     Memo Soriano, RN  07/03/20 0128

## 2020-07-08 ENCOUNTER — HOSPITAL ENCOUNTER (INPATIENT)
Age: 35
LOS: 8 days | Discharge: HOME | DRG: 751 | End: 2020-07-16
Attending: EMERGENCY MEDICINE | Admitting: PSYCHIATRY & NEUROLOGY
Payer: COMMERCIAL

## 2020-07-08 LAB
A/G RATIO: 1.7 (ref 1.1–2.2)
ACETAMINOPHEN LEVEL: <5 UG/ML (ref 10–30)
ALBUMIN SERPL-MCNC: 4.2 G/DL (ref 3.4–5)
ALP BLD-CCNC: 55 U/L (ref 40–129)
ALT SERPL-CCNC: 77 U/L (ref 10–40)
AMPHETAMINE SCREEN, URINE: POSITIVE
ANION GAP SERPL CALCULATED.3IONS-SCNC: 12 MMOL/L (ref 3–16)
AST SERPL-CCNC: 43 U/L (ref 15–37)
BARBITURATE SCREEN URINE: ABNORMAL
BASOPHILS ABSOLUTE: 0 K/UL (ref 0–0.2)
BASOPHILS RELATIVE PERCENT: 0.7 %
BENZODIAZEPINE SCREEN, URINE: ABNORMAL
BILIRUB SERPL-MCNC: 0.3 MG/DL (ref 0–1)
BILIRUBIN URINE: NEGATIVE
BLOOD, URINE: ABNORMAL
BUN BLDV-MCNC: 8 MG/DL (ref 7–20)
CALCIUM SERPL-MCNC: 9 MG/DL (ref 8.3–10.6)
CANNABINOID SCREEN URINE: ABNORMAL
CHLORIDE BLD-SCNC: 103 MMOL/L (ref 99–110)
CLARITY: CLEAR
CO2: 22 MMOL/L (ref 21–32)
COCAINE METABOLITE SCREEN URINE: ABNORMAL
COLOR: YELLOW
CREAT SERPL-MCNC: 0.9 MG/DL (ref 0.6–1.1)
EOSINOPHILS ABSOLUTE: 0.1 K/UL (ref 0–0.6)
EOSINOPHILS RELATIVE PERCENT: 1.2 %
EPITHELIAL CELLS, UA: ABNORMAL /HPF (ref 0–5)
ETHANOL: NORMAL MG/DL (ref 0–0.08)
GFR AFRICAN AMERICAN: >60
GFR NON-AFRICAN AMERICAN: >60
GLOBULIN: 2.5 G/DL
GLUCOSE BLD-MCNC: 92 MG/DL (ref 70–99)
GLUCOSE URINE: NEGATIVE MG/DL
HCG QUALITATIVE: NEGATIVE
HCT VFR BLD CALC: 36.1 % (ref 36–48)
HEMOGLOBIN: 12.1 G/DL (ref 12–16)
KETONES, URINE: NEGATIVE MG/DL
LEUKOCYTE ESTERASE, URINE: NEGATIVE
LIPASE: 42 U/L (ref 13–60)
LYMPHOCYTES ABSOLUTE: 1.4 K/UL (ref 1–5.1)
LYMPHOCYTES RELATIVE PERCENT: 22.1 %
Lab: ABNORMAL
MAGNESIUM: 2.2 MG/DL (ref 1.8–2.4)
MCH RBC QN AUTO: 30.3 PG (ref 26–34)
MCHC RBC AUTO-ENTMCNC: 33.5 G/DL (ref 31–36)
MCV RBC AUTO: 90.5 FL (ref 80–100)
METHADONE SCREEN, URINE: ABNORMAL
MICROSCOPIC EXAMINATION: YES
MONOCYTES ABSOLUTE: 0.7 K/UL (ref 0–1.3)
MONOCYTES RELATIVE PERCENT: 12 %
NEUTROPHILS ABSOLUTE: 3.9 K/UL (ref 1.7–7.7)
NEUTROPHILS RELATIVE PERCENT: 64 %
NITRITE, URINE: NEGATIVE
OPIATE SCREEN URINE: ABNORMAL
OXYCODONE URINE: ABNORMAL
PDW BLD-RTO: 13.5 % (ref 12.4–15.4)
PH UA: 6
PH UA: 6 (ref 5–8)
PHENCYCLIDINE SCREEN URINE: ABNORMAL
PLATELET # BLD: 244 K/UL (ref 135–450)
PMV BLD AUTO: 9.6 FL (ref 5–10.5)
POTASSIUM REFLEX MAGNESIUM: 2.9 MMOL/L (ref 3.5–5.1)
PROPOXYPHENE SCREEN: ABNORMAL
PROTEIN UA: NEGATIVE MG/DL
RBC # BLD: 3.98 M/UL (ref 4–5.2)
RBC UA: ABNORMAL /HPF (ref 0–4)
SALICYLATE, SERUM: <0.3 MG/DL (ref 15–30)
SARS-COV-2, NAAT: NOT DETECTED
SODIUM BLD-SCNC: 137 MMOL/L (ref 136–145)
SPECIFIC GRAVITY UA: 1.01 (ref 1–1.03)
TOTAL PROTEIN: 6.7 G/DL (ref 6.4–8.2)
TSH SERPL DL<=0.05 MIU/L-ACNC: 1.22 UIU/ML (ref 0.27–4.2)
URINE REFLEX TO CULTURE: ABNORMAL
URINE TYPE: ABNORMAL
UROBILINOGEN, URINE: 0.2 E.U./DL
WBC # BLD: 6.2 K/UL (ref 4–11)
WBC UA: ABNORMAL /HPF (ref 0–5)

## 2020-07-08 PROCEDURE — 6370000000 HC RX 637 (ALT 250 FOR IP): Performed by: EMERGENCY MEDICINE

## 2020-07-08 PROCEDURE — 83735 ASSAY OF MAGNESIUM: CPT

## 2020-07-08 PROCEDURE — G0480 DRUG TEST DEF 1-7 CLASSES: HCPCS

## 2020-07-08 PROCEDURE — 80307 DRUG TEST PRSMV CHEM ANLYZR: CPT

## 2020-07-08 PROCEDURE — 1240000000 HC EMOTIONAL WELLNESS R&B

## 2020-07-08 PROCEDURE — 36415 COLL VENOUS BLD VENIPUNCTURE: CPT

## 2020-07-08 PROCEDURE — U0002 COVID-19 LAB TEST NON-CDC: HCPCS

## 2020-07-08 PROCEDURE — 6370000000 HC RX 637 (ALT 250 FOR IP): Performed by: PSYCHIATRY & NEUROLOGY

## 2020-07-08 PROCEDURE — 83690 ASSAY OF LIPASE: CPT

## 2020-07-08 PROCEDURE — 84443 ASSAY THYROID STIM HORMONE: CPT

## 2020-07-08 PROCEDURE — 99284 EMERGENCY DEPT VISIT MOD MDM: CPT

## 2020-07-08 PROCEDURE — 81001 URINALYSIS AUTO W/SCOPE: CPT

## 2020-07-08 PROCEDURE — 85025 COMPLETE CBC W/AUTO DIFF WBC: CPT

## 2020-07-08 PROCEDURE — 84703 CHORIONIC GONADOTROPIN ASSAY: CPT

## 2020-07-08 PROCEDURE — 80053 COMPREHEN METABOLIC PANEL: CPT

## 2020-07-08 RX ORDER — IBUPROFEN 400 MG/1
400 TABLET ORAL EVERY 6 HOURS PRN
Status: DISCONTINUED | OUTPATIENT
Start: 2020-07-08 | End: 2020-07-16 | Stop reason: HOSPADM

## 2020-07-08 RX ORDER — TRAZODONE HYDROCHLORIDE 50 MG/1
50 TABLET ORAL NIGHTLY PRN
Status: DISCONTINUED | OUTPATIENT
Start: 2020-07-08 | End: 2020-07-16 | Stop reason: HOSPADM

## 2020-07-08 RX ORDER — OLANZAPINE 10 MG/1
10 INJECTION, POWDER, LYOPHILIZED, FOR SOLUTION INTRAMUSCULAR
Status: COMPLETED | OUTPATIENT
Start: 2020-07-08 | End: 2020-07-08

## 2020-07-08 RX ORDER — HYDROXYZINE PAMOATE 50 MG/1
50 CAPSULE ORAL 3 TIMES DAILY PRN
Status: DISCONTINUED | OUTPATIENT
Start: 2020-07-08 | End: 2020-07-16 | Stop reason: HOSPADM

## 2020-07-08 RX ORDER — BENZTROPINE MESYLATE 1 MG/ML
2 INJECTION INTRAMUSCULAR; INTRAVENOUS 2 TIMES DAILY PRN
Status: DISCONTINUED | OUTPATIENT
Start: 2020-07-08 | End: 2020-07-16 | Stop reason: HOSPADM

## 2020-07-08 RX ORDER — MAGNESIUM HYDROXIDE/ALUMINUM HYDROXICE/SIMETHICONE 120; 1200; 1200 MG/30ML; MG/30ML; MG/30ML
30 SUSPENSION ORAL EVERY 6 HOURS PRN
Status: DISCONTINUED | OUTPATIENT
Start: 2020-07-08 | End: 2020-07-16 | Stop reason: HOSPADM

## 2020-07-08 RX ORDER — OLANZAPINE 10 MG/1
10 TABLET ORAL
Status: COMPLETED | OUTPATIENT
Start: 2020-07-08 | End: 2020-07-08

## 2020-07-08 RX ORDER — ACETAMINOPHEN 325 MG/1
650 TABLET ORAL EVERY 4 HOURS PRN
Status: DISCONTINUED | OUTPATIENT
Start: 2020-07-08 | End: 2020-07-16 | Stop reason: HOSPADM

## 2020-07-08 RX ADMIN — HYDROXYZINE PAMOATE 50 MG: 50 CAPSULE ORAL at 21:05

## 2020-07-08 RX ADMIN — POTASSIUM BICARBONATE 40 MEQ: 782 TABLET, EFFERVESCENT ORAL at 08:40

## 2020-07-08 RX ADMIN — OLANZAPINE 10 MG: 10 TABLET, FILM COATED ORAL at 22:12

## 2020-07-08 ASSESSMENT — LIFESTYLE VARIABLES: HISTORY_ALCOHOL_USE: NO

## 2020-07-08 ASSESSMENT — SLEEP AND FATIGUE QUESTIONNAIRES
DO YOU HAVE DIFFICULTY SLEEPING: YES
SLEEP PATTERN: DIFFICULTY FALLING ASLEEP
DIFFICULTY FALLING ASLEEP: YES
RESTFUL SLEEP: NO
DIFFICULTY ARISING: NO
AVERAGE NUMBER OF SLEEP HOURS: 5
DIFFICULTY STAYING ASLEEP: YES

## 2020-07-08 ASSESSMENT — ENCOUNTER SYMPTOMS
SORE THROAT: 0
ABDOMINAL PAIN: 0
SHORTNESS OF BREATH: 0
BACK PAIN: 0

## 2020-07-08 ASSESSMENT — PAIN DESCRIPTION - LOCATION: LOCATION: NECK;BACK

## 2020-07-08 ASSESSMENT — PAIN DESCRIPTION - PAIN TYPE: TYPE: CHRONIC PAIN

## 2020-07-08 ASSESSMENT — PAIN DESCRIPTION - FREQUENCY: FREQUENCY: CONTINUOUS

## 2020-07-08 ASSESSMENT — PAIN SCALES - GENERAL: PAINLEVEL_OUTOF10: 7

## 2020-07-08 NOTE — ED NOTES
Level of Care Disposition: Admit to [facility] when bed becomes available     Patient was seen by ED provider and [facility] staff. The case presented to psychiatric provider on-call . Based on the ED evaluation and information presented to the provider by Dimas Verma it was determined that inpatient hospitalization is the least restrictive environment for the patient at this time. The patient will be admitted to the inpatient unit. Admitting provider did not order suicide precautions based on pt reyna for safety on the unit. RATIONALE FOR ADMISSION:     Patient at imminent risk of danger to self as demonstrated by increased depression and suicidal ideations.    Patient has shown an inability to care for self demonstrated by not taking her medications correctly             Insurance Pre certification Authorization: Care source       Robert Oneal MSW,LSW

## 2020-07-08 NOTE — ED PROVIDER NOTES
[address] ED  EMERGENCY DEPARTMENT ENCOUNTER      Pt Name: Westley Arteaga  MRN: [id number]  [place] 1985  Date of evaluation: 7/8/2020  Provider: Garcia Logan MD    [address] [place] [address]       Chief Complaint   Patient presents with    Anxiety     pt comes in with anxiety attack that started yesterday. Pt sts she usually tries home meds, meditation, praying but it did not help. Pt was out taking a walk and someone stopped to see if she was ok and needed anything. Pt told her she needed help so passerby called police. Police dropped her off here. HISTORY OF PRESENT ILLNESS   (Location/Symptom, Timing/Onset, Context/Setting, Quality, Duration, Modifying Factors, Severity)  Note limiting factors. Westley Arteaga is a 38 y.o. female who presents to the emergency department     Patient is a 50-year-old female with a past medical history of depression, anxiety, PTSD, substance abuse who presents with anxiety, recurrent PTSD attacks and wanting help with mood stabilization. Patient reports that she has been having difficult time over the last couple of weeks. She reports that she has been having nightly panic attacks and has not been able to sleep. A couple weeks ago her son was taken away by child services. A couple days ago she was seen in the emergency department but at that time refused treatment and left AGAINST MEDICAL ADVICE. Patient states that she is ready for help now. She states that she has been admitted to the [facility] several times in the past and that each time it has really helped her and she is hoping that that is what can happen again today. She reports that she last used drugs a couple of days ago. She denies any other symptoms or concerns. Patient is being very depressed but does not readily admit to suicidality    The history is provided by the patient. Nursing Notes were reviewed.     REVIEW OF SYSTEMS    (2-9 systems for level 4, 10 or more for level 5)     Review of Systems   Constitutional: Negative for chills and fever. HENT: Negative for congestion and sore throat. Eyes: Negative for visual disturbance. Respiratory: Negative for shortness of breath. Cardiovascular: Negative for chest pain. Gastrointestinal: Negative for abdominal pain. Musculoskeletal: Negative for back pain. Skin: Negative for pallor and rash. Neurological: Negative for headaches. Psychiatric/Behavioral: Positive for sleep disturbance. The patient is nervous/anxious. All other systems reviewed and are negative. Except as noted above the remainder of the review of systems was reviewed and negative. PAST MEDICAL HISTORY     Past Medical History:   Diagnosis Date    Anxiety     Chronic post-traumatic stress disorder (PTSD) 10/29/2018    Depression     Mild intermittent asthma without complication     Stimulant use disorder 2018         SURGICAL HISTORY       Past Surgical History:   Procedure Laterality Date     SECTION  2005    DILATION AND CURETTAGE OF UTERUS  2006             CURRENT MEDICATIONS       Previous Medications    BUPROPION (WELLBUTRIN SR) 150 MG EXTENDED RELEASE TABLET    Take 1 tablet by mouth 2 times daily    CETIRIZINE (ZYRTEC) 10 MG TABLET    Take 1 tablet by mouth daily    DULOXETINE (CYMBALTA) 60 MG EXTENDED RELEASE CAPSULE    Take 60 mg by mouth daily    GABAPENTIN (NEURONTIN) 400 MG CAPSULE    Take 1 capsule by mouth 3 times daily for 30 days. Jai Corie     NAPROXEN (NAPROSYN) 500 MG TABLET    Take 1 tablet by mouth 2 times daily for 20 doses    POTASSIUM CHLORIDE (KLOR-CON M) 20 MEQ TBCR EXTENDED RELEASE TABLET    Take 1 tablet by mouth daily (with breakfast) for 5 days    PRAZOSIN (MINIPRESS) 2 MG CAPSULE    Take 1 capsule by mouth nightly       ALLERGIES     Ceclor [cefaclor]    FAMILY HISTORY       Family History   Problem Relation Age of Onset    Cancer Father         lung    Mental Illness Father     Diabetes Maternal Grandmother     Hypertension Maternal Grandmother     Heart Failure Maternal Grandmother     Depression Paternal Grandmother     Diabetes Paternal Grandmother     Heart Failure Paternal Grandmother     Mental Illness Paternal Grandmother     Hypertension Mother     Diabetes Paternal Grandfather     Heart Failure Paternal Grandfather     Mental Illness Paternal Grandfather           SOCIAL HISTORY       Social History     Socioeconomic History    Marital status: Single     Spouse name: None    Number of children: 2    Years of education: 15    Highest education level: None   Occupational History    Occupation: unemployed past 2+ year   Social Needs    Financial resource strain: None    Food insecurity     Worry: None     Inability: None    Transportation needs     Medical: None     Non-medical: None   Tobacco Use    Smoking status: Current Some Day Smoker     Packs/day: 0.50    Smokeless tobacco: Never Used   Substance and Sexual Activity    Alcohol use: No     Alcohol/week: 1.0 standard drinks     Types: 1 Standard drinks or equivalent per week    Drug use: Yes     Types: Methamphetamines, IV     Comment: recently relapsed on meth    Sexual activity: Yes     Partners: Male   Lifestyle    Physical activity     Days per week: None     Minutes per session: None    Stress: None   Relationships    Social connections     Talks on phone: None     Gets together: None     Attends Shinto service: None     Active member of club or organization: None     Attends meetings of clubs or organizations: None     Relationship status: None    Intimate partner violence     Fear of current or ex partner: None     Emotionally abused: None     Physically abused: None     Forced sexual activity: None   Other Topics Concern    None   Social History Narrative    None       SCREENINGS               PHYSICAL EXAM    (up to 7 for level 4, 8 or more for level 5)     ED Triage Vitals [07/08/20 0700]   BP Temp Temp Source Pulse Resp SpO2 Height Weight   100/72 98.1 °F (36.7 °C) Oral 61 12 100 % 5' 5\" (1.651 m) 175 lb (79.4 kg)       Physical Exam  Vitals signs and nursing note reviewed. Constitutional:       General: She is not in acute distress. Appearance: Normal appearance. HENT:      Head: Normocephalic and atraumatic. Nose: Nose normal. No congestion. Mouth/Throat:      Mouth: Mucous membranes are moist.   Eyes:      Conjunctiva/sclera: Conjunctivae normal.   Neck:      Musculoskeletal: Normal range of motion and neck supple. Cardiovascular:      Rate and Rhythm: Normal rate and regular rhythm. Pulses: Normal pulses. Pulmonary:      Effort: Pulmonary effort is normal. No respiratory distress. Musculoskeletal: Normal range of motion. General: No swelling or deformity. Skin:     General: Skin is warm and dry. Neurological:      General: No focal deficit present. Mental Status: She is alert and oriented to person, place, and time. Psychiatric:         Mood and Affect: Affect is flat and tearful. Behavior: Behavior is slowed. Thought Content: Thought content does not include homicidal ideation.          DIAGNOSTIC RESULTS     EKG: All EKG's are interpreted by the Emergency Department Physician who either signs or Co-signs this chart in the absence of a cardiologist.        RADIOLOGY:     Interpretation per the Radiologist below, if available at the time of this note:    No orders to display         LABS:  Labs Reviewed   ACETAMINOPHEN LEVEL - Abnormal; Notable for the following components:       Result Value    Acetaminophen Level <5 (*)     All other components within normal limits    Narrative:     Performed at:  [facility],  [place], [facility]   Phone [phone]   CBC WITH AUTO DIFFERENTIAL - Abnormal; Notable for the following components:    RBC 3.98 (*)     All other components within normal limits Narrative:     Performed at:  [facility] [address],  [place], [address]   Phone [phone]   COMPREHENSIVE METABOLIC PANEL W/ REFLEX TO MG FOR LOW K - Abnormal; Notable for the following components:    Potassium reflex Magnesium 2.9 (*)     ALT 77 (*)     AST 43 (*)     All other components within normal limits    Narrative:     Yeni Naidu  GARTH tel. [phone],  Chemistry results called to and read back by Keysha Hernandez RN, 07/08/2020  08:28, by [facility] ON THE [facility]  Performed at:  [facility] [address],  [place], [address]   Phone [phone]   SALICYLATE LEVEL - Abnormal; Notable for the following components:    Salicylate, Serum <3.0 (*)     All other components within normal limits    Narrative:     Performed at:  [facility] [address],  [place], [address]   Phone [phone]   URINE RT REFLEX TO CULTURE - Abnormal; Notable for the following components:    Blood, Urine MODERATE (*)     All other components within normal limits    Narrative:     Performed at:  [facility],  [place], [address]   Phone [phone]   [address] - Abnormal; Notable for the following components:    Amphetamine Screen, Urine POSITIVE (*)     All other components within normal limits    Narrative:     Performed at:  [facility],  [place], [address]   Phone [phone]   MICROSCOPIC URINALYSIS - Abnormal; Notable for the following components:    RBC, UA 5-10 (*)     All other components within normal limits    Narrative:     Performed at:  [facility] [address],  [place], [address]   Phone [phone]   ETHANOL    Narrative:     Performed at:  [facility] [address],  [place], [address]   Phone [phone]   HCG, SERUM, QUALITATIVE    Narrative:     Performed at:  [facility] [address],  [place], [facility]   Phone [phone]   LIPASE    Narrative:     Performed at:  [facility] [address],  [place], [facility]   Phone [phone]   MAGNESIUM    Narrative:     Sunny Quick tel. [phone],  Chemistry results called to and read back by Keysha Hernandez RN, 07/08/2020  08:28, by Layla Caguas  Performed at:  [facility] [address],  [place], [facility]   Phone [phone]       All other labs were within normal range or not returned as of this dictation. EMERGENCY DEPARTMENT COURSE and DIFFERENTIAL DIAGNOSIS/MDM:   Vitals:    Vitals:    07/08/20 0700   BP: 100/72   Pulse: 61   Resp: 12   Temp: 98.1 °F (36.7 °C)   TempSrc: Oral   SpO2: 100%   Weight: 175 lb (79.4 kg)   Height: 5' 5\" (1.651 m)       Patient evaluated and previous record reviewed. Patient presents with wanting help for anxiety and depression though will not specify suicidality. Vital signs stable and within normal limits. Physical exam as documented above. Lab work-up notable for hypokalemia which is replaced. As well as UDS positive for amphetamines. Patient evaluated by psychiatric team and they recommend admission when bed is available. Patient is medically cleared for admission. CONSULTS:  None    PROCEDURES:  Unless otherwise noted below, none     Procedures      FINAL IMPRESSION      1. Anxiety    2. Depression, unspecified depression type    3. Hypokalemia          DISPOSITION/PLAN   DISPOSITION        PATIENT REFERRED TO:  No follow-up provider specified. DISCHARGE MEDICATIONS:  New Prescriptions    No medications on file     Controlled Substances Monitoring:     No flowsheet data found.     (Please note that portions of this note were completed with a voice recognition program.  Efforts were made to edit the dictations but occasionally words are mis-transcribed.)    Shaheed Sal MD (electronically signed)  Attending Emergency Physician            Polina Foley MD  [phone]
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
## 2020-07-08 NOTE — ED NOTES
Pt brought back to [facility]. Pt already changed out. Pt oriented to room B1 and belongings placed in locker.      Sheri Puller MSW,LSW

## 2020-07-09 LAB
CHOLESTEROL, TOTAL: 133 MG/DL (ref 0–199)
ESTIMATED AVERAGE GLUCOSE: 99.7 MG/DL
HBA1C MFR BLD: 5.1 %
HDLC SERPL-MCNC: 39 MG/DL (ref 40–60)
LDL CHOLESTEROL CALCULATED: 68 MG/DL
POTASSIUM SERPL-SCNC: 3 MMOL/L (ref 3.5–5.1)
TRIGL SERPL-MCNC: 128 MG/DL (ref 0–150)
VLDLC SERPL CALC-MCNC: 26 MG/DL

## 2020-07-09 PROCEDURE — 83036 HEMOGLOBIN GLYCOSYLATED A1C: CPT

## 2020-07-09 PROCEDURE — 1240000000 HC EMOTIONAL WELLNESS R&B

## 2020-07-09 PROCEDURE — 6370000000 HC RX 637 (ALT 250 FOR IP): Performed by: PHYSICIAN ASSISTANT

## 2020-07-09 PROCEDURE — 99223 1ST HOSP IP/OBS HIGH 75: CPT | Performed by: PSYCHIATRY & NEUROLOGY

## 2020-07-09 PROCEDURE — 6370000000 HC RX 637 (ALT 250 FOR IP): Performed by: PSYCHIATRY & NEUROLOGY

## 2020-07-09 PROCEDURE — 80074 ACUTE HEPATITIS PANEL: CPT

## 2020-07-09 PROCEDURE — 36415 COLL VENOUS BLD VENIPUNCTURE: CPT

## 2020-07-09 PROCEDURE — 84132 ASSAY OF SERUM POTASSIUM: CPT

## 2020-07-09 PROCEDURE — 80061 LIPID PANEL: CPT

## 2020-07-09 RX ORDER — POTASSIUM CHLORIDE 20 MEQ/1
40 TABLET, EXTENDED RELEASE ORAL ONCE
Status: COMPLETED | OUTPATIENT
Start: 2020-07-09 | End: 2020-07-09

## 2020-07-09 RX ADMIN — POTASSIUM CHLORIDE 40 MEQ: 1500 TABLET, EXTENDED RELEASE ORAL at 10:51

## 2020-07-09 RX ADMIN — HYDROXYZINE PAMOATE 50 MG: 50 CAPSULE ORAL at 08:47

## 2020-07-09 RX ADMIN — TRAZODONE HYDROCHLORIDE 50 MG: 50 TABLET ORAL at 00:04

## 2020-07-09 RX ADMIN — ACETAMINOPHEN 650 MG: 325 TABLET ORAL at 08:47

## 2020-07-09 ASSESSMENT — PAIN SCALES - GENERAL
PAINLEVEL_OUTOF10: 8
PAINLEVEL_OUTOF10: 0

## 2020-07-09 ASSESSMENT — SLEEP AND FATIGUE QUESTIONNAIRES
DO YOU USE A SLEEP AID: NO
AVERAGE NUMBER OF SLEEP HOURS: 5
DO YOU HAVE DIFFICULTY SLEEPING: NO

## 2020-07-09 ASSESSMENT — LIFESTYLE VARIABLES: HISTORY_ALCOHOL_USE: NO

## 2020-07-09 NOTE — PLAN OF CARE
Problem: Depressive Behavior With or Without Suicide Precautions:  Goal: Able to verbalize and/or display a decrease in depressive symptoms  Description: Able to verbalize and/or display a decrease in depressive symptoms  Outcome: Not Met This Shift  In bed most of shift. Finally ate some dinner after no breakfast or lunch.  No SI.

## 2020-07-09 NOTE — BH NOTE
`[facility]  Admission Note     Admission Type:   Admission Type: Voluntary    Reason for admission:  Reason for Admission: Pt brought in y police due to panic attack and increased anxiety along with suicidal ideation.     PATIENT STRENGTHS:  Strengths: Communication    Patient Strengths and Limitations:  Limitations: Tendency to isolate self, Hopeless about future    Addictive Behavior:   Addictive Behavior  In the past 3 months, have you felt or has someone told you that you have a problem with:  : None  Do you have a history of Chemical Use?: No  Do you have a history of Alcohol Use?: No  Do you have a history of Street Drug Abuse?: No  Histroy of Prescripton Drug Abuse?: No    Medical Problems:   Past Medical History:   Diagnosis Date    Anxiety     Chronic post-traumatic stress disorder (PTSD) 10/29/2018    Depression     Mild intermittent asthma without complication     Stimulant use disorder 9/23/2018       Status EXAM:  Status and Exam  Normal: No  Facial Expression: Avoids Gaze, Flat  Affect: Unstable  Level of Consciousness: Alert  Mood:Normal: No  Mood: Anxious, Depressed, Helpless, Sad, Empty, Despair  Motor Activity:Normal: No  Motor Activity: Decreased  Interview Behavior: Cooperative, Evasive  Preception: [place] to Person, Terrye Christians to Time, [place] to Place, [place] to Situation  Attention:Normal: No  Attention: Distractible  Thought Processes: Blocking  Thought Content:Normal: Yes  Hallucinations: None  Delusions: No  Memory:Normal: Yes  Insight and Judgment: No  Insight and Judgment: Poor Insight, Poor Judgment  Present Suicidal Ideation: Yes  Present Homicidal Ideation: No    Tobacco Screening:  Practical Counseling, on admission, david X, if applicable and completed (first 3 are required if patient doesn't refuse):            ( )  Recognizing danger situations (included triggers and roadblocks)                    ( )  Coping skills (new ways to manage stress, exercise,

## 2020-07-09 NOTE — PLAN OF CARE
Problem: Depressive Behavior With or Without Suicide Precautions:  Goal: Ability to disclose and discuss suicidal ideas will improve  Description: Ability to disclose and discuss suicidal ideas will improve  Outcome: Ongoing   Joellen Maxwell spent the early part of the shift in her bed. She got up once to get snacks but did not eat them. Joellen Maxwell complained of anxiety, racing thoughts and insomnia, getting a PRN medication three times for these symptoms. Joellen Maxwell finally was able to rest.  Joellen Maxwell did not answer directly when asked of suicidal ideation. She did verbally contract for safety. Slept with door open. No behavioral problems.
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
## 2020-07-09 NOTE — BH NOTE
[address] [place] [address]  Initial Interdisciplinary Treatment Plan NOTE    Review Date & Time: 7/9/2020 0920    Patient was not in treatment team    Admission Type:   Admission Type: Voluntary    Reason for admission:  Reason for Admission: Pt brought in [facility] police due to panic attack and increased anxiety along with suicidal ideation. Estimated Length of Stay Update:  2-3 days  Estimated Discharge Date Update: 7/12/2020    PATIENT STRENGTHS:  Patient Strengths Strengths: No significant Physical Illness, Motivated, Social Skills  Patient Strengths and Limitations:Limitations: Tendency to isolate self, Difficult relationships / poor social skills, Demonstrates discomfort with /lack of social skills, External locus of control, Multiple barriers to leisure interests, Difficulty problem solving/relies on others to help solve problems, Inappropriate/potentially harmful leisure interests, Lacks leisure interests  Addictive Behavior:Addictive Behavior  In the past 3 months, have you felt or has someone told you that you have a problem with:  : None  Do you have a history of Chemical Use?: No  Do you have a history of Alcohol Use?: No  Do you have a history of Street Drug Abuse?: Yes  Histroy of Prescripton Drug Abuse?: No  Medical Problems:  Past Medical History:   Diagnosis Date    Anxiety     Chronic post-traumatic stress disorder (PTSD) 10/29/2018    Depression     Mild intermittent asthma without complication     Stimulant use disorder 9/23/2018       EDUCATION:   Learner Progress Toward Treatment Goals: Reviewed goals and plan of care    Method: Individual    Outcome: no evidence of learning     PATIENT GOALS: Patient was not in goals group    PLAN/TREATMENT RECOMMENDATIONS UPDATE: Patient will take medication as prescribed, eat 75% of meals, attend groups, participate in milieu activities, participate in treatment team and care planning for discharge and follow up.     GOALS UPDATE:   Time frame for Short-Term Goals: 1-2 days    Alethea Man RN

## 2020-07-09 NOTE — PROGRESS NOTES
Occupational Therapy      Attempted OT eval.  Pt. Refused requesting OTR/L to re-attempt later today. Plan to re-attempt as time permits.     Guillermo Kraus, OTR/L  #[id number]

## 2020-07-09 NOTE — BH NOTE
DONTRELL notes that patient hasn't taken medications in a long time. Patient uncooperative with assessment due to wanting to sleep. Dr. Zahra Taylor okay to order prn psych medications. Will continue to monitor.

## 2020-07-09 NOTE — BH NOTE
Met with patient to complete psychosocial assessment. Patient reports that she was brought to the [facility] ED via PD. Patient reports that she was taking a walk and a passerby became worried about her. Has been having panic attacks and recurrent PTSD. Presents today with anxiety and depression. Has also been struggling with meth use. Was staying with her significant other and some friends and CPS was called and took her 2yo son out of the home due to the drug use. Her 2yo daughter had already been staying with her mother. Patient reports that she is struggling with addiction and is wanting help at this time. Oriented x 4. No avh at this time. Delusions were evident at time of admission. No s/h ideations reported at this time. Patient ended interview due to a headache and feeling sick.

## 2020-07-09 NOTE — FLOWSHEET NOTE
07/09/20 1017   Activities of Daily Living   Patient Requires assistance with daily self-care activities? No   Leisure Activity 1   3 Favorite Leisure Activities Pt refused assessment twice, stating \"I don't feel good. \" Assessment completed per chart review. Frequency   (KORI due to pt refusing assessment.)   Last time   (KORI due to pt refusing assessment.)   Barriers to participating    (KORI due to pt refusing assessment.)   Leisure Activity 2   Favorite Leisure Activities  KORI due to pt refusing assessment. Frequency    (KORI due to pt refusing assessment.)   Last time    (KORI due to pt refusing assessment.)   Barriers to participating    (KORI due to pt refusing assessment.)   Leisure Activity 3   Favorite Leisure Activities  KORI due to pt refusing assessment. Frequency    (KORI due to pt refusing assessment.)   Last time    (KORI due to pt refusing assessment.)   Barriers to participating    (KORI due to pt refusing assessment.)   Social   Patient reports spending the majority of their free time   (KORI due to pt refusing assessment.)   Patient verbalizes a preference for spending free time   (KORI due to pt refusing assessment.)   Patients perception of support system   (KORI due to pt refusing assessment.)   Patients perception of barriers to socializing with others include(s)   (KORI due to pt refusing assessment.)   Social Details Per chart review, pt reports brother as main support system.    Beliefs & Coping   Has difficulty dealing with feelings     (KORI due to pt refusing assessment.)   Internalizes feelings/Keeps feelings in   (KORI due to pt refusing assessment.)   Externalizes feelings through aggressiveness or poor temper control    (KORI due to pt refusing assessment.)   Feels uncomfortable around others    (KORI due to pt refusing assessment.)   Has difficulty talking to others    (KORI due to pt refusing assessment.)   Depends on others for direction or decisions   (KORI due to pt refusing assessment.)   Difficulty dealing with anger of others    (KORI due to pt refusing assessment.)   Difficulty dealing with own anger    (KORI due to pt refusing assessment.)   Difficulty managing stress   (KORI due to pt refusing assessment.)   Frequently has difficulty with relationships    (KORI due to pt refusing assessment.)   Has recently perceived/experienced loss, disappointment, humiliation or failure    (KORI due to pt refusing assessment.)   General perception about self   (KORI due to pt refusing assessment.)   Attitude about abilities   (KORI due to pt refusing assessment.)   Locus of Control    (KORI due to pt refusing assessment.)   Belief about recovery   (KORI due to pt refusing assessment. Per chart review, pt reports that she is \"ready for help now. \" )   Patient Identified Strengths  KORI due to pt refusing assessment. Patient Identified Limitations  KORI due to pt refusing assessment. Perception of most stressful event prior to hospitalization Per chart review, \"pt comes in with anxiety attack that started yesterday. Pt sts she usually tries home meds, meditation, praying but it did not help. Pt was out taking a walk and someone stopped to see if she was ok and needed anything. Pt told her she needed help so passerby called police. Police dropped her off here. \" Pt reported family issues regarding children being removed from home as a main stressor. Pt was recently seen in the ED and left AMA, states she is ready now and wants treatment. Perception of changes needed KORI due to pt refusing assessment. Strengths and Limitations   Strengths Independent in basic self-care activities; Positive support network  (Pt is able to take care of basic ADLs and reports brother as main support system.)   Limitations Tendency to isolate self; Inappropriate/potentially harmful leisure interests  (per chart review, pt was found taking a walk and reports nightly panic attacks.  Pt also reported she last used drugs \"a few

## 2020-07-09 NOTE — PLAN OF CARE
Attempted to see patient today between hours of 10-11 am. Pt refused physical exam and medical history stating \"My PTSD is really bad right now and I would prefer to do this another time. \"    I informed pt that I did order replacement potassium for a K of 3 (40 mEq total) and put in a lab for CMP to recheck potassium and LFTs tomorrow as both were abnormal. Pt consented to lab work and KCl. Will attempt to round on patient tomorrow during daily rounds.     Cassie Martins PA-C 11:52 AM 7/9/2020

## 2020-07-10 LAB
A/G RATIO: 1.4 (ref 1.1–2.2)
ALBUMIN SERPL-MCNC: 3.3 G/DL (ref 3.4–5)
ALP BLD-CCNC: 45 U/L (ref 40–129)
ALT SERPL-CCNC: 55 U/L (ref 10–40)
ANION GAP SERPL CALCULATED.3IONS-SCNC: 15 MMOL/L (ref 3–16)
AST SERPL-CCNC: 29 U/L (ref 15–37)
BILIRUB SERPL-MCNC: <0.2 MG/DL (ref 0–1)
BUN BLDV-MCNC: 8 MG/DL (ref 7–20)
CALCIUM SERPL-MCNC: 8 MG/DL (ref 8.3–10.6)
CHLORIDE BLD-SCNC: 106 MMOL/L (ref 99–110)
CO2: 20 MMOL/L (ref 21–32)
CREAT SERPL-MCNC: 0.7 MG/DL (ref 0.6–1.1)
GFR AFRICAN AMERICAN: >60
GFR NON-AFRICAN AMERICAN: >60
GLOBULIN: 2.3 G/DL
GLUCOSE BLD-MCNC: 90 MG/DL (ref 70–99)
POTASSIUM SERPL-SCNC: 3.5 MMOL/L (ref 3.5–5.1)
SODIUM BLD-SCNC: 141 MMOL/L (ref 136–145)
TOTAL PROTEIN: 5.6 G/DL (ref 6.4–8.2)

## 2020-07-10 PROCEDURE — 36415 COLL VENOUS BLD VENIPUNCTURE: CPT

## 2020-07-10 PROCEDURE — 99222 1ST HOSP IP/OBS MODERATE 55: CPT | Performed by: PHYSICIAN ASSISTANT

## 2020-07-10 PROCEDURE — 80053 COMPREHEN METABOLIC PANEL: CPT

## 2020-07-10 PROCEDURE — 1240000000 HC EMOTIONAL WELLNESS R&B

## 2020-07-10 PROCEDURE — 6370000000 HC RX 637 (ALT 250 FOR IP): Performed by: PSYCHIATRY & NEUROLOGY

## 2020-07-10 PROCEDURE — 99233 SBSQ HOSP IP/OBS HIGH 50: CPT | Performed by: PSYCHIATRY & NEUROLOGY

## 2020-07-10 PROCEDURE — 6360000002 HC RX W HCPCS: Performed by: PSYCHIATRY & NEUROLOGY

## 2020-07-10 RX ORDER — DULOXETIN HYDROCHLORIDE 30 MG/1
30 CAPSULE, DELAYED RELEASE ORAL 2 TIMES DAILY
Status: DISCONTINUED | OUTPATIENT
Start: 2020-07-10 | End: 2020-07-14

## 2020-07-10 RX ORDER — QUETIAPINE FUMARATE 25 MG/1
50 TABLET, FILM COATED ORAL NIGHTLY
Status: DISCONTINUED | OUTPATIENT
Start: 2020-07-10 | End: 2020-07-11

## 2020-07-10 RX ORDER — KETOROLAC TROMETHAMINE 30 MG/ML
30 INJECTION, SOLUTION INTRAMUSCULAR; INTRAVENOUS ONCE
Status: COMPLETED | OUTPATIENT
Start: 2020-07-10 | End: 2020-07-10

## 2020-07-10 RX ORDER — CETIRIZINE HYDROCHLORIDE 10 MG/1
10 TABLET ORAL DAILY
Status: DISCONTINUED | OUTPATIENT
Start: 2020-07-10 | End: 2020-07-16 | Stop reason: HOSPADM

## 2020-07-10 RX ORDER — BUPROPION HYDROCHLORIDE 150 MG/1
150 TABLET ORAL DAILY
Status: DISCONTINUED | OUTPATIENT
Start: 2020-07-10 | End: 2020-07-11

## 2020-07-10 RX ADMIN — HYDROXYZINE PAMOATE 50 MG: 50 CAPSULE ORAL at 20:34

## 2020-07-10 RX ADMIN — DULOXETINE HYDROCHLORIDE 30 MG: 30 CAPSULE, DELAYED RELEASE ORAL at 11:32

## 2020-07-10 RX ADMIN — ACETAMINOPHEN 650 MG: 325 TABLET ORAL at 09:18

## 2020-07-10 RX ADMIN — CETIRIZINE HYDROCHLORIDE 10 MG: 10 TABLET ORAL at 11:32

## 2020-07-10 RX ADMIN — TRAZODONE HYDROCHLORIDE 50 MG: 50 TABLET ORAL at 22:11

## 2020-07-10 RX ADMIN — KETOROLAC TROMETHAMINE 30 MG: 30 INJECTION, SOLUTION INTRAMUSCULAR; INTRAVENOUS at 11:33

## 2020-07-10 RX ADMIN — ACETAMINOPHEN 650 MG: 325 TABLET ORAL at 22:10

## 2020-07-10 RX ADMIN — BUPROPION HYDROCHLORIDE 150 MG: 150 TABLET, EXTENDED RELEASE ORAL at 11:32

## 2020-07-10 RX ADMIN — DULOXETINE HYDROCHLORIDE 30 MG: 30 CAPSULE, DELAYED RELEASE ORAL at 20:34

## 2020-07-10 ASSESSMENT — PAIN DESCRIPTION - DESCRIPTORS: DESCRIPTORS: HEADACHE

## 2020-07-10 ASSESSMENT — PAIN SCALES - GENERAL
PAINLEVEL_OUTOF10: 0
PAINLEVEL_OUTOF10: 8
PAINLEVEL_OUTOF10: 8
PAINLEVEL_OUTOF10: 0

## 2020-07-10 ASSESSMENT — PAIN DESCRIPTION - LOCATION: LOCATION: HEAD

## 2020-07-10 ASSESSMENT — PAIN DESCRIPTION - PAIN TYPE: TYPE: ACUTE PAIN

## 2020-07-10 NOTE — BH NOTE
Mother of patient called to provide collateral on daughter. Daughter has signed NABIL for mother. Mom reports that daughter has been using Meth, on and off, for about a year. Following being released from residential in february she began using again. Mom reports that while this was very disturbing for the family her orientation and cognition were not frankly altered when they would be together. In May of this year mother reports that she had a drastic change in personality and mentation. Pt would call mother at odd hours warning her about magnets, being tracked, and that the FBI was after her. When the patient would go to the family home she would report that bugs were all over her, and would wipe her body down with bath wipes after having just showered, to get the bugs off. Additionally, pt would say that she was leaving to  meet her boyfriend (Juliana Reyes does not recall the name), this person does not live in town and they had dated approx 20-years ago. When asked about this the patient, per mother, April Leah clearly disoriented. \"   Pt would also have \"long lasting\" delusions that her flatmate, a woman in her late 29's, was her 13yo daughter. Mother reports that the paranoia and psychotic behavior has been mostly uninterrupted since may. They are very concerned about, \"brain damage. \"    Writer attempted to speak to patient about these reports. Pt tersely denied, and said, \"I dont want to talk about these things. \"   Writer asked about her recent drug use, she endorsed that she, \"smoked some stuff, like street drugs. \"  She didn't know what it was but it \"looked like crystal.\"  When asked if it hit like crystal when she smoked it, she replied, \"sometimes it made me really tired. \"  This does not sound like amphetamine, concern for synthetic use.

## 2020-07-10 NOTE — PLAN OF CARE
Problem: Depressive Behavior With or Without Suicide Precautions:  Goal: Ability to disclose and discuss suicidal ideas will improve  Description: Ability to disclose and discuss suicidal ideas will improve  Outcome: Ongoing   Patient more visible on unit. Patient denies SI/HI. Asked that staff not ask her why she is here because it just makes her PTSD worse. Encouraged patient just to reinforce that she wishes not to speak on it in an appropriate way. Will continue to monitor.

## 2020-07-10 NOTE — PROGRESS NOTES
Final    MCH 07/08/2020 30.3  26.0 - 34.0 pg Final    MCHC 07/08/2020 33.5  31.0 - 36.0 g/dL Final    RDW 07/08/2020 13.5  12.4 - 15.4 % Final    Platelets 65/36/1560 244  135 - 450 K/uL Final    MPV 07/08/2020 9.6  5.0 - 10.5 fL Final    Neutrophils % 07/08/2020 64.0  % Final    Lymphocytes % 07/08/2020 22.1  % Final    Monocytes % 07/08/2020 12.0  % Final    Eosinophils % 07/08/2020 1.2  % Final    Basophils % 07/08/2020 0.7  % Final    Neutrophils Absolute 07/08/2020 3.9  1.7 - 7.7 K/uL Final    Lymphocytes Absolute 07/08/2020 1.4  1.0 - 5.1 K/uL Final    Monocytes Absolute 07/08/2020 0.7  0.0 - 1.3 K/uL Final    Eosinophils Absolute 07/08/2020 0.1  0.0 - 0.6 K/uL Final    Basophils Absolute 07/08/2020 0.0  0.0 - 0.2 K/uL Final    Sodium 07/08/2020 137  136 - 145 mmol/L Final    Potassium reflex Magnesium 07/08/2020 2.9* 3.5 - 5.1 mmol/L Final    Chloride 07/08/2020 103  99 - 110 mmol/L Final    CO2 07/08/2020 22  21 - 32 mmol/L Final    Anion Gap 07/08/2020 12  3 - 16 Final    Glucose 07/08/2020 92  70 - 99 mg/dL Final    BUN 07/08/2020 8  7 - 20 mg/dL Final    CREATININE 07/08/2020 0.9  0.6 - 1.1 mg/dL Final    GFR Non- 07/08/2020 >60  >60 Final    Comment: >60 mL/min/1.73m2 EGFR, calc. for ages 25 and older using the  MDRD formula (not corrected for weight), is valid for stable  renal function.  GFR  07/08/2020 >60  >60 Final    Comment: Chronic Kidney Disease: less than 60 ml/min/1.73 sq.m. Kidney Failure: less than 15 ml/min/1.73 sq.m. Results valid for patients 18 years and older.       Calcium 07/08/2020 9.0  8.3 - 10.6 mg/dL Final    Total Protein 07/08/2020 6.7  6.4 - 8.2 g/dL Final    Alb 07/08/2020 4.2  3.4 - 5.0 g/dL Final    Albumin/Globulin Ratio 07/08/2020 1.7  1.1 - 2.2 Final    Total Bilirubin 07/08/2020 0.3  0.0 - 1.0 mg/dL Final    Alkaline Phosphatase 07/08/2020 55  40 - 129 U/L Final    ALT 07/08/2020 77* 10 - 40 U/L Final    AST 07/08/2020 43* 15 - 37 U/L Final    Globulin 07/08/2020 2.5  g/dL Final    Ethanol Lvl 07/08/2020 None Detected  mg/dL Final    Comment:    None Detected  Conversion factor:  100 mg/dl = .100 g/dl  For Medical Purposes Only      hCG Qual 07/08/2020 Negative  Detects HCG level >10 MIU/mL Final    Lipase 07/08/2020 42.0  13.0 - 60.0 U/L Final    Salicylate, Serum 73/07/4268 <0.3* 15.0 - 30.0 mg/dL Final    Comment: Therapeutic Range: 15.0-30.0 mg/dL  Toxic: >30.0 mg/dL      Color, UA 07/08/2020 Yellow  Straw/Yellow Final    Clarity, UA 07/08/2020 Clear  Clear Final    Glucose, Ur 07/08/2020 Negative  Negative mg/dL Final    Bilirubin Urine 07/08/2020 Negative  Negative Final    Ketones, Urine 07/08/2020 Negative  Negative mg/dL Final    Specific Gravity, UA 07/08/2020 1.010  1.005 - 1.030 Final    Blood, Urine 07/08/2020 MODERATE* Negative Final    pH, UA 07/08/2020 6.0  5.0 - 8.0 Final    Protein, UA 07/08/2020 Negative  Negative mg/dL Final    Urobilinogen, Urine 07/08/2020 0.2  <2.0 E.U./dL Final    Nitrite, Urine 07/08/2020 Negative  Negative Final    Leukocyte Esterase, Urine 07/08/2020 Negative  Negative Final    Microscopic Examination 07/08/2020 YES   Final    Urine Type 07/08/2020 NotGiven   Final    Urine Reflex to Culture 07/08/2020 Not Indicated   Final    Amphetamine Screen, Urine 07/08/2020 POSITIVE* Negative <1000ng/mL Final    Comment: High concentrations of ephedrine/pseudoephedrine or  phenylpropanolamine may cause false positive results  for amphetamine. Therefore, confirmatory testing for  amphetamine should be considered if clinically indicated.       Barbiturate Screen, Ur 07/08/2020 Neg  Negative <200 ng/mL Final    Benzodiazepine Screen, Urine 07/08/2020 Neg  Negative <200 ng/mL Final    Cannabinoid Scrn, Ur 07/08/2020 Neg  Negative <50 ng/mL Final    Cocaine Metabolite Screen, Urine 07/08/2020 Neg  Negative <300 ng/mL Final    Opiate Scrn, Ur 07/08/2020 Neg  Negative <300 ng/mL Final    Comment: \"Therapeutic levels of pain medication, especially oxycontin and synthetic  opioids, may not be detected by this Methodology. Pain management screen  panel  Drug panel-PM-Hi Res Ur, Interp (PAIN) should be considered for drug  monitoring \".  PCP Screen, Urine 07/08/2020 Neg  Negative <25 ng/mL Final    Methadone Screen, Urine 07/08/2020 Neg  Negative <300 ng/mL Final    Propoxyphene Scrn, Ur 07/08/2020 Neg  Negative <300 ng/mL Final    Oxycodone Urine 07/08/2020 Neg  Negative <100 ng/ml Final    pH, UA 07/08/2020 6.0   Final    Comment: Urine pH less than 5.0 or greater than 8.0 may indicate sample adulteration. Another sample should be collected if clinically  indicated.  Drug Screen Comment: 07/08/2020 see below   Final    Comment: This method is a screening test to detect only these drug  classes as part of a medical workup. Confirmatory testing  by another method should be ordered if clinically indicated.  WBC, UA 07/08/2020 0-2  0 - 5 /HPF Final    RBC, UA 07/08/2020 5-10* 0 - 4 /HPF Final    Epithelial Cells, UA 07/08/2020 2-5  0 - 5 /HPF Final    Magnesium 07/08/2020 2.20  1.80 - 2.40 mg/dL Final    SARS-CoV-2, NAAT 07/08/2020 Not Detected  Not Detected Final    Comment: Rapid NAAT:   Negative results should be treated as presumptive and,  if inconsistent with clinical signs and symptoms or necessary for  patient management, should be tested with an alternative molecular  assay. Negative results do not preclude SARS-CoV-2 infection and  should not be used as the sole basis for patient management decisions. This test has been authorized by the FDA under an Emergency Use  Authorization (EUA) for use by authorized laboratories.     Fact sheet for Healthcare Providers:  BuildHer.es  Fact sheet for Patients: Gregory.es    METHODOLOGY: Isothermal Nucleic Acid Amplification     

## 2020-07-10 NOTE — H&P
.  Hospital Medicine History & Physical      PCP: ISSA Knapp CNP    Date of Admission: 2020    Date of Service: Pt seen/examined on 7/10/2020     Chief Complaint:    Chief Complaint   Patient presents with    Anxiety     pt comes in with anxiety attack that started yesterday. Pt sts she usually tries home meds, meditation, praying but it did not help. Pt was out taking a walk and someone stopped to see if she was ok and needed anything. Pt told her she needed help so passerby called police. Police dropped her off here. History Of Present Illness: The patient is a 38 y.o. female with a PMH of Anxiety, Depression, Hx of Anxiety w/o recent AE who presented to [facility] for worsening anxiety. Patient was seen and evaluated in the ED by the ED medical provider, patient was medically cleared for admission to [facility] at [facility]. This note serves as an admission medical H&P.   PCP: denies  Tobacco Use: yes  EtOH Use: denies  Illicit Drug Use: former hx of substance abuse and recently relapsed, will not report what she has used    Pt denies any medical concerns at this time. Past Medical History:        Diagnosis Date    Anxiety     Chronic post-traumatic stress disorder (PTSD) 10/29/2018    Depression     Mild intermittent asthma without complication     Stimulant use disorder 2018       Past Surgical History:        Procedure Laterality Date     SECTION  2005    DILATION AND CURETTAGE OF UTERUS  2006           Medications Prior to Admission:    Prior to Admission medications    Medication Sig Start Date End Date Taking?  Authorizing Provider   potassium chloride (KLOR-CON M) 20 MEQ TBCR extended release tablet Take 1 tablet by mouth daily (with breakfast) for 5 days 12/3/18 7/8/20 Yes Alesha Mora MD   naproxen (NAPROSYN) 500 MG tablet Take 1 tablet by mouth 2 times daily for 20 doses 18 Yes Matias Leiva PA-C   cetirizine (ZYRTEC) 10 MG tablet Take 1 tablet by mouth daily 11/6/18  Yes gR Schaefer MD   prazosin (MINIPRESS) 2 MG capsule Take 1 capsule by mouth nightly 11/5/18  Yes Rg Schaefer MD   buPROPion [facility] SR) 150 MG extended release tablet Take 1 tablet by mouth 2 times daily 11/5/18  Yes Rg Schaefer MD   DULoxetine (CYMBALTA) 60 MG extended release capsule Take 60 mg by mouth daily   Yes An Goncalves MD   gabapentin (NEURONTIN) 400 MG capsule Take 1 capsule by mouth 3 times daily for 30 days. . 11/5/18 12/5/18  Rg Schaefer MD       Allergies:  Ceclor [cefaclor]    Social History:  The patient currently lives with family. TOBACCO:   reports that she has been smoking. She has been smoking about 0.50 packs per day. She has never used smokeless tobacco.  ETOH:   reports no history of alcohol use.       Family History:   Positive as follows:        Problem Relation Age of Onset    Cancer Father         lung    Mental Illness Father     Diabetes Maternal Grandmother     Hypertension Maternal Grandmother     Heart Failure Maternal Grandmother     Depression Paternal Grandmother     Diabetes Paternal Grandmother     Heart Failure Paternal Grandmother     Mental Illness Paternal Grandmother     Hypertension Mother     Diabetes Paternal Grandfather     Heart Failure Paternal Grandfather     Mental Illness Paternal Grandfather        REVIEW OF SYSTEMS:     Constitutional: Negative for fever   HENT: Negative for sore throat   Eyes: Negative for redness   Respiratory: Negative  for dyspnea, cough   Cardiovascular: Negative for chest pain   Gastrointestinal: Negative for vomiting, diarrhea   Genitourinary: Negative for hematuria   Musculoskeletal: Negative for arthralgias   Skin: Negative for rash   Neurological: Negative for syncope   Hematological: Negative for adenopathy   Psychiatric/Behavorial: Negative for anxiety    PHYSICAL EXAM:    BP 91/63   Pulse 70   Temp 97.7 °F (36.5 °C) (Temporal)   Resp 18   Ht 5' 5\" (1.651 m)   Wt 175 lb (79.4 kg)   LMP 07/03/2020   SpO2 99%   Breastfeeding No   BMI 29.12 kg/m²   Gen: No distress. Alert. Yas Monterroso  female  Eyes: PERRL. No sclera icterus. No conjunctival injection. ENT: No discharge. Pharynx clear. Neck:  Trachea midline. Resp: No accessory muscle use. No crackles. No wheezes. No rhonchi. CV: Regular rate. Regular rhythm. No murmur. No rub. No edema. GI: Soft, Non-tender. Non-distended. Normal bowel sounds. Skin: Warm and dry. No rash on exposed extremities. M/S: No cyanosis. No clubbing. Neuro: Awake. Grossly nonfocal, CN II-XII intact    Psych: Defer to psychiatry.     CBC:   Recent Labs     07/08/20  0744   WBC 6.2   HGB 12.1   HCT 36.1   MCV 90.5        BMP:   Recent Labs     07/08/20  0744 07/09/20  0705 07/10/20  0704     --  141   K 2.9* 3.0* 3.5     --  106   CO2 22  --  20*   BUN 8  --  8   CREATININE 0.9  --  0.7     LIVER PROFILE:   Recent Labs     07/08/20  0744 07/10/20  0704   AST 43* 29   ALT 77* 55*   LIPASE 42.0  --    BILITOT 0.3 <0.2   ALKPHOS 55 45     UA:  Recent Labs     07/08/20  0744   COLORU Yellow   PHUR 6.0  6.0   WBCUA 0-2   RBCUA 5-10*   CLARITYU Clear   SPECGRAV 1.010   LEUKOCYTESUR Negative   UROBILINOGEN 0.2   BILIRUBINUR Negative   BLOODU MODERATE*   GLUCOSEU Negative      U/A:    Lab Results   Component Value Date    COLORU Yellow 07/08/2020    WBCUA 0-2 07/08/2020    RBCUA 5-10 07/08/2020    MUCUS 2+ 11/21/2018    BACTERIA 2+ 12/03/2018    CLARITYU Clear 07/08/2020    SPECGRAV 1.010 07/08/2020    LEUKOCYTESUR Negative 07/08/2020    BLOODU MODERATE 07/08/2020    GLUCOSEU Negative 07/08/2020    AMORPHOUS 1+ 10/28/2018     CULTURES  N/A    RADIOLOGY  N/A    ASSESSMENT/PLAN:    Anxiety  Depression  PTSD  - cont mgmt per [facility]    Asthma  - denies any recent AE  - does not use any inhalers    Hypokalemia - Resolved  - 2.9 --> 3  - repleted with 40 meq  - repeat normal    Transaminitis  - reports hx of substance abuse  - repeat trended down  - will check acute hepatitis panel    Tobacco Dependence  - Recommended cessation     Pt has no medical complaints at this time. Pt was informed that they may have [facility] contact us should any medical concerns arise during this admission.     Erendira Walsh PA-C 3:08 PM 7/10/2020

## 2020-07-10 NOTE — GROUP NOTE
Group Therapy Note    Date: 7/10/2020    Group Start Time: 1100  Group End Time: 1150  Group Topic: Cognitive Skills    [facility]    Ashly Frazier, [facility]        Group Therapy Note    Attendees: 18        Patient's Goal:  Pt's goal was to identify what Pt needs to do to maintain healthy mind, relationships, spirit and body.       Notes:  Pt was not engaged in group.  Pt did not participate in group discussion and activity.  Pt not meet goal.    Status After Intervention:  Unchanged    Participation Level: Minimal    Participation Quality: Resistant      Speech:  hesitant      Thought Process/Content: Logical      Affective Functioning: Congruent      Mood: depressed      Level of consciousness:  Preoccupied and Inattentive      Response to Learning: Resistant      Endings: None Reported    Modes of Intervention: Education, Support, Socialization, Exploration, Clarifying, Problem-solving, Activity, Movement, Confrontation, Limit-setting and Reality-testing      Discipline Responsible: /Counselor      Signature:  Ashly Frazier, [facility]

## 2020-07-10 NOTE — PLAN OF CARE
Problem: Anger Management/Homicidal Ideation:  Goal: Absence of angry outbursts  Description: Absence of angry outbursts  Outcome: Ongoing     Problem: Altered Mood, Depressive Behavior:  Goal: Absence of self-harm  Description: Absence of self-harm  Outcome: Ongoing     Problem: Depressive Behavior With or Without Suicide Precautions:  Goal: Able to verbalize and/or display a decrease in depressive symptoms  Description: Able to verbalize and/or display a decrease in depressive symptoms  Outcome: Ongoing     Problem: Depressive Behavior With or Without Suicide Precautions:  Goal: Ability to disclose and discuss suicidal ideas will improve  Description: Ability to disclose and discuss suicidal ideas will improve  Outcome: Ongoing  Jazmin Thao has been absent from any angry outburst this shift. Patient has been calm and cooperative and medication compliant. Will continue to monitor for safety.

## 2020-07-10 NOTE — GROUP NOTE
Group Therapy Note    Date: 7/9/2020    Group Start Time: 2030  Group End Time: 2100  Group Topic: Wrap-Up    [facility]        Group Therapy Note    Attendees: Goals and importance of goal setting discussed. Night time milieu activities discussed.          Patient's Goal:  Get out of bed    Notes:  Successful     Status After Intervention:  Improved    Participation Level: Minimal    Participation Quality: Attentive      Speech:  hesitant      Thought Process/Content: Linear      Affective Functioning: Congruent      Mood: anxious      Level of consciousness:  Alert and Oriented x4      Response to Learning: Progressing to goal      Endings: None Reported    Modes of Intervention: Support      Discipline Responsible: [employer]      Signature:  Juancho Edgar

## 2020-07-11 PROCEDURE — 6370000000 HC RX 637 (ALT 250 FOR IP): Performed by: PSYCHIATRY & NEUROLOGY

## 2020-07-11 PROCEDURE — 99233 SBSQ HOSP IP/OBS HIGH 50: CPT | Performed by: PSYCHIATRY & NEUROLOGY

## 2020-07-11 PROCEDURE — 1240000000 HC EMOTIONAL WELLNESS R&B

## 2020-07-11 PROCEDURE — 6370000000 HC RX 637 (ALT 250 FOR IP)

## 2020-07-11 RX ORDER — OLANZAPINE 10 MG/1
TABLET, ORALLY DISINTEGRATING ORAL
Status: COMPLETED
Start: 2020-07-11 | End: 2020-07-11

## 2020-07-11 RX ORDER — OLANZAPINE 10 MG/1
10 TABLET, ORALLY DISINTEGRATING ORAL EVERY 6 HOURS PRN
Status: DISCONTINUED | OUTPATIENT
Start: 2020-07-11 | End: 2020-07-16 | Stop reason: HOSPADM

## 2020-07-11 RX ADMIN — ACETAMINOPHEN 650 MG: 325 TABLET ORAL at 08:36

## 2020-07-11 RX ADMIN — CETIRIZINE HYDROCHLORIDE 10 MG: 10 TABLET ORAL at 08:32

## 2020-07-11 RX ADMIN — OLANZAPINE 10 MG: 10 TABLET, ORALLY DISINTEGRATING ORAL at 11:39

## 2020-07-11 RX ADMIN — HYDROXYZINE PAMOATE 50 MG: 50 CAPSULE ORAL at 11:37

## 2020-07-11 RX ADMIN — HYDROXYZINE PAMOATE 50 MG: 50 CAPSULE ORAL at 20:20

## 2020-07-11 RX ADMIN — DULOXETINE HYDROCHLORIDE 30 MG: 30 CAPSULE, DELAYED RELEASE ORAL at 08:32

## 2020-07-11 RX ADMIN — DULOXETINE HYDROCHLORIDE 30 MG: 30 CAPSULE, DELAYED RELEASE ORAL at 21:07

## 2020-07-11 RX ADMIN — BUPROPION HYDROCHLORIDE 150 MG: 150 TABLET, EXTENDED RELEASE ORAL at 08:32

## 2020-07-11 ASSESSMENT — PAIN DESCRIPTION - PROGRESSION

## 2020-07-11 ASSESSMENT — PAIN - FUNCTIONAL ASSESSMENT
PAIN_FUNCTIONAL_ASSESSMENT: ACTIVITIES ARE NOT PREVENTED
PAIN_FUNCTIONAL_ASSESSMENT: ACTIVITIES ARE NOT PREVENTED

## 2020-07-11 ASSESSMENT — PAIN DESCRIPTION - ORIENTATION
ORIENTATION: RIGHT
ORIENTATION: RIGHT

## 2020-07-11 ASSESSMENT — PAIN SCALES - GENERAL
PAINLEVEL_OUTOF10: 0
PAINLEVEL_OUTOF10: 5
PAINLEVEL_OUTOF10: 8

## 2020-07-11 ASSESSMENT — PAIN DESCRIPTION - PAIN TYPE
TYPE: CHRONIC PAIN
TYPE: CHRONIC PAIN

## 2020-07-11 ASSESSMENT — PAIN DESCRIPTION - FREQUENCY
FREQUENCY: CONTINUOUS
FREQUENCY: CONTINUOUS

## 2020-07-11 ASSESSMENT — PAIN DESCRIPTION - ONSET
ONSET: ON-GOING
ONSET: ON-GOING

## 2020-07-11 ASSESSMENT — PAIN DESCRIPTION - DIRECTION
RADIATING_TOWARDS: SHOULDER
RADIATING_TOWARDS: SHOULDER

## 2020-07-11 ASSESSMENT — PAIN DESCRIPTION - DESCRIPTORS
DESCRIPTORS: ACHING
DESCRIPTORS: ACHING

## 2020-07-11 ASSESSMENT — PAIN DESCRIPTION - LOCATION
LOCATION: NECK
LOCATION: NECK

## 2020-07-11 NOTE — PLAN OF CARE
Problem: Altered Mood, Depressive Behavior:  Goal: Absence of self-harm  Description: Absence of self-harm  7/11/2020 1250 by Augustus Felix RN  Outcome: Ongoing     Problem: Depressive Behavior With or Without Suicide Precautions:  Goal: Able to verbalize and/or display a decrease in depressive symptoms  Description: Able to verbalize and/or display a decrease in depressive symptoms  Outcome: Ongoing   Patient has spent the majority shift resting in bed, but did come out for meals, some socializing with peers, and requesting medications. She is A & O x2 she does not seem to understand why she is here and time/date. She is dismissive of staff when they try to interact with her and usually would only answer in \"yes or no\" to most questions. She denies that she is having SI/HI and A/V H. She does seem like she has IS. She also during group had a panic attack and c/o her throat closing up, but able to talk in complete sentences. She was cooperative with taking medications for her anxiety. She was seen by staff afterwards eating lunch with others and then walking and talking with peers. Staff approached her to ask how she was since the medication and interaction with peers. She continue to endorse that she felt her throat was closing up and feeling odd. She continued to talk in complete sentences and did not appear to have any difficulty breathing. She did eventually did rest and seemed to be calm. She was up multiple times requesting pain medication for headaches and neck/shoulder pain. She focused on and requested multiple times for Toradol. She was informed that she does not have it ordered. She did take redirection well.

## 2020-07-11 NOTE — BH NOTE
[address] [place] [address]  Day 3 Interdisciplinary Treatment Plan NOTE    Review Date & Time: 07/11/2020 0900    Patient was not in treatment team    Admission Type:   Admission Type: Voluntary    Reason for admission:  Reason for Admission: Pt brought in [facility] police due to panic attack and increased anxiety along with suicidal ideation.   Estimated Length of Stay Update:  1-3 days   Estimated Discharge Date Update: 1-3 days     PATIENT STRENGTHS:  Patient Strengths Strengths: No significant Physical Illness, Motivated, Social Skills  Patient Strengths and Limitations:Limitations: Tendency to isolate self, Difficult relationships / poor social skills, Demonstrates discomfort with /lack of social skills, External locus of control, Multiple barriers to leisure interests, Difficulty problem solving/relies on others to help solve problems, Inappropriate/potentially harmful leisure interests, Lacks leisure interests  Addictive Behavior:Addictive Behavior  In the past 3 months, have you felt or has someone told you that you have a problem with:  : None  Do you have a history of Chemical Use?: No  Do you have a history of Alcohol Use?: No  Do you have a history of Street Drug Abuse?: Yes  Histroy of Prescripton Drug Abuse?: No  Medical Problems:  Past Medical History:   Diagnosis Date    Anxiety     Chronic post-traumatic stress disorder (PTSD) 10/29/2018    Depression     Mild intermittent asthma without complication     Stimulant use disorder 9/23/2018       Risk:  Fall RiskTotal: 69  Jose Scale Jose Scale Score: 22  BVC Total: 1  Change in scores No. Changes to plan of Care NO    Status EXAM:   Status and Exam  Normal: No  Facial Expression: Hostile, Flat, Worried  Affect: Blunt  Level of Consciousness: Alert  Mood:Normal: No  Mood: Depressed, Labile, Anxious, Irritable  Motor Activity:Normal: Yes  Motor Activity: Decreased  Interview Behavior: Irritable  Preception: [place] to Person, [place] to Time, [place] to Place, [place] to Situation  Attention:Normal: Yes  Attention: Distractible  Thought Processes: Other(See comment)(linear)  Thought Content:Normal: No  Thought Content: Poverty of Content  Hallucinations: None  Delusions: No  Memory:Normal: Yes  Memory: Poor Recent, Poor Remote  Insight and Judgment: No  Insight and Judgment: Poor Judgment, Poor Insight  Present Suicidal Ideation: No  Present Homicidal Ideation: No    Daily Assessment Last Entry:   Daily Sleep (WDL): Within Defined Limits         Patient Currently in Pain: Denies  Daily Nutrition (WDL): Within Defined Limits    Patient Monitoring:  Frequency of Checks: 4 times per hour, close    Psychiatric Symptoms:   Depression Symptoms  Depression Symptoms: Change in energy level  Anxiety Symptoms  Anxiety Symptoms: Generalized, Shortness of breath  Elizabeth Symptoms  Elizabeth Symptoms: No problems reported or observed. Psychosis Symptoms  Delusion Type: No problems reported or observed.     Suicide Risk CSSR-S:  1) Within the past month, have you wished you were dead or wished you could go to sleep and not wake up? : No  2) Have you actually had any thoughts of killing yourself? : No  6) Have you ever done anything, started to do anything, or prepared to do anything to end your life?: No  Change in Result NO Change in Plan of care NO      EDUCATION:   Learner Progress Toward Treatment Goals: Reviewed results and recommendations of this team    Method: Individual    Outcome: Verbalized understanding    PATIENT GOALS: attend group     PLAN/TREATMENT RECOMMENDATIONS UPDATE: maintain safety, medication management     GOALS UPDATE:   Time frame for Short-Term Goals:  By time of discharge       Zak Lucero RN

## 2020-07-11 NOTE — GROUP NOTE
Group Therapy Note    Date: 7/11/2020    Group Start Time: 1492  Group End Time: 3088  Group Topic: Cognitive Skills    [id number] [place] [address]        Group Therapy Note    Attendees: 10       Patient's Goal:  Patient will increase knowledge about anxiety, symptoms, treatment, and how to challenge negative or irrational thoughts     Notes:  Patient was provided with handout about anxiety and how to challenge negative thoughts by using CBT skills. Patient was attentive in the group, but then started to report having difficulty breathing, so writer reported this to the nurse. Writer attempted to assist her with using breathing techniques, but she required PRN medication that was administered by the nurse and psychiatrist then took patient to her room to assess her. Status After Intervention:  Improved    Participation Level:  Active Listener    Participation Quality: Attentive      Speech:  hesitant      Thought Process/Content: Logical      Affective Functioning: Congruent      Mood: anxious      Level of consciousness:  Drowsy      Response to Learning: Progressing to goal      Endings: None Reported    Modes of Intervention: Education      Discipline Responsible: /Counselor      Signature:  GLORIA Faulkner

## 2020-07-11 NOTE — PROGRESS NOTES
Patient was in group and started complaining of feeling like she could not breath and her throat was closing. She feels that it is related to feeling anxious. She became tearful. She was escorted by the provider to her room. Medication was ordered. She is cooperative and allowed staff to take VS. When writer went went in with medications she was sitting on the bed continued to be tearful and had her hands in the praying position under her chin. She was cooperative with taking the medication, then she was dismissive of staff by telling them she just needs to sleep. She then preceded to cover herself up with the blankets and lay down.

## 2020-07-11 NOTE — PROGRESS NOTES
Comments: + interactions, + contribution, and + engagement.   Met daily goal.      Time: 5717-0407      Type of Group: Wrap up-Relaxation      Level of Participation: 9/15

## 2020-07-11 NOTE — PLAN OF CARE
Problem: Anger Management/Homicidal Ideation:  Goal: Absence of angry outbursts  Description: Absence of angry outbursts  Outcome: Not Met This Shift     Problem: Altered Mood, Depressive Behavior:  Goal: Absence of self-harm  Description: Absence of self-harm  Outcome: Not Met This Shift     Problem: Depressive Behavior With or Without Suicide Precautions:  Goal: Ability to disclose and discuss suicidal ideas will improve  Description: Ability to disclose and discuss suicidal ideas will improve  Outcome: Not Met This Shift   Pt was out on the unit all evening interacting with peers. In 1:1 was frustrated that this nurse asked about her PTSD. She said she had no SI at that time and that she could contract for safety for now. She did contract to come to staff if she could not remain in control of herself. Pt said she was going to have to leave [employer] because she was getting SOB and more anxious. Rated depression 9-10. Pt seemed irritable during the evening. Given Vistaril 50 mg po at 2034 for anxiety she rated at an 8-9. Shortly thereafter, she said that her anxiety had come down to a 7. Pt given Tylenol for pt she rated at an 8 in her back and shoulders. Pt would not take IBU which covered that level of pain. Trazodone 50 mg given for sleep and both meds given at 2211. Pt did go to sleep shortly thereafter and was a FLACC 0.

## 2020-07-12 LAB
HAV IGM SER IA-ACNC: ABNORMAL
HEPATITIS B CORE IGM ANTIBODY: ABNORMAL
HEPATITIS B SURFACE ANTIGEN INTERPRETATION: ABNORMAL
HEPATITIS C ANTIBODY INTERPRETATION: REACTIVE

## 2020-07-12 PROCEDURE — 6370000000 HC RX 637 (ALT 250 FOR IP): Performed by: PSYCHIATRY & NEUROLOGY

## 2020-07-12 PROCEDURE — 1240000000 HC EMOTIONAL WELLNESS R&B

## 2020-07-12 RX ADMIN — DULOXETINE HYDROCHLORIDE 30 MG: 30 CAPSULE, DELAYED RELEASE ORAL at 21:02

## 2020-07-12 RX ADMIN — OLANZAPINE 15 MG: 10 TABLET, ORALLY DISINTEGRATING ORAL at 21:02

## 2020-07-12 RX ADMIN — HYDROXYZINE PAMOATE 50 MG: 50 CAPSULE ORAL at 15:25

## 2020-07-12 RX ADMIN — ACETAMINOPHEN 650 MG: 325 TABLET ORAL at 15:25

## 2020-07-12 RX ADMIN — CETIRIZINE HYDROCHLORIDE 10 MG: 10 TABLET ORAL at 08:40

## 2020-07-12 RX ADMIN — DULOXETINE HYDROCHLORIDE 30 MG: 30 CAPSULE, DELAYED RELEASE ORAL at 08:40

## 2020-07-12 RX ADMIN — HYDROXYZINE PAMOATE 50 MG: 50 CAPSULE ORAL at 21:26

## 2020-07-12 ASSESSMENT — PAIN SCALES - GENERAL
PAINLEVEL_OUTOF10: 0
PAINLEVEL_OUTOF10: 8
PAINLEVEL_OUTOF10: 0

## 2020-07-12 NOTE — PLAN OF CARE
Zonia Delong has been mostly isolative to her room today. She has complaints of a headache and anxiety. Tylenol and vistaril given at this time. Patient denies any hallucinations. She denies suicidal and homicidal ideations. She is pleasant during interview. She presents somewhat disheveled.

## 2020-07-12 NOTE — PROGRESS NOTES
Department of Psychiatry  Attending Progress Note  Chief Complaint: follow-up depression/psychosis? Patient's chart was reviewed and collaborated with  about the treatment plan. SUBJECTIVE:    Patient is feeling unchanged. Suicidal ideation:  denies suicidal ideation. Patient does not have medication side effects. Pt stated that she is feeling very anxious but can not explain why. She appears paranoid and distracted. We discussed starting zyprexa and she is agreeable with plan. Pt is poor historian and appears guarded and distracted. ROS: Patient has new complaints: no  Sleeping adequately:  Yes   Appetite adequate: Yes  Attending groups: No: isolative to room  Visitors:No    OBJECTIVE    Physical  VITALS:  BP (!) 104/53   Pulse 68   Temp 98.6 °F (37 °C) (Oral)   Resp 16   Ht 5' 5\" (1.651 m)   Wt 175 lb (79.4 kg)   LMP 07/03/2020   SpO2 97%   Breastfeeding No   BMI 29.12 kg/m²     Mental Status Examination:  Patients appearance was ill-appearing, hospital attire, lying in bed, fair grooming and fair hygiene. Thoughts are [place] and Unusual fears. Homicidal ideations none. No abnormal movements, tics or mannerisms. Memory intact [place] 0. Concentration Poor. Alert and oriented X 4. Insight and Judgement poor. Patient was cooperative.  Patient gait normal. Mood anxious, affect flat affect Hallucinations denies but distracted, suicidal ideations no specific plan to harm self Speech fluent and spontaneous    Data  Labs:   Admission on 07/08/2020   Component Date Value Ref Range Status    Acetaminophen Level 07/08/2020 <5* 10 - 30 ug/mL Final    Comment: Therapeutic Range: 10.0-30.0 ug/mL  Toxic: >=150 ug/mL      WBC 07/08/2020 6.2  4.0 - 11.0 K/uL Final    RBC 07/08/2020 3.98* 4.00 - 5.20 M/uL Final    Hemoglobin 07/08/2020 12.1  12.0 - 16.0 g/dL Final    Hematocrit 07/08/2020 36.1  36.0 - 48.0 % Final    MCV 07/08/2020 90.5  80.0 - 100.0 fL Final    MCH 07/08/2020 30.3  26.0 - 34.0 pg Final    MCHC 07/08/2020 33.5  31.0 - 36.0 g/dL Final    RDW 07/08/2020 13.5  12.4 - 15.4 % Final    Platelets 12/97/9262 244  135 - 450 K/uL Final    MPV 07/08/2020 9.6  5.0 - 10.5 fL Final    Neutrophils % 07/08/2020 64.0  % Final    Lymphocytes % 07/08/2020 22.1  % Final    Monocytes % 07/08/2020 12.0  % Final    Eosinophils % 07/08/2020 1.2  % Final    Basophils % 07/08/2020 0.7  % Final    Neutrophils Absolute 07/08/2020 3.9  1.7 - 7.7 K/uL Final    Lymphocytes Absolute 07/08/2020 1.4  1.0 - 5.1 K/uL Final    Monocytes Absolute 07/08/2020 0.7  0.0 - 1.3 K/uL Final    Eosinophils Absolute 07/08/2020 0.1  0.0 - 0.6 K/uL Final    Basophils Absolute 07/08/2020 0.0  0.0 - 0.2 K/uL Final    Sodium 07/08/2020 137  136 - 145 mmol/L Final    Potassium reflex Magnesium 07/08/2020 2.9* 3.5 - 5.1 mmol/L Final    Chloride 07/08/2020 103  99 - 110 mmol/L Final    CO2 07/08/2020 22  21 - 32 mmol/L Final    Anion Gap 07/08/2020 12  3 - 16 Final    Glucose 07/08/2020 92  70 - 99 mg/dL Final    BUN 07/08/2020 8  7 - 20 mg/dL Final    CREATININE 07/08/2020 0.9  0.6 - 1.1 mg/dL Final    GFR Non- 07/08/2020 >60  >60 Final    Comment: >60 mL/min/1.73m2 EGFR, calc. for ages 25 and older using the  MDRD formula (not corrected for weight), is valid for stable  renal function.  GFR  07/08/2020 >60  >60 Final    Comment: Chronic Kidney Disease: less than 60 ml/min/1.73 sq.m. Kidney Failure: less than 15 ml/min/1.73 sq.m. Results valid for patients 18 years and older.       Calcium 07/08/2020 9.0  8.3 - 10.6 mg/dL Final    Total Protein 07/08/2020 6.7  6.4 - 8.2 g/dL Final    Alb 07/08/2020 4.2  3.4 - 5.0 g/dL Final    Albumin/Globulin Ratio 07/08/2020 1.7  1.1 - 2.2 Final    Total Bilirubin 07/08/2020 0.3  0.0 - 1.0 mg/dL Final    Alkaline Phosphatase 07/08/2020 55  40 - 129 U/L Final    ALT 07/08/2020 77* 10 - 40 U/L Final    AST 07/08/2020 43* 15 - 37 U/L Final    Globulin 07/08/2020 2.5  g/dL Final    Ethanol Lvl 07/08/2020 None Detected  mg/dL Final    Comment:    None Detected  Conversion factor:  100 mg/dl = .100 g/dl  For Medical Purposes Only      hCG Qual 07/08/2020 Negative  Detects HCG level >10 MIU/mL Final    Lipase 07/08/2020 42.0  13.0 - 60.0 U/L Final    Salicylate, Serum 41/69/5487 <0.3* 15.0 - 30.0 mg/dL Final    Comment: Therapeutic Range: 15.0-30.0 mg/dL  Toxic: >30.0 mg/dL      Color, UA 07/08/2020 Yellow  Straw/Yellow Final    Clarity, UA 07/08/2020 Clear  Clear Final    Glucose, Ur 07/08/2020 Negative  Negative mg/dL Final    Bilirubin Urine 07/08/2020 Negative  Negative Final    Ketones, Urine 07/08/2020 Negative  Negative mg/dL Final    Specific Gravity, UA 07/08/2020 1.010  1.005 - 1.030 Final    Blood, Urine 07/08/2020 MODERATE* Negative Final    pH, UA 07/08/2020 6.0  5.0 - 8.0 Final    Protein, UA 07/08/2020 Negative  Negative mg/dL Final    Urobilinogen, Urine 07/08/2020 0.2  <2.0 E.U./dL Final    Nitrite, Urine 07/08/2020 Negative  Negative Final    Leukocyte Esterase, Urine 07/08/2020 Negative  Negative Final    Microscopic Examination 07/08/2020 YES   Final    Urine Type 07/08/2020 NotGiven   Final    Urine Reflex to Culture 07/08/2020 Not Indicated   Final    Amphetamine Screen, Urine 07/08/2020 POSITIVE* Negative <1000ng/mL Final    Comment: High concentrations of ephedrine/pseudoephedrine or  phenylpropanolamine may cause false positive results  for amphetamine. Therefore, confirmatory testing for  amphetamine should be considered if clinically indicated.       Barbiturate Screen, Ur 07/08/2020 Neg  Negative <200 ng/mL Final    Benzodiazepine Screen, Urine 07/08/2020 Neg  Negative <200 ng/mL Final    Cannabinoid Scrn, Ur 07/08/2020 Neg  Negative <50 ng/mL Final    Cocaine Metabolite Screen, Urine 07/08/2020 Neg  Negative <300 ng/mL Final    Opiate Scrn, Ur 07/08/2020 Neg  Negative <300 ng/mL Final Comment: \"Therapeutic levels of pain medication, especially oxycontin and synthetic  opioids, may not be detected by this Methodology. Pain management screen  panel  Drug panel-PM-Hi Res Ur, Interp (PAIN) should be considered for drug  monitoring \".  PCP Screen, Urine 07/08/2020 Neg  Negative <25 ng/mL Final    Methadone Screen, Urine 07/08/2020 Neg  Negative <300 ng/mL Final    Propoxyphene Scrn, Ur 07/08/2020 Neg  Negative <300 ng/mL Final    Oxycodone Urine 07/08/2020 Neg  Negative <100 ng/ml Final    pH, UA 07/08/2020 6.0   Final    Comment: Urine pH less than 5.0 or greater than 8.0 may indicate sample adulteration. Another sample should be collected if clinically  indicated.  Drug Screen Comment: 07/08/2020 see below   Final    Comment: This method is a screening test to detect only these drug  classes as part of a medical workup. Confirmatory testing  by another method should be ordered if clinically indicated.  WBC, UA 07/08/2020 0-2  0 - 5 /HPF Final    RBC, UA 07/08/2020 5-10* 0 - 4 /HPF Final    Epithelial Cells, UA 07/08/2020 2-5  0 - 5 /HPF Final    Magnesium 07/08/2020 2.20  1.80 - 2.40 mg/dL Final    SARS-CoV-2, NAAT 07/08/2020 Not Detected  Not Detected Final    Comment: Rapid NAAT:   Negative results should be treated as presumptive and,  if inconsistent with clinical signs and symptoms or necessary for  patient management, should be tested with an alternative molecular  assay. Negative results do not preclude SARS-CoV-2 infection and  should not be used as the sole basis for patient management decisions. This test has been authorized by the FDA under an Emergency Use  Authorization (EUA) for use by authorized laboratories.     Fact sheet for Healthcare Providers:  [employer]Her[employer]es  Fact sheet for Patients: [employer]Her[employer]es    METHODOLOGY: Isothermal Nucleic Acid Amplification      Hemoglobin A1C 07/09/2020 5.1  See comment Hep B Core Ab, IgM 07/09/2020 Non-reactive  Non-reactive Final    Hep B S Ag Interp 07/09/2020 Non-reactive  Non-reactive Final    Hep C Ab Interp 07/09/2020 REACTIVE* Non-reactive Final    Comment: REACTIVE Screen:  Confirmation with Hepatitis C RIBA not available. Depending on clinical  history, Hepatitis C Virus (HCV)by Quantitative NAAT ([id number]) should be  considered as an alternative to this test although it is not an equivalent. If HCV by Quantitative NAAT is desired, please reorder. A new sample may be  needed due to specimen requirements for HCV by Quantitative NAAT. Medications  Current Facility-Administered Medications: OLANZapine zydis (ZYPREXA) disintegrating tablet 10 mg, 10 mg, Oral, Q6H PRN  OLANZapine zydis (ZYPREXA) disintegrating tablet 15 mg, 15 mg, Oral, Nightly  DULoxetine (CYMBALTA) extended release capsule 30 mg, 30 mg, Oral, BID  cetirizine (ZYRTEC) tablet 10 mg, 10 mg, Oral, Daily  acetaminophen (TYLENOL) tablet 650 mg, 650 mg, Oral, Q4H PRN  ibuprofen (ADVIL;MOTRIN) tablet 400 mg, 400 mg, Oral, Q6H PRN  sterile water injection 2.1 mL, 2.1 mL, Intramuscular, Q4H PRN  traZODone (DESYREL) tablet 50 mg, 50 mg, Oral, Nightly PRN  benztropine mesylate (COGENTIN) injection 2 mg, 2 mg, Intramuscular, BID PRN  magnesium hydroxide (MILK OF MAGNESIA) 400 MG/5ML suspension 30 mL, 30 mL, Oral, Daily PRN  aluminum & magnesium hydroxide-simethicone (MAALOX) 200-200-20 MG/5ML suspension 30 mL, 30 mL, Oral, Q6H PRN  hydrOXYzine (VISTARIL) capsule 50 mg, 50 mg, Oral, TID PRN    ASSESSMENT AND PLAN    Start zyprexa  D/c seroquel and wellbutrin    1. Patient s symptoms   show no change  2. Probable discharge is 2-3 days  3. Discharge planning is incomplete  4 Suicidal ideation is unchanged  5 total time 40 minutes  face to face and more than 50 % was spent coordinating care, exploring sx's and pharmacotherapy

## 2020-07-13 PROCEDURE — 6370000000 HC RX 637 (ALT 250 FOR IP): Performed by: PSYCHIATRY & NEUROLOGY

## 2020-07-13 PROCEDURE — 1240000000 HC EMOTIONAL WELLNESS R&B

## 2020-07-13 PROCEDURE — 99233 SBSQ HOSP IP/OBS HIGH 50: CPT | Performed by: PSYCHIATRY & NEUROLOGY

## 2020-07-13 RX ORDER — RISPERIDONE 1 MG/1
1 TABLET, FILM COATED ORAL 2 TIMES DAILY
Status: DISCONTINUED | OUTPATIENT
Start: 2020-07-13 | End: 2020-07-16

## 2020-07-13 RX ADMIN — DULOXETINE HYDROCHLORIDE 30 MG: 30 CAPSULE, DELAYED RELEASE ORAL at 21:50

## 2020-07-13 RX ADMIN — CETIRIZINE HYDROCHLORIDE 10 MG: 10 TABLET ORAL at 08:32

## 2020-07-13 RX ADMIN — DULOXETINE HYDROCHLORIDE 30 MG: 30 CAPSULE, DELAYED RELEASE ORAL at 08:32

## 2020-07-13 RX ADMIN — HYDROXYZINE PAMOATE 50 MG: 50 CAPSULE ORAL at 21:57

## 2020-07-13 RX ADMIN — ACETAMINOPHEN 650 MG: 325 TABLET ORAL at 14:36

## 2020-07-13 ASSESSMENT — PAIN SCALES - GENERAL
PAINLEVEL_OUTOF10: 0
PAINLEVEL_OUTOF10: 6
PAINLEVEL_OUTOF10: 0

## 2020-07-13 NOTE — PLAN OF CARE
Pt has been visible on unit, social. Attended group. Colored with peers. Denied SI/HI. Denied hallucinations, paranoia. Said had rough day but no panic attack. Still experiencing anxiety. Gave prn Vistaril, med was helpful.

## 2020-07-13 NOTE — BH NOTE
PRN tylenol given per pt request for c/o headache. Pt rates pain 6/10. Will continue to monitor efficacy of medication.

## 2020-07-13 NOTE — PROGRESS NOTES
48.0 % Final    MCV 07/08/2020 90.5  80.0 - 100.0 fL Final    MCH 07/08/2020 30.3  26.0 - 34.0 pg Final    MCHC 07/08/2020 33.5  31.0 - 36.0 g/dL Final    RDW 07/08/2020 13.5  12.4 - 15.4 % Final    Platelets 66/96/8140 244  135 - 450 K/uL Final    MPV 07/08/2020 9.6  5.0 - 10.5 fL Final    Neutrophils % 07/08/2020 64.0  % Final    Lymphocytes % 07/08/2020 22.1  % Final    Monocytes % 07/08/2020 12.0  % Final    Eosinophils % 07/08/2020 1.2  % Final    Basophils % 07/08/2020 0.7  % Final    Neutrophils Absolute 07/08/2020 3.9  1.7 - 7.7 K/uL Final    Lymphocytes Absolute 07/08/2020 1.4  1.0 - 5.1 K/uL Final    Monocytes Absolute 07/08/2020 0.7  0.0 - 1.3 K/uL Final    Eosinophils Absolute 07/08/2020 0.1  0.0 - 0.6 K/uL Final    Basophils Absolute 07/08/2020 0.0  0.0 - 0.2 K/uL Final    Sodium 07/08/2020 137  136 - 145 mmol/L Final    Potassium reflex Magnesium 07/08/2020 2.9* 3.5 - 5.1 mmol/L Final    Chloride 07/08/2020 103  99 - 110 mmol/L Final    CO2 07/08/2020 22  21 - 32 mmol/L Final    Anion Gap 07/08/2020 12  3 - 16 Final    Glucose 07/08/2020 92  70 - 99 mg/dL Final    BUN 07/08/2020 8  7 - 20 mg/dL Final    CREATININE 07/08/2020 0.9  0.6 - 1.1 mg/dL Final    GFR Non- 07/08/2020 >60  >60 Final    Comment: >60 mL/min/1.73m2 EGFR, calc. for ages 25 and older using the  MDRD formula (not corrected for weight), is valid for stable  renal function.  GFR  07/08/2020 >60  >60 Final    Comment: Chronic Kidney Disease: less than 60 ml/min/1.73 sq.m. Kidney Failure: less than 15 ml/min/1.73 sq.m. Results valid for patients 18 years and older.       Calcium 07/08/2020 9.0  8.3 - 10.6 mg/dL Final    Total Protein 07/08/2020 6.7  6.4 - 8.2 g/dL Final    Alb 07/08/2020 4.2  3.4 - 5.0 g/dL Final    Albumin/Globulin Ratio 07/08/2020 1.7  1.1 - 2.2 Final    Total Bilirubin 07/08/2020 0.3  0.0 - 1.0 mg/dL Final    Alkaline Phosphatase 07/08/2020 55 40 - 129 U/L Final    ALT 07/08/2020 77* 10 - 40 U/L Final    AST 07/08/2020 43* 15 - 37 U/L Final    Globulin 07/08/2020 2.5  g/dL Final    Ethanol Lvl 07/08/2020 None Detected  mg/dL Final    Comment:    None Detected  Conversion factor:  100 mg/dl = .100 g/dl  For Medical Purposes Only      hCG Qual 07/08/2020 Negative  Detects HCG level >10 MIU/mL Final    Lipase 07/08/2020 42.0  13.0 - 60.0 U/L Final    Salicylate, Serum 67/94/1337 <0.3* 15.0 - 30.0 mg/dL Final    Comment: Therapeutic Range: 15.0-30.0 mg/dL  Toxic: >30.0 mg/dL      Color, UA 07/08/2020 Yellow  Straw/Yellow Final    Clarity, UA 07/08/2020 Clear  Clear Final    Glucose, Ur 07/08/2020 Negative  Negative mg/dL Final    Bilirubin Urine 07/08/2020 Negative  Negative Final    Ketones, Urine 07/08/2020 Negative  Negative mg/dL Final    Specific Gravity, UA 07/08/2020 1.010  1.005 - 1.030 Final    Blood, Urine 07/08/2020 MODERATE* Negative Final    pH, UA 07/08/2020 6.0  5.0 - 8.0 Final    Protein, UA 07/08/2020 Negative  Negative mg/dL Final    Urobilinogen, Urine 07/08/2020 0.2  <2.0 E.U./dL Final    Nitrite, Urine 07/08/2020 Negative  Negative Final    Leukocyte Esterase, Urine 07/08/2020 Negative  Negative Final    Microscopic Examination 07/08/2020 YES   Final    Urine Type 07/08/2020 NotGiven   Final    Urine Reflex to Culture 07/08/2020 Not Indicated   Final    Amphetamine Screen, Urine 07/08/2020 POSITIVE* Negative <1000ng/mL Final    Comment: High concentrations of ephedrine/pseudoephedrine or  phenylpropanolamine may cause false positive results  for amphetamine. Therefore, confirmatory testing for  amphetamine should be considered if clinically indicated.       Barbiturate Screen, Ur 07/08/2020 Neg  Negative <200 ng/mL Final    Benzodiazepine Screen, Urine 07/08/2020 Neg  Negative <200 ng/mL Final    Cannabinoid Scrn, Ur 07/08/2020 Neg  Negative <50 ng/mL Final    Cocaine Metabolite Screen, Urine 07/08/2020 Neg Negative <300 ng/mL Final    Opiate Scrn, Ur 07/08/2020 Neg  Negative <300 ng/mL Final    Comment: \"Therapeutic levels of pain medication, especially oxycontin and synthetic  opioids, may not be detected by this Methodology. Pain management screen  panel  Drug panel-PM-Hi Res Ur, Interp (PAIN) should be considered for drug  monitoring \".  PCP Screen, Urine 07/08/2020 Neg  Negative <25 ng/mL Final    Methadone Screen, Urine 07/08/2020 Neg  Negative <300 ng/mL Final    Propoxyphene Scrn, Ur 07/08/2020 Neg  Negative <300 ng/mL Final    Oxycodone Urine 07/08/2020 Neg  Negative <100 ng/ml Final    pH, UA 07/08/2020 6.0   Final    Comment: Urine pH less than 5.0 or greater than 8.0 may indicate sample adulteration. Another sample should be collected if clinically  indicated.  Drug Screen Comment: 07/08/2020 see below   Final    Comment: This method is a screening test to detect only these drug  classes as part of a medical workup. Confirmatory testing  by another method should be ordered if clinically indicated.  WBC, UA 07/08/2020 0-2  0 - 5 /HPF Final    RBC, UA 07/08/2020 5-10* 0 - 4 /HPF Final    Epithelial Cells, UA 07/08/2020 2-5  0 - 5 /HPF Final    Magnesium 07/08/2020 2.20  1.80 - 2.40 mg/dL Final    SARS-CoV-2, NAAT 07/08/2020 Not Detected  Not Detected Final    Comment: Rapid NAAT:   Negative results should be treated as presumptive and,  if inconsistent with clinical signs and symptoms or necessary for  patient management, should be tested with an alternative molecular  assay. Negative results do not preclude SARS-CoV-2 infection and  should not be used as the sole basis for patient management decisions. This test has been authorized by the FDA under an Emergency Use  Authorization (EUA) for use by authorized laboratories.     Fact sheet for Healthcare Providers:  [employer]  Fact sheet for Patients: Tobi    METHODOLOGY: Isothermal Nucleic Acid Amplification      Hemoglobin A1C 07/09/2020 5.1  See comment % Final    Comment: Comment:  Diagnosis of Diabetes: > or = 6.5%  Increased risk of diabetes (Prediabetes): 5.7-6.4%  Glycemic Control: Nonpregnant Adults: <7.0%                    Pregnant: <6.0%        eAG 07/09/2020 99.7  mg/dL Final    TSH 07/08/2020 1.22  0.27 - 4.20 uIU/mL Final    Cholesterol, Total 07/09/2020 133  0 - 199 mg/dL Final    Triglycerides 07/09/2020 128  0 - 150 mg/dL Final    HDL 07/09/2020 39* 40 - 60 mg/dL Final    LDL Calculated 07/09/2020 68  <100 mg/dL Final    VLDL Cholesterol Calculated 07/09/2020 26  Not Established mg/dL Final    Potassium 07/09/2020 3.0* 3.5 - 5.1 mmol/L Final    Sodium 07/10/2020 141  136 - 145 mmol/L Final    Potassium 07/10/2020 3.5  3.5 - 5.1 mmol/L Final    Chloride 07/10/2020 106  99 - 110 mmol/L Final    CO2 07/10/2020 20* 21 - 32 mmol/L Final    Anion Gap 07/10/2020 15  3 - 16 Final    Glucose 07/10/2020 90  70 - 99 mg/dL Final    BUN 07/10/2020 8  7 - 20 mg/dL Final    CREATININE 07/10/2020 0.7  0.6 - 1.1 mg/dL Final    GFR Non- 07/10/2020 >60  >60 Final    Comment: >60 mL/min/1.73m2 EGFR, calc. for ages 25 and older using the  MDRD formula (not corrected for weight), is valid for stable  renal function.  GFR  07/10/2020 >60  >60 Final    Comment: Chronic Kidney Disease: less than 60 ml/min/1.73 sq.m. Kidney Failure: less than 15 ml/min/1.73 sq.m. Results valid for patients 18 years and older.       Calcium 07/10/2020 8.0* 8.3 - 10.6 mg/dL Final    Total Protein 07/10/2020 5.6* 6.4 - 8.2 g/dL Final    Alb 07/10/2020 3.3* 3.4 - 5.0 g/dL Final    Albumin/Globulin Ratio 07/10/2020 1.4  1.1 - 2.2 Final    Total Bilirubin 07/10/2020 <0.2  0.0 - 1.0 mg/dL Final    Alkaline Phosphatase 07/10/2020 45  40 - 129 U/L Final    ALT 07/10/2020 55* 10 - 40 U/L Final    AST 07/10/2020 29  15 - 37 U/L Final    Globulin 07/10/2020 2.3  g/dL Final    Hep A IgM 07/09/2020 Non-reactive  Non-reactive Final    Hep B Core Ab, IgM 07/09/2020 Non-reactive  Non-reactive Final    Hep B S Ag Interp 07/09/2020 Non-reactive  Non-reactive Final    Hep C Ab Interp 07/09/2020 REACTIVE* Non-reactive Final    Comment: REACTIVE Screen:  Confirmation with Hepatitis C RIBA not available. Depending on clinical  history, Hepatitis C Virus (HCV)by Quantitative NAAT ([id number]) should be  considered as an alternative to this test although it is not an equivalent. If HCV by Quantitative NAAT is desired, please reorder. A new sample may be  needed due to specimen requirements for HCV by Quantitative NAAT. Medications  Current Facility-Administered Medications: risperiDONE (RISPERDAL) tablet 1 mg, 1 mg, Oral, BID  OLANZapine zydis (ZYPREXA) disintegrating tablet 10 mg, 10 mg, Oral, Q6H PRN  DULoxetine (CYMBALTA) extended release capsule 30 mg, 30 mg, Oral, BID  cetirizine (ZYRTEC) tablet 10 mg, 10 mg, Oral, Daily  acetaminophen (TYLENOL) tablet 650 mg, 650 mg, Oral, Q4H PRN  ibuprofen (ADVIL;MOTRIN) tablet 400 mg, 400 mg, Oral, Q6H PRN  sterile water injection 2.1 mL, 2.1 mL, Intramuscular, Q4H PRN  traZODone (DESYREL) tablet 50 mg, 50 mg, Oral, Nightly PRN  benztropine mesylate (COGENTIN) injection 2 mg, 2 mg, Intramuscular, BID PRN  magnesium hydroxide (MILK OF MAGNESIA) 400 MG/5ML suspension 30 mL, 30 mL, Oral, Daily PRN  aluminum & magnesium hydroxide-simethicone (MAALOX) 200-200-20 MG/5ML suspension 30 mL, 30 mL, Oral, Q6H PRN  hydrOXYzine (VISTARIL) capsule 50 mg, 50 mg, Oral, TID PRN    ASSESSMENT AND PLAN    D/c zyprexa and start risperdal    1. Patient s symptoms   show no change  2. Probable discharge is 2-3 days  3. Discharge planning is incomplete  4 Suicidal ideation is unchanged  5 total time 40 minutes  face to face and more than 50 % was spent coordinating care,

## 2020-07-14 PROCEDURE — 6370000000 HC RX 637 (ALT 250 FOR IP): Performed by: PSYCHIATRY & NEUROLOGY

## 2020-07-14 PROCEDURE — 97535 SELF CARE MNGMENT TRAINING: CPT

## 2020-07-14 PROCEDURE — 97165 OT EVAL LOW COMPLEX 30 MIN: CPT

## 2020-07-14 PROCEDURE — 1240000000 HC EMOTIONAL WELLNESS R&B

## 2020-07-14 PROCEDURE — 99233 SBSQ HOSP IP/OBS HIGH 50: CPT | Performed by: PSYCHIATRY & NEUROLOGY

## 2020-07-14 RX ORDER — DULOXETIN HYDROCHLORIDE 60 MG/1
60 CAPSULE, DELAYED RELEASE ORAL 2 TIMES DAILY
Status: DISCONTINUED | OUTPATIENT
Start: 2020-07-14 | End: 2020-07-16 | Stop reason: HOSPADM

## 2020-07-14 RX ORDER — LORAZEPAM 0.5 MG/1
0.5 TABLET ORAL 2 TIMES DAILY PRN
Status: DISCONTINUED | OUTPATIENT
Start: 2020-07-14 | End: 2020-07-16 | Stop reason: HOSPADM

## 2020-07-14 RX ADMIN — CETIRIZINE HYDROCHLORIDE 10 MG: 10 TABLET ORAL at 08:50

## 2020-07-14 RX ADMIN — DULOXETINE HYDROCHLORIDE 30 MG: 30 CAPSULE, DELAYED RELEASE ORAL at 08:50

## 2020-07-14 RX ADMIN — LORAZEPAM 0.5 MG: 0.5 TABLET ORAL at 11:00

## 2020-07-14 RX ADMIN — RISPERIDONE 1 MG: 1 TABLET ORAL at 20:43

## 2020-07-14 RX ADMIN — OLANZAPINE 10 MG: 10 TABLET, ORALLY DISINTEGRATING ORAL at 16:22

## 2020-07-14 RX ADMIN — DULOXETINE HYDROCHLORIDE 60 MG: 60 CAPSULE, DELAYED RELEASE ORAL at 20:43

## 2020-07-14 RX ADMIN — HYDROXYZINE PAMOATE 50 MG: 50 CAPSULE ORAL at 08:52

## 2020-07-14 RX ADMIN — RISPERIDONE 1 MG: 1 TABLET ORAL at 10:43

## 2020-07-14 ASSESSMENT — PAIN SCALES - GENERAL: PAINLEVEL_OUTOF10: 0

## 2020-07-14 NOTE — BH NOTE
Patient noted to be standing in corner of small side of unit, crying. Patient redirected back to room. Patient states she has just had a very hard day today but does not want to go into anything further details. Anxiety is a 9 out of 10. Patient given Zyprexa 10 mg per orders. Will continue to monitor.

## 2020-07-14 NOTE — BH NOTE
Spoke with mom who staes that Keri Monte is back to state of when she came in. Talked about how her med-taking is sporadic & psychiatrists incnsistant. Mom hopes to get her into dual-diagnosis inpt treatment to keep her from immediately relapsing & being on the street. Asked her to try Fatuma Chaudhry again in [facility].

## 2020-07-14 NOTE — PLAN OF CARE
Problem: Depressive Behavior With or Without Suicide Precautions:  Goal: Able to verbalize and/or display a decrease in depressive symptoms  Description: Able to verbalize and/or display a decrease in depressive symptoms  7/14/2020 0357 by Carter Braun RN  Outcome: Ongoing   Melissa Ba was visible and social in the milieu this evening. She attended all of the evening groups. Melissa Ba refused her first dose of Risperdal stating that she wants to talk with the Dr more before taking it. We discussed the medication at length, highlighting the Dr note about why this medication was prescribed. Written education regarding Risperdal was also provided but Melissa Ba refused the medication. She did take her Cymbalta and requested/received Vistaril at  which was effective.

## 2020-07-14 NOTE — BH NOTE
Patient still complaining of some anxiety 6 on a 1-10 scale. Patient noted to be lying in bed, tearful, Ativan 0.5 mg given per order. Will continue to monitor.

## 2020-07-14 NOTE — BH NOTE
Group Therapy Note    Date: 7/13/2020  Start Time: 20:00  End Time:  21:00  Number of Participants: 12    Type of Group: Recreational  Wrap up    Madhu Zuñiga Information  Module Name:  /  Session Number:  /    Patient's Goal:  Go to group    Notes:  Goal completed per pt    Status After Intervention:  Unchanged    Participation Level: Minimal    Participation Quality: Appropriate      Speech:  normal      Thought Process/Content: Perseverating      Affective Functioning: Blunted      Mood: irritable      Level of consciousness:  Alert and Attentive      Response to Learning: Able to change behavior      Endings: None Reported    Modes of Intervention: Socialization and Problem-solving      Discipline Responsible: Behavorial Health Tech      Signature:  Sunshine Obrien

## 2020-07-14 NOTE — BH NOTE
Patient refused Risperdal this morning. Patient states she would like to talk with the doctor more about the medication prior to starting it.

## 2020-07-14 NOTE — PROGRESS NOTES
Inpatient Occupational Therapy  Evaluation and Treatment    Unit:  [facility]  Date:  2020  Patient Name:    Dany Gan  Admitting diagnosis:  MDD (major depressive disorder), recurrent episode [facility]) [F33.9]  Admit Date:  2020  Precautions/Restrictions/WB Status/ Lines/ Wounds/ Oxygen:  Up as tolerated; Pt states that she is allergic to latex. Reported this to pt's nurse 2020. Treatment Time:  13:11-11:38  Treatment Number:  1    Patient Goals for Therapy:  \" To understand what's going on with my mental and physical health and how to use resources, when I leave. \"      Discharge Recommendations:  []Home Independently  [x] Home with assist prn [] Home OT  []SNF  []ARU    DME needs for discharge:   NA     AM-PAC Score: 19     Home Health S4 Level: [x] NA   [] Level 1- Standard  []  Level 2- Social  [] Level 3- Safety  []  Level 4- Sick    ACLS:  Deferred secondary to pt states that she is allergic to latex. HPI per chart note Shaheed Sal MD;  Date of Service:  2020  Patient evaluated and previous record reviewed. Patient presents with wanting help for anxiety and depression though will not specify suicidality. Vital signs stable and within normal limits. Physical exam as documented above. Lab work-up notable for hypokalemia which is replaced. As well as UDS positive for amphetamines. Patient evaluated by psychiatric team and they recommend admission when bed is available. Patient is medically cleared for admission. FINAL IMPRESSION       1. Anxiety    2. Depression, unspecified depression type    3. Hypokalemia      Preadmission Environment:    Pt. Lives   [] alone  [x]with roommate  Home environment:   [] Apartment   []one story  [x]two story home (Bilevel).   Steps to enter first floor:    [] No steps     []  Steps to enter   [] Railings    Steps to second floor  [] Full Flight of 13  Bathroom: [] Bath Tub Shower  []Walk in [employer]  [] [employer] owned: [] GREGORIO [] SW []Rollator []W/C  []Shower Chair []BSC []Reacher  []Cane [] Other     Preadmission Status / PLOF:  History of falls   [x]Yes  []No 4-5x Pt. Reports that she can't recall any details of falls. Pt. Able to drive   []Yes  [x]No  Family or friends provide transportation. Walks  Pt Fully independent for ADLs/IADLs. [x]Yes  []No  \"I get really overwhelmed. \"  Pt. Required assistance from family for:  []Bathing []Dressing []Cooking []Cleaning  []Laundry  []Other :   Pt. Fully independent for transfers and gait and walked with: [x]No Device  []Walker   []Cane  Sleep Hygiene:  4-5 hours avg sleep; fragmented sleep. Income:  \"I had applied for social security for my son. \"  Mainor Yi and Family helped with cash assist.  Financial Management:  Mother's family assist with financial management. Leisure Interests:  Going to the park with her kids, jogging, walking, music, dance  Medication Management:  \"I hadn't been on my medications for a long time. \"  \"I was not putting my health first.\"  Health Management:  Pt. Reports that she dose not have a PCP, Psychiatrist or therapist.  Social Network:  2 Brothers, roommate, Grandfather  Stressors:  1. \"Making sure that myself and my children are being taken care of.\"  2.  Burdening my family. 3.  Trying to figure out what's real or not. Pt. Reports that she hears voices. Coping Skills:  Read, bubble baths, listen to music, walk, jog    Pain  [x]Yes  []No  Ratin:10  Location: chronic pain in neck/shoulder/spine  Pain Medicine Status: [x] Denies need  [] Pain med requested  [] RN notified. Cognition    A&Ox4  Follows []1 step and [x] 3 step commands. Tearful, guarded, poor/questionable historian  Decreased awareness of errors. Questionable historian. Upper Extremity ROM:    WFL, pt able to perform all bed mobility, transfers, and gait without ROM limitation.     Upper Extremity Strength:    WFL, pt able to perform all bed mobility, transfers, and gait without strength limitation. Upper Extremity Sensation:    No apparent deficits. Upper Extremity Proprioception:    No apparent deficits. Skin Integrity:  WFL     Coordination and Tone:  WFL    Balance  Static Sitting:  [x] Good [] Fair [] Poor  Dynamic Sitting:  [x] Good [] Fair [] Poor  Static Standing: [x] Good [] Fair [] Poor  Dynamic Standing: [x] Good [] Fair [] Poor    Bed mobility:  Independent  Supine to sit:  Sit to supine:  Scooting to head of bed:  Scooting in sitting:  Rolling:  Bridging:    Transfers:   Independent  Sit to stand:  Stand to sit:  Bed/Chair to/from toilet:    Self Care: Toileting:   independent  Grooming:  Supervision for prompting  Dressing:  Supervision for prompting    Exercise / Activities Initiated:   Pt. Educated on role of OT. Pt. Participated in:  Eval, ADL Retraining, Medication management, and health management. Assessment of Deficits:   Pt demonstrated decreased activity tolerance, decreased safety awareness, decreased cognition and decreased ADL/IADL status. Pt. Limited during evaluation by decreased cognition. At end of evaluation, pt. In gathering room. Goal(s) : To be met in 3 Visits:  1). Pt. To complete ACLS. 2). Pt. To verbalize understanding of sleep hygiene education. To be met in 5 Visits:  1). Pt. To complete 1 SMART long term goal and 2 SMART short term goals with mod assist.  2). Pt. To verbalize 3 new coping skills. 3). Pt. To complete interest check list.    4). Pt. To verbalize understanding of 3 communication styles. 5). Pt. To complete wellness plan. 6). Pt. To complete a daily schedule of healthy activities/routines with mod assist.   7). Pt. To identify 2 memory strategies to take medications as prescribed. Rehabilitation Potential:  Good for goals listed above. Strengths for achieving goals include:  PLOF  Barriers to achieving goals include:  Decreased Cognition      Plan:   To be seen 2-5x/week while in acute care setting for therapeutic exercises/act, ADL retraining, NMR and patient/caregiver ed/instruction.      Timed Code Treatment Minutes:   17  minutes    Total Treatment Time:    27 minutes    Signature and License Number      Sam Good OTR/L  #[id number]      If patient discharges from this facility prior to next visit, this note will serve as the Discharge Summary

## 2020-07-14 NOTE — BH NOTE
Patient noted to be on the small side of the unit, standing in a corner facing the wall. Patient walked over to the exit door while a staff member was walking out. Patient was redirected back to the large side of the unit. Patient questioned on whether she was trying to ext, patient stated, \"no I signed myself in here. \". Will continue to monitor.

## 2020-07-14 NOTE — PROGRESS NOTES
Department of Psychiatry  Attending Progress Note  Chief Complaint: follow-up depression/psychosis  Patient's chart was reviewed and collaborated with  about the treatment plan. SUBJECTIVE:    Patient is feeling unchanged. Suicidal ideation:  denies suicidal ideation. Patient does not have medication side effects. Pt stated that she feels anxious because she can not tell what is real and waht is not . She stated that she feels that there is al alternate virtual reality state. Pt stated that she is hearing voices and are very distracting to her. We discussed risperdal since she has been refusing it and currently she is agreeable to take. Pt is poor historian and appears guarded and distracted. ROS: Patient has new complaints: no  Sleeping adequately:  Yes   Appetite adequate: Yes  Attending groups: No: isolative to room  Visitors:No    OBJECTIVE    Physical  VITALS:  /68   Pulse 85   Temp 97.7 °F (36.5 °C) (Oral)   Resp 16   Ht 5' 5\" (1.651 m)   Wt 175 lb (79.4 kg)   LMP 07/03/2020   SpO2 98%   Breastfeeding No   BMI 29.12 kg/m²     Mental Status Examination:  Patients appearance was ill-appearing, hospital attire, lying in bed, fair grooming and fair hygiene. Thoughts are Independence and Unusual fears. Homicidal ideations none. No abnormal movements, tics or mannerisms. Memory intact Aims 0. Concentration Poor. Alert and oriented X 4. Insight and Judgement poor. Patient was cooperative.  Patient gait normal. Mood anxious, affect flat affect Hallucinations denies but distracted, suicidal ideations no specific plan to harm self Speech fluent and spontaneous    Data  Labs:   Admission on 07/08/2020   Component Date Value Ref Range Status    Acetaminophen Level 07/08/2020 <5* 10 - 30 ug/mL Final    Comment: Therapeutic Range: 10.0-30.0 ug/mL  Toxic: >=150 ug/mL      WBC 07/08/2020 6.2  4.0 - 11.0 K/uL Final    RBC 07/08/2020 3.98* 4.00 - 5.20 M/uL Final    Hemoglobin 07/08/2020 12.1  12.0 - 16.0 g/dL Final    Hematocrit 07/08/2020 36.1  36.0 - 48.0 % Final    MCV 07/08/2020 90.5  80.0 - 100.0 fL Final    MCH 07/08/2020 30.3  26.0 - 34.0 pg Final    MCHC 07/08/2020 33.5  31.0 - 36.0 g/dL Final    RDW 07/08/2020 13.5  12.4 - 15.4 % Final    Platelets 46/01/8885 244  135 - 450 K/uL Final    MPV 07/08/2020 9.6  5.0 - 10.5 fL Final    Neutrophils % 07/08/2020 64.0  % Final    Lymphocytes % 07/08/2020 22.1  % Final    Monocytes % 07/08/2020 12.0  % Final    Eosinophils % 07/08/2020 1.2  % Final    Basophils % 07/08/2020 0.7  % Final    Neutrophils Absolute 07/08/2020 3.9  1.7 - 7.7 K/uL Final    Lymphocytes Absolute 07/08/2020 1.4  1.0 - 5.1 K/uL Final    Monocytes Absolute 07/08/2020 0.7  0.0 - 1.3 K/uL Final    Eosinophils Absolute 07/08/2020 0.1  0.0 - 0.6 K/uL Final    Basophils Absolute 07/08/2020 0.0  0.0 - 0.2 K/uL Final    Sodium 07/08/2020 137  136 - 145 mmol/L Final    Potassium reflex Magnesium 07/08/2020 2.9* 3.5 - 5.1 mmol/L Final    Chloride 07/08/2020 103  99 - 110 mmol/L Final    CO2 07/08/2020 22  21 - 32 mmol/L Final    Anion Gap 07/08/2020 12  3 - 16 Final    Glucose 07/08/2020 92  70 - 99 mg/dL Final    BUN 07/08/2020 8  7 - 20 mg/dL Final    CREATININE 07/08/2020 0.9  0.6 - 1.1 mg/dL Final    GFR Non- 07/08/2020 >60  >60 Final    Comment: >60 mL/min/1.73m2 EGFR, calc. for ages 25 and older using the  MDRD formula (not corrected for weight), is valid for stable  renal function.  GFR  07/08/2020 >60  >60 Final    Comment: Chronic Kidney Disease: less than 60 ml/min/1.73 sq.m. Kidney Failure: less than 15 ml/min/1.73 sq.m. Results valid for patients 18 years and older.       Calcium 07/08/2020 9.0  8.3 - 10.6 mg/dL Final    Total Protein 07/08/2020 6.7  6.4 - 8.2 g/dL Final    Alb 07/08/2020 4.2  3.4 - 5.0 g/dL Final    Albumin/Globulin Ratio 07/08/2020 1.7  1.1 - 2.2 Final    Total Bilirubin 07/08/2020 0.3  0.0 - 1.0 mg/dL Final    Alkaline Phosphatase 07/08/2020 55  40 - 129 U/L Final    ALT 07/08/2020 77* 10 - 40 U/L Final    AST 07/08/2020 43* 15 - 37 U/L Final    Globulin 07/08/2020 2.5  g/dL Final    Ethanol Lvl 07/08/2020 None Detected  mg/dL Final    Comment:    None Detected  Conversion factor:  100 mg/dl = .100 g/dl  For Medical Purposes Only      hCG Qual 07/08/2020 Negative  Detects HCG level >10 MIU/mL Final    Lipase 07/08/2020 42.0  13.0 - 60.0 U/L Final    Salicylate, Serum 22/17/1149 <0.3* 15.0 - 30.0 mg/dL Final    Comment: Therapeutic Range: 15.0-30.0 mg/dL  Toxic: >30.0 mg/dL      Color, UA 07/08/2020 Yellow  Straw/Yellow Final    Clarity, UA 07/08/2020 Clear  Clear Final    Glucose, Ur 07/08/2020 Negative  Negative mg/dL Final    Bilirubin Urine 07/08/2020 Negative  Negative Final    Ketones, Urine 07/08/2020 Negative  Negative mg/dL Final    Specific Gravity, UA 07/08/2020 1.010  1.005 - 1.030 Final    Blood, Urine 07/08/2020 MODERATE* Negative Final    pH, UA 07/08/2020 6.0  5.0 - 8.0 Final    Protein, UA 07/08/2020 Negative  Negative mg/dL Final    Urobilinogen, Urine 07/08/2020 0.2  <2.0 E.U./dL Final    Nitrite, Urine 07/08/2020 Negative  Negative Final    Leukocyte Esterase, Urine 07/08/2020 Negative  Negative Final    Microscopic Examination 07/08/2020 YES   Final    Urine Type 07/08/2020 NotGiven   Final    Urine Reflex to Culture 07/08/2020 Not Indicated   Final    Amphetamine Screen, Urine 07/08/2020 POSITIVE* Negative <1000ng/mL Final    Comment: High concentrations of ephedrine/pseudoephedrine or  phenylpropanolamine may cause false positive results  for amphetamine. Therefore, confirmatory testing for  amphetamine should be considered if clinically indicated.       Barbiturate Screen, Ur 07/08/2020 Neg  Negative <200 ng/mL Final    Benzodiazepine Screen, Urine 07/08/2020 Neg  Negative <200 ng/mL Final    Cannabinoid Scrn, Ur 07/08/2020 Neg Negative <50 ng/mL Final    Cocaine Metabolite Screen, Urine 07/08/2020 Neg  Negative <300 ng/mL Final    Opiate Scrn, Ur 07/08/2020 Neg  Negative <300 ng/mL Final    Comment: \"Therapeutic levels of pain medication, especially oxycontin and synthetic  opioids, may not be detected by this Methodology. Pain management screen  panel  Drug panel-PM-Hi Res Ur, Interp (PAIN) should be considered for drug  monitoring \".  PCP Screen, Urine 07/08/2020 Neg  Negative <25 ng/mL Final    Methadone Screen, Urine 07/08/2020 Neg  Negative <300 ng/mL Final    Propoxyphene Scrn, Ur 07/08/2020 Neg  Negative <300 ng/mL Final    Oxycodone Urine 07/08/2020 Neg  Negative <100 ng/ml Final    pH, UA 07/08/2020 6.0   Final    Comment: Urine pH less than 5.0 or greater than 8.0 may indicate sample adulteration. Another sample should be collected if clinically  indicated.  Drug Screen Comment: 07/08/2020 see below   Final    Comment: This method is a screening test to detect only these drug  classes as part of a medical workup. Confirmatory testing  by another method should be ordered if clinically indicated.  WBC, UA 07/08/2020 0-2  0 - 5 /HPF Final    RBC, UA 07/08/2020 5-10* 0 - 4 /HPF Final    Epithelial Cells, UA 07/08/2020 2-5  0 - 5 /HPF Final    Magnesium 07/08/2020 2.20  1.80 - 2.40 mg/dL Final    SARS-CoV-2, NAAT 07/08/2020 Not Detected  Not Detected Final    Comment: Rapid NAAT:   Negative results should be treated as presumptive and,  if inconsistent with clinical signs and symptoms or necessary for  patient management, should be tested with an alternative molecular  assay. Negative results do not preclude SARS-CoV-2 infection and  should not be used as the sole basis for patient management decisions. This test has been authorized by the FDA under an Emergency Use  Authorization (EUA) for use by authorized laboratories.     Fact sheet for Healthcare Providers:  Brayan  Fact sheet for Patients: Gregory.marilyn    METHODOLOGY: Isothermal Nucleic Acid Amplification      Hemoglobin A1C 07/09/2020 5.1  See comment % Final    Comment: Comment:  Diagnosis of Diabetes: > or = 6.5%  Increased risk of diabetes (Prediabetes): 5.7-6.4%  Glycemic Control: Nonpregnant Adults: <7.0%                    Pregnant: <6.0%        eAG 07/09/2020 99.7  mg/dL Final    TSH 07/08/2020 1.22  0.27 - 4.20 uIU/mL Final    Cholesterol, Total 07/09/2020 133  0 - 199 mg/dL Final    Triglycerides 07/09/2020 128  0 - 150 mg/dL Final    HDL 07/09/2020 39* 40 - 60 mg/dL Final    LDL Calculated 07/09/2020 68  <100 mg/dL Final    VLDL Cholesterol Calculated 07/09/2020 26  Not Established mg/dL Final    Potassium 07/09/2020 3.0* 3.5 - 5.1 mmol/L Final    Sodium 07/10/2020 141  136 - 145 mmol/L Final    Potassium 07/10/2020 3.5  3.5 - 5.1 mmol/L Final    Chloride 07/10/2020 106  99 - 110 mmol/L Final    CO2 07/10/2020 20* 21 - 32 mmol/L Final    Anion Gap 07/10/2020 15  3 - 16 Final    Glucose 07/10/2020 90  70 - 99 mg/dL Final    BUN 07/10/2020 8  7 - 20 mg/dL Final    CREATININE 07/10/2020 0.7  0.6 - 1.1 mg/dL Final    GFR Non- 07/10/2020 >60  >60 Final    Comment: >60 mL/min/1.73m2 EGFR, calc. for ages 25 and older using the  MDRD formula (not corrected for weight), is valid for stable  renal function.  GFR  07/10/2020 >60  >60 Final    Comment: Chronic Kidney Disease: less than 60 ml/min/1.73 sq.m. Kidney Failure: less than 15 ml/min/1.73 sq.m. Results valid for patients 18 years and older.       Calcium 07/10/2020 8.0* 8.3 - 10.6 mg/dL Final    Total Protein 07/10/2020 5.6* 6.4 - 8.2 g/dL Final    Alb 07/10/2020 3.3* 3.4 - 5.0 g/dL Final    Albumin/Globulin Ratio 07/10/2020 1.4  1.1 - 2.2 Final    Total Bilirubin 07/10/2020 <0.2  0.0 - 1.0 mg/dL Final    Alkaline Phosphatase 07/10/2020 45  40 - 129 U/L Final    ALT 07/10/2020 55* 10 - 40 U/L Final    AST 07/10/2020 29  15 - 37 U/L Final    Globulin 07/10/2020 2.3  g/dL Final    Hep A IgM 07/09/2020 Non-reactive  Non-reactive Final    Hep B Core Ab, IgM 07/09/2020 Non-reactive  Non-reactive Final    Hep B S Ag Interp 07/09/2020 Non-reactive  Non-reactive Final    Hep C Ab Interp 07/09/2020 REACTIVE* Non-reactive Final    Comment: REACTIVE Screen:  Confirmation with Hepatitis C RIBA not available. Depending on clinical  history, Hepatitis C Virus (HCV)by Quantitative NAAT ([id number]) should be  considered as an alternative to this test although it is not an equivalent. If HCV by Quantitative NAAT is desired, please reorder. A new sample may be  needed due to specimen requirements for HCV by Quantitative NAAT. Medications  Current Facility-Administered Medications: LORazepam (ATIVAN) tablet 0.5 mg, 0.5 mg, Oral, BID PRN  DULoxetine (CYMBALTA) extended release capsule 60 mg, 60 mg, Oral, BID  risperiDONE (RISPERDAL) tablet 1 mg, 1 mg, Oral, BID  OLANZapine zydis (ZYPREXA) disintegrating tablet 10 mg, 10 mg, Oral, Q6H PRN  cetirizine (ZYRTEC) tablet 10 mg, 10 mg, Oral, Daily  acetaminophen (TYLENOL) tablet 650 mg, 650 mg, Oral, Q4H PRN  ibuprofen (ADVIL;MOTRIN) tablet 400 mg, 400 mg, Oral, Q6H PRN  sterile water injection 2.1 mL, 2.1 mL, Intramuscular, Q4H PRN  traZODone (DESYREL) tablet 50 mg, 50 mg, Oral, Nightly PRN  benztropine mesylate (COGENTIN) injection 2 mg, 2 mg, Intramuscular, BID PRN  magnesium hydroxide (MILK OF MAGNESIA) 400 MG/5ML suspension 30 mL, 30 mL, Oral, Daily PRN  aluminum & magnesium hydroxide-simethicone (MAALOX) 200-200-20 MG/5ML suspension 30 mL, 30 mL, Oral, Q6H PRN  hydrOXYzine (VISTARIL) capsule 50 mg, 50 mg, Oral, TID PRN    ASSESSMENT AND PLAN    Cont risperdal    1. Patient s symptoms   show no change  2. Probable discharge is 2-3 days  3. Discharge planning is incomplete  4 Suicidal ideation is unchanged  5 total time 40 minutes  face to face and more than 50 % was spent coordinating care, exploring sx's and pharmacotherapy

## 2020-07-14 NOTE — BH NOTE
Reassessment of Vistaril. Patient states she is still feeling very anxious 6 on a 1-10 scale. Medication somewhat effective. Will continue to monitor.

## 2020-07-14 NOTE — BH NOTE
Patient noted to be on the small side again. Walked through doors with Housekeeping. Patient displaying exit seeking behavior. Pulling on doors. Patient brought back over to the large side where she walked to the end of the hallway and continued pulling on outpatient door and group room doors. Patient redirected back to room. When writer tried to talk with patient, patient states that she does not want to talk at this time. Patient walked back to her room and is noted to be laying down at this time.

## 2020-07-15 PROCEDURE — 6370000000 HC RX 637 (ALT 250 FOR IP): Performed by: PSYCHIATRY & NEUROLOGY

## 2020-07-15 PROCEDURE — 1240000000 HC EMOTIONAL WELLNESS R&B

## 2020-07-15 PROCEDURE — 99233 SBSQ HOSP IP/OBS HIGH 50: CPT | Performed by: PSYCHIATRY & NEUROLOGY

## 2020-07-15 RX ADMIN — DULOXETINE HYDROCHLORIDE 60 MG: 60 CAPSULE, DELAYED RELEASE ORAL at 09:11

## 2020-07-15 RX ADMIN — DULOXETINE HYDROCHLORIDE 60 MG: 60 CAPSULE, DELAYED RELEASE ORAL at 20:32

## 2020-07-15 RX ADMIN — LORAZEPAM 0.5 MG: 0.5 TABLET ORAL at 21:11

## 2020-07-15 RX ADMIN — HYDROXYZINE PAMOATE 50 MG: 50 CAPSULE ORAL at 15:34

## 2020-07-15 RX ADMIN — ACETAMINOPHEN 650 MG: 325 TABLET ORAL at 22:54

## 2020-07-15 RX ADMIN — RISPERIDONE 1 MG: 1 TABLET ORAL at 09:11

## 2020-07-15 RX ADMIN — HYDROXYZINE PAMOATE 50 MG: 50 CAPSULE ORAL at 23:03

## 2020-07-15 RX ADMIN — ACETAMINOPHEN 650 MG: 325 TABLET ORAL at 15:34

## 2020-07-15 ASSESSMENT — PAIN SCALES - GENERAL
PAINLEVEL_OUTOF10: 0
PAINLEVEL_OUTOF10: 5
PAINLEVEL_OUTOF10: 3
PAINLEVEL_OUTOF10: 6
PAINLEVEL_OUTOF10: 6

## 2020-07-15 ASSESSMENT — PAIN DESCRIPTION - ONSET: ONSET: GRADUAL

## 2020-07-15 ASSESSMENT — PAIN DESCRIPTION - LOCATION
LOCATION: TEETH
LOCATION: HEAD

## 2020-07-15 ASSESSMENT — PAIN DESCRIPTION - FREQUENCY: FREQUENCY: CONTINUOUS

## 2020-07-15 ASSESSMENT — PAIN DESCRIPTION - DESCRIPTORS
DESCRIPTORS: ACHING
DESCRIPTORS: HEADACHE

## 2020-07-15 ASSESSMENT — PAIN DESCRIPTION - ORIENTATION: ORIENTATION: RIGHT;LOWER;POSTERIOR

## 2020-07-15 ASSESSMENT — PAIN - FUNCTIONAL ASSESSMENT: PAIN_FUNCTIONAL_ASSESSMENT: PREVENTS OR INTERFERES SOME ACTIVE ACTIVITIES AND ADLS

## 2020-07-15 ASSESSMENT — PAIN DESCRIPTION - PAIN TYPE
TYPE: ACUTE PAIN
TYPE: ACUTE PAIN

## 2020-07-15 ASSESSMENT — PAIN DESCRIPTION - PROGRESSION: CLINICAL_PROGRESSION: GRADUALLY WORSENING

## 2020-07-15 NOTE — FLOWSHEET NOTE
Pt requested Bible. Bible provided. No other needs at this time. [facility]       07/15/20 2750   Encounter Summary   Services provided to: Patient   Referral/Consult From: Patient   Continue Visiting   (7/15 initial, provided Bible to Pt)   Complexity of Encounter Low   Length of Encounter 15 minutes   Spiritual Assessment Completed Yes   Spiritual/Rastafari   Type Spiritual support   Assessment Calm; Approachable   Intervention Active listening;Explored feelings, thoughts, concerns;Provided reading materials/devotional materials   Outcome Expressed gratitude

## 2020-07-15 NOTE — PLAN OF CARE
Patient alert and oriented x 3. Patient rates Depression 10/10 and Anxiety 10/10. Patient denies SI/HI/VH. Patient states, \"I am hearing voices they are not telling me to hurt myself or anyone. \" 'I am not telling what they are saying. \" Patient initially refused her Risperdal, but after 5 minutes she said, \"I will take it since you have it out of the package. \" Patient took HS medications. Patient refused to attended wrap up group. Patient was checking the doors at the beginning of the shift. Patient denies self harm. Patient ate a HS snack. Patient resting quietly in bed. No c/o's voiced at present.

## 2020-07-15 NOTE — PROGRESS NOTES
Consultation - [address] [facility]  Georgia Clayalva 1966 male         Chief Complaint:  For colonoscopy    HPI:  50-year-old male with history of diabetes mellitus, hypertension, hyperlipidemia was referred for colonoscopy  Patient's father had colon cancer  He had colonoscopy about 10 years ago which was reported to be normal except couple of diverticuli  Patient has regular bowel movements and denies any blood or mucus in the stool  Appetite is good and denies any recent weight loss  Denies any abdominal pain, nausea, or vomiting  Has no heartburn or acid reflux  Denies any difficulty swallowing  REVIEW OF SYSTEMS: Review of Systems   Constitutional: Negative for activity change, appetite change, chills, diaphoresis, fatigue, fever and unexpected weight change  HENT: Negative for ear discharge, ear pain, facial swelling, hearing loss, nosebleeds, sore throat, tinnitus and voice change  Eyes: Negative for pain, discharge, redness, itching and visual disturbance  Respiratory: Negative for apnea, cough, chest tightness, shortness of breath and wheezing  Cardiovascular: Negative for chest pain and palpitations  Gastrointestinal:        As noted in HPI   Endocrine: Negative for cold intolerance, heat intolerance and polyuria  Genitourinary: Negative for difficulty urinating, dysuria, flank pain, hematuria and urgency  Musculoskeletal: Positive for arthralgias  Negative for back pain, gait problem, joint swelling and myalgias  Skin: Negative for rash and wound  Neurological: Negative for dizziness, tremors, seizures, speech difficulty, light-headedness, numbness and headaches  Hematological: Negative for adenopathy  Does not bruise/bleed easily  Psychiatric/Behavioral: Negative for agitation, behavioral problems and confusion  The patient is not nervous/anxious  History reviewed  No pertinent past medical history     Past Surgical History:   Procedure Laterality Department of Psychiatry  Attending Progress Note  Chief Complaint: follow-up depression/psychosis  Patient's chart was reviewed and collaborated with  about the treatment plan. SUBJECTIVE:    Patient is feeling better. Suicidal ideation:  denies suicidal ideation. Patient does not have medication side effects. Pt stated that she had horrible day yesterday and reports that she was anxious and emotional. Pt stated that she is exhausted due to feeling like that yesterday. Pt stated that since starting risperdal she is feeling better but cont to hear voices that are starting to decrease in inetensity. Pt stated that she slept ok. ROS: Patient has new complaints: no  Sleeping adequately:  Yes   Appetite adequate: Yes  Attending groups: No: isolative to room  Visitors:No    OBJECTIVE    Physical  VITALS:  BP 92/60   Pulse 103   Temp 96.9 °F (36.1 °C) (Temporal)   Resp 18   Ht 5' 5\" (1.651 m)   Wt 175 lb (79.4 kg)   LMP 07/03/2020   SpO2 97%   Breastfeeding No   BMI 29.12 kg/m²     Mental Status Examination:  Patients appearance was ill-appearing, hospital attire, lying in bed, fair grooming and fair hygiene. Thoughts are more organized Homicidal ideations none. No abnormal movements, tics or mannerisms. Memory intact A[place] 0. Concentration Poor. Alert and oriented X 4. Insight and Judgement poor. Patient was cooperative.  Patient gait normal. Mood anxious, affect flat affect Hallucinations denies but distracted, suicidal ideations no specific plan to harm self Speech fluent and spontaneous    Data  Labs:   Admission on 07/08/2020   Component Date Value Ref Range Status    Acetaminophen Level 07/08/2020 <5* 10 - 30 ug/mL Final    Comment: Therapeutic Range: 10.0-30.0 ug/mL  Toxic: >=150 ug/mL      WBC 07/08/2020 6.2  4.0 - 11.0 K/uL Final    RBC 07/08/2020 3.98* 4.00 - 5.20 M/uL Final    Hemoglobin 07/08/2020 12.1  12.0 - 16.0 g/dL Final    Hematocrit 07/08/2020 36.1  36.0 - 48.0 % Date    COLONOSCOPY       Social History     Socioeconomic History    Marital status: /Civil Union     Spouse name: Not on file    Number of children: Not on file    Years of education: Not on file    Highest education level: Not on file   Occupational History    Not on file   Social Needs    Financial resource strain: Not on file    Food insecurity:     Worry: Not on file     Inability: Not on file    Transportation needs:     Medical: Not on file     Non-medical: Not on file   Tobacco Use    Smoking status: Never Smoker    Smokeless tobacco: Never Used   Substance and Sexual Activity    Alcohol use: Not Currently    Drug use: Never    Sexual activity: Not on file   Lifestyle    Physical activity:     Days per week: Not on file     Minutes per session: Not on file    Stress: Not on file   Relationships    Social connections:     Talks on phone: Not on file     Gets together: Not on file     Attends [employer] service: Not on file     Active member of club or organization: Not on file     Attends meetings of clubs or organizations: Not on file     Relationship status: Not on file    Intimate partner violence:     Fear of current or ex partner: Not on file     Emotionally abused: Not on file     Physically abused: Not on file     Forced sexual activity: Not on file   Other Topics Concern    Not on file   Social History Narrative    Not on file     Family History   Problem Relation Age of Onset    Colon cancer Father      Patient has no known allergies    Current Outpatient Medications   Medication Sig Dispense Refill    albuterol (PROAIR HFA) 90 mcg/act inhaler INHALE 1 PUFF BY MOUTH  EVERY 6 HOURS AS NEEDED FOR WHEEZING      amLODIPine-benazepril (LOTREL 5-10) 5-10 MG per capsule Take 1 capsule by mouth daily      atorvastatin (LIPITOR) 40 mg tablet TAKE 1 TABLET BY MOUTH  NIGHTLY      Cinnamon Bark POWD Take 1 tablet by mouth daily      metFORMIN (GLUCOPHAGE) 500 mg tablet Take 500 mg Final    MCV 07/08/2020 90.5  80.0 - 100.0 fL Final    MCH 07/08/2020 30.3  26.0 - 34.0 pg Final    MCHC 07/08/2020 33.5  31.0 - 36.0 g/dL Final    RDW 07/08/2020 13.5  12.4 - 15.4 % Final    Platelets 65/06/8866 244  135 - 450 K/uL Final    MPV 07/08/2020 9.6  5.0 - 10.5 fL Final    Neutrophils % 07/08/2020 64.0  % Final    Lymphocytes % 07/08/2020 22.1  % Final    Monocytes % 07/08/2020 12.0  % Final    Eosinophils % 07/08/2020 1.2  % Final    Basophils % 07/08/2020 0.7  % Final    Neutrophils Absolute 07/08/2020 3.9  1.7 - 7.7 K/uL Final    Lymphocytes Absolute 07/08/2020 1.4  1.0 - 5.1 K/uL Final    Monocytes Absolute 07/08/2020 0.7  0.0 - 1.3 K/uL Final    Eosinophils Absolute 07/08/2020 0.1  0.0 - 0.6 K/uL Final    Basophils Absolute 07/08/2020 0.0  0.0 - 0.2 K/uL Final    Sodium 07/08/2020 137  136 - 145 mmol/L Final    Potassium reflex Magnesium 07/08/2020 2.9* 3.5 - 5.1 mmol/L Final    Chloride 07/08/2020 103  99 - 110 mmol/L Final    CO2 07/08/2020 22  21 - 32 mmol/L Final    Anion Gap 07/08/2020 12  3 - 16 Final    Glucose 07/08/2020 92  70 - 99 mg/dL Final    BUN 07/08/2020 8  7 - 20 mg/dL Final    CREATININE 07/08/2020 0.9  0.6 - 1.1 mg/dL Final    GFR Non- 07/08/2020 >60  >60 Final    Comment: >60 mL/min/1.73m2 EGFR, calc. for ages 25 and older using the  MDRD formula (not corrected for weight), is valid for stable  renal function.  GFR  07/08/2020 >60  >60 Final    Comment: Chronic Kidney Disease: less than 60 ml/min/1.73 sq.m. Kidney Failure: less than 15 ml/min/1.73 sq.m. Results valid for patients 18 years and older.       Calcium 07/08/2020 9.0  8.3 - 10.6 mg/dL Final    Total Protein 07/08/2020 6.7  6.4 - 8.2 g/dL Final    Alb 07/08/2020 4.2  3.4 - 5.0 g/dL Final    Albumin/Globulin Ratio 07/08/2020 1.7  1.1 - 2.2 Final    Total Bilirubin 07/08/2020 0.3  0.0 - 1.0 mg/dL Final    Alkaline Phosphatase 07/08/2020 55  40 - 129 U/L Final    ALT 07/08/2020 77* 10 - 40 U/L Final    AST 07/08/2020 43* 15 - 37 U/L Final    Globulin 07/08/2020 2.5  g/dL Final    Ethanol Lvl 07/08/2020 None Detected  mg/dL Final    Comment:    None Detected  Conversion factor:  100 mg/dl = .100 g/dl  For Medical Purposes Only      hCG Qual 07/08/2020 Negative  Detects HCG level >10 MIU/mL Final    Lipase 07/08/2020 42.0  13.0 - 60.0 U/L Final    Salicylate, Serum 30/63/7943 <0.3* 15.0 - 30.0 mg/dL Final    Comment: Therapeutic Range: 15.0-30.0 mg/dL  Toxic: >30.0 mg/dL      Color, UA 07/08/2020 Yellow  Straw/Yellow Final    Clarity, UA 07/08/2020 Clear  Clear Final    Glucose, Ur 07/08/2020 Negative  Negative mg/dL Final    Bilirubin Urine 07/08/2020 Negative  Negative Final    Ketones, Urine 07/08/2020 Negative  Negative mg/dL Final    Specific Gravity, UA 07/08/2020 1.010  1.005 - 1.030 Final    Blood, Urine 07/08/2020 MODERATE* Negative Final    pH, UA 07/08/2020 6.0  5.0 - 8.0 Final    Protein, UA 07/08/2020 Negative  Negative mg/dL Final    Urobilinogen, Urine 07/08/2020 0.2  <2.0 E.U./dL Final    Nitrite, Urine 07/08/2020 Negative  Negative Final    Leukocyte Esterase, Urine 07/08/2020 Negative  Negative Final    Microscopic Examination 07/08/2020 YES   Final    Urine Type 07/08/2020 NotGiven   Final    Urine Reflex to Culture 07/08/2020 Not Indicated   Final    Amphetamine Screen, Urine 07/08/2020 POSITIVE* Negative <1000ng/mL Final    Comment: High concentrations of ephedrine/pseudoephedrine or  phenylpropanolamine may cause false positive results  for amphetamine. Therefore, confirmatory testing for  amphetamine should be considered if clinically indicated.       Barbiturate Screen, Ur 07/08/2020 Neg  Negative <200 ng/mL Final    Benzodiazepine Screen, Urine 07/08/2020 Neg  Negative <200 ng/mL Final    Cannabinoid Scrn, Ur 07/08/2020 Neg  Negative <50 ng/mL Final    Cocaine Metabolite Screen, Urine 07/08/2020 Neg  Negative by mouth      multivitamin (THERAGRAN) TABS Take 1 tablet by mouth daily      Omega-3 1000 MG CAPS Take 1 capsule by mouth 2 (two) times a day      PROAIR  (90 Base) MCG/ACT inhaler       Turmeric 400 MG CAPS Take 1 capsule by mouth daily      Na Sulfate-K Sulfate-Mg Sulf (SUPREP BOWEL PREP KIT) 17 5-3 13-1 6 GM/177ML SOLN Take 2 Bottles by mouth see administration instructions Please follow the instructions from the office 2 Bottle 0     No current facility-administered medications for this visit  Blood pressure 132/80, pulse 90, temperature 98 9 °F (37 2 °C), temperature source Tympanic, resp  rate 16, height 5' 9" (1 753 m), weight 122 kg (270 lb)  PHYSICAL EXAM: Physical Exam   Constitutional: He is oriented to person, place, and time  He appears well-developed  HENT:   Head: Normocephalic and atraumatic  Mouth/Throat: Oropharynx is clear and moist    Eyes: Pupils are equal, round, and reactive to light  Conjunctivae are normal  Right eye exhibits no discharge  Left eye exhibits no discharge  No scleral icterus  Neck: Neck supple  No JVD present  No tracheal deviation present  No thyromegaly present  Cardiovascular: Normal rate, regular rhythm, normal heart sounds and intact distal pulses  Exam reveals no gallop and no friction rub  No murmur heard  Pulmonary/Chest: Effort normal and breath sounds normal  No respiratory distress  He has no wheezes  He has no rales  He exhibits no tenderness  Abdominal: Soft  Bowel sounds are normal  He exhibits no distension and no mass  There is no tenderness  There is no rebound and no guarding  No hernia  Musculoskeletal: He exhibits no edema  Lymphadenopathy:     He has no cervical adenopathy  Neurological: He is alert and oriented to person, place, and time  Skin: Skin is warm and dry  No rash noted  No erythema  Psychiatric: He has a normal mood and affect   His behavior is normal  Thought content normal         No results found <300 ng/mL Final    Opiate Scrn, Ur 07/08/2020 Neg  Negative <300 ng/mL Final    Comment: \"Therapeutic levels of pain medication, especially oxycontin and synthetic  opioids, may not be detected by this Methodology. Pain management screen  panel  Drug panel-PM-Hi Res Ur, Interp (PAIN) should be considered for drug  monitoring \".  PCP Screen, Urine 07/08/2020 Neg  Negative <25 ng/mL Final    Methadone Screen, Urine 07/08/2020 Neg  Negative <300 ng/mL Final    Propoxyphene Scrn, Ur 07/08/2020 Neg  Negative <300 ng/mL Final    Oxycodone Urine 07/08/2020 Neg  Negative <100 ng/ml Final    pH, UA 07/08/2020 6.0   Final    Comment: Urine pH less than 5.0 or greater than 8.0 may indicate sample adulteration. Another sample should be collected if clinically  indicated.  Drug Screen Comment: 07/08/2020 see below   Final    Comment: This method is a screening test to detect only these drug  classes as part of a medical workup. Confirmatory testing  by another method should be ordered if clinically indicated.  WBC, UA 07/08/2020 0-2  0 - 5 /HPF Final    RBC, UA 07/08/2020 5-10* 0 - 4 /HPF Final    Epithelial Cells, UA 07/08/2020 2-5  0 - 5 /HPF Final    Magnesium 07/08/2020 2.20  1.80 - 2.40 mg/dL Final    SARS-CoV-2, NAAT 07/08/2020 Not Detected  Not Detected Final    Comment: Rapid NAAT:   Negative results should be treated as presumptive and,  if inconsistent with clinical signs and symptoms or necessary for  patient management, should be tested with an alternative molecular  assay. Negative results do not preclude SARS-CoV-2 infection and  should not be used as the sole basis for patient management decisions. This test has been authorized by the FDA under an Emergency Use  Authorization (EUA) for use by authorized laboratories.     Fact sheet for Healthcare Providers:  Brayan  Fact sheet for Patients: Brayan    METHODOLOGY: for: WBC, HGB, HCT, MCV, PLT  No results found for: GLUCOSE, CALCIUM, NA, K, CO2, CL, BUN, CREATININE  No results found for: ALT, AST, GGT, ALKPHOS, BILITOT  No results found for: INR, PROTIME    Patient was never admitted  ASSESSMENT & PLAN:    Family history of colon cancer  Patient is at increased risk for colon cancer screening because of family history  Rule out colorectal lesions including polyps or malignancy     -Schedule for colonoscopy  -High-fiber diet     -Patient was given instructions about the colonoscopy prep     -Patient was explained about  the risks and benefits of the procedure  Risks including but not limited to bleeding, infection, perforation were explained in detail  Also explained about less than 100% sensitivity with the exam and other alternatives  Isothermal Nucleic Acid Amplification      Hemoglobin A1C 07/09/2020 5.1  See comment % Final    Comment: Comment:  Diagnosis of Diabetes: > or = 6.5%  Increased risk of diabetes (Prediabetes): 5.7-6.4%  Glycemic Control: Nonpregnant Adults: <7.0%                    Pregnant: <6.0%        eAG 07/09/2020 99.7  mg/dL Final    TSH 07/08/2020 1.22  0.27 - 4.20 uIU/mL Final    Cholesterol, Total 07/09/2020 133  0 - 199 mg/dL Final    Triglycerides 07/09/2020 128  0 - 150 mg/dL Final    HDL 07/09/2020 39* 40 - 60 mg/dL Final    LDL Calculated 07/09/2020 68  <100 mg/dL Final    VLDL Cholesterol Calculated 07/09/2020 26  Not Established mg/dL Final    Potassium 07/09/2020 3.0* 3.5 - 5.1 mmol/L Final    Sodium 07/10/2020 141  136 - 145 mmol/L Final    Potassium 07/10/2020 3.5  3.5 - 5.1 mmol/L Final    Chloride 07/10/2020 106  99 - 110 mmol/L Final    CO2 07/10/2020 20* 21 - 32 mmol/L Final    Anion Gap 07/10/2020 15  3 - 16 Final    Glucose 07/10/2020 90  70 - 99 mg/dL Final    BUN 07/10/2020 8  7 - 20 mg/dL Final    CREATININE 07/10/2020 0.7  0.6 - 1.1 mg/dL Final    GFR Non- 07/10/2020 >60  >60 Final    Comment: >60 mL/min/1.73m2 EGFR, calc. for ages 25 and older using the  MDRD formula (not corrected for weight), is valid for stable  renal function.  GFR  07/10/2020 >60  >60 Final    Comment: Chronic Kidney Disease: less than 60 ml/min/1.73 sq.m. Kidney Failure: less than 15 ml/min/1.73 sq.m. Results valid for patients 18 years and older.       Calcium 07/10/2020 8.0* 8.3 - 10.6 mg/dL Final    Total Protein 07/10/2020 5.6* 6.4 - 8.2 g/dL Final    Alb 07/10/2020 3.3* 3.4 - 5.0 g/dL Final    Albumin/Globulin Ratio 07/10/2020 1.4  1.1 - 2.2 Final    Total Bilirubin 07/10/2020 <0.2  0.0 - 1.0 mg/dL Final    Alkaline Phosphatase 07/10/2020 45  40 - 129 U/L Final    ALT 07/10/2020 55* 10 - 40 U/L Final    AST 07/10/2020 29  15 - 37 U/L Final    Globulin

## 2020-07-15 NOTE — PLAN OF CARE
Problem: Anger Management/Homicidal Ideation:  Goal: Absence of angry outbursts  Description: Absence of angry outbursts  Outcome: Ongoing     Problem: Altered Mood, Depressive Behavior:  Goal: Absence of self-harm  Description: Absence of self-harm  Outcome: Ongoing   Patient has mainly been withdrawn to her room resting in bed throughout the shift. She appears A & Ox3 she does not seem to understand the situation that has lead to her being her. She denies SI or HI, and A/V H however she is RTIS. She is talking to self and mumbling under her breath. She also spend a period of time kneeling at bedside like she was praying and then she was lying down in bed and held her hands in the praying position under her chin. Staff tried to ask her about yesterday to see how today will go, her response was to not want to talk about it and called it a \"bad day\". She has not made any attempt to escape/exit the unit today. She was cooperative with taking her scheduled medications and feels that the Risperdal is helping her feel better. When she was awake she was seen coloring or writing in bed. She has not been an issue through shift.

## 2020-07-15 NOTE — PROGRESS NOTES
Patient up to team station requesting medication for HA and anxiety. She was cooperative with taking medication.

## 2020-07-15 NOTE — CARE COORDINATION
[facility] Interdisciplinary Treatment Plan Note     Review Date & Time: 7/15/2020 0915    Patient was not in treatment team.    Admission Type:   Admission Type: Voluntary    Reason for admission:  Reason for Admission: Pt brought in [facility] police due to panic attack and increased anxiety along with suicidal ideation.     Estimated Length of Stay Update:  2-3 days   Estimated Discharge Date Update: 7/18/2020    PATIENT STRENGTHS:  Patient Strengths:Strengths: No significant Physical Illness, Motivated, Social Skills  Patient Strengths and Limitations:Limitations: Tendency to isolate self, Difficult relationships / poor social skills, Demonstrates discomfort with /lack of social skills, External locus of control, Multiple barriers to leisure interests, Difficulty problem solving/relies on others to help solve problems, Inappropriate/potentially harmful leisure interests, Lacks leisure interests  Addictive Behavior:Addictive Behavior  In the past 3 months, have you felt or has someone told you that you have a problem with:  : None  Do you have a history of Chemical Use?: No  Do you have a history of Alcohol Use?: No  Do you have a history of Street Drug Abuse?: Yes  Histroy of Prescripton Drug Abuse?: No  Medical Problems:   Past Medical History:   Diagnosis Date    Anxiety     Chronic post-traumatic stress disorder (PTSD) 10/29/2018    Depression     Hepatitis C 07/09/2020    Mild intermittent asthma without complication     Stimulant use disorder 9/23/2018       Risk:  Fall RiskTotal: 55  Jose Scale Jose Scale Score: 22  BVC Total: 0    Status EXAM:   Status and Exam  Normal: No  Facial Expression: Flat  Affect: Blunt  Level of Consciousness: Alert  Mood:Normal: No  Mood: Depressed, Anxious, Irritable, Sad  Motor Activity:Normal: No  Motor Activity: Decreased  Interview Behavior: Evasive, Irritable, Uncooperative/Withdrawn  Preception: [place] to Person, [place] to Time, [place] to Place, [place] to Situation  Attention:Normal: No  Attention: Distractible  Thought Processes: Circumstantial  Thought Content:Normal: No  Thought Content: Paranoia  Hallucinations: Auditory (Comment)(Patient appears to be RTIS at times.)  Delusions: No  Memory:Normal: No  Memory: Poor Recent  Insight and Judgment: No  Insight and Judgment: Poor Judgment, Poor Insight  Present Suicidal Ideation: No  Present Homicidal Ideation: No    Daily Assessment Last Entry:   Daily Sleep (WDL): Within Defined Limits         Patient Currently in Pain: Denies  Daily Nutrition (WDL): Within Defined Limits    Patient Monitoring:  Frequency of Checks: 4 times per hour, close    Psychiatric Symptoms:   Depression Symptoms  Depression Symptoms: Isolative, Impaired concentration  Anxiety Symptoms  Anxiety Symptoms: Generalized  Elizabeth Symptoms  Elizabeth Symptoms: Poor judgment     Psychosis Symptoms  Delusion Type: Paranoid    Suicide Risk CSSR-S:  1) Within the past month, have you wished you were dead or wished you could go to sleep and not wake up? : No  2) Have you actually had any thoughts of killing yourself? : No  6) Have you ever done anything, started to do anything, or prepared to do anything to end your life?: No      EDUCATION:   Learner Progress Toward Treatment Goals: Reviewed goals and plan of care    Method: Individual    Outcome: Verbalized understanding and Needs reinforcement    PATIENT GOALS: Patient did not attend goals group    PLAN/TREATMENT RECOMMENDATIONS UPDATE: Patient will take medication as prescribed, eat 75% of meals, attend groups, participate in milieu activities, participate in treatment team and care planning for discharge and follow up.     GOALS UPDATE:  Time frame for Short-Term Goals: 1-2 days      Karon Amin RN

## 2020-07-16 VITALS
RESPIRATION RATE: 16 BRPM | HEART RATE: 112 BPM | DIASTOLIC BLOOD PRESSURE: 63 MMHG | OXYGEN SATURATION: 98 % | BODY MASS INDEX: 29.16 KG/M2 | HEIGHT: 65 IN | TEMPERATURE: 95.9 F | WEIGHT: 175 LBS | SYSTOLIC BLOOD PRESSURE: 116 MMHG

## 2020-07-16 PROCEDURE — 5130000000 HC BRIDGE APPOINTMENT

## 2020-07-16 PROCEDURE — 6370000000 HC RX 637 (ALT 250 FOR IP): Performed by: PSYCHIATRY & NEUROLOGY

## 2020-07-16 PROCEDURE — 99239 HOSP IP/OBS DSCHRG MGMT >30: CPT | Performed by: PSYCHIATRY & NEUROLOGY

## 2020-07-16 RX ORDER — HYDROXYZINE PAMOATE 50 MG/1
50 CAPSULE ORAL 3 TIMES DAILY PRN
Qty: 90 CAPSULE | Refills: 0 | Status: ON HOLD | OUTPATIENT
Start: 2020-07-16 | End: 2020-07-28 | Stop reason: HOSPADM

## 2020-07-16 RX ORDER — DULOXETIN HYDROCHLORIDE 60 MG/1
60 CAPSULE, DELAYED RELEASE ORAL 2 TIMES DAILY
Qty: 60 CAPSULE | Refills: 0 | Status: ON HOLD | OUTPATIENT
Start: 2020-07-16 | End: 2020-07-28 | Stop reason: HOSPADM

## 2020-07-16 RX ADMIN — HYDROXYZINE PAMOATE 50 MG: 50 CAPSULE ORAL at 13:43

## 2020-07-16 RX ADMIN — DULOXETINE HYDROCHLORIDE 60 MG: 60 CAPSULE, DELAYED RELEASE ORAL at 08:42

## 2020-07-16 ASSESSMENT — PAIN SCALES - GENERAL
PAINLEVEL_OUTOF10: 0
PAINLEVEL_OUTOF10: 0
PAINLEVEL_OUTOF10: 6
PAINLEVEL_OUTOF10: 0
PAINLEVEL_OUTOF10: 6

## 2020-07-16 NOTE — PLAN OF CARE
Problem: Anger Management/Homicidal Ideation:  Goal: Absence of angry outbursts  Description: Absence of angry outbursts  7/16/2020 1142 by Maeve Price RN  Outcome: Ongoing     Problem: Altered Mood, Psychotic Behavior:  Goal: Able to verbalize decrease in frequency and intensity of hallucinations  Description: Able to verbalize decrease in frequency and intensity of hallucinations  7/16/2020 1142 by Maeve Price RN  Outcome: Ongoing   Patient has mainly been visible out on the unit on the large side throughout the shift. She appears A & Ox3 she does not seem to understand the situation that has lead to her being her. She denies SI or HI, and A/V H however she is RTIS and when she is in groups she would do bizarre head nodding that did not corollate what is being said in group. She is talking to self and mumbling under her breath. Staff talked with her not displaying any exist seeking behavior for her to go to the large side of the unit and she agreed that she would not make any attempts. She has not made any attempt to escape/exit the unit so far this shift. She was not willing to take Risperdal this morning, she felt that it made her to sleepy yesterday through the shift. She did request to see the male doctor that she was assigned the last time she was admitted to the hospital. She would also like to be changed to his team. Dr. Santos Torres was made aware of her request.  She has not been an issue through shift.

## 2020-07-16 NOTE — GROUP NOTE
Group Therapy Note    Date: 7/15/2020    Group Start Time: 2030  Group End Time: 2100  Group Topic: Wrap-Up    [facility]        Group Therapy Note    Attendees: Goals and importance of goal setting discussed. Night time milieu activities discussed. Patient's Goal:  Get out of bed, go to 1 group    Notes:  Successful     Status After Intervention:  Improved    Participation Level:  Active Listener    Participation Quality: Attentive      Speech:  normal      Thought Process/Content: Linear      Affective Functioning: Blunted      Mood: dysphoric      Level of consciousness:  Alert, Oriented x4 and Preoccupied      Response to Learning: Progressing to goal      Endings: None Reported    Modes of Intervention: Support      Discipline Responsible: [address] [facility] [employer]      Signature:  Maday Pham

## 2020-07-16 NOTE — PROGRESS NOTES
Patient refused her 21:00 dose of risperdal 1 mg po. \"It caused me to have a panic attack last night & I didn't sleep well. \" Patient was instructed & agreed to discuss this with her doctor tomorrow.  Priyank Peraza R.N.

## 2020-07-16 NOTE — PLAN OF CARE
Patient was regressed  to her room & bed most of the shift. Patient did attend wrap up group. Patient reported that she came to the hospital for anxiety, suicidal ideation & PTSD. Patient denied having hallucinations, but appeared reoccupied & was observed talking to herself, while lying int the bed. Patient was also noted kneeling at her bedside, praying. Patient appeared to of been sleeping since 23:45.  Kenneth Peraza R.N.

## 2020-07-16 NOTE — BH NOTE
[facility]  Discharge Note    Pt discharged with followings belongings:   Dentures: None  Vision - Corrective Lenses: None  Hearing Aid: None  Jewelry: None  Body Piercings Removed: N/A  Clothing: Dress  Were All Patient Medications Collected?: Not Applicable  Other Valuables: None   Valuables sent home with patient. Valuables retrieved from safe and returned to patient. Patient education on aftercare instructions: yes. Patient verbalize understanding of AVS:  yes. Status EXAM upon discharge:  Status and Exam  Normal: No  Facial Expression: Flat  Affect: Normal  Level of Consciousness: Alert  Mood:Normal: No  Mood: Anxious, Other (Comment)(\"good\")  Motor Activity:Normal: Yes  Motor Activity: Decreased  Interview Behavior: Cooperative, Evasive  Preception: [place] to Person, [place] to Time, [place] to Place, [place] to Situation  Attention:Normal: No  Attention: Distractible  Thought Processes: Circumstantial  Thought Content:Normal: No  Thought Content: Paranoia, Preoccupations  Hallucinations: Auditory (Comment)(denies)  Delusions: No  Memory:Normal: No  Memory: Poor Recent  Insight and Judgment: No  Insight and Judgment: Poor Judgment, Poor Insight  Present Suicidal Ideation: No  Present Homicidal Ideation: No      Metabolic Screening:    Lab Results   Component Value Date    LABA1C 5.1 07/09/2020       Lab Results   Component Value Date    CHOL 133 07/09/2020     Lab Results   Component Value Date    TRIG 128 07/09/2020     Lab Results   Component Value Date    HDL 39 (L) 07/09/2020     No components found for: [facility]  Lab Results   Component Value Date    LABVLDL 26 07/09/2020       Hu Zee RN    Bridge Appointment completed: Reviewed Discharge Instructions with patient. Patient verbalizes understanding and agreement with the discharge plan using the teachback method.      Referral for Outpatient Tobacco Cessation Counseling, upon discharge (david X if applicable and completed):    ( )  Hospital staff assisted patient to call Quit Line or faxed referral                                   during hospitalization                  (X)  Recognizing danger situations (included triggers and roadblocks), if not completed on admission     (X)  Coping skills (new ways to manage stress, exercise, relaxation techniques, changing routine, distraction), if not completed on admission                                                           (X)  Basic information about quitting (benefits of quitting, techniques in how to quit, available resources, if not completed on admission  ( ) Referral for counseling faxed to [facility]   ( ) Patient refused referral  ( ) Patient refused counseling  ( ) Patient refused smoking cessation medication upon discharge    Vaccinations (david X if applicable and completed):  ( ) Patient states already received influenza vaccine elsewhere  ( ) Patient received influenza vaccine during this hospitalization  ( ) Patient refused influenza vaccine at this time  (x) not influenza season

## 2020-07-16 NOTE — GROUP NOTE
Group Therapy Note    Date: 7/16/2020    Group Start Time: 1000  Group End Time: 1100  Group Topic: Psychoeducation    [facility]        Group Therapy Note    Attendees: 10         Patient's Goal: Patients were invited to participate in an Art Therapy / Psycho-Education group on gratitude. Patient were encouraged share what and/or who what they were grateful for in their lives and why and to express their gratitude through art making or letter writing. At the end of group, patients were asked to share and process their work with the group. Notes:  Zonia Delong expressed gratitude towards, \"feeling much better today,\" was focused and attentive while engaging in art making on gratitude, and listened to peers share their work. Status After Intervention:  Improved    Participation Level:  Active Listener and Interactive    Participation Quality: Appropriate, Attentive and Sharing      Speech:  normal      Thought Process/Content: Logical      Affective Functioning: Constricted/Restricted      Mood: anxious      Level of consciousness:  Alert, Oriented x4 and Attentive      Response to Learning: Able to verbalize current knowledge/experience, Able to verbalize/acknowledge new learning, Able to retain information and Capable of insight      Endings: None Reported    Modes of Intervention: Education, Support, Socialization and Exploration      Discipline Responsible: Psychoeducational Specialist      Signature:  Clarice Bearden, [facility]

## 2020-07-16 NOTE — GROUP NOTE
Group Therapy Note    Date: 7/16/2020    Group Start Time: 1000  Group End Time: 1050  Group Topic: Psychoeducation    [address] [place] [address]        Group Therapy Note    Attendees: 10    Patient's Goal: to read A Time to Be Kind by Shahzad Toure and discuss acceptance, spending time in a valuable way, practicing gratitude, how to improve happiness, and the power of positive thinking. To practice gratitude and positive thinking by completing a gratitude journal prompt. Notes: Frandy Camps read the article A Time to be Kind by Shahzad Toure with peers. Frandy Camps appeared to actively listen to group discussion. Frandy Camps minimally contributed to group discussion with maximum encouragement. Frandy Camps practiced gratitude and positive thinking by completing a gratitude journal prompt. Status After Intervention:  Improved    Participation Level:  Active Listener and Interactive    Participation Quality: Appropriate and Attentive      Speech:  normal      Thought Process/Content: Hallucinating      Affective Functioning: Constricted/Restricted      Mood: anxious      Level of consciousness:  Alert and Preoccupied      Response to Learning: Progressing to goal      Endings: None Reported    Modes of Intervention: Education, Support, Socialization, Exploration, Clarifying, Problem-solving and Activity      Discipline Responsible: Psychoeducational Specialist      Signature:  WILEY Keller

## 2020-07-16 NOTE — DISCHARGE SUMMARY
Discharge Summary   Admit Date: 7/8/2020   Discharge Date:    7/16/2020  Spent over 40 minutes with patient and staff on [facility]   Final Dx:     axis I: Mdd recurrent moderate, opiate, stimulant abuse, PTSD  Axis 2: deferred   Mulu 3: See Medical History  Axis 4: Problems with primary support group, Occupational problems, Economic problems and Other psychosocial and environmental problems      Condition on DC  Mood and affect are stable and pt is not suicidal   VITALS:  /63   Pulse 112   Temp 95.9 °F (35.5 °C) (Temporal)   Resp 16   Ht 5' 5\" (1.651 m)   Wt 175 lb (79.4 kg)   LMP 07/03/2020   SpO2 98%   Breastfeeding No   BMI 29.12 kg/m²   Brief Summary Present Illness   39 y/o wf that presented for worsening depression and anxiety. Pt reports she has been having family issues regarding her children. She reports both children have been removed from her home. She reports her depression and anxiety have increased. Pt states she has not been keeping up with her medications and has not seen a therapist since before covid. Pt reports she is going through custody and court issues. Pt reports she had a panic attack last night and felt like she was going to pass out. Pt reports police dropped her off at the hospital. Pt reports she has not been eating or sleeping. Pt states she is suicidal but does not have a plan or intent. Pt states her suicidal thoughts have started recently due to events surrounding family issues happening recently. Pt states she wants help getting stabilized with her mind and body. Pt in room and refused to wake up for interview. Chart has been reviewed  Hospital Course Pt was admitted for psychosis and depression.  Initially pt was started on cymbalta, wellbutrin and seroquel but she was oversedated on seroquel and inc agitation with wellbutrin and both were d/c and she was started on zyprexa but also cause oversedation and finally started on risperdla which she had a good response but did not wish to cont taking. Pt attended grps and paranoia and psychosis were improved which I suspect that it was a product of her illicit drugs. Pt able to verbalize discharge plan to go to [facility] and arrange transportation thru insurance. Patient stabilized on meds and milieu treatment. Patient was discharged to home to continue recovery in the community.    PE: (reviewed) and labs (see medical H&PE)  Labs:    Admission on 07/08/2020   Component Date Value Ref Range Status    Acetaminophen Level 07/08/2020 <5* 10 - 30 ug/mL Final    Comment: Therapeutic Range: 10.0-30.0 ug/mL  Toxic: >=150 ug/mL      WBC 07/08/2020 6.2  4.0 - 11.0 K/uL Final    RBC 07/08/2020 3.98* 4.00 - 5.20 M/uL Final    Hemoglobin 07/08/2020 12.1  12.0 - 16.0 g/dL Final    Hematocrit 07/08/2020 36.1  36.0 - 48.0 % Final    MCV 07/08/2020 90.5  80.0 - 100.0 fL Final    MCH 07/08/2020 30.3  26.0 - 34.0 pg Final    MCHC 07/08/2020 33.5  31.0 - 36.0 g/dL Final    RDW 07/08/2020 13.5  12.4 - 15.4 % Final    Platelets 62/90/5209 244  135 - 450 K/uL Final    MPV 07/08/2020 9.6  5.0 - 10.5 fL Final    Neutrophils % 07/08/2020 64.0  % Final    Lymphocytes % 07/08/2020 22.1  % Final    Monocytes % 07/08/2020 12.0  % Final    Eosinophils % 07/08/2020 1.2  % Final    Basophils % 07/08/2020 0.7  % Final    Neutrophils Absolute 07/08/2020 3.9  1.7 - 7.7 K/uL Final    Lymphocytes Absolute 07/08/2020 1.4  1.0 - 5.1 K/uL Final    Monocytes Absolute 07/08/2020 0.7  0.0 - 1.3 K/uL Final    Eosinophils Absolute 07/08/2020 0.1  0.0 - 0.6 K/uL Final    Basophils Absolute 07/08/2020 0.0  0.0 - 0.2 K/uL Final    Sodium 07/08/2020 137  136 - 145 mmol/L Final    Potassium reflex Magnesium 07/08/2020 2.9* 3.5 - 5.1 mmol/L Final    Chloride 07/08/2020 103  99 - 110 mmol/L Final    CO2 07/08/2020 22  21 - 32 mmol/L Final    Anion Gap 07/08/2020 12  3 - 16 Final    Glucose 07/08/2020 92  70 - 99 mg/dL Final    BUN 07/08/2020 8  7 - 20 mg/dL Final    CREATININE 07/08/2020 0.9  0.6 - 1.1 mg/dL Final    GFR Non- 07/08/2020 >60  >60 Final    Comment: >60 mL/min/1.73m2 EGFR, calc. for ages 25 and older using the  MDRD formula (not corrected for weight), is valid for stable  renal function.  GFR  07/08/2020 >60  >60 Final    Comment: Chronic Kidney Disease: less than 60 ml/min/1.73 sq.m. Kidney Failure: less than 15 ml/min/1.73 sq.m. Results valid for patients 18 years and older.       Calcium 07/08/2020 9.0  8.3 - 10.6 mg/dL Final    Total Protein 07/08/2020 6.7  6.4 - 8.2 g/dL Final    Alb 07/08/2020 4.2  3.4 - 5.0 g/dL Final    Albumin/Globulin Ratio 07/08/2020 1.7  1.1 - 2.2 Final    Total Bilirubin 07/08/2020 0.3  0.0 - 1.0 mg/dL Final    Alkaline Phosphatase 07/08/2020 55  40 - 129 U/L Final    ALT 07/08/2020 77* 10 - 40 U/L Final    AST 07/08/2020 43* 15 - 37 U/L Final    Globulin 07/08/2020 2.5  g/dL Final    Ethanol Lvl 07/08/2020 None Detected  mg/dL Final    Comment:    None Detected  Conversion factor:  100 mg/dl = .100 g/dl  For Medical Purposes Only      hCG Qual 07/08/2020 Negative  Detects HCG level >10 MIU/mL Final    Lipase 07/08/2020 42.0  13.0 - 60.0 U/L Final    Salicylate, Serum 20/11/6361 <0.3* 15.0 - 30.0 mg/dL Final    Comment: Therapeutic Range: 15.0-30.0 mg/dL  Toxic: >30.0 mg/dL      Color, UA 07/08/2020 Yellow  Straw/Yellow Final    Clarity, UA 07/08/2020 Clear  Clear Final    Glucose, Ur 07/08/2020 Negative  Negative mg/dL Final    Bilirubin Urine 07/08/2020 Negative  Negative Final    Ketones, Urine 07/08/2020 Negative  Negative mg/dL Final    Specific Gravity, UA 07/08/2020 1.010  1.005 - 1.030 Final    Blood, Urine 07/08/2020 MODERATE* Negative Final    pH, UA 07/08/2020 6.0  5.0 - 8.0 Final    Protein, UA 07/08/2020 Negative  Negative mg/dL Final    Urobilinogen, Urine 07/08/2020 0.2  <2.0 E.U./dL Final    Nitrite, Urine 07/08/2020 Negative Negative Final    Leukocyte Esterase, Urine 07/08/2020 Negative  Negative Final    Microscopic Examination 07/08/2020 YES   Final    Urine Type 07/08/2020 NotGiven   Final    Urine Reflex to Culture 07/08/2020 Not Indicated   Final    Amphetamine Screen, Urine 07/08/2020 POSITIVE* Negative <1000ng/mL Final    Comment: High concentrations of ephedrine/pseudoephedrine or  phenylpropanolamine may cause false positive results  for amphetamine. Therefore, confirmatory testing for  amphetamine should be considered if clinically indicated.  Barbiturate Screen, Ur 07/08/2020 Neg  Negative <200 ng/mL Final    Benzodiazepine Screen, Urine 07/08/2020 Neg  Negative <200 ng/mL Final    Cannabinoid Scrn, Ur 07/08/2020 Neg  Negative <50 ng/mL Final    Cocaine Metabolite Screen, Urine 07/08/2020 Neg  Negative <300 ng/mL Final    Opiate Scrn, Ur 07/08/2020 Neg  Negative <300 ng/mL Final    Comment: \"Therapeutic levels of pain medication, especially oxycontin and synthetic  opioids, may not be detected by this Methodology. Pain management screen  panel  Drug panel-PM-Hi Res Ur, Interp (PAIN) should be considered for drug  monitoring \".  PCP Screen, Urine 07/08/2020 Neg  Negative <25 ng/mL Final    Methadone Screen, Urine 07/08/2020 Neg  Negative <300 ng/mL Final    Propoxyphene Scrn, Ur 07/08/2020 Neg  Negative <300 ng/mL Final    Oxycodone Urine 07/08/2020 Neg  Negative <100 ng/ml Final    pH, UA 07/08/2020 6.0   Final    Comment: Urine pH less than 5.0 or greater than 8.0 may indicate sample adulteration. Another sample should be collected if clinically  indicated.  Drug Screen Comment: 07/08/2020 see below   Final    Comment: This method is a screening test to detect only these drug  classes as part of a medical workup. Confirmatory testing  by another method should be ordered if clinically indicated.       WBC, UA 07/08/2020 0-2  0 - 5 /HPF Final    RBC, UA 07/08/2020 5-10* 0 - 4 /HPF Final    Quality 110: Preventive Care And Screening: Influenza Immunization: Influenza Immunization previously received during influenza season Detail Level: Detailed Epithelial Cells, UA 07/08/2020 2-5  0 - 5 /HPF Final    Magnesium 07/08/2020 2.20  1.80 - 2.40 mg/dL Final    SARS-CoV-2, NAAT 07/08/2020 Not Detected  Not Detected Final    Comment: Rapid NAAT:   Negative results should be treated as presumptive and,  if inconsistent with clinical signs and symptoms or necessary for  patient management, should be tested with an alternative molecular  assay. Negative results do not preclude SARS-CoV-2 infection and  should not be used as the sole basis for patient management decisions. This test has been authorized by the FDA under an Emergency Use  Authorization (EUA) for use by authorized laboratories.     Fact sheet for Healthcare Providers:  BuildHer.es  Fact sheet for Patients: BuildHer.es    METHODOLOGY: Isothermal Nucleic Acid Amplification      Hemoglobin A1C 07/09/2020 5.1  See comment % Final    Comment: Comment:  Diagnosis of Diabetes: > or = 6.5%  Increased risk of diabetes (Prediabetes): 5.7-6.4%  Glycemic Control: Nonpregnant Adults: <7.0%                    Pregnant: <6.0%        eAG 07/09/2020 99.7  mg/dL Final    TSH 07/08/2020 1.22  0.27 - 4.20 uIU/mL Final    Cholesterol, Total 07/09/2020 133  0 - 199 mg/dL Final    Triglycerides 07/09/2020 128  0 - 150 mg/dL Final    HDL 07/09/2020 39* 40 - 60 mg/dL Final    LDL Calculated 07/09/2020 68  <100 mg/dL Final    VLDL Cholesterol Calculated 07/09/2020 26  Not Established mg/dL Final    Potassium 07/09/2020 3.0* 3.5 - 5.1 mmol/L Final    Sodium 07/10/2020 141  136 - 145 mmol/L Final    Potassium 07/10/2020 3.5  3.5 - 5.1 mmol/L Final    Chloride 07/10/2020 106  99 - 110 mmol/L Final    CO2 07/10/2020 20* 21 - 32 mmol/L Final    Anion Gap 07/10/2020 15  3 - 16 Final    Glucose 07/10/2020 90  70 - 99 mg/dL Final    BUN 07/10/2020 8  7 - 20 mg/dL Final    CREATININE 07/10/2020 0.7  0.6 - 1.1 mg/dL Final    GFR Non- 07/10/2020 >60  >60 Final    Comment: >60 mL/min/1.73m2 EGFR, calc. for ages 25 and older using the  MDRD formula (not corrected for weight), is valid for stable  renal function.  GFR  07/10/2020 >60  >60 Final    Comment: Chronic Kidney Disease: less than 60 ml/min/1.73 sq.m. Kidney Failure: less than 15 ml/min/1.73 sq.m. Results valid for patients 18 years and older.  Calcium 07/10/2020 8.0* 8.3 - 10.6 mg/dL Final    Total Protein 07/10/2020 5.6* 6.4 - 8.2 g/dL Final    Alb 07/10/2020 3.3* 3.4 - 5.0 g/dL Final    Albumin/Globulin Ratio 07/10/2020 1.4  1.1 - 2.2 Final    Total Bilirubin 07/10/2020 <0.2  0.0 - 1.0 mg/dL Final    Alkaline Phosphatase 07/10/2020 45  40 - 129 U/L Final    ALT 07/10/2020 55* 10 - 40 U/L Final    AST 07/10/2020 29  15 - 37 U/L Final    Globulin 07/10/2020 2.3  g/dL Final    Hep A IgM 07/09/2020 Non-reactive  Non-reactive Final    Hep B Core Ab, IgM 07/09/2020 Non-reactive  Non-reactive Final    Hep B S Ag Interp 07/09/2020 Non-reactive  Non-reactive Final    Hep C Ab Interp 07/09/2020 REACTIVE* Non-reactive Final    Comment: REACTIVE Screen:  Confirmation with Hepatitis C RIBA not available. Depending on clinical  history, Hepatitis C Virus (HCV)by Quantitative NAAT (3130123) should be  considered as an alternative to this test although it is not an equivalent. If HCV by Quantitative NAAT is desired, please reorder. A new sample may be  needed due to specimen requirements for HCV by Quantitative NAAT.           Mental Status Exam at Discharge:  Level of consciousness:  awake  Appearance:  well-appearing, in chair, good grooming and good hygiene well-developed, well-nourished  Behavior/Motor:  no abnormalities noted normal gait and station AIMS: 0  Attitude toward examiner:  cooperative, attentive and good eye contact  Speech:  spontaneous, normal rate, normal volume and well articulated  Mood:  dysthymic  Affect:  mood congruent Anxiety: mild  Hallucinations: Absent  Thought processes:  coherent Attention span, Concentration & Attention:  attention span and concentration were age appropriate  Thought content:  Reality based no evidence of delusions OCD: none    Insight: normal insight and judgment Cognition:  oriented to person, place, and time  Fund of Knowledge: average  IQ:average Memory: intact  Suicide:  No specific plan to harm self  Sleep: sleeps through the night  Appetite: ok   Reassess Elsa Risk:  no specific plan to harm self Pt has phone numbers to contact if suicidal thoughts recur and states pt will return to the hospital if suicidal feelings return.    Hospital Routine Meds:     DULoxetine  60 mg Oral BID    cetirizine  10 mg Oral Daily      Hospital PRN Meds: LORazepam, OLANZapine zydis, acetaminophen, ibuprofen, sterile water, traZODone, benztropine mesylate, magnesium hydroxide, aluminum & magnesium hydroxide-simethicone, hydrOXYzine   Discharge Meds:    Current Discharge Medication List           Details   hydrOXYzine (VISTARIL) 50 MG capsule Take 1 capsule by mouth 3 times daily as needed for Itching or Anxiety  Qty: 90 capsule, Refills: 0              Details   DULoxetine (CYMBALTA) 60 MG extended release capsule Take 1 capsule by mouth 2 times daily  Qty: 60 capsule, Refills: 0              Details   cetirizine (ZYRTEC) 10 MG tablet Take 1 tablet by mouth daily  Qty: 30 tablet, Refills: 0                 Disposition - Residence      Follow Up:  See Discharge Instructions

## 2020-07-16 NOTE — PLAN OF CARE
Patient was given tylenol 650 mg po, per request for complaint of pain in her right, back lower tooth.  Tapan Peraza R.N.

## 2020-07-16 NOTE — BH NOTE
Spoke to Arma mother who states she has concerns about BODØ continuing to be delusional and paranoid when speaking to her. Made her mother aware that she does not meet criteria for continued admission. Patients mother has concerns that she has been trying to reach a SW since last weekend. States that BODØ needs inpatient drug rehab. Patient verbalized she would like to follow up with Ranjit Lezama. Patients mother upset that she is being released however patient denies SI/HI.

## 2020-07-16 NOTE — PROGRESS NOTES
Patient continues awake & was given vistaril 50 mg po, per request for complaint of anxiety.  Madelyn Peraza R.N.

## 2020-07-16 NOTE — BH NOTE
Received phone call from mother concerning her discharge. Attempted to explain that we can not hold a patient because they may use drugs and that she has to choose to go to rehab and find the rehab herself. Mother continued to question her discharge and requested to speak to .   Bossman Bland, CON-BC

## 2020-07-16 NOTE — PROGRESS NOTES
Patient reported being allergic to latex, which was added to patient's allergie & a allergy bracelet was put on.  Madelyn Peraza R.N.

## 2020-07-17 ENCOUNTER — HOSPITAL ENCOUNTER (EMERGENCY)
Age: 35
Discharge: HOME | DRG: 751 | End: 2020-07-17
Payer: COMMERCIAL

## 2020-07-17 ENCOUNTER — APPOINTMENT (OUTPATIENT)
Dept: GENERAL RADIOLOGY | Age: 35
DRG: 751 | End: 2020-07-17
Payer: COMMERCIAL

## 2020-07-17 VITALS
OXYGEN SATURATION: 99 % | HEART RATE: 98 BPM | DIASTOLIC BLOOD PRESSURE: 92 MMHG | RESPIRATION RATE: 20 BRPM | SYSTOLIC BLOOD PRESSURE: 127 MMHG | TEMPERATURE: 98.1 F

## 2020-07-17 LAB
A/G RATIO: 1.6 (ref 1.1–2.2)
ACETAMINOPHEN LEVEL: <5 UG/ML (ref 10–30)
ALBUMIN SERPL-MCNC: 4.1 G/DL (ref 3.4–5)
ALP BLD-CCNC: 52 U/L (ref 40–129)
ALT SERPL-CCNC: 71 U/L (ref 10–40)
AMPHETAMINE SCREEN, URINE: POSITIVE
ANION GAP SERPL CALCULATED.3IONS-SCNC: 11 MMOL/L (ref 3–16)
AST SERPL-CCNC: 40 U/L (ref 15–37)
BACTERIA: ABNORMAL /HPF
BARBITURATE SCREEN URINE: ABNORMAL
BASOPHILS ABSOLUTE: 0 K/UL (ref 0–0.2)
BASOPHILS RELATIVE PERCENT: 0.5 %
BENZODIAZEPINE SCREEN, URINE: ABNORMAL
BILIRUB SERPL-MCNC: <0.2 MG/DL (ref 0–1)
BILIRUBIN URINE: NEGATIVE
BLOOD, URINE: ABNORMAL
BUN BLDV-MCNC: 9 MG/DL (ref 7–20)
CALCIUM SERPL-MCNC: 8.7 MG/DL (ref 8.3–10.6)
CANNABINOID SCREEN URINE: ABNORMAL
CHLORIDE BLD-SCNC: 104 MMOL/L (ref 99–110)
CLARITY: CLEAR
CO2: 23 MMOL/L (ref 21–32)
COCAINE METABOLITE SCREEN URINE: ABNORMAL
COLOR: YELLOW
CREAT SERPL-MCNC: 0.8 MG/DL (ref 0.6–1.1)
EOSINOPHILS ABSOLUTE: 0 K/UL (ref 0–0.6)
EOSINOPHILS RELATIVE PERCENT: 0.2 %
EPITHELIAL CELLS, UA: ABNORMAL /HPF (ref 0–5)
GFR AFRICAN AMERICAN: >60
GFR NON-AFRICAN AMERICAN: >60
GLOBULIN: 2.6 G/DL
GLUCOSE BLD-MCNC: 98 MG/DL (ref 70–99)
GLUCOSE URINE: NEGATIVE MG/DL
HCG QUALITATIVE: NEGATIVE
HCT VFR BLD CALC: 34.3 % (ref 36–48)
HEMOGLOBIN: 11.3 G/DL (ref 12–16)
HYALINE CASTS: ABNORMAL /LPF (ref 0–2)
KETONES, URINE: NEGATIVE MG/DL
LEUKOCYTE ESTERASE, URINE: ABNORMAL
LYMPHOCYTES ABSOLUTE: 1.6 K/UL (ref 1–5.1)
LYMPHOCYTES RELATIVE PERCENT: 18.1 %
Lab: ABNORMAL
MCH RBC QN AUTO: 30 PG (ref 26–34)
MCHC RBC AUTO-ENTMCNC: 33.1 G/DL (ref 31–36)
MCV RBC AUTO: 90.8 FL (ref 80–100)
METHADONE SCREEN, URINE: ABNORMAL
MICROSCOPIC EXAMINATION: YES
MONOCYTES ABSOLUTE: 1.1 K/UL (ref 0–1.3)
MONOCYTES RELATIVE PERCENT: 12.6 %
NEUTROPHILS ABSOLUTE: 6.1 K/UL (ref 1.7–7.7)
NEUTROPHILS RELATIVE PERCENT: 68.6 %
NITRITE, URINE: NEGATIVE
OPIATE SCREEN URINE: ABNORMAL
OXYCODONE URINE: ABNORMAL
PDW BLD-RTO: 13.7 % (ref 12.4–15.4)
PH UA: 7
PH UA: 7 (ref 5–8)
PHENCYCLIDINE SCREEN URINE: ABNORMAL
PLATELET # BLD: 263 K/UL (ref 135–450)
PMV BLD AUTO: 8.4 FL (ref 5–10.5)
POTASSIUM REFLEX MAGNESIUM: 3.9 MMOL/L (ref 3.5–5.1)
PRO-BNP: 172 PG/ML (ref 0–124)
PROPOXYPHENE SCREEN: ABNORMAL
PROTEIN UA: NEGATIVE MG/DL
RBC # BLD: 3.78 M/UL (ref 4–5.2)
RBC UA: ABNORMAL /HPF (ref 0–4)
SALICYLATE, SERUM: <0.3 MG/DL (ref 15–30)
SODIUM BLD-SCNC: 138 MMOL/L (ref 136–145)
SPECIFIC GRAVITY UA: 1.01 (ref 1–1.03)
TOTAL PROTEIN: 6.7 G/DL (ref 6.4–8.2)
TROPONIN: <0.01 NG/ML
URINE TYPE: ABNORMAL
UROBILINOGEN, URINE: 0.2 E.U./DL
WBC # BLD: 8.8 K/UL (ref 4–11)
WBC UA: ABNORMAL /HPF (ref 0–5)

## 2020-07-17 PROCEDURE — 85025 COMPLETE CBC W/AUTO DIFF WBC: CPT

## 2020-07-17 PROCEDURE — 80307 DRUG TEST PRSMV CHEM ANLYZR: CPT

## 2020-07-17 PROCEDURE — 99284 EMERGENCY DEPT VISIT MOD MDM: CPT

## 2020-07-17 PROCEDURE — 71046 X-RAY EXAM CHEST 2 VIEWS: CPT

## 2020-07-17 PROCEDURE — 93005 ELECTROCARDIOGRAM TRACING: CPT | Performed by: PHYSICIAN ASSISTANT

## 2020-07-17 PROCEDURE — 84484 ASSAY OF TROPONIN QUANT: CPT

## 2020-07-17 PROCEDURE — 83880 ASSAY OF NATRIURETIC PEPTIDE: CPT

## 2020-07-17 PROCEDURE — 80053 COMPREHEN METABOLIC PANEL: CPT

## 2020-07-17 PROCEDURE — 84703 CHORIONIC GONADOTROPIN ASSAY: CPT

## 2020-07-17 PROCEDURE — 81001 URINALYSIS AUTO W/SCOPE: CPT

## 2020-07-17 PROCEDURE — G0480 DRUG TEST DEF 1-7 CLASSES: HCPCS

## 2020-07-17 ASSESSMENT — HEART SCORE: ECG: 1

## 2020-07-18 ENCOUNTER — HOSPITAL ENCOUNTER (INPATIENT)
Age: 35
LOS: 11 days | Discharge: HOME | DRG: 751 | End: 2020-07-29
Attending: EMERGENCY MEDICINE | Admitting: PSYCHIATRY & NEUROLOGY
Payer: COMMERCIAL

## 2020-07-18 LAB
EKG ATRIAL RATE: 110 BPM
EKG ATRIAL RATE: 92 BPM
EKG DIAGNOSIS: NORMAL
EKG DIAGNOSIS: NORMAL
EKG P AXIS: 53 DEGREES
EKG P AXIS: 75 DEGREES
EKG P-R INTERVAL: 126 MS
EKG P-R INTERVAL: 136 MS
EKG Q-T INTERVAL: 354 MS
EKG Q-T INTERVAL: 388 MS
EKG QRS DURATION: 78 MS
EKG QRS DURATION: 82 MS
EKG QTC CALCULATION (BAZETT): 479 MS
EKG QTC CALCULATION (BAZETT): 479 MS
EKG R AXIS: 47 DEGREES
EKG R AXIS: 57 DEGREES
EKG T AXIS: 54 DEGREES
EKG T AXIS: 54 DEGREES
EKG VENTRICULAR RATE: 110 BPM
EKG VENTRICULAR RATE: 92 BPM
SARS-COV-2, NAAT: NOT DETECTED

## 2020-07-18 PROCEDURE — 6370000000 HC RX 637 (ALT 250 FOR IP): Performed by: PSYCHIATRY & NEUROLOGY

## 2020-07-18 PROCEDURE — 6360000002 HC RX W HCPCS

## 2020-07-18 PROCEDURE — 93010 ELECTROCARDIOGRAM REPORT: CPT | Performed by: INTERNAL MEDICINE

## 2020-07-18 PROCEDURE — 99285 EMERGENCY DEPT VISIT HI MDM: CPT

## 2020-07-18 PROCEDURE — U0002 COVID-19 LAB TEST NON-CDC: HCPCS

## 2020-07-18 PROCEDURE — 6360000002 HC RX W HCPCS: Performed by: PSYCHIATRY & NEUROLOGY

## 2020-07-18 PROCEDURE — 1240000000 HC EMOTIONAL WELLNESS R&B

## 2020-07-18 PROCEDURE — 93005 ELECTROCARDIOGRAM TRACING: CPT | Performed by: EMERGENCY MEDICINE

## 2020-07-18 RX ORDER — OLANZAPINE 10 MG/1
10 INJECTION, POWDER, LYOPHILIZED, FOR SOLUTION INTRAMUSCULAR
Status: ACTIVE | OUTPATIENT
Start: 2020-07-18 | End: 2020-07-18

## 2020-07-18 RX ORDER — TRAZODONE HYDROCHLORIDE 50 MG/1
50 TABLET ORAL NIGHTLY PRN
Status: DISCONTINUED | OUTPATIENT
Start: 2020-07-18 | End: 2020-07-29 | Stop reason: HOSPADM

## 2020-07-18 RX ORDER — OLANZAPINE 10 MG/1
10 TABLET ORAL
Status: ACTIVE | OUTPATIENT
Start: 2020-07-18 | End: 2020-07-18

## 2020-07-18 RX ORDER — BENZTROPINE MESYLATE 1 MG/ML
2 INJECTION INTRAMUSCULAR; INTRAVENOUS 2 TIMES DAILY PRN
Status: DISCONTINUED | OUTPATIENT
Start: 2020-07-18 | End: 2020-07-29 | Stop reason: HOSPADM

## 2020-07-18 RX ORDER — HYDROXYZINE PAMOATE 50 MG/1
50 CAPSULE ORAL 3 TIMES DAILY PRN
Status: DISCONTINUED | OUTPATIENT
Start: 2020-07-18 | End: 2020-07-29 | Stop reason: HOSPADM

## 2020-07-18 RX ORDER — IBUPROFEN 400 MG/1
400 TABLET ORAL EVERY 6 HOURS PRN
Status: DISCONTINUED | OUTPATIENT
Start: 2020-07-18 | End: 2020-07-29 | Stop reason: HOSPADM

## 2020-07-18 RX ORDER — HALOPERIDOL 5 MG/ML
INJECTION INTRAMUSCULAR
Status: COMPLETED
Start: 2020-07-18 | End: 2020-07-18

## 2020-07-18 RX ORDER — DULOXETIN HYDROCHLORIDE 60 MG/1
60 CAPSULE, DELAYED RELEASE ORAL 2 TIMES DAILY
Status: DISCONTINUED | OUTPATIENT
Start: 2020-07-18 | End: 2020-07-18

## 2020-07-18 RX ORDER — HALOPERIDOL 5 MG/ML
10 INJECTION INTRAMUSCULAR ONCE
Status: COMPLETED | OUTPATIENT
Start: 2020-07-18 | End: 2020-07-18

## 2020-07-18 RX ORDER — LORAZEPAM 2 MG/ML
2 INJECTION INTRAMUSCULAR ONCE
Status: COMPLETED | OUTPATIENT
Start: 2020-07-18 | End: 2020-07-18

## 2020-07-18 RX ORDER — LORAZEPAM 2 MG/ML
INJECTION INTRAMUSCULAR
Status: COMPLETED
Start: 2020-07-18 | End: 2020-07-18

## 2020-07-18 RX ORDER — MAGNESIUM HYDROXIDE/ALUMINUM HYDROXICE/SIMETHICONE 120; 1200; 1200 MG/30ML; MG/30ML; MG/30ML
30 SUSPENSION ORAL EVERY 6 HOURS PRN
Status: DISCONTINUED | OUTPATIENT
Start: 2020-07-18 | End: 2020-07-29 | Stop reason: HOSPADM

## 2020-07-18 RX ORDER — CETIRIZINE HYDROCHLORIDE 10 MG/1
10 TABLET ORAL DAILY
Status: DISCONTINUED | OUTPATIENT
Start: 2020-07-18 | End: 2020-07-29 | Stop reason: HOSPADM

## 2020-07-18 RX ORDER — ACETAMINOPHEN 325 MG/1
650 TABLET ORAL EVERY 4 HOURS PRN
Status: DISCONTINUED | OUTPATIENT
Start: 2020-07-18 | End: 2020-07-29 | Stop reason: HOSPADM

## 2020-07-18 RX ORDER — DIPHENHYDRAMINE HYDROCHLORIDE 50 MG/ML
50 INJECTION INTRAMUSCULAR; INTRAVENOUS ONCE
Status: COMPLETED | OUTPATIENT
Start: 2020-07-18 | End: 2020-07-18

## 2020-07-18 RX ADMIN — HALOPERIDOL 10 MG: 5 INJECTION INTRAMUSCULAR at 18:54

## 2020-07-18 RX ADMIN — CETIRIZINE HYDROCHLORIDE 10 MG: 10 TABLET ORAL at 17:40

## 2020-07-18 RX ADMIN — LORAZEPAM 2 MG: 2 INJECTION INTRAMUSCULAR; INTRAVENOUS at 18:53

## 2020-07-18 RX ADMIN — HYDROXYZINE PAMOATE 50 MG: 50 CAPSULE ORAL at 17:40

## 2020-07-18 RX ADMIN — DIPHENHYDRAMINE HYDROCHLORIDE 50 MG: 50 INJECTION, SOLUTION INTRAMUSCULAR; INTRAVENOUS at 18:54

## 2020-07-18 RX ADMIN — LORAZEPAM 2 MG: 2 INJECTION INTRAMUSCULAR; INTRAVENOUS at 18:55

## 2020-07-18 RX ADMIN — HALOPERIDOL LACTATE 10 MG: 5 INJECTION, SOLUTION INTRAMUSCULAR at 18:54

## 2020-07-18 ASSESSMENT — LIFESTYLE VARIABLES: HISTORY_ALCOHOL_USE: NO

## 2020-07-18 ASSESSMENT — SLEEP AND FATIGUE QUESTIONNAIRES
DO YOU HAVE DIFFICULTY SLEEPING: YES
DIFFICULTY FALLING ASLEEP: YES
RESTFUL SLEEP: NO
AVERAGE NUMBER OF SLEEP HOURS: 3
DIFFICULTY STAYING ASLEEP: YES
SLEEP PATTERN: DIFFICULTY FALLING ASLEEP;RESTLESSNESS;INSOMNIA
DIFFICULTY ARISING: YES
DO YOU USE A SLEEP AID: NO

## 2020-07-18 ASSESSMENT — PATIENT HEALTH QUESTIONNAIRE - PHQ9: SUM OF ALL RESPONSES TO PHQ QUESTIONS 1-9: 27

## 2020-07-18 ASSESSMENT — PAIN SCALES - GENERAL
PAINLEVEL_OUTOF10: 5
PAINLEVEL_OUTOF10: 0

## 2020-07-18 ASSESSMENT — PAIN DESCRIPTION - LOCATION: LOCATION: CHEST

## 2020-07-18 NOTE — BH NOTE
`[facility]  Admission Note     Admission Type:   Admission Type: Voluntary    Reason for admission:  Reason for Admission: delusional, paranoid, labile,+ SI with a plan to cut her head off    PATIENT STRENGTHS:  Strengths: Communication    Patient Strengths and Limitations:  Limitations: Difficult relationships / poor social skills, Multiple barriers to leisure interests, Hopeless about future, Tendency to isolate self, External locus of control    Addictive Behavior:   Addictive Behavior  In the past 3 months, have you felt or has someone told you that you have a problem with:  : None  Do you have a history of Chemical Use?: No  Do you have a history of Alcohol Use?: No  Do you have a history of Street Drug Abuse?: Yes  Histroy of Prescripton Drug Abuse?: No    Medical Problems:   Past Medical History:   Diagnosis Date    Anxiety     Chronic post-traumatic stress disorder (PTSD) 10/29/2018    Depression     Hepatitis C 07/09/2020    Mild intermittent asthma without complication     Stimulant use disorder 9/23/2018       Status EXAM:  Status and Exam  Normal: No  Facial Expression: Exaggerated, Sad, Worried  Affect: Unstable  Level of Consciousness: Alert  Mood:Normal: No  Mood: Depressed, Anxious, Labile, Suspicious  Motor Activity:Normal: No  Motor Activity: Increased  Interview Behavior: Cooperative, Evasive  Preception: Freedom to Person, Freedom to Place  Attention:Normal: No  Attention: Unable to Concentrate  Thought Processes: Flt.of Ideas  Thought Content:Normal: No  Thought Content: Delusions, Paranoia  Hallucinations:  Auditory (Comment)  Delusions: Yes  Delusions: Persecution  Memory:Normal: No  Memory: Confabulation, Poor Recent, Poor Remote  Insight and Judgment: No  Insight and Judgment: Poor Judgment, Poor Insight  Present Suicidal Ideation: Yes(\" i did when i came to the hospial, i am not having it now\")  Present Homicidal Ideation: No    Tobacco Screening:  Practical Counseling, on admission, david X, if applicable and completed (first 3 are required if patient doesn't refuse): ( X)  Recognizing danger situations (included triggers and roadblocks)                    ( )  Coping skills (new ways to manage stress, exercise, relaxation techniques, changing routine, distraction)                                                           ( )  Basic information about quitting (benefits of quitting, techniques in how to quit, available resources  ( ) Referral for counseling faxed to [facility]                                           ( ) Patient refused counseling  ( ) Patient has not smoked in the last 30 days    Metabolic Screening:    Lab Results   Component Value Date    LABA1C 5.1 07/09/2020       Lab Results   Component Value Date    CHOL 133 07/09/2020     Lab Results   Component Value Date    TRIG 128 07/09/2020     Lab Results   Component Value Date    HDL 39 (L) 07/09/2020     No components found for: [facility] EVALUATION AND TREATMENT [place]  Lab Results   Component Value Date    LABVLDL 26 07/09/2020         Body mass index is 26.63 kg/m². BP Readings from Last 2 Encounters:   07/18/20 113/75   07/17/20 (!) 127/92           Pt admitted with followings belongings: pants        Patient oriented to surroundings and program expectations and copy of patient rights given. Received admission packet:   YES. Consents reviewed, signed YES. Patient verbalize understanding:  YES.     Patient education on precautions: Abhinav Osuna RN

## 2020-07-18 NOTE — ED NOTES
Level of Care Disposition: Admit      Patient was seen by ED provider and [facility] staff. The case presented to psychiatric provider on-call Dr. Tamar Meza. Based on the ED evaluation and information presented to the provider by this nurse it was determined that inpatient hospitalization is the least restrictive environment for the patient at this time. The patient will be admitted to the inpatient unit. Admitting provider did not order suicide precautions based on patient denies SI and able to contract for safety. RATIONALE FOR ADMISSION:   Patient has a mental illness: mood disorder      Patient has shown an inability to care for self demonstrated by poor grooming and hygiene, delusional thought process and depressed mood.    Patient is at imminent risk of violating their own rights or the rights AND they could benefit from psychiatric treatment             Ifrah Anguiano RN  07/18/20 [address] [facility]  07/18/20 [id number]

## 2020-07-18 NOTE — ED NOTES
RN went into room to do EKG on pt and pt was sitting with eyes closed- and not answering or responding to this RN's questions for assessment. D/T concern of pt being unresponsive RN used ammonia inhalant in which pt responded. PT reports \" You are not being nice to me, I want a Dr.\" Pt then goes  Back to not responding to this RN. Provider notified.       Rick Richardson RN  07/17/20 2044

## 2020-07-18 NOTE — BH NOTE
Client continued to report during the assessment that, \" I dont know, I cant remember. \" however client then stated, \" you know I just need some food, a shower and a vistaril. \"   Client continues to ask for specific medications.  No insight into choices regarding meth use

## 2020-07-18 NOTE — BH NOTE
Client continues to wail in her room, was just sleeping a few minutes prior. Explained to client that I have already given her some vistaril.  Encouraged rest.

## 2020-07-18 NOTE — ED NOTES
Prepared pt medication and placed in tamper proof bag. Sent to pharmacy.  David in pharmacy aware     Jose Mireles RN  07/17/20 [id number]

## 2020-07-18 NOTE — ED PROVIDER NOTES
I did not perform an evaluation of Christa Olea but was asked to review her EKG. EKG interpreted by myself.    Rate: 110  Rhythm: sinus tachy  Axis: normal  Intervals:  QRS 82   ST Segments: Nonspecific ST abnormality  Comparison: Compared to 10/28/18, the rhythm is sinus tachycardia from normal sinus rhythm and there is resolution of right axis deviation  Impression: Sinus tachycardia with nonspecific ST abnormality     Vicente Woo MD  07/17/20 0864

## 2020-07-18 NOTE — ED NOTES
Pt reports \" I think I had an adverse reaction to my vistaril or my cymbalta. It just made me feel short of breath. \" Pt is 99& on RA. No noted shortness of breath.       Bhargav Mayorga RN  07/17/20 2016

## 2020-07-18 NOTE — ED PROVIDER NOTES
[address] [facility]  Psychiatric Evaluation (hearing voices. doesnt know how she got to her  mothers house. talking about death and dying. was released on thursday from behavioral health. was seen yesterday for the samething. mom states that it like you are talking to 2 different people)       [address] [facility]  Cale Kent is a 38 y.o. female who was brought to the emergency department by mom for evaluation of auditory hallucinations. Mom reports patient has been seen at the emergency department several times over the past week. Patient was discharged home yesterday and brought to mom's house today due to ongoing auditory hallucinations. Mom says patient did not seem to recognize mom at certain times and appears like two different person. Mom denies any recent illnesses. Denies any known injuries per the report given by patient's roommate to mom. Patient denies having any complaints. Patient denies having ingested any medications. No other complaints, modifying factors or associated symptoms. I have reviewed the following from the nursing documentation.     Past Medical History:   Diagnosis Date    Anxiety     Chronic post-traumatic stress disorder (PTSD) 10/29/2018    Depression     Hepatitis C 2020    Mild intermittent asthma without complication     Stimulant use disorder 2018     Past Surgical History:   Procedure Laterality Date     SECTION  2005    DILATION AND CURETTAGE OF UTERUS  2006         Family History   Problem Relation Age of Onset    Cancer Father         lung    Mental Illness Father     Diabetes Maternal Grandmother     Hypertension Maternal Grandmother     Heart Failure Maternal Grandmother     Depression Paternal Grandmother     Diabetes Paternal Grandmother     Heart Failure Paternal Grandmother     Mental Illness Paternal Grandmother     Hypertension Mother     Diabetes Paternal Lawrnce Pucker Heart Failure Paternal Grandfather     Mental Illness Paternal Grandfather      Social History     Socioeconomic History    Marital status: Single     Spouse name: Not on file    Number of children: 2    Years of education: 15    Highest education level: Not on file   Occupational History    Occupation: unemployed past 2+ year   Social Needs    Financial resource strain: Not on file    Food insecurity     Worry: Not on file     Inability: Not on file   [employer] needs     Medical: Not on file     Non-medical: Not on file   Tobacco Use    Smoking status: Current Some Day Smoker     Packs/day: 0.50    Smokeless tobacco: Never Used   Substance and Sexual Activity    Alcohol use: No     Alcohol/week: 1.0 standard drinks     Types: 1 Standard drinks or equivalent per week    Drug use: Yes     Types: Methamphetamines, IV     Comment: recently relapsed on meth    Sexual activity: Yes     Partners: Male   Lifestyle    Physical activity     Days per week: Not on file     Minutes per session: Not on file    Stress: Not on file   Relationships    Social connections     Talks on phone: Not on file     Gets together: Not on file     Attends Mu-ism service: Not on file     Active member of club or organization: Not on file     Attends meetings of clubs or organizations: Not on file     Relationship status: Not on file    Intimate partner violence     Fear of current or ex partner: Not on file     Emotionally abused: Not on file     Physically abused: Not on file     Forced sexual activity: Not on file   Other Topics Concern    Not on file   Social History Narrative    Not on file     Current Facility-Administered Medications   Medication Dose Route Frequency Provider Last Rate Last Dose    cetirizine (ZYRTEC) tablet 10 mg  10 mg Oral Daily Dominic Funez MD        DULoxetine (CYMBALTA) extended release capsule 60 mg  60 mg Oral BID Dominic Funez MD        hydrOXYzine (VISTARIL) capsule 50 mg  50 mg Oral TID PRN Cb Gomez MD        acetaminophen (TYLENOL) tablet 650 mg  650 mg Oral Q4H PRN Cb Gomez MD        ibuprofen (ADVIL;MOTRIN) tablet 400 mg  400 mg Oral Q6H PRN Cb Gomez MD        OLANZapine THE PAVILIION) tablet 10 mg  10 mg Oral Once PRN Cb Gomez MD        Or    OLANZapine THE PAVILIION) injection 10 mg  10 mg Intramuscular Once PRN Cb Gomez MD        sterile water injection 2.1 mL  2.1 mL Intramuscular Q4H PRN Cb Gomez MD        traZODone (DESYREL) tablet 50 mg  50 mg Oral Nightly PRN Cb Gomez MD        benztropine mesylate (COGENTIN) injection 2 mg  2 mg Intramuscular BID PRN Cb Gomez MD        magnesium hydroxide (MILK OF MAGNESIA) 400 MG/5ML suspension 30 mL  30 mL Oral Daily PRN Cb Gomez MD        aluminum & magnesium hydroxide-simethicone (MAALOX) 200-200-20 MG/5ML suspension 30 mL  30 mL Oral Q6H PRN Cb Gomez MD         Allergies   Allergen Reactions    Latex Rash     \"I get a red inflamed rash. \"    Ceclor [Cefaclor] Rash       REVIEW OF SYSTEMS  10 systems reviewed, pertinent positives per HPI otherwise noted to be negative. PHYSICAL EXAM  /75   Pulse 94   Temp 98.3 °F (36.8 °C) (Temporal)   Resp 16   Ht 5' 5\" (1.651 m)   Wt 160 lb (72.6 kg)   LMP 07/03/2020   SpO2 93%   Breastfeeding No   BMI 26.63 kg/m²    GENERAL APPEARANCE: intermittently cooperative   HENT: Normocephalic. Atraumatic. CN  2-12 grossly intact. HEART/CHEST: RRR. No murmurs appreciated   LUNGS: Respirations unlabored. CTAB. ABDOMEN: Soft, non-distended abdomen. Non tender to palpation. No guarding. No rebound. EXTREMITIES: no gross deformities. Moving all extremities. All extremities neurovascularly intact. SKIN: Warm and dry. No acute rashes. NEUROLOGICAL: Alert and oriented. CN's 2-12 intact. No gross facial drooping. Strength 5/5, sensation intact. PSYCHIATRIC: difficult to arouse.

## 2020-07-18 NOTE — ED PROVIDER NOTES
[address] ED  EMERGENCY DEPARTMENT ENCOUNTER        Pt Name: Ray Hayes  MRN: [id number]  [place] 1985  Date of evaluation: 7/17/2020  Provider: GRANT Oneal  PCP: Joy Pérez, APRN - CNP    This patient was not seen and evaluated by the attending physician No att. providers found. I have evaluated this patient. My supervising physician was available for consultation. CHIEF COMPLAINT       Chief Complaint   Patient presents with    Mental Health Problem     Pt initally reported dyspnea, now states feels better but wants to speak with [address] provider, friend with PT states that she is SI, pt denies SI/HI @ this time. ED charge RN has been adivsed of SI potential.        HISTORY OF PRESENT ILLNESS   (Location/Symptom, Timing/Onset, Context/Setting, Quality, Duration, Modifying Factors, Severity)  Note limiting factors. Ray Hayes is a 38 y.o. female with significant past medical history of amphetamine abuse, major depressive disorder, opioid dependence, mild intermittent asthma, cocaine abuse who presents via private vehicle from her home for evaluation of shortness of breath. Patient notes that earlier today she was taking her medications and after she took her medication she felt acutely short of breath. She also complained of some chest wall tightness. She denies any chest pain. She denies current shortness of breath or chest tightness. .  She denies any recent cough or difficulty breathing. She notes currently she would just like to close her eyes. She denies any suicidal or homicidal thoughts or ideations. She denies any recent attempts at self-harm. She denies any visual or auditory hallucinations. She endorses taking all of her medications as prescribed and she denies any recent drug or alcohol abuse. She denies any recent fevers body aches chills nausea vomiting.   She denies feeling like she is going to pass out and denies any room spinning dizziness. I discussed extensively with the patient that she denies not currently feel  suicidal or homicidal.    Nursing Notes were all reviewed and agreed with or any disagreements were addressed  in the HPI. REVIEW OF SYSTEMS    (2-9 systems for level 4, 10 or more for level 5)     Review of Systems    Positives and Pertinent negatives as per HPI. Except as noted abovein the ROS, all other systems were reviewed and negative.        PAST MEDICAL HISTORY     Past Medical History:   Diagnosis Date    Anxiety     Chronic post-traumatic stress disorder (PTSD) 10/29/2018    Depression     Hepatitis C 2020    Mild intermittent asthma without complication     Stimulant use disorder 2018         SURGICAL HISTORY     Past Surgical History:   Procedure Laterality Date     SECTION  2005    DILATION AND CURETTAGE OF UTERUS  2006             [address]       Discharge Medication List as of 2020 11:11 PM      CONTINUE these medications which have NOT CHANGED    Details   hydrOXYzine (VISTARIL) 50 MG capsule Take 1 capsule by mouth 3 times daily as needed for Itching or Anxiety, Disp-90 capsule,R-0Normal      DULoxetine (CYMBALTA) 60 MG extended release capsule Take 1 capsule by mouth 2 times daily, Disp-60 capsule,R-0Normal      cetirizine (ZYRTEC) 10 MG tablet Take 1 tablet by mouth daily, Disp-30 tablet, R-0Normal               ALLERGIES     Latex and Ceclor [cefaclor]    FAMILYHISTORY       Family History   Problem Relation Age of Onset    Cancer Father         lung    Mental Illness Father     Diabetes Maternal Grandmother     Hypertension Maternal Grandmother     Heart Failure Maternal Grandmother     Depression Paternal Grandmother     Diabetes Paternal Grandmother     Heart Failure Paternal Grandmother     Mental Illness Paternal Grandmother     Hypertension Mother     Diabetes Paternal Grandfather     Heart Failure Paternal [facility] Mental Illness Paternal Grandfather           SOCIAL HISTORY       Social History     Socioeconomic History    Marital status: Single     Spouse name: None    Number of children: 2    Years of education: 15    Highest education level: None   Occupational History    Occupation: unemployed past 2+ year   Social Needs    Financial resource strain: None    Food insecurity     Worry: None     Inability: None    Transportation needs     Medical: None     Non-medical: None   Tobacco Use    Smoking status: Current Some Day Smoker     Packs/day: 0.50    Smokeless tobacco: Never Used   Substance and Sexual Activity    Alcohol use: No     Alcohol/week: 1.0 standard drinks     Types: 1 Standard drinks or equivalent per week    Drug use: Yes     Types: Methamphetamines, IV     Comment: recently relapsed on meth    Sexual activity: Yes     Partners: Male   Lifestyle    Physical activity     Days per week: None     Minutes per session: None    Stress: None   Relationships    Social connections     Talks on phone: None     Gets together: None     Attends Mandaeism service: None     Active member of club or organization: None     Attends meetings of clubs or organizations: None     Relationship status: None    Intimate partner violence     Fear of current or ex partner: None     Emotionally abused: None     Physically abused: None     Forced sexual activity: None   Other Topics Concern    None   Social History Narrative    None       SCREENINGS    [place] Coma Scale  Eye Opening: Spontaneous  Best Verbal Response: Oriented  Best Motor Response: Obeys commands  Janine Coma Scale Score: 15 Heart Score for chest pain patients  History: Slightly Suspicious  ECG: Non-Specifc repolarization disturbance/LBTB/PM  Patient Age: < 45 years  *Risk factors for Atherosclerotic disease: Cigarette smoking  Risk Factors: 1 or 2 risk factors  Troponin: < 1X normal limit  Heart Score Total: 2    Wells Clinical Prediction Rule for Pulmonary Embolism (PE)    Clinical feature           Points  Clinical symptoms of DVT   3  Other diagnosis less likely than PE  3  Heart rate greater than 100 beats per min 1.5  Immobilization or surgery within past 4 wks 1.5  Previous DVT or PE    1.5  Hemoptysis     1  Malignancy     1  Total points     0     PE = pulmonary embolism; DVT = deep venous thrombosis. Risk score interpretation (probability of PE):  >6 points: high risk (78.4%);  2 to 6 points: moderate risk (27.8%);  <2 points: low risk (3.4%)    PERC Rule:  Applicable in this patient who has low clinical suspicion for pulmonary embolism. Age < 48years old: Yes  Heart rate < 100 bpm: Yes  Oxygen saturation > 95%: Yes  Hemoptysis: No  Exogenous estrogen use: No  Prior history of DVT or PE: No  Unilateral leg swelling: No  Surgery or significant trauma in the past 4 weeks: No    Based on the above, PE can effectively be ruled out without further testing. PHYSICAL EXAM    (up to 7 for level 4, 8 or more for level 5)     ED Triage Vitals [07/17/20 1940]   BP Temp Temp Source Pulse Resp SpO2 Height Weight   128/72 98.1 °F (36.7 °C) Tympanic 98 20 98 % -- --       Physical Exam  PHYSICAL EXAM  BP (!) 127/92   Pulse 98   Temp 98.1 °F (36.7 °C) (Tympanic)   Resp 20   LMP 07/03/2020   SpO2 99%   GENERAL APPEARANCE: Awake and alert. Cooperative. Female lying supine in exam bed. She is nondiaphoretic breathing comfortably on room air showing no sign of acute respiratory distress. HEAD: Normocephalic. Atraumatic. EYES: PERRL. EOM's grossly intact. ENT: Mucous membranes are moist.. NECK: Supple. Trachea midline. No JVD. No meningismus. HEART: RRR. No murmurs. Radial pulses 2+ symmetric, PT pulses 1+ symmetric  LUNGS: Respirations unlabored. CTAB. Good air exchange. Speaking comfortably in full sentences. ABDOMEN: Soft. Non-distended. Non-tender. No masses. No organomegaly. No guarding or rebound.    EXTREMITIES: No peripheral edema, edema warmth tenderness asymmetry or superficial venous changes. . Moves all extremities equally. All extremities neurovascularly intact. SKIN: Warm and dry. No acute rashes. NEUROLOGICAL: Alert and oriented. No gross facial drooping. Power intact upper and lower extremities, sensation intact x4. PSYCHIATRIC: Normal mood and affect however patient is choosing to perform most of the physical exam and history with her eyes closed. Not suicidal or homicidal.  Not actively hallucinating or psychotic.     DIAGNOSTIC RESULTS   LABS:    Labs Reviewed   CBC WITH AUTO DIFFERENTIAL - Abnormal; Notable for the following components:       Result Value    RBC 3.78 (*)     Hemoglobin 11.3 (*)     Hematocrit 34.3 (*)     All other components within normal limits    Narrative:     Performed at:  [facility],  [employer] [place] [facility]   Phone [phone]   COMPREHENSIVE METABOLIC PANEL W/ REFLEX TO MG FOR LOW K - Abnormal; Notable for the following components:    ALT 71 (*)     AST 40 (*)     All other components within normal limits    Narrative:     Performed at:  [facility],  [employer] [facility]   Phone [phone]   URINALYSIS - Abnormal; Notable for the following components:    Blood, Urine TRACE-INTACT (*)     Leukocyte Esterase, Urine TRACE (*)     All other components within normal limits    Narrative:     Performed at:  [facility]) - [facility],  [place], [facility]   Phone [phone]   [address] - Abnormal; Notable for the following components:    Amphetamine Screen, Urine POSITIVE (*)     All other components within normal limits    Narrative:     Performed at:  [facility]) - [facility],  [place], [facility]   Phone [phone]   SALICYLATE LEVEL - Abnormal; Notable for the following components:    Salicylate, Serum <6.2 (*)     All other components within normal limits    Narrative:     Performed at:  [facility]) - [facility],  [place], [employer]   Phone [phone]   ACETAMINOPHEN LEVEL - Abnormal; Notable for the following components:    Acetaminophen Level <5 (*)     All other components within normal limits    Narrative:     Performed at:  [facility],  [place] [employer]   Phone [phone]   BRAIN NATRIURETIC PEPTIDE - Abnormal; Notable for the following components:    Pro- (*)     All other components within normal limits    Narrative:     Performed at:  [facility],  [place] [employer]   Phone [phone]   MICROSCOPIC URINALYSIS - Abnormal; Notable for the following components:    Hyaline Casts, UA 3-5 (*)     Bacteria, UA Rare (*)     All other components within normal limits    Narrative:     Performed at:  [facility],  [place] [employer]   Phone [phone]   HCG, SERUM, QUALITATIVE    Narrative:     Performed at:  [facility],  [place] [employer]   Phone [phone]   TROPONIN    Narrative:     Performed at:  [facility]) - [facility],  [place], [employer]   Phone [phone]       All other labs were within normal range or not returned as of this dictation. EKG: All EKG's are interpreted by the Emergency Department Physician who either signs orCo-signs this chart in the absence of a cardiologist.  Please see their note for interpretation of EKG.       RADIOLOGY:   Non-plain film images such as CT, Ultrasound and MRI are read by the radiologist. Plain radiographic images are visualized andpreliminarily interpreted by the  ED Provider with the below findings:        Interpretation perthe Radiologist below, if available at the time of this note:    XR CHEST STANDARD (2 VW)   Final Result   No acute cardiopulmonary disease. Xr Chest Standard (2 Vw)    Result Date: 7/17/2020  EXAMINATION: TWO XRAY VIEWS OF THE CHEST 7/17/2020 8:59 pm COMPARISON: 03/26/2008 HISTORY: ORDERING SYSTEM PROVIDED HISTORY: Chest tightness and SOB TECHNOLOGIST PROVIDED HISTORY: Reason for exam:->Chest tightness and SOB Reason for Exam: Pt came in for SOB Acuity: Unknown Type of Exam: Unknown FINDINGS: The cardiac silhouette, mediastinal and hilar contours are normal.  Old granulomatous disease is noted. Otherwise, the lungs are clear. There are no pleural effusions. No acute osseous abnormalities are identified. No acute cardiopulmonary disease. PROCEDURES   Unless otherwise noted below, none     Procedures    CRITICAL CARE TIME   N/A    CONSULTS:  None      EMERGENCY DEPARTMENT COURSE and DIFFERENTIALDIAGNOSIS/MDM:   Vitals:    Vitals:    07/17/20 1940 07/17/20 2140   BP: 128/72 (!) 127/92   Pulse: 98 98   Resp: 20 20   Temp: 98.1 °F (36.7 °C)    TempSrc: Tympanic    SpO2: 98% 99%       Patient was given thefollowing medications:  Medications - No data to display    Patient is a 50-year-old female who presents for evaluation of shortness of breath. On exam she is alert oriented afebrile well-perfused hemodynamically stable nontoxic in appearance. Her cardiopulmonary exam is nonfocal.  Her abdomen is soft nontender without peritoneal signs and she has no gross neuro deficits. She is overall low risk for acute PE and her vital signs are within normal limits I have low concern for acute PE as cause of her shortness of breath today. Her troponin is negative, her BNP is slightly elevated however not consistent with acute significant right heart strain and she has no evidence of acute fluid overload. Her labs are reviewed, no significant acute focal abnormality.   No evidence of salicylate or acetaminophen toxicity, she does have positive amphetamines in her system. Her chest x-ray is negative for sign of acute cardiopulmonary abnormality. Twelve-lead revealed sinus tachycardia with nonspecific ST and T wave abnormalities however no significant change was found when compared to prior twelve-lead EKG. She is not having any acute chest pain today and she is overall low risk I have low concern that patient is currently suffering from acute coronary syndrome and she remains asymptomatic while in the emergency department. I reevaluated the patient, she noted that she felt better and wanted to go home. Patient continues to deny any suicidality or homicidality. At this time I believe she is a reasonable candidate for discharge home with close follow-up with her PCP and psychiatrist.      FINAL IMPRESSION      1. History of dyspnea    2.  Amphetamine abuse [facility])          DISPOSITION/PLAN   DISPOSITION        PATIENT REFERREDTO:  Juan Kowalski, ISS[facility] - [facility]  [address] [place] [address]  [phone]    Schedule an appointment as soon as possible for a visit       [facility] [place] [facility]  [address] [facility]  Go to   If symptoms worsen      DISCHARGE MEDICATIONS:  Discharge Medication List as of 7/17/2020 11:11 PM          DISCONTINUED MEDICATIONS:  Discharge Medication List as of 7/17/2020 11:11 PM                 (Please note that portions ofthis note were completed with a voice recognition program.  Efforts were made to edit the dictations but occasionally words are mis-transcribed.)    Nagi Avilez (electronically signed)       Nagi Avilez  07/19/20 7789

## 2020-07-18 NOTE — BH NOTE
Spoke with mom. Son is in foster care, \" this was a major break for her. \"  Mom reports that no one would talk to her during her last admission, \" there were multiple people coming out of her, sometimes in a [place] accent, sometimes in a [place] accent. \"   Mom reports wanting a discharge planner. Reports patient is talking about prophecies. \" I have seen issues since the age of 14, I know the drugs are not the only part here. She needs help. \"

## 2020-07-18 NOTE — ED NOTES
Spoke to Dr. Carmen Vieira. Notified him of no urine collected today ordered no need for repeat blood or urine today from his standpoint.       Francia Vergara RN  07/18/20 0487

## 2020-07-18 NOTE — ED NOTES
Continue to await for urine patient did not give sample. Attempted multiple times to encourage her to given urine. Patient each time goes to restroom and lies on ground. Spoke with Dr. Morales Precise about presentation will update him about no urine sample however having one yesterday when here.       Francia Vergara RN  07/18/20 4186

## 2020-07-18 NOTE — ED NOTES
Call placed to patient mother Neena Samuels at [phone]. No answer message left.       Lord Sheng RN  07/18/20 8903

## 2020-07-18 NOTE — ED NOTES
Pt continues to be crying in her room. Pt signed form for voluntary admission to [facility].      Ruth Lema, [address]  07/18/20 9340

## 2020-07-18 NOTE — ED NOTES
Pt was sent to bathroom to give urine sample. Checked on pt in bathroom and she was laying on the floor and was crying. Pt did not give urine sample. Pt sent back in bathroom again to give urine sample and again pt was laying on bathroom floor. Pt was escorted back to her room to lay down.      Dex Villagomez, [address]  07/18/20 6831
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
## 2020-07-18 NOTE — ED NOTES
Pt left ED without shoes, bible and vistaril medication script. Attempted to call pt listed cell phone number as well as pt roommate cell phone number.  No answer at either number and room mate number states \" Mailbox is full\"      Tonnie Brunner, RN  07/17/20 8922

## 2020-07-18 NOTE — ED NOTES
Spoke with pt room mate who states \" She was carving her name into the walls last night with a  and telling me I was her daughter. She was like demonic. \" Room mate believes pt behavior to be \"attention seeking because her family isnt giving her any attention and I'm kind of fed up on her. \" Updated provider on pt room mate concern     Madelyn Matthew RN  07/17/20 2052

## 2020-07-18 NOTE — BH NOTE
[facility]  Initial Interdisciplinary Treatment Plan NOTE    Review Date & Time: 07/18/2020 170    Patient was not in treatment team    Admission Type:   Admission Type: Voluntary    Reason for admission:  Reason for Admission: delusional, paranoid, labile,+ SI with a plan to cut her head off      Estimated Length of Stay Update:  1-3 days   Estimated Discharge Date Update: 1-3 days     PATIENT STRENGTHS:  Patient Strengths Strengths: Communication  Patient Strengths and Limitations:Limitations: Difficult relationships / poor social skills, Multiple barriers to leisure interests, Hopeless about future, Tendency to isolate self, External locus of control  Addictive Behavior:Addictive Behavior  In the past 3 months, have you felt or has someone told you that you have a problem with:  : None  Do you have a history of Chemical Use?: No  Do you have a history of Alcohol Use?: No  Do you have a history of Street Drug Abuse?: Yes  Histroy of Prescripton Drug Abuse?: No  Medical Problems:  Past Medical History:   Diagnosis Date    Anxiety     Chronic post-traumatic stress disorder (PTSD) 10/29/2018    Depression     Hepatitis C 07/09/2020    Mild intermittent asthma without complication     Stimulant use disorder 9/23/2018       EDUCATION:   Learner Progress Toward Treatment Goals: Reviewed results and recommendations of this team    Method: Individual    Outcome: Verbalized understanding    PATIENT GOALS: \" im so confused, I dont know. \"     PLAN/TREATMENT RECOMMENDATIONS UPDATE: maintain safety, medication management     GOALS UPDATE:   Time frame for Short-Term Goals:  By time of discharge     Candida Martinez RN

## 2020-07-19 PROBLEM — F29 PSYCHOSIS (HCC): Status: ACTIVE | Noted: 2020-07-19

## 2020-07-19 LAB
CHOLESTEROL, TOTAL: 140 MG/DL (ref 0–199)
HDLC SERPL-MCNC: 50 MG/DL (ref 40–60)
LDL CHOLESTEROL CALCULATED: 76 MG/DL
TRIGL SERPL-MCNC: 68 MG/DL (ref 0–150)
TSH SERPL DL<=0.05 MIU/L-ACNC: 1.62 UIU/ML (ref 0.27–4.2)
VLDLC SERPL CALC-MCNC: 14 MG/DL

## 2020-07-19 PROCEDURE — 1240000000 HC EMOTIONAL WELLNESS R&B

## 2020-07-19 PROCEDURE — 83036 HEMOGLOBIN GLYCOSYLATED A1C: CPT

## 2020-07-19 PROCEDURE — 6370000000 HC RX 637 (ALT 250 FOR IP): Performed by: PSYCHIATRY & NEUROLOGY

## 2020-07-19 PROCEDURE — 36415 COLL VENOUS BLD VENIPUNCTURE: CPT

## 2020-07-19 PROCEDURE — 99223 1ST HOSP IP/OBS HIGH 75: CPT | Performed by: PSYCHIATRY & NEUROLOGY

## 2020-07-19 PROCEDURE — 84443 ASSAY THYROID STIM HORMONE: CPT

## 2020-07-19 PROCEDURE — 80061 LIPID PANEL: CPT

## 2020-07-19 RX ADMIN — TRAZODONE HYDROCHLORIDE 50 MG: 50 TABLET ORAL at 19:42

## 2020-07-19 RX ADMIN — ACETAMINOPHEN 650 MG: 325 TABLET ORAL at 17:22

## 2020-07-19 RX ADMIN — HYDROXYZINE PAMOATE 50 MG: 50 CAPSULE ORAL at 17:22

## 2020-07-19 ASSESSMENT — PAIN SCALES - GENERAL
PAINLEVEL_OUTOF10: 6
PAINLEVEL_OUTOF10: 0
PAINLEVEL_OUTOF10: 0

## 2020-07-19 NOTE — PLAN OF CARE
Problem: Suicide risk  Goal: Provide patient with safe environment  Description: Provide patient with safe environment  Outcome: Ongoing   Patient has been asleep since start of shift. Pt was medicated with PRN ER medications prior to shift change for behavioral disturbances. Pt currently resting in bed with eyes closed. Resps even and unlabored. No s/s of distress noted. Q15 minute safety checks continued. Purposeful hourly rounding continued for safety.
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
## 2020-07-20 PROBLEM — F15.10 AMPHETAMINE USE DISORDER, MILD (HCC): Status: ACTIVE | Noted: 2018-09-23

## 2020-07-20 LAB
ESTIMATED AVERAGE GLUCOSE: 99.7 MG/DL
HBA1C MFR BLD: 5.1 %

## 2020-07-20 PROCEDURE — 6370000000 HC RX 637 (ALT 250 FOR IP): Performed by: PSYCHIATRY & NEUROLOGY

## 2020-07-20 PROCEDURE — 99233 SBSQ HOSP IP/OBS HIGH 50: CPT | Performed by: PSYCHIATRY & NEUROLOGY

## 2020-07-20 PROCEDURE — 99221 1ST HOSP IP/OBS SF/LOW 40: CPT | Performed by: NURSE PRACTITIONER

## 2020-07-20 PROCEDURE — 1240000000 HC EMOTIONAL WELLNESS R&B

## 2020-07-20 RX ADMIN — CETIRIZINE HYDROCHLORIDE 10 MG: 10 TABLET ORAL at 12:15

## 2020-07-20 RX ADMIN — HYDROXYZINE PAMOATE 50 MG: 50 CAPSULE ORAL at 20:27

## 2020-07-20 RX ADMIN — HYDROXYZINE PAMOATE 50 MG: 50 CAPSULE ORAL at 03:29

## 2020-07-20 RX ADMIN — TRAZODONE HYDROCHLORIDE 50 MG: 50 TABLET ORAL at 20:27

## 2020-07-20 RX ADMIN — HYDROXYZINE PAMOATE 50 MG: 50 CAPSULE ORAL at 12:47

## 2020-07-20 ASSESSMENT — PAIN SCALES - GENERAL
PAINLEVEL_OUTOF10: 0

## 2020-07-20 NOTE — PLAN OF CARE
Problem: Depressive Behavior With or Without Suicide Precautions:  Goal: Able to verbalize and/or display a decrease in depressive symptoms  Description: Able to verbalize and/or display a decrease in depressive symptoms  7/19/2020 2028 by Javi Lam RN  Outcome: Ongoing  7/19/2020 1717 by Ifeanyi Galvez RN        Problem: Depressive Behavior With or Without Suicide Precautions:  Goal: Able to verbalize support systems  Description: Able to verbalize support systems  Outcome: Ongoing     Pt isolative to room. Did come to nursing station crying asking for something to help her sleep. Alert to self and place only. Very limited responses. Tearful. Reports hearing voices but not observed RTIS. States the voices are telling her things she didn't know about her life. Denies SI/HI/VH but states she came in because of suicidal thoughts. She does not report a plan. She ask if she can go back to her room because she just wants to sleep.

## 2020-07-20 NOTE — PROGRESS NOTES
Department of Psychiatry  Attending Progress Note  Chief Complaint: follow-up psychosis  Patient's chart was reviewed and collaborated with  about the treatment plan. SUBJECTIVE:    Patient is feeling unchange. Suicidal ideation:  denies suicidal ideation. Patient does not have medication side effects. Pt left hospital and used methamphetamines and started hearing voices. She appears in precontemplative stages of addiction. Pt stated that she is exhausted due to getting emergency meds. ROS: Patient has new complaints: no  Sleeping adequately:  Yes   Appetite adequate: Yes  Attending groups: No: isolative to room  Visitors:No    OBJECTIVE    Physical  VITALS:  BP (!) 97/52   Pulse 57   Temp 98 °F (36.7 °C) (Temporal)   Resp 16   Ht 5' 5\" (1.651 m)   Wt 160 lb (72.6 kg)   LMP 07/03/2020   SpO2 98%   Breastfeeding No   BMI 26.63 kg/m²     Mental Status Examination:  Patients appearance was ill-appearing, hospital attire, lying in bed, fair grooming and fair hygiene. Thoughts are concrete Homicidal ideations none. No abnormal movements, tics or mannerisms. Memory intact Aims 0. Concentration Poor. Alert and oriented X 4. Insight and Judgement poor. Patient was cooperative.  Patient gait normal. Mood tired affect flat affect Hallucinations present, suicidal ideations no specific plan to harm self Speech slurred    Data  Labs:   Admission on 07/18/2020   Component Date Value Ref Range Status    Ventricular Rate 07/18/2020 92  BPM Final    Atrial Rate 07/18/2020 92  BPM Final    P-R Interval 07/18/2020 136  ms Final    QRS Duration 07/18/2020 78  ms Final    Q-T Interval 07/18/2020 388  ms Final    QTc Calculation (Bazett) 07/18/2020 479  ms Final    P Axis 07/18/2020 75  degrees Final    R Axis 07/18/2020 47  degrees Final    T Axis 07/18/2020 54  degrees Final    Diagnosis 07/18/2020 Baseline artifactNormal sinus rhythmNonspecific T wave abnormalityConfirmed by Holli Manley MD, [address] ([id number]) on 7/18/2020 11:34:41 AM   Final    SARS-CoV-2, NAAT 07/18/2020 Not Detected  Not Detected Final    Comment: Rapid NAAT:   Negative results should be treated as presumptive and,  if inconsistent with clinical signs and symptoms or necessary for  patient management, should be tested with an alternative molecular  assay. Negative results do not preclude SARS-CoV-2 infection and  should not be used as the sole basis for patient management decisions. This test has been authorized by the FDA under an Emergency Use  Authorization (EUA) for use by authorized laboratories.     Fact sheet for Healthcare Providers:  BuildHer.es  Fact sheet for Patients: BuildHer.es    METHODOLOGY: Isothermal Nucleic Acid Amplification      Hemoglobin A1C 07/19/2020 5.1  See comment % Final    Comment: Comment:  Diagnosis of Diabetes: > or = 6.5%  Increased risk of diabetes (Prediabetes): 5.7-6.4%  Glycemic Control: Nonpregnant Adults: <7.0%                    Pregnant: <6.0%        eAG 07/19/2020 99.7  mg/dL Final    TSH 07/19/2020 1.62  0.27 - 4.20 uIU/mL Final    Cholesterol, Total 07/19/2020 140  0 - 199 mg/dL Final    Triglycerides 07/19/2020 68  0 - 150 mg/dL Final    HDL 07/19/2020 50  40 - 60 mg/dL Final    LDL Calculated 07/19/2020 76  <100 mg/dL Final    VLDL Cholesterol Calculated 07/19/2020 14  Not Established mg/dL Final            Medications  Current Facility-Administered Medications: cetirizine (ZYRTEC) tablet 10 mg, 10 mg, Oral, Daily  hydrOXYzine (VISTARIL) capsule 50 mg, 50 mg, Oral, TID PRN  acetaminophen (TYLENOL) tablet 650 mg, 650 mg, Oral, Q4H PRN  ibuprofen (ADVIL;MOTRIN) tablet 400 mg, 400 mg, Oral, Q6H PRN  sterile water injection 2.1 mL, 2.1 mL, Intramuscular, Q4H PRN  traZODone (DESYREL) tablet 50 mg, 50 mg, Oral, Nightly PRN  benztropine mesylate (COGENTIN) injection 2 mg, 2 mg, Intramuscular, BID PRN  magnesium hydroxide (MILK OF MAGNESIA) 400 MG/5ML suspension 30 mL, 30 mL, Oral, Daily PRN  aluminum & magnesium hydroxide-simethicone (MAALOX) 200-200-20 MG/5ML suspension 30 mL, 30 mL, Oral, Q6H PRN    ASSESSMENT AND PLAN    No meds    1. Patient s symptoms unchanged  2. Probable discharge is 3-5 days  3. Discharge planning is incomplete  4 Suicidal ideation is unchanged  5 total time 40 minutes  face to face and more than 50 % was spent coordinating care, exploring sx's and pharmacotherapy

## 2020-07-20 NOTE — H&P
.  Hospital Medicine History & Physical      PCP: ISSA Moe - CNP    Date of Admission: 2020    Date of Service: Pt seen/examined on 7/10/2020     Chief Complaint:    Chief Complaint   Patient presents with    Psychiatric Evaluation     hearing voices. doesnt know how she got to her  mothers house. talking about death and dying. was released on thursday from behavioral health. was seen yesterday for the samething. mom states that it like you are talking to 2 different people       History Of Present Illness: The patient is a 38 y.o. female with asthma, Hepatitis C, Anxiety, Depression, who presented to [facility] ED with c/o auditory hallucinations. Patient was seen and evaluated in the ED by the ED medical provider, patient was medically cleared for admission to [facility] at [facility]. This note serves as an admission medical H&P.     PCP: denies  Tobacco Use: yes  EtOH Use: denies  Illicit Drug Use: recently relapsed, opiates and amphetamines. Pt denies any medical concerns at this time. Past Medical History:        Diagnosis Date    Anxiety     Chronic post-traumatic stress disorder (PTSD) 10/29/2018    Depression     Hepatitis C 2020    Mild intermittent asthma without complication     Stimulant use disorder 2018       Past Surgical History:        Procedure Laterality Date     SECTION  2005    DILATION AND CURETTAGE OF UTERUS  2006           Medications Prior to Admission:    Prior to Admission medications    Medication Sig Start Date End Date Taking?  Authorizing Provider   hydrOXYzine (VISTARIL) 50 MG capsule Take 1 capsule by mouth 3 times daily as needed for Itching or Anxiety 20 Yes Mervat Hughes MD   DULoxetine (CYMBALTA) 60 MG extended release capsule Take 1 capsule by mouth 2 times daily 20  Yes Mervat Hughes MD   cetirizine (ZYRTEC) 10 MG tablet Take 1 tablet by mouth daily 18  Yes Orvin Boas, MD Allergies:  Latex and Ceclor [cefaclor]    Social History:  The patient currently lives with family. TOBACCO:   reports that she has been smoking. She has been smoking about 0.50 packs per day. She has never used smokeless tobacco.  ETOH:   reports no history of alcohol use. Family History:   Positive as follows:        Problem Relation Age of Onset    Cancer Father         lung    Mental Illness Father     Diabetes Maternal Grandmother     Hypertension Maternal Grandmother     Heart Failure Maternal Grandmother     Depression Paternal Grandmother     Diabetes Paternal Grandmother     Heart Failure Paternal Grandmother     Mental Illness Paternal Grandmother     Hypertension Mother     Diabetes Paternal Grandfather     Heart Failure Paternal Grandfather     Mental Illness Paternal Grandfather        REVIEW OF SYSTEMS:     Constitutional: Negative for fever   HENT: Negative for sore throat   Eyes: Negative for redness   Respiratory: Negative  for dyspnea, cough   Cardiovascular: Negative for chest pain   Gastrointestinal: Negative for vomiting, diarrhea   Genitourinary: Negative for hematuria   Musculoskeletal: Negative for arthralgias   Skin: Negative for rash   Neurological: Negative for syncope   Hematological: Negative for adenopathy   Psychiatric/Behavorial: Negative for anxiety    PHYSICAL EXAM:    BP (!) 97/52   Pulse 57   Temp 98 °F (36.7 °C) (Temporal)   Resp 16   Ht 5' 5\" (1.651 m)   Wt 160 lb (72.6 kg)   LMP 07/03/2020   SpO2 98%   Breastfeeding No   BMI 26.63 kg/m²   Gen: No distress. Alert. [employer] [place]  female  Eyes: PERRL. No sclera icterus. No conjunctival injection. ENT: No discharge. Pharynx clear. Neck:  Trachea midline. Resp: No accessory muscle use. No crackles. No wheezes. No rhonchi. CV: Regular rate. Regular rhythm. No murmur. No rub. No edema. GI: Soft, Non-tender. Non-distended. Normal bowel sounds. Skin: Warm and dry.  No rash on exposed extremities. M/S: No cyanosis. No clubbing. Neuro: Awake. Grossly nonfocal, CN II-XII intact    Psych: Defer to psychiatry. CBC:   Recent Labs     07/17/20 2020   WBC 8.8   HGB 11.3*   HCT 34.3*   MCV 90.8        BMP:   Recent Labs     07/17/20 2020      K 3.9      CO2 23   BUN 9   CREATININE 0.8     LIVER PROFILE:   Recent Labs     07/17/20 2020   AST 40*   ALT 71*   BILITOT <0.2   ALKPHOS 52     UA:  Recent Labs     07/17/20  2207   COLORU Yellow   PHUR 7.0  7.0   WBCUA 3-5   RBCUA 0-2   BACTERIA Rare*   CLARITYU Clear   SPECGRAV 1.010   LEUKOCYTESUR TRACE*   UROBILINOGEN 0.2   BILIRUBINUR Negative   BLOODU TRACE-INTACT*   GLUCOSEU Negative      U/A:    Lab Results   Component Value Date    COLORU Yellow 07/17/2020    WBCUA 3-5 07/17/2020    RBCUA 0-2 07/17/2020    MUCUS 2+ 11/21/2018    BACTERIA Rare 07/17/2020    CLARITYU Clear 07/17/2020    SPECGRAV 1.010 07/17/2020    LEUKOCYTESUR TRACE 07/17/2020    BLOODU TRACE-INTACT 07/17/2020    GLUCOSEU Negative 07/17/2020    AMORPHOUS 1+ 10/28/2018     CULTURES  COVID: not detected    RADIOLOGY  N/A    EKG: Sinus tachycardia, Nonspecific ST and T wave abnormality    ASSESSMENT/PLAN:    Anxiety  Depression  PTSD  - cont mgmt per [facility]    Asthma  - denies any recent AE  - does not use any inhalers    Transaminitis  Hepatitis C  - reactive on previous panel  - encouraged to f/w PCP and see GI in the future. Tobacco Dependence  - Recommended cessation     Pt has no medical complaints at this time. Pt was informed that they may have [facility] contact us should any medical concerns arise during this admission.     Moisés Fees FNP-C 11:42 AM 7/20/2020

## 2020-07-20 NOTE — PROGRESS NOTES
Occupational Therapy      Hold OT session today, per nsg in order to allow pt to rest.    Natalie Doan, OTR/L  #[id number]

## 2020-07-20 NOTE — PROGRESS NOTES
Pt awake d/t another patient entering room. Pt request something to help with anxiety and sleep. Prn meds given. Pt back to room.

## 2020-07-20 NOTE — PLAN OF CARE
Pt has remained free from falls and injury so far this shift. Med compliant. Denies SI/HI. + AVH, hears voices telling her she is bad or doing wrong and sees distortions. Tearful. Hopeful about getting better and being happy. Vistaril administered for anxiety, which was effective. Slept until lunch. Appetite good. Smiles during interactions with staff. Pt's mom called for support, pt can call her anytime. Resting in bed at this time. Will continue to monitor.

## 2020-07-21 PROCEDURE — 6370000000 HC RX 637 (ALT 250 FOR IP): Performed by: PSYCHIATRY & NEUROLOGY

## 2020-07-21 PROCEDURE — 97535 SELF CARE MNGMENT TRAINING: CPT

## 2020-07-21 PROCEDURE — 97166 OT EVAL MOD COMPLEX 45 MIN: CPT

## 2020-07-21 PROCEDURE — 1240000000 HC EMOTIONAL WELLNESS R&B

## 2020-07-21 PROCEDURE — 99233 SBSQ HOSP IP/OBS HIGH 50: CPT | Performed by: PSYCHIATRY & NEUROLOGY

## 2020-07-21 RX ORDER — DULOXETIN HYDROCHLORIDE 30 MG/1
30 CAPSULE, DELAYED RELEASE ORAL DAILY
Status: DISCONTINUED | OUTPATIENT
Start: 2020-07-21 | End: 2020-07-27

## 2020-07-21 RX ADMIN — TRAZODONE HYDROCHLORIDE 50 MG: 50 TABLET ORAL at 20:44

## 2020-07-21 RX ADMIN — IBUPROFEN 400 MG: 400 TABLET, FILM COATED ORAL at 10:15

## 2020-07-21 RX ADMIN — HYDROXYZINE PAMOATE 50 MG: 50 CAPSULE ORAL at 15:02

## 2020-07-21 RX ADMIN — CETIRIZINE HYDROCHLORIDE 10 MG: 10 TABLET ORAL at 10:15

## 2020-07-21 RX ADMIN — ACETAMINOPHEN 650 MG: 325 TABLET ORAL at 15:04

## 2020-07-21 RX ADMIN — DULOXETINE HYDROCHLORIDE 30 MG: 30 CAPSULE, DELAYED RELEASE ORAL at 10:15

## 2020-07-21 RX ADMIN — ACETAMINOPHEN 650 MG: 325 TABLET ORAL at 20:44

## 2020-07-21 RX ADMIN — HYDROXYZINE PAMOATE 50 MG: 50 CAPSULE ORAL at 20:42

## 2020-07-21 ASSESSMENT — PAIN SCALES - GENERAL
PAINLEVEL_OUTOF10: 6
PAINLEVEL_OUTOF10: 0
PAINLEVEL_OUTOF10: 4
PAINLEVEL_OUTOF10: 0
PAINLEVEL_OUTOF10: 1
PAINLEVEL_OUTOF10: 0
PAINLEVEL_OUTOF10: 6

## 2020-07-21 ASSESSMENT — PAIN DESCRIPTION - PAIN TYPE
TYPE: ACUTE PAIN
TYPE: CHRONIC PAIN
TYPE: CHRONIC PAIN

## 2020-07-21 ASSESSMENT — PAIN DESCRIPTION - LOCATION
LOCATION: SHOULDER
LOCATION: HEAD
LOCATION: SHOULDER

## 2020-07-21 ASSESSMENT — PAIN DESCRIPTION - DESCRIPTORS
DESCRIPTORS: ACHING
DESCRIPTORS: ACHING

## 2020-07-21 ASSESSMENT — PAIN DESCRIPTION - ORIENTATION
ORIENTATION: RIGHT
ORIENTATION: RIGHT

## 2020-07-21 ASSESSMENT — PAIN DESCRIPTION - ONSET
ONSET: GRADUAL
ONSET: GRADUAL

## 2020-07-21 ASSESSMENT — PAIN DESCRIPTION - FREQUENCY
FREQUENCY: INTERMITTENT
FREQUENCY: INTERMITTENT

## 2020-07-21 NOTE — BH NOTE
Patient complaining of right shoulder/neck pain. Patient given Tylenol 650 mg per order. Will continue to monitor.

## 2020-07-21 NOTE — BH NOTE
Patient noted to be crying in her room. Patient stated that she was feeling very anxious as she had just finished a telephone assessment to [facility] and it was hard talking about some things from her past. Patient states that she is happy that she has made the decision to go but that it was scary for her. Patient given encouragement about her future and how it can look like sober. Patient given Vistaril 50 mg for anxiety per order. Will continue to monitor.

## 2020-07-21 NOTE — BH NOTE
Group Therapy Note    Date: 7/20/2020  Start Time: 20:00  End Time:  21:00  Number of Participants: 12    Type of Group: Recreational  Wrap up    Madhu Zuñiga Information  Module Name:  /  Session Number:  /    Patient's Goal:  Out of room more    Notes:  Goal completed per pt    Status After Intervention:  Unchanged    Participation Level:  Active Listener and Minimal    Participation Quality: Appropriate      Speech:  hesitant      Thought Process/Content: Perseverating      Affective Functioning: Blunted      Mood: anxious      Level of consciousness:  Alert and Inattentive      Response to Learning: Able to change behavior      Endings: None Reported    Modes of Intervention: Socialization and Problem-solving      Discipline Responsible: Behavorial Health Tech      Signature:  Adam Martinez

## 2020-07-21 NOTE — GROUP NOTE
Group Therapy Note    Date: 7/21/2020    Group Start Time: 1330  Group End Time: 1430  Group Topic: Psychoeducation    [address] [place] [address]        Group Therapy Note    Attendees: 9    Patient's Goal: to complete a strength developing worksheet and create a self-portrait incorporating identified developed strengths. Notes: Tino Irizarry completed a strength developing worksheet and created an art piece representing her identified strengths. Tino Irizarry was observed whispering to herself, while engaged in art making. Tino Irizarry selected to not share her artwork with peers. Tino Irizarry appeared to actively listen to processing discussion. Status After Intervention:  Improved    Participation Level:  Active Listener and Interactive    Participation Quality: Appropriate      Speech:  hesitant      Thought Process/Content: Hallucinating      Affective Functioning: Flat      Mood: depressed      Level of consciousness:  Alert, Oriented x4 and Attentive      Response to Learning: Capable of insight, Able to change behavior and Progressing to goal      Endings: None Reported    Modes of Intervention: Education, Support, Socialization, Exploration, Clarifying, Problem-solving and Activity      Discipline Responsible: Psychoeducational Specialist      Signature:  WILEY Howe
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
## 2020-07-21 NOTE — PLAN OF CARE
Patient was out with patient group, early in the shift, with minimal socialization. Patient was pleasant during 1:1 & reported that she was here due to having feelings of self harm & denied this during 1:1. Patient declined to verbalize further. Patient was asleep since 23:00, since getting trazodone & vistaril at 20:30.  Laura Peraza R.N.

## 2020-07-21 NOTE — BH NOTE
Patient mother called with concerns about her daughter. Reports on Friday she found patient in her garage when she left the ER before being evaluated. Mother states that patient could not tell her how she got there. Reports patient was noted to talking in different personalities with different accents on the way back to the hospital. Mom reports she was worse this time than last week. Mom concerned for patient' safety if she gets discharged again.

## 2020-07-21 NOTE — BH NOTE
5 [facility]  Day 3 Interdisciplinary Treatment Plan NOTE    Review Date & Time: 7/21/2020 0904    Patient was not in treatment team    Admission Type:   Admission Type: Voluntary    Reason for admission:  Reason for Admission: delusional, paranoid, labile,+ SI with a plan to cut her head off  Estimated Length of Stay Update:  2 to 3 days  Estimated Discharge Date Update: 7/23/20 to 7/24/20    PATIENT STRENGTHS:  Patient Strengths Strengths: Communication  Patient Strengths and Limitations:Limitations: Difficult relationships / poor social skills, Multiple barriers to leisure interests, Hopeless about future, Tendency to isolate self, External locus of control  Addictive Behavior:Addictive Behavior  In the past 3 months, have you felt or has someone told you that you have a problem with:  : None  Do you have a history of Chemical Use?: No  Do you have a history of Alcohol Use?: No  Do you have a history of Street Drug Abuse?: Yes  Histroy of Prescripton Drug Abuse?: No  Medical Problems:  Past Medical History:   Diagnosis Date    Anxiety     Chronic hepatitis C without hepatic coma ([facility] [address]     Chronic post-traumatic stress disorder (PTSD) 10/29/2018    Depression     Hepatitis C 07/09/2020    Mild intermittent asthma without complication     Stimulant use disorder 9/23/2018       Risk:  Fall RiskTotal: 61  Jose Scale Jose Scale Score: 22  BVC Total: 0  Change in scores none.  Changes to plan of Care none    Status EXAM:   Status and Exam  Normal: No  Facial Expression: Flat, Sad  Affect: Blunt  Level of Consciousness: Alert  Mood:Normal: Yes  Mood: Depressed  Motor Activity:Normal: No  Motor Activity: Decreased  Interview Behavior: Cooperative  Preception: [place] to Person, Charla [place] to Time, [place] to Place, [place] to Situation  Attention:Normal: Yes  Attention: Unable to Concentrate  Thought Processes: Circumstantial  Thought Content:Normal: Yes  Thought Content: Poverty of Content  Hallucinations: None  Delusions: No  Delusions: Persecution  Memory:Normal: No  Memory: Poor Remote  Insight and Judgment: No  Insight and Judgment: Poor Judgment, Poor Insight  Present Suicidal Ideation: No  Present Homicidal Ideation: No    Daily Assessment Last Entry:   Daily Sleep (WDL): Within Defined Limits         Patient Currently in Pain: Denies  Daily Nutrition (WDL): Within Defined Limits    Patient Monitoring:  Frequency of Checks: 4 times per hour, close    Psychiatric Symptoms:   Depression Symptoms  Depression Symptoms: Feelings of helplessness, Isolative  Anxiety Symptoms  Anxiety Symptoms: Generalized  Elizabeth Symptoms  Elizabeth Symptoms: No problems reported or observed. Psychosis Symptoms  Delusion Type: Paranoid    Suicide Risk CSSR-S:  1) Within the past month, have you wished you were dead or wished you could go to sleep and not wake up? : Yes  2) Have you actually had any thoughts of killing yourself? : Yes  3) Have you been thinking about how you might kill yourself? : Yes  5) Have you started to work out or worked out the details of how to kill yourself? Do you intend to carry out this plan? : No  6) Have you ever done anything, started to do anything, or prepared to do anything to end your life?: No  Change in Resultnone Change in Plan of carenone      EDUCATION:   Learner Progress Toward Treatment Goals: Reviewed goals and plan of care    Method: Individual    Outcome: Verbalized understanding    PATIENT GOALS: To go to rehab    PLAN/TREATMENT RECOMMENDATIONS UPDATE:Continue treatment and medication management.      GOALS UPDATE:   Time frame for Short-Term Goals: 2 days      Tracee Best RN

## 2020-07-21 NOTE — BH NOTE
Patient inquiring about her glasses or contacts. No glasses or contacts where on patient belonging lists when admitted and nothing was placed in her locker or safe. Patient told to call her mom to see if she could bring her glasses to her.

## 2020-07-21 NOTE — PROGRESS NOTES
Patient to team station at 22:00, with continued complaint of not sleeping. Patient was instructed that she had nothing else ordered. Patient continues awake in bed at this time.  Karen Peraza R.N.

## 2020-07-21 NOTE — GROUP NOTE
Group Therapy Note    Date: 7/21/2020    Group Start Time: 1600  Group End Time: 1640  Group Topic: Healthy Living/Wellness    Oleg, [facility]        Group Therapy Note    Attendees: 7         Discussed the importance of sleep and establishing a sleep routine. Patients given handouts on Insomnia and sleep tips.     Signature:  Charla Terrazas RN

## 2020-07-21 NOTE — PROGRESS NOTES
07/18/2020 47  degrees Final    T Axis 07/18/2020 54  degrees Final    Diagnosis 07/18/2020 Baseline artifactNormal sinus rhythmNonspecific T wave abnormalityConfirmed by Dante Loera MD, [place] [id number] ([id number]) on 7/18/2020 11:34:41 AM   Final    SARS-CoV-2, NAAT 07/18/2020 Not Detected  Not Detected Final    Comment: Rapid NAAT:   Negative results should be treated as presumptive and,  if inconsistent with clinical signs and symptoms or necessary for  patient management, should be tested with an alternative molecular  assay. Negative results do not preclude SARS-CoV-2 infection and  should not be used as the sole basis for patient management decisions. This test has been authorized by the FDA under an Emergency Use  Authorization (EUA) for use by authorized laboratories.     Fact sheet for Healthcare Providers:  Gregory.[email or web address]  Fact sheet for Patients: [email or web address]    METHODOLOGY: Isothermal Nucleic Acid Amplification      Hemoglobin A1C 07/19/2020 5.1  See comment % Final    Comment: Comment:  Diagnosis of Diabetes: > or = 6.5%  Increased risk of diabetes (Prediabetes): 5.7-6.4%  Glycemic Control: Nonpregnant Adults: <7.0%                    Pregnant: <6.0%        eAG 07/19/2020 99.7  mg/dL Final    TSH 07/19/2020 1.62  0.27 - 4.20 uIU/mL Final    Cholesterol, Total 07/19/2020 140  0 - 199 mg/dL Final    Triglycerides 07/19/2020 68  0 - 150 mg/dL Final    HDL 07/19/2020 50  40 - 60 mg/dL Final    LDL Calculated 07/19/2020 76  <100 mg/dL Final    VLDL Cholesterol Calculated 07/19/2020 14  Not Established mg/dL Final            Medications  Current Facility-Administered Medications: DULoxetine (CYMBALTA) extended release capsule 30 mg, 30 mg, Oral, Daily  cetirizine (ZYRTEC) tablet 10 mg, 10 mg, Oral, Daily  hydrOXYzine (VISTARIL) capsule 50 mg, 50 mg, Oral, TID PRN  acetaminophen (TYLENOL) tablet 650 mg, 650 mg, Oral, Q4H PRN  ibuprofen (ADVIL;MOTRIN) tablet 400 mg, 400 mg, Oral, Q6H PRN  sterile water injection 2.1 mL, 2.1 mL, Intramuscular, Q4H PRN  traZODone (DESYREL) tablet 50 mg, 50 mg, Oral, Nightly PRN  benztropine mesylate (COGENTIN) injection 2 mg, 2 mg, Intramuscular, BID PRN  magnesium hydroxide (MILK OF MAGNESIA) 400 MG/5ML suspension 30 mL, 30 mL, Oral, Daily PRN  aluminum & magnesium hydroxide-simethicone (MAALOX) 200-200-20 MG/5ML suspension 30 mL, 30 mL, Oral, Q6H PRN    ASSESSMENT AND PLAN    Restart cymbalta    1. Patient s symptoms improving  2. Probable discharge is 3-5 days  3. Discharge planning is incomplete  4 Suicidal ideation is unchanged  5 total time 40 minutes  face to face and more than 50 % was spent coordinating care, exploring sx's and pharmacotherapy

## 2020-07-22 PROCEDURE — 1240000000 HC EMOTIONAL WELLNESS R&B

## 2020-07-22 PROCEDURE — 6370000000 HC RX 637 (ALT 250 FOR IP): Performed by: PSYCHIATRY & NEUROLOGY

## 2020-07-22 PROCEDURE — 99233 SBSQ HOSP IP/OBS HIGH 50: CPT | Performed by: PSYCHIATRY & NEUROLOGY

## 2020-07-22 RX ORDER — ARIPIPRAZOLE 10 MG/1
5 TABLET ORAL DAILY
Status: DISCONTINUED | OUTPATIENT
Start: 2020-07-22 | End: 2020-07-27 | Stop reason: ALTCHOICE

## 2020-07-22 RX ADMIN — DULOXETINE HYDROCHLORIDE 30 MG: 30 CAPSULE, DELAYED RELEASE ORAL at 08:11

## 2020-07-22 RX ADMIN — ARIPIPRAZOLE 5 MG: 10 TABLET ORAL at 11:24

## 2020-07-22 RX ADMIN — HYDROXYZINE PAMOATE 50 MG: 50 CAPSULE ORAL at 13:42

## 2020-07-22 RX ADMIN — TRAZODONE HYDROCHLORIDE 50 MG: 50 TABLET ORAL at 20:43

## 2020-07-22 RX ADMIN — Medication: at 08:11

## 2020-07-22 RX ADMIN — IBUPROFEN 400 MG: 400 TABLET, FILM COATED ORAL at 08:14

## 2020-07-22 RX ADMIN — IBUPROFEN 400 MG: 400 TABLET, FILM COATED ORAL at 15:53

## 2020-07-22 RX ADMIN — IBUPROFEN 400 MG: 400 TABLET, FILM COATED ORAL at 01:26

## 2020-07-22 RX ADMIN — Medication: at 15:53

## 2020-07-22 RX ADMIN — HYDROXYZINE PAMOATE 50 MG: 50 CAPSULE ORAL at 22:11

## 2020-07-22 RX ADMIN — CETIRIZINE HYDROCHLORIDE 10 MG: 10 TABLET ORAL at 08:11

## 2020-07-22 ASSESSMENT — PAIN DESCRIPTION - PAIN TYPE: TYPE: ACUTE PAIN

## 2020-07-22 ASSESSMENT — PAIN DESCRIPTION - ORIENTATION: ORIENTATION: RIGHT;LOWER;POSTERIOR

## 2020-07-22 ASSESSMENT — PAIN SCALES - GENERAL
PAINLEVEL_OUTOF10: 0
PAINLEVEL_OUTOF10: 10
PAINLEVEL_OUTOF10: 9
PAINLEVEL_OUTOF10: 6

## 2020-07-22 ASSESSMENT — PAIN DESCRIPTION - LOCATION: LOCATION: TEETH

## 2020-07-22 NOTE — BH NOTE
Writer received VM from pt's mother Thelma Higuera [phone], and attempted to reach her. A VM was left for a return call. Mother called back and discussed discharge plan and recent behaviors (previously documented by nurse Jory Campa). Writer encouraged mother to continue to be a supportive person for pt and that pt is awaiting a bed with [facility]. Writer informed mother that pt was being open about her hallucinations and paranoia at this time and was working with the doctor to address this.     Brian Poole MSW, LSW
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
## 2020-07-22 NOTE — BH NOTE
C/o tooth pain, rated at 9. No facial swelling noted. Med w/ Ibuprofen 400mg per request. Pt reports that she was awakened by the pain.

## 2020-07-22 NOTE — BH NOTE
Group Therapy Note    Date: 7/21/2020  Start Time: 20:00  End Time:  21:00  Number of Participants: 12    Type of Group: Recreational  Wrap up    Madhu Zuñiga Information  Module Name:  /  Session Number:  /    Patient's Goal:  Out of room more     Notes:  Goal completed per pt    Status After Intervention:  Unchanged    Participation Level:  Active Listener and Interactive    Participation Quality: Appropriate, Attentive and Sharing      Speech:  normal      Thought Process/Content: Logical      Affective Functioning: Blunted      Mood: anxious      Level of consciousness:  Alert and Attentive      Response to Learning: Able to change behavior      Endings: None Reported    Modes of Intervention: Socialization and Problem-solving      Discipline Responsible: Behavorial Health Tech      Signature:  Aury Dia

## 2020-07-22 NOTE — PROGRESS NOTES
07/18/2020 47  degrees Final    T Axis 07/18/2020 54  degrees Final    Diagnosis 07/18/2020 Baseline artifactNormal sinus rhythmNonspecific T wave abnormalityConfirmed by Wilver Rivera MD, [place] [id number] ([id number]) on 7/18/2020 11:34:41 AM   Final    SARS-CoV-2, NAAT 07/18/2020 Not Detected  Not Detected Final    Comment: Rapid NAAT:   Negative results should be treated as presumptive and,  if inconsistent with clinical signs and symptoms or necessary for  patient management, should be tested with an alternative molecular  assay. Negative results do not preclude SARS-CoV-2 infection and  should not be used as the sole basis for patient management decisions. This test has been authorized by the FDA under an Emergency Use  Authorization (EUA) for use by authorized laboratories.     Fact sheet for Healthcare Providers:  [email or web address]  Fact sheet for Patients: [email or web address]    METHODOLOGY: Isothermal Nucleic Acid Amplification      Hemoglobin A1C 07/19/2020 5.1  See comment % Final    Comment: Comment:  Diagnosis of Diabetes: > or = 6.5%  Increased risk of diabetes (Prediabetes): 5.7-6.4%  Glycemic Control: Nonpregnant Adults: <7.0%                    Pregnant: <6.0%        eAG 07/19/2020 99.7  mg/dL Final    TSH 07/19/2020 1.62  0.27 - 4.20 uIU/mL Final    Cholesterol, Total 07/19/2020 140  0 - 199 mg/dL Final    Triglycerides 07/19/2020 68  0 - 150 mg/dL Final    HDL 07/19/2020 50  40 - 60 mg/dL Final    LDL Calculated 07/19/2020 76  <100 mg/dL Final    VLDL Cholesterol Calculated 07/19/2020 14  Not Established mg/dL Final            Medications  Current Facility-Administered Medications: benzocaine (ORAJEL) 20 % mucosal gel, , Mouth/Throat, BID PRN  ARIPiprazole (ABILIFY) tablet 5 mg, 5 mg, Oral, Daily  DULoxetine (CYMBALTA) extended release capsule 30 mg, 30 mg, Oral, Daily  cetirizine (ZYRTEC) tablet 10 mg, 10 mg, Oral, Daily  hydrOXYzine (VISTARIL) capsule 50 mg, 50 mg, Oral, TID PRN  acetaminophen (TYLENOL) tablet 650 mg, 650 mg, Oral, Q4H PRN  ibuprofen (ADVIL;MOTRIN) tablet 400 mg, 400 mg, Oral, Q6H PRN  sterile water injection 2.1 mL, 2.1 mL, Intramuscular, Q4H PRN  traZODone (DESYREL) tablet 50 mg, 50 mg, Oral, Nightly PRN  benztropine mesylate (COGENTIN) injection 2 mg, 2 mg, Intramuscular, BID PRN  magnesium hydroxide (MILK OF MAGNESIA) 400 MG/5ML suspension 30 mL, 30 mL, Oral, Daily PRN  aluminum & magnesium hydroxide-simethicone (MAALOX) 200-200-20 MG/5ML suspension 30 mL, 30 mL, Oral, Q6H PRN    ASSESSMENT AND PLAN  cont cymbalta  Start abilify    1. Patient s symptoms improving  2. Probable discharge is 2-3  days  3. Discharge planning is incomplete  4 Suicidal ideation is unchanged  5 total time 40 minutes  face to face and more than 50 % was spent coordinating care, exploring sx's and pharmacotherapy

## 2020-07-23 PROCEDURE — 97535 SELF CARE MNGMENT TRAINING: CPT

## 2020-07-23 PROCEDURE — 6370000000 HC RX 637 (ALT 250 FOR IP): Performed by: PSYCHIATRY & NEUROLOGY

## 2020-07-23 PROCEDURE — 1240000000 HC EMOTIONAL WELLNESS R&B

## 2020-07-23 PROCEDURE — 99233 SBSQ HOSP IP/OBS HIGH 50: CPT | Performed by: PSYCHIATRY & NEUROLOGY

## 2020-07-23 RX ADMIN — HYDROXYZINE PAMOATE 50 MG: 50 CAPSULE ORAL at 20:32

## 2020-07-23 RX ADMIN — DULOXETINE HYDROCHLORIDE 30 MG: 30 CAPSULE, DELAYED RELEASE ORAL at 09:18

## 2020-07-23 RX ADMIN — ACETAMINOPHEN 650 MG: 325 TABLET ORAL at 10:28

## 2020-07-23 RX ADMIN — ARIPIPRAZOLE 5 MG: 10 TABLET ORAL at 09:18

## 2020-07-23 RX ADMIN — CETIRIZINE HYDROCHLORIDE 10 MG: 10 TABLET ORAL at 09:18

## 2020-07-23 RX ADMIN — IBUPROFEN 400 MG: 400 TABLET, FILM COATED ORAL at 22:10

## 2020-07-23 RX ADMIN — TRAZODONE HYDROCHLORIDE 50 MG: 50 TABLET ORAL at 20:32

## 2020-07-23 RX ADMIN — Medication: at 22:11

## 2020-07-23 RX ADMIN — IBUPROFEN 400 MG: 400 TABLET, FILM COATED ORAL at 09:19

## 2020-07-23 ASSESSMENT — PAIN SCALES - GENERAL
PAINLEVEL_OUTOF10: 5
PAINLEVEL_OUTOF10: 0
PAINLEVEL_OUTOF10: 1
PAINLEVEL_OUTOF10: 5
PAINLEVEL_OUTOF10: 4
PAINLEVEL_OUTOF10: 0
PAINLEVEL_OUTOF10: 0

## 2020-07-23 ASSESSMENT — PAIN DESCRIPTION - PAIN TYPE
TYPE: ACUTE PAIN
TYPE: ACUTE PAIN

## 2020-07-23 ASSESSMENT — PAIN DESCRIPTION - LOCATION
LOCATION: HEAD
LOCATION: HEAD

## 2020-07-23 ASSESSMENT — PAIN DESCRIPTION - DESCRIPTORS
DESCRIPTORS: HEADACHE
DESCRIPTORS: HEADACHE

## 2020-07-23 ASSESSMENT — PAIN DESCRIPTION - FREQUENCY
FREQUENCY: INTERMITTENT
FREQUENCY: INTERMITTENT

## 2020-07-23 NOTE — PLAN OF CARE
Problem: Depressive Behavior With or Without Suicide Precautions:  Goal: Able to verbalize and/or display a decrease in depressive symptoms  Description: Able to verbalize and/or display a decrease in depressive symptoms  7/23/2020 1131 by Ashley Magana RN  Outcome: Ongoing     Problem: Altered Mood, Psychotic Behavior:  Goal: Able to verbalize decrease in frequency and intensity of hallucinations  Description: Able to verbalize decrease in frequency and intensity of hallucinations  Outcome: Ongoing   Patient has spent the majority of the shift withdrawn to her bed resting. She is A&Ox4 and denies SI/HI and hallucinations. She did not seem to be RTIS today. She was c/o HA and was given medication, but she is saying that she keeps having headaches in the morning that also effects her right  ear and jaw. She does not know if it is how she is sleeping or sinuses draining. She also gave that as a reason why she has not been getting up to call rehabs in the morning. She is trying to get into [facility], but has had trouble getting through. Her mother did call this morning and informed staff she had spoke to Matty Mccabe at [facility] and for the patient to call her back. The patient did call the number given to her and she spoke to Matty Mccabe. She informed staff that Matty Mccabe from [facility] [employer] will call the hospital tomorrow morning to do a full phone assessment. She returned to resting since the call. She has been no issue through the shift.

## 2020-07-23 NOTE — PROGRESS NOTES
Inpatient Occupational Therapy Treatment Note    Unit:  [facility]  Date:  7/23/2020  Patient Name:    Carlyle Cabezas  Admitting diagnosis:  Mood disorder [facility]) Matt Turpin  Admit Date:  7/18/2020  Precautions/Restrictions/WB Status/ Lines/ Wounds/ Oxygen:  Standard BHI Precautions  History of Present Illness:  Per H&P on 7/19/2020: \"The patient presented to the ED on  07/18/2020.  She states that she was hearing voices and was not clear as  to how she got to her mother's house, was talking about death and dying. She had been released from [facility] on 07/16/2020 after an 8-day stay with  Dr. Pat Escamilla. Isabel Rivera Jovonindira Miguel She stated that she did not feel safe at home and  that she has been hearing voices telling her that \"I am crazy. \"  She  stated they did not tell her to hurt herself. Isabel Hall Miguel \"    Treatment Number:  2    Treatment Time: 3320-0176  Timed Code Treatment Minutes:   40  minutes   Total Treatment Time:    40  minutes    Staff Recommendations: Independent with use of No AD for all ambulation within halls    Discharge Recommendations:  Home with PRN assist and Home OT    DME needs for discharge:  Needs Met    AM-PAC Score: AM-PAC Inpatient Daily Activity Raw Score: 21  Home Health S4 Level: Level 1- Standard     ACLS: To be assessed    Subjective:  Pt was found completing shower when OT arrived. Was receptive to OT tx. ADLs:  Sleep Hygiene:  Reviewed sleep hygiene handout with pt. . She was receptive to OT recommendations   eating better, nighttime ritual to include hot shower, possibly drinking herbal tea (decaffienated), and avoiding naps longer than an hour. Medication Management: Discussion began re: techniques pt could use to ensure she takes her meds as scheduled. Recommended placing a reminder sign where she can see it daily, and to use her phone to set reminders. Self Care:   Toileting: NT  Grooming: NT  Bathing: IND  Dressing: IND    Pain   No  Rating:mild  Location: R shoulder/ neck   Pain Medicine Status: No request made Cognition    A&O to orientation not directly assessed. Able to follow:  2 step commands    Balance:    Bed mobility:      Transfer Training:   Sit to stand:   Independent  Stand to sit: Independent  Bed to Chair:  Not Tested  Standard toilet:   Not Tested    Activity Tolerance   Pt completed therapy session with No adverse symptoms noted w/activity    Therapeutic Exercise:   NA    Patient Education:   Role of OT, Sleep hygiene and Medication management    Positioning Needs: In room with needs met    Family Present:  No    Assessment: Pt was receptive to OT tx. Ended session in bed attempting to take nap. Asked that she be woken up in an hour. Much of initial tx consisted of pt describing pain in her R shoulder/neck. Pt admits that she has not followed up with ortho testing and appointments, often because of lack of care for her 5 yr. Old son. Pt should continue to benefit from skilled OT tx to maximize independence so that she may eventually return home with the least amount of assistance. GOALS  To be met in 3 Visits:  1). Pt. To verbalize understanding of sleep hygiene education. (Goal met 7/23/20)     To be met in 5 Visits:  1). Pt. To verbalize 3 new coping skills.   2). Pt. To complete wellness plan. 3). Pt. To complete a daily schedule of healthy activities/routines with mod assist.   4). Pt. To identify 2 memory strategies to take medications as prescribed.  (Initiated 7/23-needs to be further addressed)       Plan: cont with [address] MS, OTR/L  #[id number]      If patient discharges from this facility prior to next visit, this note will serve as the Discharge Summary

## 2020-07-23 NOTE — PROGRESS NOTES
Department of Psychiatry  Attending Progress Note  Chief Complaint: follow-up psychosis  Patient's chart was reviewed and collaborated with  about the treatment plan. SUBJECTIVE:    Patient is feeling better. Suicidal ideation:  denies suicidal ideation. Patient does not have medication side effects. Pt stated that she is feeling tired and she has headache. Pt stated that yesterday she was hearing voices but overall is feeling better. Pt needs to talk to [facility] for phone interview today. Pt stated that she cont to be motivated to attending drug treatment. We discussed cont abilify for voices. ROS: Patient has new complaints: no  Sleeping adequately:  Yes   Appetite adequate: Yes  Attending groups: No: some grps  Visitors:No    OBJECTIVE    Physical  VITALS:  BP 99/64   Pulse 61   Temp 98.9 °F (37.2 °C) (Temporal)   Resp 14   Ht 5' 5\" (1.651 m)   Wt 160 lb (72.6 kg)   LMP 07/03/2020   SpO2 98%   Breastfeeding No   BMI 26.63 kg/m²     Mental Status Examination:  Patients appearance was ill-appearing, hospital attire, lying in bed, fair grooming and fair hygiene. Thoughts are logical.  Homicidal ideations none. No abnormal movements, tics or mannerisms. Memory intact Aims 0. Concentration fair. Alert and oriented X 4. Insight and Judgement limited. Patient was cooperative.  Patient gait normal. Mood better affect flat affect Hallucinations present, suicidal ideations no specific plan to harm self Speech fluent and spontaneous    Data  Labs:   Admission on 07/18/2020   Component Date Value Ref Range Status    Ventricular Rate 07/18/2020 92  BPM Final    Atrial Rate 07/18/2020 92  BPM Final    P-R Interval 07/18/2020 136  ms Final    QRS Duration 07/18/2020 78  ms Final    Q-T Interval 07/18/2020 388  ms Final    QTc Calculation (Bazett) 07/18/2020 479  ms Final    P Axis 07/18/2020 75  degrees Final    R Axis 07/18/2020 47  degrees Final    T Axis 07/18/2020 54  degrees Final    Diagnosis 07/18/2020 Baseline artifactNormal sinus rhythmNonspecific T wave abnormalityConfirmed by Sasha hOara MD, [place] [id number] ([id number]) on 7/18/2020 11:34:41 AM   Final    SARS-CoV-2, NAAT 07/18/2020 Not Detected  Not Detected Final    Comment: Rapid NAAT:   Negative results should be treated as presumptive and,  if inconsistent with clinical signs and symptoms or necessary for  patient management, should be tested with an alternative molecular  assay. Negative results do not preclude SARS-CoV-2 infection and  should not be used as the sole basis for patient management decisions. This test has been authorized by the FDA under an Emergency Use  Authorization (EUA) for use by authorized laboratories.     Fact sheet for Healthcare Providers:  [email or web address]  Fact sheet for Patients: [email or web address]    METHODOLOGY: Isothermal Nucleic Acid Amplification      Hemoglobin A1C 07/19/2020 5.1  See comment % Final    Comment: Comment:  Diagnosis of Diabetes: > or = 6.5%  Increased risk of diabetes (Prediabetes): 5.7-6.4%  Glycemic Control: Nonpregnant Adults: <7.0%                    Pregnant: <6.0%        eAG 07/19/2020 99.7  mg/dL Final    TSH 07/19/2020 1.62  0.27 - 4.20 uIU/mL Final    Cholesterol, Total 07/19/2020 140  0 - 199 mg/dL Final    Triglycerides 07/19/2020 68  0 - 150 mg/dL Final    HDL 07/19/2020 50  40 - 60 mg/dL Final    LDL Calculated 07/19/2020 76  <100 mg/dL Final    VLDL Cholesterol Calculated 07/19/2020 14  Not Established mg/dL Final            Medications  Current Facility-Administered Medications: benzocaine (ORAJEL) 20 % mucosal gel, , Mouth/Throat, BID PRN  ARIPiprazole (ABILIFY) tablet 5 mg, 5 mg, Oral, Daily  DULoxetine (CYMBALTA) extended release capsule 30 mg, 30 mg, Oral, Daily  cetirizine (ZYRTEC) tablet 10 mg, 10 mg, Oral, Daily  hydrOXYzine (VISTARIL) capsule 50 mg, 50 mg, Oral, TID PRN  acetaminophen (TYLENOL) tablet 650 mg, 650 mg, Oral, Q4H PRN  ibuprofen (ADVIL;MOTRIN) tablet 400 mg, 400 mg, Oral, Q6H PRN  sterile water injection 2.1 mL, 2.1 mL, Intramuscular, Q4H PRN  traZODone (DESYREL) tablet 50 mg, 50 mg, Oral, Nightly PRN  benztropine mesylate (COGENTIN) injection 2 mg, 2 mg, Intramuscular, BID PRN  magnesium hydroxide (MILK OF MAGNESIA) 400 MG/5ML suspension 30 mL, 30 mL, Oral, Daily PRN  aluminum & magnesium hydroxide-simethicone (MAALOX) 200-200-20 MG/5ML suspension 30 mL, 30 mL, Oral, Q6H PRN    ASSESSMENT AND PLAN  cont cymbalta  Cont abilify    1. Patient s symptoms improving  2. Probable discharge is 2-3  days  3. Discharge planning is incomplete  4 Suicidal ideation is unchanged  5 total time 40 minutes  face to face and more than 50 % was spent coordinating care, exploring sx's and pharmacotherapy

## 2020-07-23 NOTE — PLAN OF CARE
Problem: Suicide risk  Goal: Provide patient with safe environment  Description: Provide patient with safe environment  Outcome: Ongoing     Problem: Depressive Behavior With or Without Suicide Precautions:  Goal: Able to verbalize and/or display a decrease in depressive symptoms  Description: Able to verbalize and/or display a decrease in depressive symptoms  Outcome: Ongoing    Pt has been pleasant with staff and cooperative. She denies SI/HI/AVH. She has remained isolative to her room. She was medication compliant. She requested prn Vistaril at 2211 for restlessness and anxiety. Medication was effective.  Electronically signed by Juancarlos Angel RN on 7/23/2020 at 2:33 AM

## 2020-07-23 NOTE — FLOWSHEET NOTE
Group Therapy Note    Date: 7/23/2020  Start Time: 1300  End Time:  1400  Number of Participants: 10    Type of Group: Music Group    Notes:  Pt present for part of Music Group, arriving 30 minutes late. While present, Pt sat quietly and listened. Pt left after 15 minutes in group and did not return.     Participation Level: Minimal    Participation Quality: Attentive      Speech:  hesitant      Affective Functioning: Blunted      Endings: None Reported    Modes of Intervention: Support, Socialization, Activity and Media      Discipline Responsible: Chaplain Suarez March 07/23/20 1432   Encounter Summary   Services provided to: Patient   Continue Visiting   (7/23 Music Group)   Complexity of Encounter Moderate   Length of Encounter 15 minutes

## 2020-07-24 PROCEDURE — 99233 SBSQ HOSP IP/OBS HIGH 50: CPT | Performed by: PSYCHIATRY & NEUROLOGY

## 2020-07-24 PROCEDURE — 6370000000 HC RX 637 (ALT 250 FOR IP): Performed by: PSYCHIATRY & NEUROLOGY

## 2020-07-24 PROCEDURE — 1240000000 HC EMOTIONAL WELLNESS R&B

## 2020-07-24 RX ORDER — BUPROPION HYDROCHLORIDE 150 MG/1
150 TABLET ORAL DAILY
Status: DISCONTINUED | OUTPATIENT
Start: 2020-07-24 | End: 2020-07-29 | Stop reason: HOSPADM

## 2020-07-24 RX ADMIN — HYDROXYZINE PAMOATE 50 MG: 50 CAPSULE ORAL at 20:57

## 2020-07-24 RX ADMIN — TRAZODONE HYDROCHLORIDE 50 MG: 50 TABLET ORAL at 20:57

## 2020-07-24 RX ADMIN — CETIRIZINE HYDROCHLORIDE 10 MG: 10 TABLET ORAL at 07:59

## 2020-07-24 RX ADMIN — ARIPIPRAZOLE 5 MG: 10 TABLET ORAL at 08:00

## 2020-07-24 RX ADMIN — DULOXETINE HYDROCHLORIDE 30 MG: 30 CAPSULE, DELAYED RELEASE ORAL at 08:00

## 2020-07-24 RX ADMIN — BUPROPION HYDROCHLORIDE 150 MG: 150 TABLET, EXTENDED RELEASE ORAL at 13:51

## 2020-07-24 ASSESSMENT — PAIN SCALES - GENERAL
PAINLEVEL_OUTOF10: 0
PAINLEVEL_OUTOF10: 0

## 2020-07-24 NOTE — BH NOTE
Writer was informed by Rosmery from [facility] that pt was not appropriate to their setting due to her level of [address] needs. Pt spoke with the [facility] and requested her assessments be faxed for review. Writer sent information.     Rudy Foster MSW, LSW

## 2020-07-24 NOTE — PROGRESS NOTES
Department of Psychiatry  Attending Progress Note  Chief Complaint: follow-up psychosis  Patient's chart was reviewed and collaborated with  about the treatment plan. SUBJECTIVE:    Patient is feeling better. Suicidal ideation:  denies suicidal ideation. Patient does not have medication side effects. Pt stated that she is feeling better but feels that she is tire and has some difficulty focusing. Pt wanted to be restarted on wellbutrin since she found it to be helpful for her concentration. Pt appears to be extremely motivated to go into treatment and appears more aware of the effects of illicit drugs in her life and especially psychiatric sx's she experience has consequence of her use. Pt stated that she cont to be motivated to attending drug treatment. We discussed cont abilify for voices. ROS: Patient has new complaints: no  Sleeping adequately:  Yes   Appetite adequate: Yes  Attending groups: No: some grps  Visitors:No    OBJECTIVE    Physical  VITALS:  /78   Pulse 73   Temp 98.2 °F (36.8 °C) (Temporal)   Resp 18   Ht 5' 5\" (1.651 m)   Wt 160 lb (72.6 kg)   LMP 07/03/2020   SpO2 98%   Breastfeeding No   BMI 26.63 kg/m²     Mental Status Examination:  Patients appearance was ill-appearing, hospital attire, lying in bed, good grooming and good hygiene. Thoughts are logical.  Homicidal ideations none. No abnormal movements, tics or mannerisms. Memory intact Aims 0. Concentration fair. Alert and oriented X 4. Insight and Judgement limited. Patient was cooperative.  Patient gait normal. Mood better affect more reactive affect Hallucinations denies suicidal ideations no specific plan to harm self Speech fluent and spontaneous    Data  Labs:   Admission on 07/18/2020   Component Date Value Ref Range Status    Ventricular Rate 07/18/2020 92  BPM Final    Atrial Rate 07/18/2020 92  BPM Final    P-R Interval 07/18/2020 136  ms Final    QRS Duration 07/18/2020 78  ms Final    Q-T PRN  ARIPiprazole (ABILIFY) tablet 5 mg, 5 mg, Oral, Daily  DULoxetine (CYMBALTA) extended release capsule 30 mg, 30 mg, Oral, Daily  cetirizine (ZYRTEC) tablet 10 mg, 10 mg, Oral, Daily  hydrOXYzine (VISTARIL) capsule 50 mg, 50 mg, Oral, TID PRN  acetaminophen (TYLENOL) tablet 650 mg, 650 mg, Oral, Q4H PRN  ibuprofen (ADVIL;MOTRIN) tablet 400 mg, 400 mg, Oral, Q6H PRN  sterile water injection 2.1 mL, 2.1 mL, Intramuscular, Q4H PRN  traZODone (DESYREL) tablet 50 mg, 50 mg, Oral, Nightly PRN  benztropine mesylate (COGENTIN) injection 2 mg, 2 mg, Intramuscular, BID PRN  magnesium hydroxide (MILK OF MAGNESIA) 400 MG/5ML suspension 30 mL, 30 mL, Oral, Daily PRN  aluminum & magnesium hydroxide-simethicone (MAALOX) 200-200-20 MG/5ML suspension 30 mL, 30 mL, Oral, Q6H PRN    ASSESSMENT AND PLAN  cont cymbalta  Cont abilify  Start wellbutrin xl    1. Patient s symptoms improving  2. Probable discharge is 2-3  days  3. Discharge planning is incomplete  4 Suicidal ideation is unchanged  5 total time 40 minutes  face to face and more than 50 % was spent coordinating care, exploring sx's and pharmacotherapy

## 2020-07-24 NOTE — GROUP NOTE
Group Therapy Note    Date: 7/24/2020    Group Start Time: 1110  Group End Time: 4194  Group Topic: Cognitive Skills    [facility]    Ashly Frazier, [facility]        Group Therapy Note    Attendees: 11       atient's Goal:  Pt's goal was to learn the 4 styles of communication; passive, aggressive, passive-aggressive, and assertive and learn communication skills; listening, nonverbal communication, clarity and concision, friendliness, confidence, empathy, open-mindedness, respect, feedback and picking the right medium. Notes:  Pt was not engaged in group and did not participate in group discussion. Pt did not meet goal.  Pt sat talking to another group member throughout group.       Status After Intervention:  Unchanged    Participation Level: Minimal    Participation Quality: Resistant      Speech:  hesitant      Thought Process/Content: Perseverating      Affective Functioning: Congruent      Mood: euthymic      Level of consciousness:  Preoccupied and Inattentive      Response to Learning: Resistant      Endings: None Reported    Modes of Intervention: Education, Support, Socialization, Exploration, Clarifying, Problem-solving, Activity, Movement, Confrontation, Limit-setting and Reality-testing      Discipline Responsible: /Counselor      Signature:  Ashly Frazier, [facility]

## 2020-07-24 NOTE — PLAN OF CARE
Problem: Depressive Behavior With or Without Suicide Precautions:  Goal: Able to verbalize and/or display a decrease in depressive symptoms  Description: Able to verbalize and/or display a decrease in depressive symptoms  Outcome: Ongoing   Melissa Ba was visible and limitedly social in the milieu this afternoon. She attended all of the morning groups. Melissa Ba was not accepted by [facility] for rehab. Melissa Ba is aware of this and is currently calling other rehabs. Melissa Ba denied suicidal and homicidal ideations this morning. She also denied hallucinations.

## 2020-07-24 NOTE — GROUP NOTE
Group Therapy Note    Date: 7/24/2020    Group Start Time: 1330  Group End Time: 1210  Group Topic: Psychoeducation    [address] [facility]        Group Therapy Note    Attendees: 10         Patient's Goal: Patients were invited to participate in an Art Therapy / Psycho-Education group. Patients were asked to pick group appropriate songs from a variety of genres to listen to and then were asked to discuss what the song meant to them, what lyrics stood out, and/or the emotional impact of the song. While listening to the music, patients were encouraged to create response art with art materials of their choosing. At the beginning and end of group, patients were invited to engage in a grounding technique with deep breathing to monitor how their energy or mood might have shifted in response to art making and music. Notes:  Maribel Ko selected to listen to the song, \"Shallow with [employer]" engaged in art making briefly, and then began working on her application to be admitted to [place] [address] and shared with therapist that it was an \"important step\" to her recovery. Status After Intervention:  Improved    Participation Level:  Active Listener and Interactive    Participation Quality: Appropriate, Attentive and Sharing      Speech:  normal      Thought Process/Content: Logical      Affective Functioning: Congruent      Mood: anxious      Level of consciousness:  Alert, Oriented x4 and Attentive      Response to Learning: Able to verbalize current knowledge/experience, Able to verbalize/acknowledge new learning, Able to retain information and Capable of insight      Endings: None Reported    Modes of Intervention: Education, Support, Socialization, Exploration, Activity and Media      Discipline Responsible: Psychoeducational Specialist      Signature:  Rosalio Louis, [address] [facility]

## 2020-07-25 PROCEDURE — 1240000000 HC EMOTIONAL WELLNESS R&B

## 2020-07-25 PROCEDURE — 6370000000 HC RX 637 (ALT 250 FOR IP): Performed by: PSYCHIATRY & NEUROLOGY

## 2020-07-25 PROCEDURE — 99233 SBSQ HOSP IP/OBS HIGH 50: CPT | Performed by: NURSE PRACTITIONER

## 2020-07-25 RX ADMIN — TRAZODONE HYDROCHLORIDE 50 MG: 50 TABLET ORAL at 21:41

## 2020-07-25 RX ADMIN — DULOXETINE HYDROCHLORIDE 30 MG: 30 CAPSULE, DELAYED RELEASE ORAL at 09:06

## 2020-07-25 RX ADMIN — IBUPROFEN 400 MG: 400 TABLET, FILM COATED ORAL at 09:08

## 2020-07-25 RX ADMIN — HYDROXYZINE PAMOATE 50 MG: 50 CAPSULE ORAL at 21:41

## 2020-07-25 RX ADMIN — CETIRIZINE HYDROCHLORIDE 10 MG: 10 TABLET ORAL at 09:06

## 2020-07-25 RX ADMIN — ARIPIPRAZOLE 5 MG: 10 TABLET ORAL at 09:06

## 2020-07-25 RX ADMIN — BUPROPION HYDROCHLORIDE 150 MG: 150 TABLET, EXTENDED RELEASE ORAL at 09:07

## 2020-07-25 RX ADMIN — IBUPROFEN 400 MG: 400 TABLET, FILM COATED ORAL at 21:41

## 2020-07-25 ASSESSMENT — PAIN SCALES - GENERAL
PAINLEVEL_OUTOF10: 0
PAINLEVEL_OUTOF10: 5
PAINLEVEL_OUTOF10: 5

## 2020-07-25 NOTE — PLAN OF CARE
Problem: Depressive Behavior With or Without Suicide Precautions:  Goal: Able to verbalize and/or display a decrease in depressive symptoms  Description: Able to verbalize and/or display a decrease in depressive symptoms  7/24/2020 2309 by Bishop Dilan RN  Outcome: Ongoing  Note: Pt has been pleasant and cooperative this shift. Denies SI/HI/AVH. Pt does not appear to be responding to internal stimuli. Pt has been visible and social on the unit. has  been attending and participating in groups. Pt has not required PRN medications.

## 2020-07-25 NOTE — GROUP NOTE
Group Therapy Note    Date: 7/24/2020    Group Start Time: 2030  Group End Time: 2055  Group Topic: Wrap-Up    [address] [facility]        Group Therapy Note    Attendees: Goals and importance of goal setting discussed. Night time milieu activities discussed. Patient's Goal:  Stay out of room more    Notes:  Successful     Status After Intervention:  Improved    Participation Level:  Active Listener and Interactive    Participation Quality: Appropriate and Attentive      Speech:  normal      Thought Process/Content: Logical  Linear      Affective Functioning: Flat      Mood: depressed      Level of consciousness:  Alert and Oriented x4      Response to Learning: Progressing to goal      Endings: None Reported    Modes of Intervention: Support      Discipline Responsible: Behavorial Health Tech      Signature:  Deidre Boyce

## 2020-07-25 NOTE — BH NOTE
None  Delusions: No  Delusions: Persecution  Memory:Normal: Yes  Memory: Confabulation, Poor Recent  Insight and Judgment: No  Insight and Judgment: Poor Insight, Poor Judgment  Present Suicidal Ideation: No  Present Homicidal Ideation: No    Daily Assessment Last Entry:   Daily Sleep (WDL): Within Defined Limits         Patient Currently in Pain: No  Daily Nutrition (WDL): Within Defined Limits    Patient Monitoring:  Frequency of Checks: 4 times per hour, close    Psychiatric Symptoms:   Depression Symptoms  Depression Symptoms: Impaired concentration, Isolative  Anxiety Symptoms  Anxiety Symptoms: Generalized  Elizabeth Symptoms  Elizabeth Symptoms: Poor judgment     Psychosis Symptoms  Delusion Type: No problems reported or observed. Suicide Risk CSSR-S:  1) Within the past month, have you wished you were dead or wished you could go to sleep and not wake up? : Yes  2) Have you actually had any thoughts of killing yourself? : Yes  3) Have you been thinking about how you might kill yourself? : Yes  5) Have you started to work out or worked out the details of how to kill yourself?  Do you intend to carry out this plan? : No  6) Have you ever done anything, started to do anything, or prepared to do anything to end your life?: No  Change in Result NO Change in Plan of care NO    EDUCATION:   Learner Progress Toward Treatment Goals: Reviewed results and recommendations of this team    Method: Individual    Outcome: Verbalized understanding    PATIENT GOALS: \" work on finding a treatment place\"    PLAN/TREATMENT RECOMMENDATIONS UPDATE:  Maintain safety, medication management     GOALS UPDATE:  Time frame for Short-Term Goals: by time of discharge       Valerie Erickson RN

## 2020-07-25 NOTE — GROUP NOTE
Group Therapy Note    Date: 7/25/2020    Group Start Time: 1617  Group End Time: 1140  Group Topic: Cognitive Skills    [address], [place]        Group Therapy Note    Attendees: 8         Patient's Goal:  Patient will increase skills to challenge negative thoughts     Notes:  Patient learned about CBT skills to challenge thoughts and to see things from the perspective of others before reacting in negative ways. Patient provided examples of people who cause her distress and learned how she can reframe her thoughts. She also discussed how communication can help her change her thoughts      Status After Intervention:  Improved    Participation Level:  Active Listener and Interactive    Participation Quality: Appropriate, Attentive and Sharing      Speech:  normal      Thought Process/Content: Logical      Affective Functioning: Congruent      Mood: anxious and depressed      Level of consciousness:  Alert and Oriented x4      Response to Learning: Able to verbalize current knowledge/experience and Progressing to goal      Endings: None Reported    Modes of Intervention: Education      Discipline Responsible: /Counselor      Signature:  [place] Keita

## 2020-07-25 NOTE — PLAN OF CARE
Problem: Pain:  Goal: Control of chronic pain  Description: Control of chronic pain  Outcome: Ongoing   Patient has been awake and visible on unit since start of shift. She denies any thoughts of harming self or others. She denies any hallucinations and does not appear to be RTIS. She has been compliant with taking medication. She has been calm and cooperative with staff. Attended and engaged in groups. Minimally social w/ peers, mostly isolative to self. Able to verbalize needs. Will continue to monitor for safety.

## 2020-07-25 NOTE — PROGRESS NOTES
Department of Psychiatry  Progress Note    Admission Date: 7/18/2020    Chief Complaint / Reason for Admission:  Psychosis and amphetamine abuse     Patient's chart was reviewed, case was discussed with nursing/OT/RT staff, and collaborated with  about the treatment plan. SUBJECTIVE:   Over last 24 hours:  Behavioral outbursts: No  Non-aggressive behavioral disturbance: No  Medication compliant: Yes  Need for seclusion/restraints: No  Sleeping adequately: Yes  Appetite adequate: Yes  Attending groups: Yes    BODØ has been visible on the unit, pleasant and cooperative during assessment. She reports that she was started on Wellbutrin yesterday and that this has been helpful. She notes that the paranoia and auditory hallucinations she was experiencing prior to admission have improved; has not heard them since being in the hospital. States \"in comparison from intake to now, I'm 1000% better\". She expressed interest in attending a rehab program post-discharge and she is hopeful to get custody back of her son.      Suicidal ideation: Denies   Homicidal ideation: Denies   Medication side effects: Denies     ROS: Patient has new complaints: No    Current Medications Ordered:   buPROPion  150 mg Oral Daily    ARIPiprazole  5 mg Oral Daily    DULoxetine  30 mg Oral Daily    cetirizine  10 mg Oral Daily      PRN Meds: benzocaine, hydrOXYzine, acetaminophen, ibuprofen, sterile water, traZODone, benztropine mesylate, magnesium hydroxide, aluminum & magnesium hydroxide-simethicone     Objective:     PE:    BP (!) 104/55   Pulse 54   Temp 98.4 °F (36.9 °C) (Temporal)   Resp 16   Ht 5' 5\" (1.651 m)   Wt 160 lb (72.6 kg)   LMP 07/03/2020   SpO2 97%   Breastfeeding No   BMI 26.63 kg/m²       Motor / Gait: WNL, normal gait and station    Mental Status Examination:    Appearance: WF, appears stated age, in chair, wearing personal attire, good grooming and hygiene   Behavior/Attitude Toward Examiner: Cooperative, attentive and good eye contact  Speech: Spontaneous, normal rate, normal volume and well articulated   Mood: \"1000% better\", euthymic  Affect: Mood congruent   Thought Processes: Logical, linear  Thought Content: Denies SI, denies HI, no delusions voiced, no obsessions  Perceptions: Denies AVH, did not appear to be RTIS  Attention: Intact  Abstraction: Intact  Cognition: Average ALLISON, alert and oriented to person, place, time, and situation, recall intact  Insight: Limited insight   Judgment: Limited judgment      LAB: Reviewed labs from last 24 hours      Dx:   Primary Psychiatric (DSM V) Diagnosis: Psychosis   Secondary Psychiatric (DSM V) Diagnoses: None   Chemical Dependency Diagnoses: Stimulant use disorder, history of opiate abuse   Active Medical Diagnoses: Asthma, transaminitis, hepatitis C    All conditions detailed above are being treated while patient is hospitalized. Tx Plan: Generally: prevent self injury/aggression towards others, stabilize mood/anxiety/psychotic/behavioral disturbance, establish/maintain aftercare, increase coping mechanisms, improve medication compliance. All conditions present on admission are being treated while pt is hospitalized. Legal Status: Voluntary    Primary Psychiatric Issues:   1. Psychosis   - Tolerating Abilify and Wellbutrin well. Will continue, no changes. Chemical Dependency Issues:  - Stimulant use disorder, amphetamine   - History of opiate abuse     Medical Problems:  Internal medicine has been consulted. Appreciate recs. Asthma  - denies any recent AE  - does not use any inhalers     Transaminitis  Hepatitis C  - reactive on previous panel  - encouraged to f/w PCP and see GI in the future.      Code Status: Full    Disposition:    - Housing: Lives with boyfriend and friends  - Current outpatient follow-up: None    Estimated Length of Stay: 7-10 days     Criteria for Discharge:  Not suicidal, not homicidal, not grossly psychotic, behavioral disturbance improved, sleeping well, mood/affect stable, eating well, aftercare arranged. Total face to face time with patient was 30 minutes and more than 50 % of that time was spent counseling the patient on their symptoms, treatment and expected goals.     Fritz Martinez MPH, APRN, PMHNP-BC  07/25/20

## 2020-07-25 NOTE — PLAN OF CARE
Problem: Pain:  Goal: Control of chronic pain  Description: Control of chronic pain  Outcome: Ongoing   Patient c/o chronic tooth pain, right side. Rates pain 5/10 with a stated pain goal of 3/10. PRN motrin 400mg PO given. Will continue to monitor efficacy of medication.

## 2020-07-26 PROCEDURE — 1240000000 HC EMOTIONAL WELLNESS R&B

## 2020-07-26 PROCEDURE — 6370000000 HC RX 637 (ALT 250 FOR IP): Performed by: PSYCHIATRY & NEUROLOGY

## 2020-07-26 RX ADMIN — IBUPROFEN 400 MG: 400 TABLET, FILM COATED ORAL at 21:48

## 2020-07-26 RX ADMIN — HYDROXYZINE PAMOATE 50 MG: 50 CAPSULE ORAL at 21:48

## 2020-07-26 RX ADMIN — IBUPROFEN 400 MG: 400 TABLET, FILM COATED ORAL at 12:38

## 2020-07-26 RX ADMIN — DULOXETINE HYDROCHLORIDE 30 MG: 30 CAPSULE, DELAYED RELEASE ORAL at 08:11

## 2020-07-26 RX ADMIN — CETIRIZINE HYDROCHLORIDE 10 MG: 10 TABLET ORAL at 08:11

## 2020-07-26 RX ADMIN — ARIPIPRAZOLE 5 MG: 10 TABLET ORAL at 08:11

## 2020-07-26 RX ADMIN — BUPROPION HYDROCHLORIDE 150 MG: 150 TABLET, EXTENDED RELEASE ORAL at 08:11

## 2020-07-26 ASSESSMENT — PAIN SCALES - GENERAL
PAINLEVEL_OUTOF10: 5
PAINLEVEL_OUTOF10: 4

## 2020-07-26 NOTE — PLAN OF CARE
Problem: Depressive Behavior With or Without Suicide Precautions:  Goal: Able to verbalize and/or display a decrease in depressive symptoms  Description: Able to verbalize and/or display a decrease in depressive symptoms  Outcome: Ongoing   Sohamriirma Irizarry has been out in the day room and social with peers. Patient attended group and denies current SI/HI. Denies A/V/H. PRN Trazodone given as requested for sleep, PRN Ibuprofen given as requested for tooth pain and PRN Vistaril given as requested for anxiety. Medications effective.

## 2020-07-26 NOTE — GROUP NOTE
Group Therapy Note    Date: 7/25/2020    Group Start Time: 2045  Group End Time: 2115  Group Topic: Wrap-Up    [facility]        Group Therapy Note    Attendees: Goals and importance of goal setting discussed. Night time milieu activities discussed.          Patient's Goal:  Stay positive, stay active    Notes:  Successful     Status After Intervention:  Improved    Participation Level: Interactive    Participation Quality: Appropriate and Attentive      Speech:  normal      Thought Process/Content: Logical  Linear      Affective Functioning: Flat      Mood: anxious      Level of consciousness:  Alert and Oriented x4      Response to Learning: Progressing to goal      Endings: None Reported    Modes of Intervention: Support      Discipline Responsible: Novatek      Signature:  Juan Caba

## 2020-07-26 NOTE — GROUP NOTE
Group Therapy Note    Date: 7/26/2020    Group Start Time: 1:00 PM  Group End Time: 2:00 PM  Group Topic: Healthy Living/Wellness    [facility]        Group Therapy Note    Attendees: 10       Patient's Goal:  Pt will learn about the Eight Dimensions of Wellness and participate in discussion of how they can improve their overall wellness. Pt will also be given a goal sheet handout. With the handout they can set 1 month, 1 year, and 5 year goals for each dimension of wellness.      Notes: Pt participated and was appropriate in group. Pt was very pleasant. Status After Intervention:  Improved    Participation Level:  Active Listener and Interactive    Participation Quality: Appropriate, Attentive, Sharing and Supportive      Speech:  normal      Thought Process/Content: Logical      Affective Functioning: Congruent      Mood: euthymic      Level of consciousness:  Alert and Oriented x4      Response to Learning: Able to verbalize current knowledge/experience, Able to verbalize/acknowledge new learning, Capable of insight and Progressing to goal      Endings: None Reported    Modes of Intervention: Education, Socialization and Activity      Discipline Responsible: /Counselor      Signature:  LAURA Luo

## 2020-07-27 PROBLEM — F19.959 SUBSTANCE-INDUCED PSYCHOTIC DISORDER (HCC): Status: ACTIVE | Noted: 2020-07-27

## 2020-07-27 PROBLEM — F29 PSYCHOSIS (HCC): Status: RESOLVED | Noted: 2020-07-19 | Resolved: 2020-07-27

## 2020-07-27 PROBLEM — F15.20 AMPHETAMINE USE DISORDER, SEVERE, IN CONTROLLED ENVIRONMENT (HCC): Status: ACTIVE | Noted: 2018-09-23

## 2020-07-27 PROCEDURE — 99233 SBSQ HOSP IP/OBS HIGH 50: CPT | Performed by: PSYCHIATRY & NEUROLOGY

## 2020-07-27 PROCEDURE — 6370000000 HC RX 637 (ALT 250 FOR IP): Performed by: PSYCHIATRY & NEUROLOGY

## 2020-07-27 PROCEDURE — 1240000000 HC EMOTIONAL WELLNESS R&B

## 2020-07-27 RX ORDER — DULOXETIN HYDROCHLORIDE 30 MG/1
30 CAPSULE, DELAYED RELEASE ORAL 2 TIMES DAILY
Status: DISCONTINUED | OUTPATIENT
Start: 2020-07-27 | End: 2020-07-29 | Stop reason: HOSPADM

## 2020-07-27 RX ADMIN — HYDROXYZINE PAMOATE 50 MG: 50 CAPSULE ORAL at 19:48

## 2020-07-27 RX ADMIN — DULOXETINE HYDROCHLORIDE 30 MG: 30 CAPSULE, DELAYED RELEASE ORAL at 08:34

## 2020-07-27 RX ADMIN — DULOXETINE HYDROCHLORIDE 30 MG: 30 CAPSULE, DELAYED RELEASE ORAL at 22:03

## 2020-07-27 RX ADMIN — IBUPROFEN 400 MG: 400 TABLET, FILM COATED ORAL at 19:48

## 2020-07-27 RX ADMIN — ARIPIPRAZOLE 5 MG: 10 TABLET ORAL at 08:34

## 2020-07-27 RX ADMIN — BUPROPION HYDROCHLORIDE 150 MG: 150 TABLET, EXTENDED RELEASE ORAL at 08:34

## 2020-07-27 RX ADMIN — CETIRIZINE HYDROCHLORIDE 10 MG: 10 TABLET ORAL at 08:34

## 2020-07-27 ASSESSMENT — PAIN SCALES - GENERAL: PAINLEVEL_OUTOF10: 6

## 2020-07-27 NOTE — PROGRESS NOTES
Nutrition Assessment     Type and Reason for Visit: Initial(LOS x 9 days)    Nutrition Recommendations/Plan:   1. Continue general diet order - please document meal intake % in flow sheets for each meal.   2. Monitor appetite and po intake. 3. Please obtain actual, current weight for this patient. Nutrition Assessment:  patient was nutritionally compromised upon admission AEB amphetamine use d/o and substance-induced psychotic d/o, however, patient has improved from a nutritional standpoint AEB consuming adequate nutrition during this admission and she is no longer at risk for nutritional compromise; will continue general diet order and monitor nutrition status    Malnutrition Assessment:  Malnutrition Status: No malnutrition    Estimated Daily Nutrient Needs:  Energy (kcal): 1679 - 1825 kcals based on 23-25 kcals/kg/CBW; Weight Used for Energy Requirements:  Current     Protein (g): 46 - 57 g protein based on 0.8-1.0 g/kg/IBW; Weight Used for Protein Requirements:  Ideal        Fluid (ml/day): 1600 - 1800 ml; Weight Used for Fluid Requirements:  Current      Nutrition Related Findings: patient is A & O x 4; patient's mental status has improved r/t sobriety from amphetamines; patient plans to go to rehab at d/; she is consuming her meals; patient is calm and cooperative with care      Current Nutrition Therapies:    DIET GENERAL;     Anthropometric Measures:  · Height: 5' 5\" (165.1 cm)  · Current Body Wt: 160 lb (72.6 kg)(stated weight from 7/18/20)   · BMI: 26.6    Nutrition Diagnosis:   No nutrition diagnosis at this time     Nutrition Interventions:   Food and/or Nutrient Delivery:  Continue Current Diet  Nutrition Education/Counseling:  No recommendation at this time   Coordination of Nutrition Care:  No recommendation at this time    Goals:  patient will consume > 75% of her meals on general diet order x 3 meals per day       Nutrition Monitoring and Evaluation:   Behavioral-Environmental Outcomes: Beliefs and Attitutes, Knowledge or Skill, Readiness for Change   Food/Nutrient Intake Outcomes:  Food and Nutrient Intake  Physical Signs/Symptoms Outcomes:  Meal Time Behavior, Weight     Discharge Planning:    No discharge needs at this time     Electronically signed by Adenike Longoria RD, LD on 7/27/20 at 12:45 PM EDT    Contact: [phone]

## 2020-07-27 NOTE — BH NOTE
Dr. Tyra Nickerson wanted to increase patient's Cymbalta to BID and discontinue the abilify. Orders placed in Epic.

## 2020-07-27 NOTE — GROUP NOTE
Group Therapy Note    Date: 7/27/2020    Group Start Time: 1110  Group End Time: 1200  [facility]        [facility]    Attendees: 10         Patient's Goal:  Patient will complete worksheet on Dependency and will discuss how over-reliance on others' approval effects mental health. Notes:  Patient attended group. Completed the worksheet and discussed in group. Talked about her tendency to people please and explored her motives. Received feedback from peers. Status After Intervention:  Improved    Participation Level:  Active Listener and Interactive    Participation Quality: Appropriate and Attentive    Speech:  normal    Thought Process/Content: Logical    Affective Functioning: Congruent    Mood: anxious, depressed     Level of consciousness:  Oriented x4    Response to Learning: Able to verbalize current knowledge/experience and Able to verbalize/acknowledge new learning    Endings: None Reported    Modes of Intervention: Education, Support, Socialization and Exploration    Discipline Responsible: /Counselor    Signature:  Jai Parmar, [facility]

## 2020-07-27 NOTE — PROGRESS NOTES
Substance-induced psychotic disorder ([facility] [address]  Resolved Problems:    Psychosis (HCC)    Fatigue       1. Adult [facility] admission for stabilization. 2. On admission, started Cymbalta, Wellbutrin, and Abilify. Ordered q15min checks for safety, programming, and prn medication for anxiety, agitation, and insomnia. 7/27/2020 - DC Abilify. Increase Cymbalta to 30mg BID. Continue Wellbutrin. 3. Internal medicine consult for admission. Asthma  - denies any recent AE  - does not use any inhalers     Transaminitis  Hepatitis C  - reactive on previous panel  - encouraged to f/w PCP and see GI in the future    4. Collateral information for care coordination. 5. ELOS 7-10 days. [facility] DC Wednesday. Total time with patient was 35 minutes and more than 50 % of that time was spent counseling the patient on their symptoms, treatment, and expected goals.        Rita Mackey MD, [address] [place] [address],[address] Floor, [place]

## 2020-07-27 NOTE — PLAN OF CARE
Problem: Depressive Behavior With or Without Suicide Precautions:  Goal: Able to verbalize and/or display a decrease in depressive symptoms  Description: Able to verbalize and/or display a decrease in depressive symptoms  Outcome: Ongoing     Problem: Depressive Behavior With or Without Suicide Precautions:  Goal: Ability to disclose and discuss suicidal ideas will improve  Description: Ability to disclose and discuss suicidal ideas will improve  Outcome: Ongoing     Problem: Altered Mood, Psychotic Behavior:  Goal: Able to verbalize decrease in frequency and intensity of hallucinations  Description: Able to verbalize decrease in frequency and intensity of hallucinations  Outcome: Ongoing     Problem: Altered Mood, Psychotic Behavior:  Goal: Ability to interact with others will improve  Description: Ability to interact with others will improve  Outcome: Ongoing     Problem: Altered Mood, Psychotic Behavior:  Goal: Compliance with prescribed medication regimen will improve  Description: Compliance with prescribed medication regimen will improve  Outcome: Ongoing  BODØ is brighter and more social with shift. Patient has been interacting more. BODØ has been medication compliant and currently denies SI and HI. Will continue to monitor for  safety.

## 2020-07-27 NOTE — GROUP NOTE
Group Therapy Note    Date: 7/27/2020    Group Start Time: 1000  Group End Time: 4621  Group Topic: Psychoeducation    [facility]        Group Therapy Note    Attendees: 11    Patient's Goal: to participate in a [employer] Class to improve teamwork skills, mobility, mood, self-esteem, and overall health, wellness, and quality of life. Notes: Stoney Saha was approximately 35 minutes late to group. Stoney Saha actively participated in a [employer] Class. Stoney Saha completed all movement directives. Stoney Saha voiced understanding of education topic. Status After Intervention:  Improved    Participation Level:  Active Listener and Interactive    Participation Quality: Appropriate and Attentive      Speech:  normal      Thought Process/Content: Linear      Affective Functioning: Congruent      Mood: depressed      Level of consciousness:  Alert and Attentive      Response to Learning: Able to verbalize current knowledge/experience, Able to verbalize/acknowledge new learning and Progressing to goal      Endings: None Reported    Modes of Intervention: Education, Support, Socialization, Exploration, Clarifying, Problem-solving, Activity and Movement      Discipline Responsible: Psychoeducational Specialist      Signature:  Genny Cabrera, [address]

## 2020-07-27 NOTE — PLAN OF CARE
Problem: Depressive Behavior With or Without Suicide Precautions:  Goal: Ability to disclose and discuss suicidal ideas will improve  Description: Ability to disclose and discuss suicidal ideas will improve  Outcome: Ongoing   Racquel Murillo was visible and social in the milieu this evening. She attended the evening groups. Racquel Murillo stated that she called six rehab places on Friday and even sent paperwork to [facility] on Friday. Racquel Murillo states she is excited about [facility] because they are also working with her son which is in foster care at this time. She stated that if she goes to them, visits will be easier to schedule. Racquel Murillo denied suicidal and homicidal ideations this evening. She denied hallucinations also.

## 2020-07-28 PROCEDURE — 99233 SBSQ HOSP IP/OBS HIGH 50: CPT | Performed by: PSYCHIATRY & NEUROLOGY

## 2020-07-28 PROCEDURE — 1240000000 HC EMOTIONAL WELLNESS R&B

## 2020-07-28 PROCEDURE — 6370000000 HC RX 637 (ALT 250 FOR IP): Performed by: PSYCHIATRY & NEUROLOGY

## 2020-07-28 RX ORDER — DULOXETIN HYDROCHLORIDE 30 MG/1
30 CAPSULE, DELAYED RELEASE ORAL 2 TIMES DAILY
Qty: 60 CAPSULE | Refills: 0 | Status: ON HOLD | OUTPATIENT
Start: 2020-07-28 | End: 2021-06-27

## 2020-07-28 RX ORDER — BUPROPION HYDROCHLORIDE 150 MG/1
150 TABLET ORAL DAILY
Qty: 30 TABLET | Refills: 0 | Status: ON HOLD | OUTPATIENT
Start: 2020-07-29 | End: 2021-06-27

## 2020-07-28 RX ORDER — CETIRIZINE HYDROCHLORIDE 10 MG/1
10 TABLET ORAL DAILY
Qty: 30 TABLET | Refills: 0 | Status: SHIPPED | OUTPATIENT
Start: 2020-07-28 | End: 2020-08-27

## 2020-07-28 RX ADMIN — DULOXETINE HYDROCHLORIDE 30 MG: 30 CAPSULE, DELAYED RELEASE ORAL at 21:48

## 2020-07-28 RX ADMIN — CETIRIZINE HYDROCHLORIDE 10 MG: 10 TABLET ORAL at 08:28

## 2020-07-28 RX ADMIN — BUPROPION HYDROCHLORIDE 150 MG: 150 TABLET, EXTENDED RELEASE ORAL at 08:28

## 2020-07-28 RX ADMIN — DULOXETINE HYDROCHLORIDE 30 MG: 30 CAPSULE, DELAYED RELEASE ORAL at 08:28

## 2020-07-28 RX ADMIN — HYDROXYZINE PAMOATE 50 MG: 50 CAPSULE ORAL at 14:54

## 2020-07-28 RX ADMIN — ACETAMINOPHEN 650 MG: 325 TABLET ORAL at 23:10

## 2020-07-28 RX ADMIN — HYDROXYZINE PAMOATE 50 MG: 50 CAPSULE ORAL at 21:48

## 2020-07-28 ASSESSMENT — PAIN SCALES - GENERAL
PAINLEVEL_OUTOF10: 0
PAINLEVEL_OUTOF10: 6

## 2020-07-28 NOTE — GROUP NOTE
Group Therapy Note    Date: 7/28/2020    Group Start Time: 1115  Group End Time: 1200  Group Topic: Psychoeducation    [facility]    [place], [facility]    Group Therapy Note    Attendees: 9    Patients reviewed communication skills and were provided more psychoeducation handouts on \"I\" statements per patients requests in earlier group. Patients discussed using \"I\" statements and ways they could use this skill in their communication. Then, patients reviewed the wellness recovery action plan handout and were encouraged to continue to work on their wellness recovery action plan after group. Notes:  Pt was actively engaged in group as she gave supportive feedback to peer and shared her knowledge from previous groups. Status After Intervention:  Improved    Participation Level:  Active Listener and Interactive    Participation Quality: Appropriate      Speech:  normal      Thought Process/Content: Linear      Affective Functioning: Flat and Constricted/Restricted      Mood: anxious and depressed      Level of consciousness:  Alert and Oriented x4      Response to Learning: Able to verbalize current knowledge/experience, Able to verbalize/acknowledge new learning and Able to retain information      Endings: None Reported    Modes of Intervention: Education, Support, Socialization, Exploration, Clarifying and Problem-solving      Discipline Responsible: /Counselor      Signature:  [place] Mckeon, BEE-S, R-LALITA

## 2020-07-28 NOTE — PROGRESS NOTES
Department of Psychiatry  Progress Note    Patient's chart was reviewed. Discussed with treatment team. Met with patient. SUBJECTIVE:      Continues to make and maintain gains. Mood stabilizing; psychotic symptoms resolved. Tolerating increased dose of Cymbalta well and without side effects. Discussed DC tomorrow if maintains gains. ROS:   Patient has new complaints: no  Sleeping adequately:  Yes   Appetite adequate: Yes  Engaged in programming: Yes    OBJECTIVE:  VITALS:  /76   Pulse 68   Temp 98.4 °F (36.9 °C) (Temporal)   Resp 18   Ht 5' 5\" (1.651 m)   Wt 160 lb (72.6 kg)   LMP 07/03/2020   SpO2 95%   Breastfeeding No   BMI 26.63 kg/m²     Mental Status Examination:    Appearance: fair grooming and hygiene  Behavior/Attitude toward examiner:  cooperative, attentive and fair eye contact  Speech: Normal rate, volume, amount  Mood:  \"better\"  Affect:  blunted     Thought processes:  Goal directed, linear, no SVETLANA or gross disorganization  Thought Content: no SI, no HI, no delusions voiced, no obsessions  Perceptions: no AVH  Attention: attention span and concentration were intact to interview   Abstraction: intact  Cognition:  Alert and oriented to person, place, time, and situation, recall intact  Insight: improved   Judgment: improved     Medication:  Scheduled:   DULoxetine  30 mg Oral BID    buPROPion  150 mg Oral Daily    cetirizine  10 mg Oral Daily        PRN:  benzocaine, hydrOXYzine, acetaminophen, ibuprofen, sterile water, traZODone, benztropine mesylate, magnesium hydroxide, aluminum & magnesium hydroxide-simethicone     ASSESSMENT AND PLAN:    Principal Problem:    MDD (major depressive disorder), recurrent episode ([address]  Active Problems:    Amphetamine use disorder, severe, in controlled environment ([address]    Chronic hepatitis C without hepatic coma ([address]    Substance-induced psychotic disorder ([address]  Resolved Problems:    Psychosis ([address]    Fatigue       1.  Adult [facility] admission for stabilization. 2. On admission, started Cymbalta, Wellbutrin, and Abilify. Ordered q15min checks for safety, programming, and prn medication for anxiety, agitation, and insomnia. 7/27/2020 - Discontinued Abilify. Increased Cymbalta to 30mg BID. Continued Wellbutrin. 3. Internal medicine consult for admission. Asthma  - denies any recent AE  - does not use any inhalers     Transaminitis  Hepatitis C  - reactive on previous panel  - encouraged to f/w PCP and see GI in the future    4. Collateral information for care coordination. 5. ELOS 7-10 days. [facility] DC tomorrow if maintains gains. Total time with patient was 35 minutes and more than 50 % of that time was spent counseling the patient on their symptoms, treatment, and expected goals.        Ankush Reid MD, [address] [place] [address],Third Floor, [place]

## 2020-07-28 NOTE — BH NOTE
Group Therapy Note    Date: 7/27/2020  Start Time: 20:00  End Time:  21:00  Number of Participants: 10    Type of Group: Recreational  Wrap up    Wellness Binder Information  Module Name:  /  Session Number:  /    Patient's Goal:  D/c planning     Notes:  Continuing tpo work on goal    Status After Intervention:  Unchanged    Participation Level:  Active Listener and Interactive    Participation Quality: Appropriate and Attentive      Speech:  normal      Thought Process/Content: Logical      Affective Functioning: Blunted      Mood: stable      Level of consciousness:  Alert and Attentive      Response to Learning: Able to change behavior      Endings: None Reported    Modes of Intervention: Socialization and Problem-solving      Discipline Responsible: Behavorial Health Tech      Signature:  Memory Kishore

## 2020-07-28 NOTE — GROUP NOTE
Group Therapy Note    Date: 7/28/2020    Group Start Time: 1000  Group End Time: 1100  Group Topic: Psychoeducation    [facility]        Group Therapy Note    Attendees: 10         Patient's Goal:  Patients were invited to participate in an Art Therapy / Psycho-Education group using a narrative therapy activity. Patients were encouraged to share what they needed from group and then, using an art material of their choosing, to reflect upon a time in their past when they overcame an obstacle or a challenge and to depict that time. Towards the end of session, patients were encouraged to share their art and to reflect on the personal strengths and values they used to overcome previous challenges and how they could apply them today    Notes:  Stoney Saha identified her goal during group was to [employer] more light, spiritual light,\" participated in art making, and listened as peers shared their artwork. Stoney Saha shared that she was looking forward to getting discharged into a recovery program from the hospital.    Status After Intervention:  Improved    Participation Level:  Active Listener and Interactive    Participation Quality: Appropriate, Attentive, Sharing and Supportive      Speech:  normal      Thought Process/Content: Logical      Affective Functioning: Congruent      Mood: euthymic      Level of consciousness:  Alert, Oriented x4 and Attentive      Response to Learning: Able to verbalize current knowledge/experience, Able to verbalize/acknowledge new learning, Able to retain information and Capable of insight      Endings: None Reported    Modes of Intervention: Education, Support, Socialization, Exploration, Activity and Media      Discipline Responsible: Psychoeducational Specialist      Signature:  Alessandra Tracey, [facility]

## 2020-07-28 NOTE — PLAN OF CARE
Problem: Depressive Behavior With or Without Suicide Precautions:  Goal: Able to verbalize and/or display a decrease in depressive symptoms  Description: Able to verbalize and/or display a decrease in depressive symptoms  7/28/2020 0148 by Adria Mae RN  Outcome: Ongoing     Problem: Altered Mood, Psychotic Behavior:  Goal: Ability to interact with others will improve  Description: Ability to interact with others will improve  7/28/2020 0148 by Adria Mae RN  Outcome: Ongoing   Pt has been out on the unit the majority of the evening and attended group. Denies any SI and contracted for safety. Denies any psychotic sx. Interacting a bit more with others. Pt has been friendlier with others. States she feels more hopeful. States her depression is \"good. \"   Pt given  mg for neck ache and Vistaril 50 mg for anxiety at 191. Both meds seemed to be effective.

## 2020-07-28 NOTE — PLAN OF CARE
Problem: Depressive Behavior With or Without Suicide Precautions:  Goal: Ability to disclose and discuss suicidal ideas will improve  Description: Ability to disclose and discuss suicidal ideas will improve  Outcome: Ongoing  Note: Patient denies SI/HI. States that she will inform staff if she begins to have thoughts of wanting to harm herself. Visible at the start of the shift. Bright and pleasant during conversation. States that she set up an appointment for tomorrow morning for [facility]. Attended community group this morning. Sitting in the day room at this time apart from peers. Denies any needs at this time.  Will continue to monitor

## 2020-07-28 NOTE — BH NOTE
Patient came to desk requesting medication for anxiety. Patient states anxiety is a 5 out of 10 at this time. Patient concerned about getting her belongings from her mother so that she will be ready for discharge to rehab treatment program tomorrow. Patient given Vistaril 50 mg per order. Will continue to monitor.

## 2020-07-29 VITALS
RESPIRATION RATE: 16 BRPM | HEART RATE: 67 BPM | SYSTOLIC BLOOD PRESSURE: 122 MMHG | TEMPERATURE: 97.1 F | BODY MASS INDEX: 26.66 KG/M2 | WEIGHT: 160 LBS | OXYGEN SATURATION: 97 % | HEIGHT: 65 IN | DIASTOLIC BLOOD PRESSURE: 76 MMHG

## 2020-07-29 PROCEDURE — 6370000000 HC RX 637 (ALT 250 FOR IP): Performed by: PSYCHIATRY & NEUROLOGY

## 2020-07-29 PROCEDURE — 99239 HOSP IP/OBS DSCHRG MGMT >30: CPT | Performed by: PSYCHIATRY & NEUROLOGY

## 2020-07-29 PROCEDURE — 5130000000 HC BRIDGE APPOINTMENT

## 2020-07-29 RX ADMIN — ACETAMINOPHEN 650 MG: 325 TABLET ORAL at 06:54

## 2020-07-29 RX ADMIN — BUPROPION HYDROCHLORIDE 150 MG: 150 TABLET, EXTENDED RELEASE ORAL at 08:19

## 2020-07-29 RX ADMIN — CETIRIZINE HYDROCHLORIDE 10 MG: 10 TABLET ORAL at 08:19

## 2020-07-29 RX ADMIN — DULOXETINE HYDROCHLORIDE 30 MG: 30 CAPSULE, DELAYED RELEASE ORAL at 08:19

## 2020-07-29 ASSESSMENT — PAIN SCALES - GENERAL
PAINLEVEL_OUTOF10: 0
PAINLEVEL_OUTOF10: 4

## 2020-07-29 NOTE — PLAN OF CARE
Problem: Depressive Behavior With or Without Suicide Precautions:  Goal: Able to verbalize and/or display a decrease in depressive symptoms  Description: Able to verbalize and/or display a decrease in depressive symptoms  Outcome: Ongoing     Problem: Depressive Behavior With or Without Suicide Precautions:  Goal: Ability to disclose and discuss suicidal ideas will improve  Description: Ability to disclose and discuss suicidal ideas will improve  Outcome: Ongoing     Problem: Altered Mood, Psychotic Behavior:  Goal: Able to verbalize decrease in frequency and intensity of hallucinations  Description: Able to verbalize decrease in frequency and intensity of hallucinations  Outcome: Completed   Pt is looking forward to going to treatment tomorrow. She is deny any psychotic sx. Denies SI. Contracts for safety. Brighter. Seems hopeful. She is med compliant, attends groups, and is more interactive with peers. Pt given Vistaril 50 mg po for anxiety and sleep at 2148 with good results.
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
## 2020-07-29 NOTE — BH NOTE
Group Therapy Note    Date: 7/28/2020  Start Time: 20:00  End Time:  21:00  Number of Participants: 10    Type of Group: Recreational  Wrap up    Madhu Zuñiga Information  Module Name:  /  Session Number:  /    Patient's Goal:  D/c planning    Notes:  Goal completed    Status After Intervention:  Unchanged    Participation Level:  Active Listener and Interactive    Participation Quality: Appropriate and Attentive      Speech:  normal      Thought Process/Content: Logical      Affective Functioning: Blunted      Mood: stable      Level of consciousness:  Alert and Attentive      Response to Learning: Able to change behavior      Endings: None Reported    Modes of Intervention: Socialization and Problem-solving      Discipline Responsible: Behavorial Health Tech      Signature:  Lang Edmond

## 2020-07-29 NOTE — BH NOTE
and agreement with the discharge plan using the teachback method.      Referral for Outpatient Tobacco Cessation Counseling, upon discharge (david X if applicable and completed):    ( )  Hospital staff assisted patient to call Quit Line or faxed referral during hospitalization                  (X)  Recognizing danger situations (included triggers and roadblocks), if not completed on admission     (X)  Coping skills (new ways to manage stress, exercise, relaxation techniques, changing routine, distraction), if not completed on admission                                                           (X)  Basic information about quitting (benefits of quitting, techniques in how to quit, available resources, if not completed on admission  ( ) Referral for counseling faxed to [facility]   ( ) Patient refused referral  ( ) Patient refused counseling  ( ) Patient refused smoking cessation medication upon discharge    Vaccinations (david X if applicable and completed):  ( ) Patient states already received influenza vaccine elsewhere  ( ) Patient received influenza vaccine during this hospitalization  ( ) Patient refused influenza vaccine at this time  (X) not influenza season

## 2020-08-21 ENCOUNTER — OFFICE VISIT (OUTPATIENT)
Dept: ENT CLINIC | Age: 35
End: 2020-08-21
Payer: COMMERCIAL

## 2020-08-21 VITALS
HEIGHT: 65 IN | DIASTOLIC BLOOD PRESSURE: 74 MMHG | BODY MASS INDEX: 28.66 KG/M2 | WEIGHT: 172 LBS | SYSTOLIC BLOOD PRESSURE: 114 MMHG | HEART RATE: 83 BPM | TEMPERATURE: 97.6 F

## 2020-08-21 PROCEDURE — 99203 OFFICE O/P NEW LOW 30 MIN: CPT | Performed by: OTOLARYNGOLOGY

## 2020-08-21 PROCEDURE — G8428 CUR MEDS NOT DOCUMENT: HCPCS | Performed by: OTOLARYNGOLOGY

## 2020-08-21 PROCEDURE — 1111F DSCHRG MED/CURRENT MED MERGE: CPT | Performed by: OTOLARYNGOLOGY

## 2020-08-21 PROCEDURE — 4004F PT TOBACCO SCREEN RCVD TLK: CPT | Performed by: OTOLARYNGOLOGY

## 2020-08-21 PROCEDURE — G8419 CALC BMI OUT NRM PARAM NOF/U: HCPCS | Performed by: OTOLARYNGOLOGY

## 2020-08-21 PROCEDURE — 69200 CLEAR OUTER EAR CANAL: CPT | Performed by: OTOLARYNGOLOGY

## 2020-08-21 ASSESSMENT — ENCOUNTER SYMPTOMS
VOICE CHANGE: 0
EYE PAIN: 0
SINUS PAIN: 0
STRIDOR: 0
PHOTOPHOBIA: 0
SORE THROAT: 0
NAUSEA: 0
SHORTNESS OF BREATH: 0
COUGH: 0
SINUS PRESSURE: 0
TROUBLE SWALLOWING: 0
EYE REDNESS: 0
COLOR CHANGE: 0
EYE ITCHING: 0
FACIAL SWELLING: 0
CHOKING: 0
RHINORRHEA: 0
DIARRHEA: 0

## 2020-08-21 NOTE — PROGRESS NOTES
[facility]  [facility] [place], [address] [address]  P: [phone]  F: [phone]       Patient     Vasquez Burdick  1985    ChiefComplaint     Chief Complaint   Patient presents with    Ear Problem     Patient is here today because she has a crystal in here right ear    Headache     Patient also wanted to mention that she gets migrains, she takes topamax which helps most of the time but when the pain gets really bad she will have pain in the back of her eyes       History of Present Illness     Vasquez Burdick is a pleasant 38 y.o. female who presents as a new patient referred by the emergency department for foreign body in the right ear canal.  She has noted some diminished hearing for the past month or so. She was wearing earrings during sleep and 1 of the pieces of the earring fell off and went into her ear canal.  She denies any tinnitus, otalgia, otorrhea, spinning sensation. She denies any history of ear infections or ear surgeries. Denies any family history of hearing problems. She does also have a torn earlobe from a hoop earring. This has been present for years. She is interested in having this repaired.     Past Medical History     Past Medical History:   Diagnosis Date    Anxiety     Chronic hepatitis C without hepatic coma (HCC)     Chronic post-traumatic stress disorder (PTSD) 10/29/2018    Depression     Hepatitis C 2020    Mild intermittent asthma without complication     Stimulant use disorder 2018       Past Surgical History     Past Surgical History:   Procedure Laterality Date     SECTION  2005    DILATION AND CURETTAGE OF UTERUS  2006           Family History     Family History   Problem Relation Age of Onset    Cancer Father         lung    Mental Illness Father     Diabetes Maternal Grandmother     Hypertension Maternal Grandmother     Heart Failure Maternal Grandmother     Depression Paternal Grandmother  Diabetes Paternal Grandmother     Heart Failure Paternal Grandmother     Mental Illness Paternal Grandmother     Hypertension Mother     Diabetes Paternal Grandfather     Heart Failure Paternal Grandfather     Mental Illness Paternal Grandfather        Social History     Social History     Socioeconomic History    Marital status: Single     Spouse name: Not on file    Number of children: 2    Years of education: 15    Highest education level: Not on file   Occupational History    Occupation: unemployed past 2+ year   Social Needs    Financial resource strain: Not on file    Food insecurity     Worry: Not on file     Inability: Not on file   [employer] needs     Medical: Not on file     Non-medical: Not on file   Tobacco Use    Smoking status: Current Some Day Smoker     Packs/day: 0.50    Smokeless tobacco: Never Used   Substance and Sexual Activity    Alcohol use: No     Alcohol/week: 1.0 standard drinks     Types: 1 Standard drinks or equivalent per week    Drug use: Yes     Types: Methamphetamines, IV     Comment: recently relapsed on meth    Sexual activity: Yes     Partners: Male   Lifestyle    Physical activity     Days per week: Not on file     Minutes per session: Not on file    Stress: Not on file   Relationships    Social connections     Talks on phone: Not on file     Gets together: Not on file     Attends [employer] service: Not on file     Active member of club or organization: Not on file     Attends meetings of clubs or organizations: Not on file     Relationship status: Not on file    Intimate partner violence     Fear of current or ex partner: Not on file     Emotionally abused: Not on file     Physically abused: Not on file     Forced sexual activity: Not on file   Other Topics Concern    Not on file   Social History Narrative    Not on file       Allergies     Allergies   Allergen Reactions    Latex Rash     \"I get a red inflamed rash. \"    Ceclor [Cefaclor] Rash Medications     Current Outpatient Medications   Medication Sig Dispense Refill    buPROPion (WELLBUTRIN XL) 150 MG extended release tablet Take 1 tablet by mouth daily 30 tablet 0    DULoxetine (CYMBALTA) 30 MG extended release capsule Take 1 capsule by mouth 2 times daily 60 capsule 0    cetirizine (ZYRTEC) 10 MG tablet Take 1 tablet by mouth daily 30 tablet 0     No current facility-administered medications for this visit. Review of Systems     Review of Systems   Constitutional: Negative for chills, fatigue and fever. HENT: Negative for congestion, ear discharge, ear pain, facial swelling, hearing loss, nosebleeds, postnasal drip, rhinorrhea, sinus pressure, sinus pain, sneezing, sore throat, tinnitus, trouble swallowing and voice change. Eyes: Negative for photophobia, pain, redness, itching and visual disturbance. Respiratory: Negative for cough, choking, shortness of breath and stridor. Gastrointestinal: Negative for diarrhea and nausea. Musculoskeletal: Negative for neck pain and neck stiffness. Skin: Negative for color change and rash. Neurological: Negative for dizziness, facial asymmetry and light-headedness. Hematological: Negative for adenopathy. Psychiatric/Behavioral: Negative for agitation and confusion. PhysicalExam     Vitals:    08/21/20 0903   BP: 114/74   Pulse: 83   Temp: 97.6 °F (36.4 °C)       Physical Exam  Constitutional:       Appearance: She is well-developed. HENT:      Head: Normocephalic and atraumatic. Jaw: No trismus. Right Ear: Tympanic membrane, ear canal and external ear normal. No drainage. No middle ear effusion. Tympanic membrane is not perforated. Left Ear: Tympanic membrane, ear canal and external ear normal. No drainage. No middle ear effusion. Tympanic membrane is not perforated. Ears:        Nose: No septal deviation, mucosal edema or rhinorrhea. Mouth/Throat:      Dentition: Normal dentition. Pharynx: Uvula midline. No oropharyngeal exudate. Eyes:      General: No scleral icterus. Right eye: No discharge. Left eye: No discharge. Pupils: Pupils are equal, round, and reactive to light. Neck:      Musculoskeletal: Neck supple. Thyroid: No thyromegaly. Trachea: Phonation normal. No tracheal deviation. Pulmonary:      Effort: Pulmonary effort is normal. No respiratory distress. Breath sounds: No stridor. Lymphadenopathy:      Cervical: No cervical adenopathy. Skin:     General: Skin is warm and dry. Neurological:      Mental Status: She is alert and oriented to person, place, and time. Cranial Nerves: No cranial nerve deficit. Psychiatric:         Behavior: Behavior normal.           Procedure     Otomicroscopy with removal foreign body    An operating microscope was utilized to visualize the external auditory canals using a 4mm speculum. Right external auditory canal with foreign body. This was removed using a right angle pick. Patient reports improvement in symptoms. No complications. Assessment and Plan     1. Foreign body of right ear, initial encounter  Foreign body in right ear canal removed without difficulty. Patient reports improvement in symptoms. 2. Split ear lobe  Patient has a old laceration of the right earlobe resulting in a split earlobe. She is interested in having this repaired. She is going to schedule a procedure visit to do this in the office under local anesthesia. Risks and benefits discussed. Return for ear lobe repair. Portions of this note were dictated using Dragon.  There may be linguistic errors secondary to the use of this program.

## 2020-10-21 ENCOUNTER — PROCEDURE VISIT (OUTPATIENT)
Dept: ENT CLINIC | Age: 35
End: 2020-10-21
Payer: COMMERCIAL

## 2020-10-21 VITALS
HEART RATE: 77 BPM | SYSTOLIC BLOOD PRESSURE: 117 MMHG | BODY MASS INDEX: 29.76 KG/M2 | TEMPERATURE: 97.9 F | WEIGHT: 178.6 LBS | HEIGHT: 65 IN | DIASTOLIC BLOOD PRESSURE: 85 MMHG

## 2020-10-21 PROCEDURE — 12011 RPR F/E/E/N/L/M 2.5 CM/<: CPT | Performed by: OTOLARYNGOLOGY

## 2020-10-21 RX ORDER — ARIPIPRAZOLE 5 MG/1
5 TABLET ORAL DAILY
Status: ON HOLD | COMMUNITY
End: 2021-06-27

## 2020-10-21 RX ORDER — HYDROXYZINE PAMOATE 25 MG/1
25 CAPSULE ORAL 2 TIMES DAILY
Status: ON HOLD | COMMUNITY
End: 2021-06-27

## 2020-10-21 RX ORDER — TOPIRAMATE 50 MG/1
100 TABLET, FILM COATED ORAL 2 TIMES DAILY
Status: ON HOLD | COMMUNITY
End: 2021-06-27

## 2020-10-21 NOTE — PROGRESS NOTES
Repair complex earlobe laceration right 1.5 cm  Surgeon: Samantha Bailey DO  Anesthesia: 1% lidocaine with 1 100,000 epinephrine infiltrated  Consent: Written obtained  Preop diagnosis: Laceration of right earlobe, sequela  Postop diagnosis: Same  Description:  Patient was placed in supine position. Risk and benefits of the procedure discussed. Written consent obtained. The right earlobe was then cleansed with alcohol swab. 2 cc of 1% lidocaine with 100,000 and Afrin were injected into the earlobe. Adequate time was given for this to take effect. Proper timeout was then performed. Then using a 15 blade, a wedge incision was made on the old laceration. The skin was excised. The edges were then approximated in a cosmetically pleasing fashion. The incision was closed with a 5-0 nylon simple interrupted suture. The incision was then dressed with bacitracin ointment. Patient tolerated seizure well. There were no complications with the procedure. Discussed with patient to place bacitracin ointment on earlobe twice a day for the next week.   Cleanse gently with water and peroxide twice a day  Tylenol for pain  Follow-up Tuesday for suture removal

## 2021-01-06 ENCOUNTER — HOSPITAL ENCOUNTER (EMERGENCY)
Age: 36
Discharge: HOME | End: 2021-01-06
Payer: COMMERCIAL

## 2021-01-06 VITALS
HEART RATE: 87 BPM | RESPIRATION RATE: 18 BRPM | DIASTOLIC BLOOD PRESSURE: 71 MMHG | WEIGHT: 182 LBS | TEMPERATURE: 98.1 F | SYSTOLIC BLOOD PRESSURE: 135 MMHG | BODY MASS INDEX: 30.32 KG/M2 | HEIGHT: 65 IN | OXYGEN SATURATION: 99 %

## 2021-01-06 DIAGNOSIS — F43.9 STRESS AT HOME: ICD-10-CM

## 2021-01-06 DIAGNOSIS — R74.8 ELEVATED LIVER ENZYMES: ICD-10-CM

## 2021-01-06 DIAGNOSIS — Z86.59 HISTORY OF ANXIETY: Primary | ICD-10-CM

## 2021-01-06 DIAGNOSIS — Z76.0 ENCOUNTER FOR MEDICATION REFILL: ICD-10-CM

## 2021-01-06 LAB
A/G RATIO: 1.8 (ref 1.1–2.2)
ACETAMINOPHEN LEVEL: <5 UG/ML (ref 10–30)
ALBUMIN SERPL-MCNC: 4.5 G/DL (ref 3.4–5)
ALP BLD-CCNC: 46 U/L (ref 40–129)
ALT SERPL-CCNC: 98 U/L (ref 10–40)
AMPHETAMINE SCREEN, URINE: NORMAL
ANION GAP SERPL CALCULATED.3IONS-SCNC: 14 MMOL/L (ref 3–16)
AST SERPL-CCNC: 52 U/L (ref 15–37)
BARBITURATE SCREEN URINE: NORMAL
BASOPHILS ABSOLUTE: 0 K/UL (ref 0–0.2)
BASOPHILS RELATIVE PERCENT: 0.5 %
BENZODIAZEPINE SCREEN, URINE: NORMAL
BILIRUB SERPL-MCNC: 0.3 MG/DL (ref 0–1)
BILIRUBIN URINE: NEGATIVE
BLOOD, URINE: ABNORMAL
BUN BLDV-MCNC: 10 MG/DL (ref 7–20)
CALCIUM SERPL-MCNC: 9.2 MG/DL (ref 8.3–10.6)
CANNABINOID SCREEN URINE: NORMAL
CHLORIDE BLD-SCNC: 111 MMOL/L (ref 99–110)
CLARITY: CLEAR
CO2: 17 MMOL/L (ref 21–32)
COCAINE METABOLITE SCREEN URINE: NORMAL
COLOR: YELLOW
CREAT SERPL-MCNC: 0.7 MG/DL (ref 0.6–1.1)
EOSINOPHILS ABSOLUTE: 0 K/UL (ref 0–0.6)
EOSINOPHILS RELATIVE PERCENT: 0.7 %
EPITHELIAL CELLS, UA: ABNORMAL /HPF (ref 0–5)
ETHANOL: NORMAL MG/DL (ref 0–0.08)
GFR AFRICAN AMERICAN: >60
GFR NON-AFRICAN AMERICAN: >60
GLOBULIN: 2.5 G/DL
GLUCOSE BLD-MCNC: 90 MG/DL (ref 70–99)
GLUCOSE URINE: NEGATIVE MG/DL
HCG QUALITATIVE: NEGATIVE
HCT VFR BLD CALC: 33.5 % (ref 36–48)
HEMOGLOBIN: 11.3 G/DL (ref 12–16)
KETONES, URINE: NEGATIVE MG/DL
LEUKOCYTE ESTERASE, URINE: NEGATIVE
LYMPHOCYTES ABSOLUTE: 1.1 K/UL (ref 1–5.1)
LYMPHOCYTES RELATIVE PERCENT: 22 %
Lab: NORMAL
MCH RBC QN AUTO: 30.8 PG (ref 26–34)
MCHC RBC AUTO-ENTMCNC: 33.7 G/DL (ref 31–36)
MCV RBC AUTO: 91.5 FL (ref 80–100)
METHADONE SCREEN, URINE: NORMAL
MICROSCOPIC EXAMINATION: YES
MONOCYTES ABSOLUTE: 0.5 K/UL (ref 0–1.3)
MONOCYTES RELATIVE PERCENT: 9.9 %
MUCUS: ABNORMAL /LPF
NEUTROPHILS ABSOLUTE: 3.4 K/UL (ref 1.7–7.7)
NEUTROPHILS RELATIVE PERCENT: 66.9 %
NITRITE, URINE: NEGATIVE
OPIATE SCREEN URINE: NORMAL
OXYCODONE URINE: NORMAL
PDW BLD-RTO: 14.6 % (ref 12.4–15.4)
PH UA: 5.5
PH UA: 5.5 (ref 5–8)
PHENCYCLIDINE SCREEN URINE: NORMAL
PLATELET # BLD: 203 K/UL (ref 135–450)
PMV BLD AUTO: 9 FL (ref 5–10.5)
POTASSIUM REFLEX MAGNESIUM: 3.9 MMOL/L (ref 3.5–5.1)
PROPOXYPHENE SCREEN: NORMAL
PROTEIN UA: NEGATIVE MG/DL
RBC # BLD: 3.66 M/UL (ref 4–5.2)
RBC UA: ABNORMAL /HPF (ref 0–4)
SALICYLATE, SERUM: <0.3 MG/DL (ref 15–30)
SARS-COV-2, NAAT: NOT DETECTED
SODIUM BLD-SCNC: 142 MMOL/L (ref 136–145)
SPECIFIC GRAVITY UA: 1.02 (ref 1–1.03)
TOTAL PROTEIN: 7 G/DL (ref 6.4–8.2)
URINE REFLEX TO CULTURE: ABNORMAL
URINE TYPE: ABNORMAL
UROBILINOGEN, URINE: 0.2 E.U./DL
WBC # BLD: 5.2 K/UL (ref 4–11)
WBC UA: ABNORMAL /HPF (ref 0–5)

## 2021-01-06 PROCEDURE — 6370000000 HC RX 637 (ALT 250 FOR IP): Performed by: NURSE PRACTITIONER

## 2021-01-06 PROCEDURE — U0002 COVID-19 LAB TEST NON-CDC: HCPCS

## 2021-01-06 PROCEDURE — 85025 COMPLETE CBC W/AUTO DIFF WBC: CPT

## 2021-01-06 PROCEDURE — 99283 EMERGENCY DEPT VISIT LOW MDM: CPT

## 2021-01-06 PROCEDURE — G0480 DRUG TEST DEF 1-7 CLASSES: HCPCS

## 2021-01-06 PROCEDURE — 81001 URINALYSIS AUTO W/SCOPE: CPT

## 2021-01-06 PROCEDURE — 84703 CHORIONIC GONADOTROPIN ASSAY: CPT

## 2021-01-06 PROCEDURE — 80053 COMPREHEN METABOLIC PANEL: CPT

## 2021-01-06 PROCEDURE — 80307 DRUG TEST PRSMV CHEM ANLYZR: CPT

## 2021-01-06 RX ORDER — HYDROXYZINE PAMOATE 25 MG/1
25 CAPSULE ORAL 2 TIMES DAILY
Qty: 10 CAPSULE | Refills: 0 | Status: SHIPPED | OUTPATIENT
Start: 2021-01-06 | End: 2021-01-11

## 2021-01-06 RX ORDER — TOPIRAMATE 100 MG/1
100 TABLET, FILM COATED ORAL 2 TIMES DAILY
Qty: 10 TABLET | Refills: 0 | Status: ON HOLD | OUTPATIENT
Start: 2021-01-06 | End: 2021-06-27

## 2021-01-06 RX ORDER — OLANZAPINE 5 MG/1
5 TABLET, ORALLY DISINTEGRATING ORAL ONCE
Status: COMPLETED | OUTPATIENT
Start: 2021-01-06 | End: 2021-01-06

## 2021-01-06 RX ADMIN — OLANZAPINE 5 MG: 5 TABLET, ORALLY DISINTEGRATING ORAL at 18:12

## 2021-01-06 ASSESSMENT — ENCOUNTER SYMPTOMS
ABDOMINAL PAIN: 0
RHINORRHEA: 0
SORE THROAT: 0
COLOR CHANGE: 0
SHORTNESS OF BREATH: 0

## 2021-01-06 NOTE — ED PROVIDER NOTES
Evaluated by [id number] [facility] Provider          [facility] ED  EMERGENCY DEPARTMENT ENCOUNTER        Pt Name: Petrona Hager  MRN: [id number]  [place] 1985  Dateof evaluation: 1/6/2021  Provider: ISSA Deleon CNP  PCP: ISSA Duque Caro, CNP  ED Attending: No att. providers found    [address] [place] [address]       Chief Complaint   Patient presents with    Anxiety     trouble sleeping due to night house, ran out of anxiety medications, out of Vistaril, having paranoia and possibly delusions        HISTORY OF PRESENTILLNESS   (Location/Symptom, Timing/Onset, Context/Setting, Quality, Duration, Modifying Factors, Severity)  Note limiting factors. Petrona Hager is a 38 y.o. female for concern for anxiety. Onset was over the past few days. Context includes pt states she has run out of her atarax and is having increased anxiety. She denies any si/hi. Alleviating factors include nothingn. Aggravating factors include nothing. Pain is 4/10. nothing has been used for pain today. Pt is caring for her family members and is having increased stressors at home. She is a recovering addict and is also trying to regain custody of her child. Nursing Notes were all reviewed and agreed with or any disagreements were addressed  in the HPI. REVIEW OF SYSTEMS    (2-9 systems for level 4, 10 or more for level 5)     Review of Systems   Constitutional: Negative for fever. Medication refill     HENT: Negative for congestion, rhinorrhea and sore throat. Respiratory: Negative for shortness of breath. Cardiovascular: Negative for chest pain. Gastrointestinal: Negative for abdominal pain. Genitourinary: Negative for decreased urine volume and difficulty urinating. Musculoskeletal: Negative for arthralgias and myalgias. Skin: Negative for color change and rash. Neurological: Negative for dizziness and light-headedness. Psychiatric/Behavioral: Positive for sleep disturbance. Negative for agitation, self-injury and suicidal ideas. The patient is nervous/anxious. All other systems reviewed and are negative. Positives and Pertinent negatives as per HPI. Except as noted above in the ROS, all other systems were reviewed and negative. PAST MEDICAL HISTORY     Past Medical History:   Diagnosis Date    Anxiety     Chronic hepatitis C without hepatic coma ([facility] [address]     Chronic post-traumatic stress disorder (PTSD) 10/29/2018    Depression     Hepatitis C 2020    Mild intermittent asthma without complication     Stimulant use disorder 2018         SURGICAL HISTORY       Past Surgical History:   Procedure Laterality Date     SECTION  2005    DILATION AND CURETTAGE OF UTERUS  2006             CURRENT MEDICATIONS       Discharge Medication List as of 2021  9:18 PM      CONTINUE these medications which have NOT CHANGED    Details   ARIPiprazole (ABILIFY) 5 MG tablet Take 5 mg by mouth dailyHistorical Med      !! hydrOXYzine (VISTARIL) 25 MG capsule Take 25 mg by mouth 2 times daily Historical Med      !! topiramate (TOPAMAX) 50 MG tablet Take 100 mg by mouth 2 times daily Historical Med      buPROPion (WELLBUTRIN XL) 150 MG extended release tablet Take 1 tablet by mouth daily, Disp-30 tablet,R-0Normal      DULoxetine (CYMBALTA) 30 MG extended release capsule Take 1 capsule by mouth 2 times daily, Disp-60 capsule,R-0Normal       !! - Potential duplicate medications found. Please discuss with provider.             ALLERGIES     Latex and Ceclor [cefaclor]    FAMILY HISTORY       Family History   Problem Relation Age of Onset    Cancer Father         lung    Mental Illness Father     Diabetes Maternal Grandmother     Hypertension Maternal Grandmother     Heart Failure Maternal Grandmother     Depression Paternal Grandmother     Diabetes Paternal Grandmother (up to 7 for level 4, 8 or more for level 5)     ED Triage Vitals [01/06/21 1552]   BP Temp Temp Source Pulse Resp SpO2 Height Weight   135/71 98.1 °F (36.7 °C) Oral 87 18 99 % 5' 5\" (1.651 m) 182 lb (82.6 kg)       Physical Exam  Constitutional:       Appearance: She is well-developed. HENT:      Head: Normocephalic and atraumatic. Neck:      Musculoskeletal: Normal range of motion. Cardiovascular:      Rate and Rhythm: Normal rate. Pulmonary:      Effort: Pulmonary effort is normal. No respiratory distress. Abdominal:      General: There is no distension. Palpations: Abdomen is soft. Tenderness: There is no abdominal tenderness. Musculoskeletal: Normal range of motion. Skin:     General: Skin is warm and dry. Neurological:      Mental Status: She is alert and oriented to person, place, and time. Psychiatric:         Attention and Perception: Attention normal.         Mood and Affect: Mood normal.         Behavior: Behavior is cooperative. Thought Content: Thought content does not include homicidal or suicidal ideation. Thought content does not include homicidal or suicidal plan.          DIAGNOSTIC RESULTS   LABS:    Labs Reviewed   CBC WITH AUTO DIFFERENTIAL - Abnormal; Notable for the following components:       Result Value    RBC 3.66 (*)     Hemoglobin 11.3 (*)     Hematocrit 33.5 (*)     All other components within normal limits    Narrative:     Performed at:  [facility] [employer],  [employer] [facility]   Phone [phone]   COMPREHENSIVE METABOLIC PANEL W/ REFLEX TO MG FOR LOW K - Abnormal; Notable for the following components:    Chloride 111 (*)     CO2 17 (*)     ALT 98 (*)     AST 52 (*)     All other components within normal limits    Narrative:     Performed at:  [facility] [address],  [employer] [facility]   Phone [phone] URINE RT REFLEX TO CULTURE - Abnormal; Notable for the following components:    Blood, Urine LARGE (*)     All other components within normal limits    Narrative:     Performed at:  [facility],  [place], [employer]   Phone [phone]   ACETAMINOPHEN LEVEL - Abnormal; Notable for the following components:    Acetaminophen Level <5 (*)     All other components within normal limits    Narrative:     Performed at:  [facility],  [place], [employer]   Phone [phone]   SALICYLATE LEVEL - Abnormal; Notable for the following components:    Salicylate, Serum <4.6 (*)     All other components within normal limits    Narrative:     Performed at:  [facility],  [place], [employer]   Phone [phone]   MICROSCOPIC URINALYSIS - Abnormal; Notable for the following components:    Mucus, UA 2+ (*)     RBC, UA  (*)     All other components within normal limits    Narrative:     Performed at:  [facility],  [place], [employer]   Phone [phone]    Narrative:     Performed at:  [facility],  [place] [employer] [facility]   Phone [phone]   HCG, SERUM, QUALITATIVE    Narrative:     Performed at:  [facility],  [place] [employer]   Phone [phone]   URINE DRUG SCREEN    Narrative:     Performed at:  [facility],  [place], [employer]   Phone [phone]   ETHANOL    Narrative:     Performed at:  [address] [facility],  [place], [employer]   Phone [phone]       All other labs werewithin normal range or not returned as of this dictation. EKG: All EKG's are interpreted by the Emergency Department Physician who either signs or Co-signs this chart in the absence of a cardiologist.  Please see their note for interpretation of EKG. RADIOLOGY:           Interpretation per the Radiologist below, if available at the time of this note:    No orders to display     No results found. PROCEDURES   Unless otherwise noted below, none     Procedures     CRITICAL CARE TIME   N/A    CONSULTS:  None      EMERGENCYDEPARTMENT COURSE and DIFFERENTIAL DIAGNOSIS/MDM:   Vitals:    Vitals:    01/06/21 1552   BP: 135/71   Pulse: 87   Resp: 18   Temp: 98.1 °F (36.7 °C)   TempSrc: Oral   SpO2: 99%   Weight: 182 lb (82.6 kg)   Height: 5' 5\" (1.651 m)       Patient was given the following medications:  Medications   OLANZapine zydis (ZYPREXA) disintegrating tablet 5 mg (5 mg Oral Given 1/6/21 1812)     Patient was seen and evaluated by myself. Patient here for sleep disturbances. Patient reports that she takes Vistaril for her anxiety and has run out of her medication is requesting more. She denies any suicidal or homicidal ideations. On exam she is awake and alert hemodynamically stable nontoxic in appearance. Lab values have been reviewed and interpreted. At this time the patient is considered medically cleared and has been consulted with behavioral health for an evaluation and assistance and final disposition. The patient tolerated their visit well. I have evaluated this patient. My supervising physician was available for consultation. The patient and / or the family were informed of the results of any tests, a time was given to answer questions, a plan was proposed and they agreed with plan. FINAL IMPRESSION      1. History of anxiety    2. Encounter for medication refill    3. Stress at home    4.  Elevated liver enzymes          DISPOSITION/PLAN   DISPOSITION Ed Observation 01/06/2021 07:35:04 PM      PATIENT REFERRED TO: ISSA Lizama - CNP  [address] [place], [place] [id number] [phone]    Schedule an appointment as soon as possible for a visit in 2 days  If symptoms worsen    [facility]  [address] [facility]  [phone]    If symptoms worsen    Rosaurasemaj Rosenthal DO  [address] [facility] [address] [place] [address]  [phone]      for re-evaluation      DISCHARGE MEDICATIONS:  Discharge Medication List as of 1/6/2021  9:18 PM      START taking these medications    Details   !! hydrOXYzine (VISTARIL) 25 MG capsule Take 1 capsule by mouth 2 times daily for 5 days, Disp-10 capsule, R-0Print      !! topiramate (TOPAMAX) 100 MG tablet Take 1 tablet by mouth 2 times daily for 5 days, Disp-10 tablet, R-0Print       !! - Potential duplicate medications found. Please discuss with provider.           DISCONTINUED MEDICATIONS:  Discharge Medication List as of 1/6/2021  9:18 PM                 (Please note that portions of this note were completed with a voice recognition program.  Efforts were made to edit the dictations but occasionally words are mis-transcribed.)    ISSA Orozco CNP (electronically signed)         ISSA Orozco CNP  01/07/21 0024

## 2021-01-06 NOTE — ED NOTES
Writer spoke to [facility] last filled medications  11/26/20 Vistaril 25 mg BID  10/22/20 Topamax 100 mg BID     Cheri Speaker, RN  01/06/21 8799
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
## 2021-01-06 NOTE — ED NOTES
Patient sleeping currently. Will allow to sleep. Will place call to Psychiatrist on call once medically clear.       Roberto Jean RN  01/06/21 4887

## 2021-01-06 NOTE — ED NOTES
Spoke to Dr. Salome Cornejo who would like patient to take Zyprexa 5 mg and repeat if necessary and sleep and reevaluate for extreme anxiety possible paranoia. Reports we can then talk with patient after she rest and most likely discharge if she feels better.       Kim Martin RN  01/06/21 5050

## 2021-01-06 NOTE — ED NOTES
Collateral Contact:  Name:Marimar [phone]  Relation to Patient: mother  Last Contact with Patient: this morning over the phone  Concerns: she spoke to patient this morning and last night patient called her because she was in the middle of having a panic attack and out of her medications. She feels that she maybe starting to have one of her episodes and she does not believe that it is drug related this time. She has been staying with her grandparents to help care for her grandfather. She has been providing him with IV antibiotic infusions at night from an infection that he was recently hospitalized for. Mom feels that she is providing good care to her grandfather, but maybe not care as much for herself in the process. She did not sleep last night and over the past 24 hours has increasingly become paranoid. This past Monday mom took her to [facility] for an appointment she was hoping to see a doctor for a med refill, but it was only a therapy session. However, mom felt she seemed better after meeting with them. She believes that [facility] prescribes her anxiety medication and an antidepressant. She denies that she has been making any threats to harm self or others. She feels that her grandparents are safe with her. Mom also feels that it is the safest place for her to be that [place] and the neighbors are also supportive. There are no guns in the home. She is welcomed to come back and they will set up transportation for her. She did feel positive for patient seeking out help for herself this time.         Cheri Cardoso, RN  01/06/21 3854

## 2021-01-06 NOTE — ED NOTES
Patient brought back from triage by this nurse. Patient changed into safety gown. Patient oriented to [facility]. Will continue to monitor patient.       Roberto Jean RN  01/06/21 6267

## 2021-01-07 NOTE — SUICIDE SAFETY PLAN
SAFETY PLAN    A suicide Safety Plan is a document that supports someone when they are having thoughts of suicide. Warning Signs that indicate a suicidal crisis may be developing: What (situations, thoughts, feelings, body sensations, behaviors, etc.) do you experience that lets you know you are beginning to think about suicide? 1. Panic attacks  2. Anxiety   3. Nightmares. Internal Coping Strategies:  What things can I do (relaxation techniques, hobbies, physical activities, etc.) to take my mind off my problems without contacting another person? 1. Set healthy goals  2. Set relationship boundaries  3. Daily reading and meditation    People and social settings that provide distraction: Who can I call or where can I go to distract me? 1. Name: [facility]  meetings    2. Name: walk outside    3. Place: visitation with my children                People whom I can ask for help: Who can I call when I need help - for example, friends, family, clergy, someone else? 1. Name: Radha Hagen. Professionals or [address] [place], [place] [address]vd I can contact during a crisis: Who can I call for help - for example, my doctor, my psychiatrist, my psychologist, a mental health provider, a suicide hotline? 1. Clinician Name: Kayy Godinez on [employer]      2. Suicide Prevention Lifeline: 2-437-777-DGRX (1385)    Making the environment safe: How can I make my environment (house/apartment/living space) safer? For example, can I remove guns, medications, and other items? 1. Self care  2.  Set boundaries, adhere to regular schedule

## 2021-01-07 NOTE — ED NOTES
Received verbal report from [facility], RN. Patient continues to rest quietly with eyes closed, no outward s/s distress noted. Monitored for safety.      Ely Ward RN  01/06/21 2003

## 2021-01-07 NOTE — ED NOTES
Spoke with Karan Antunez NP she is in agreeance to write scripts for Vistaril and Topamax X 5 days for the patient.       Angie Waldron RN  01/06/21 2051       Angie Waldron RN  01/06/21 2059

## 2021-06-01 DIAGNOSIS — H91.90 HEARING LOSS, UNSPECIFIED HEARING LOSS TYPE, UNSPECIFIED LATERALITY: ICD-10-CM

## 2021-06-01 DIAGNOSIS — T16.1XXA FOREIGN BODY OF RIGHT EAR, INITIAL ENCOUNTER: Primary | ICD-10-CM

## 2021-06-23 ENCOUNTER — HOSPITAL ENCOUNTER (EMERGENCY)
Age: 36
Discharge: HOME | End: 2021-06-23
Payer: COMMERCIAL

## 2021-06-23 VITALS
DIASTOLIC BLOOD PRESSURE: 75 MMHG | SYSTOLIC BLOOD PRESSURE: 112 MMHG | RESPIRATION RATE: 16 BRPM | TEMPERATURE: 98.1 F | OXYGEN SATURATION: 100 % | HEART RATE: 77 BPM

## 2021-06-23 DIAGNOSIS — Z76.0 ENCOUNTER FOR MEDICATION REFILL: Primary | ICD-10-CM

## 2021-06-23 PROCEDURE — 99282 EMERGENCY DEPT VISIT SF MDM: CPT

## 2021-06-23 RX ORDER — BUPROPION HYDROCHLORIDE 300 MG/1
300 TABLET ORAL EVERY MORNING
Qty: 2 TABLET | Refills: 0 | Status: ON HOLD | OUTPATIENT
Start: 2021-06-23 | End: 2021-06-27

## 2021-06-23 RX ORDER — TOPIRAMATE 100 MG/1
100 TABLET, FILM COATED ORAL DAILY
Qty: 2 TABLET | Refills: 0 | Status: ON HOLD | OUTPATIENT
Start: 2021-06-23 | End: 2021-06-30 | Stop reason: HOSPADM

## 2021-06-23 RX ORDER — BUPROPION HYDROCHLORIDE 300 MG/1
TABLET ORAL
Status: ON HOLD | COMMUNITY
Start: 2021-05-01 | End: 2021-06-27

## 2021-06-23 ASSESSMENT — PAIN DESCRIPTION - PAIN TYPE: TYPE: CHRONIC PAIN

## 2021-06-23 ASSESSMENT — ENCOUNTER SYMPTOMS
GASTROINTESTINAL NEGATIVE: 1
RESPIRATORY NEGATIVE: 1

## 2021-06-23 NOTE — ED PROVIDER NOTES
[address] ED  EMERGENCY DEPARTMENT ENCOUNTER        Pt Name: Gael Guido  MRN: [id number]  [place] 1985  Date of evaluation: 6/23/2021  Provider: Raquel Nelson PA-C  PCP: ISSA Simms CNP  Note Started: 1:03 PM EDT       GUILLERMINA. I have evaluated this patient. My supervising physician was available for consultation. CHIEF COMPLAINT       Chief Complaint   Patient presents with    Medication Refill     patient states she has an appointment with behavioral health on friday but needs a refill on wellbutrin and topamax       HISTORY OF PRESENT ILLNESS   (Location, Timing/Onset, Context/Setting, Quality, Duration, Modifying Factors, Severity, Associated Signs and Symptoms)  Note limiting factors. Chief Complaint: medication refill     Gael Guido is a 39 y.o. female who presents via private vehicle with complaints of \"medication refill\". She does have a history of depression, hepatitis C, PTSD, anxiety, chronic hepatitis C and asthma. States that she has an appointment in the next 3 days with behavioral health. She has been out of her Wellbutrin and her topiramate. States she typically takes 300 mg of the Wellbutrin as well as 100 mg of the topiramate. Denies any other complaints. New onset of symptoms. No duration of symptoms. Context includes concern for medication refill. Denies HI or SI. Denies hallucinations or delusions. States she has been out of her medications for a couple of days. She states that she takes the Wellbutrin for depression and the topiramate for \"nerve pain\". Otherwise denies any other concerns at this time. Nursing Notes were all reviewed and agreed with or any disagreements were addressed in the HPI. REVIEW OF SYSTEMS    (2-9 systems for level 4, 10 or more for level 5)     Review of Systems   Constitutional: Negative. Respiratory: Negative. Cardiovascular: Negative. Gastrointestinal: Negative.     Genitourinary: Negative. Musculoskeletal: Negative. Skin: Negative. Neurological: Negative. Psychiatric/Behavioral: Negative. Positives and Pertinent negatives as per HPI. Except as noted above in the ROS, all other systems were reviewed and negative.        PAST MEDICAL HISTORY     Past Medical History:   Diagnosis Date    Anxiety     Chronic hepatitis C without hepatic coma ([facility] [address]     Chronic post-traumatic stress disorder (PTSD) 10/29/2018    Depression     Hepatitis C 2020    Mild intermittent asthma without complication     Stimulant use disorder 2018         SURGICAL HISTORY     Past Surgical History:   Procedure Laterality Date     SECTION  2005    DILATION AND CURETTAGE OF UTERUS  2006             CURRENTMEDICATIONS       Previous Medications    ARIPIPRAZOLE (ABILIFY) 5 MG TABLET    Take 5 mg by mouth daily    BUPROPION (WELLBUTRIN XL) 150 MG EXTENDED RELEASE TABLET    Take 1 tablet by mouth daily    BUPROPION (WELLBUTRIN XL) 300 MG EXTENDED RELEASE TABLET        DULOXETINE (CYMBALTA) 30 MG EXTENDED RELEASE CAPSULE    Take 1 capsule by mouth 2 times daily    HYDROXYZINE (VISTARIL) 25 MG CAPSULE    Take 25 mg by mouth 2 times daily     TOPIRAMATE (TOPAMAX) 100 MG TABLET    Take 1 tablet by mouth 2 times daily for 5 days    TOPIRAMATE (TOPAMAX) 50 MG TABLET    Take 100 mg by mouth 2 times daily          ALLERGIES     Latex and Ceclor [cefaclor]    FAMILYHISTORY       Family History   Problem Relation Age of Onset    Cancer Father         lung    Mental Illness Father     Diabetes Maternal Grandmother     Hypertension Maternal Grandmother     Heart Failure Maternal Grandmother     Depression Paternal Grandmother     Diabetes Paternal Grandmother     Heart Failure Paternal Grandmother     Mental Illness Paternal Grandmother     Hypertension Mother     Diabetes Paternal Grandfather     Heart Failure Paternal Grandfather     Mental Illness Paternal Grandfather No focal deficit present. Mental Status: She is alert and oriented to person, place, and time. GCS: GCS eye subscore is 4. GCS verbal subscore is 5. GCS motor subscore is 6. Cranial Nerves: Cranial nerves are intact. Psychiatric:         Behavior: Behavior normal. Behavior is cooperative. DIAGNOSTIC RESULTS   LABS:    Labs Reviewed - No data to display    All other labs were within normal range or not returned as of this dictation. EKG: All EKG's are interpreted by the Emergency Department Physician in the absence of a cardiologist.  Please see their note for interpretation of EKG. RADIOLOGY:   Non-plain film images such as CT, Ultrasound and MRI are read by the radiologist. Plain radiographic images are visualized and preliminarily interpreted by the ED Provider with the below findings:        Interpretation per the Radiologist below, if available at the time of this note:    No orders to display     No results found. PROCEDURES   Unless otherwise noted below, none     Procedures    CRITICAL CARE TIME   N/A    CONSULTS:  None      EMERGENCY DEPARTMENT COURSE and DIFFERENTIAL DIAGNOSIS/MDM:   Vitals:    Vitals:    06/23/21 1214   BP: 112/75   Pulse: 79   Resp: 16   Temp: 98.1 °F (36.7 °C)   TempSrc: Temporal   SpO2: 100%       Patient was given the following medications:  Medications - No data to display        Patient brought in today by private vehicle with complaints of medication refill. On exam she is alert oriented afebrile breathing on room air satting 100%. Nontoxic. No acute respiratory distress. Old labs records reviewed. Patient seen by myself and my attending was available for consultation. Plan at this time will be to refill her topiramate as well as her Wellbutrin. She does have a scheduled appointment with behavioral health this coming Friday. Will refill for the next 3 days. Plan at this time will be to discharge home.   Patient told to keep her scheduled appointment for this coming Friday with the behavioral health team.      Patient told return immediately to the ED with any new or worsening symptoms. Patient was discharged in stable condition. I did feel comfortable sending this patient home with close follow-up instructions and strict return precautions. FINAL IMPRESSION      1.  Encounter for medication refill          DISPOSITION/PLAN   DISPOSITION Decision To Discharge 06/23/2021 01:08:10 PM      PATIENT REFERRED TO:  David Mendoza, [address]  [address] [facility] [address]  [phone]    Schedule an appointment as soon as possible for a visit in 1 day  For a recheck in  days    [facility] ED  [address] [address]  [phone]  Schedule an appointment as soon as possible for a visit   As needed, If symptoms worsen      DISCHARGE MEDICATIONS:  New Prescriptions    BUPROPION (WELLBUTRIN XL) 300 MG EXTENDED RELEASE TABLET    Take 1 tablet by mouth every morning for 2 days    TOPIRAMATE (TOPAMAX) 100 MG TABLET    Take 1 tablet by mouth daily for 2 days       DISCONTINUED MEDICATIONS:  Discontinued Medications    No medications on file              (Please note that portions of this note were completed with a voice recognition program.  Efforts were made to edit the dictations but occasionally words are mis-transcribed.)    [facility], PA-C (electronically signed)            [facility], PA-C  06/23/21 5658

## 2021-06-27 ENCOUNTER — HOSPITAL ENCOUNTER (INPATIENT)
Age: 36
LOS: 3 days | Discharge: HOME | DRG: 751 | End: 2021-06-30
Attending: STUDENT IN AN ORGANIZED HEALTH CARE EDUCATION/TRAINING PROGRAM | Admitting: PSYCHIATRY & NEUROLOGY
Payer: COMMERCIAL

## 2021-06-27 DIAGNOSIS — R45.851 SUICIDAL IDEATION: Primary | ICD-10-CM

## 2021-06-27 LAB
A/G RATIO: 1.7 (ref 1.1–2.2)
ACETAMINOPHEN LEVEL: <5 UG/ML (ref 10–30)
ALBUMIN SERPL-MCNC: 4.8 G/DL (ref 3.4–5)
ALP BLD-CCNC: 48 U/L (ref 40–129)
ALT SERPL-CCNC: 39 U/L (ref 10–40)
AMPHETAMINE SCREEN, URINE: NORMAL
ANION GAP SERPL CALCULATED.3IONS-SCNC: 13 MMOL/L (ref 3–16)
AST SERPL-CCNC: 22 U/L (ref 15–37)
BARBITURATE SCREEN URINE: NORMAL
BASOPHILS ABSOLUTE: 0.1 K/UL (ref 0–0.2)
BASOPHILS RELATIVE PERCENT: 0.9 %
BENZODIAZEPINE SCREEN, URINE: NORMAL
BILIRUB SERPL-MCNC: 0.3 MG/DL (ref 0–1)
BUN BLDV-MCNC: 15 MG/DL (ref 7–20)
CALCIUM SERPL-MCNC: 9.2 MG/DL (ref 8.3–10.6)
CANNABINOID SCREEN URINE: NORMAL
CHLORIDE BLD-SCNC: 107 MMOL/L (ref 99–110)
CO2: 21 MMOL/L (ref 21–32)
COCAINE METABOLITE SCREEN URINE: NORMAL
CREAT SERPL-MCNC: 0.9 MG/DL (ref 0.6–1.1)
EOSINOPHILS ABSOLUTE: 0.1 K/UL (ref 0–0.6)
EOSINOPHILS RELATIVE PERCENT: 1 %
ETHANOL: NORMAL MG/DL (ref 0–0.08)
GFR AFRICAN AMERICAN: >60
GFR NON-AFRICAN AMERICAN: >60
GLOBULIN: 2.9 G/DL
GLUCOSE BLD-MCNC: 70 MG/DL (ref 70–99)
HCG QUALITATIVE: NEGATIVE
HCT VFR BLD CALC: 37.1 % (ref 36–48)
HEMOGLOBIN: 12.2 G/DL (ref 12–16)
LYMPHOCYTES ABSOLUTE: 1.7 K/UL (ref 1–5.1)
LYMPHOCYTES RELATIVE PERCENT: 25.3 %
Lab: NORMAL
MAGNESIUM: 1.9 MG/DL (ref 1.8–2.4)
MCH RBC QN AUTO: 30.6 PG (ref 26–34)
MCHC RBC AUTO-ENTMCNC: 32.9 G/DL (ref 31–36)
MCV RBC AUTO: 92.9 FL (ref 80–100)
METHADONE SCREEN, URINE: NORMAL
MONOCYTES ABSOLUTE: 0.7 K/UL (ref 0–1.3)
MONOCYTES RELATIVE PERCENT: 9.8 %
NEUTROPHILS ABSOLUTE: 4.2 K/UL (ref 1.7–7.7)
NEUTROPHILS RELATIVE PERCENT: 63 %
OPIATE SCREEN URINE: NORMAL
OXYCODONE URINE: NORMAL
PDW BLD-RTO: 15.2 % (ref 12.4–15.4)
PH UA: 6
PHENCYCLIDINE SCREEN URINE: NORMAL
PLATELET # BLD: 264 K/UL (ref 135–450)
PMV BLD AUTO: 9.4 FL (ref 5–10.5)
POTASSIUM REFLEX MAGNESIUM: 3.4 MMOL/L (ref 3.5–5.1)
PROPOXYPHENE SCREEN: NORMAL
RBC # BLD: 3.99 M/UL (ref 4–5.2)
SALICYLATE, SERUM: <0.3 MG/DL (ref 15–30)
SARS-COV-2, NAAT: NOT DETECTED
SODIUM BLD-SCNC: 141 MMOL/L (ref 136–145)
TOTAL PROTEIN: 7.7 G/DL (ref 6.4–8.2)
TSH SERPL DL<=0.05 MIU/L-ACNC: 3.6 UIU/ML (ref 0.27–4.2)
WBC # BLD: 6.7 K/UL (ref 4–11)

## 2021-06-27 PROCEDURE — 84443 ASSAY THYROID STIM HORMONE: CPT

## 2021-06-27 PROCEDURE — 85025 COMPLETE CBC W/AUTO DIFF WBC: CPT

## 2021-06-27 PROCEDURE — 83735 ASSAY OF MAGNESIUM: CPT

## 2021-06-27 PROCEDURE — 80179 DRUG ASSAY SALICYLATE: CPT

## 2021-06-27 PROCEDURE — 80053 COMPREHEN METABOLIC PANEL: CPT

## 2021-06-27 PROCEDURE — 36415 COLL VENOUS BLD VENIPUNCTURE: CPT

## 2021-06-27 PROCEDURE — 1240000000 HC EMOTIONAL WELLNESS R&B

## 2021-06-27 PROCEDURE — 99284 EMERGENCY DEPT VISIT MOD MDM: CPT

## 2021-06-27 PROCEDURE — 87635 SARS-COV-2 COVID-19 AMP PRB: CPT

## 2021-06-27 PROCEDURE — 84703 CHORIONIC GONADOTROPIN ASSAY: CPT

## 2021-06-27 PROCEDURE — 82077 ASSAY SPEC XCP UR&BREATH IA: CPT

## 2021-06-27 PROCEDURE — 80143 DRUG ASSAY ACETAMINOPHEN: CPT

## 2021-06-27 PROCEDURE — 80061 LIPID PANEL: CPT

## 2021-06-27 PROCEDURE — 80307 DRUG TEST PRSMV CHEM ANLYZR: CPT

## 2021-06-27 PROCEDURE — 83036 HEMOGLOBIN GLYCOSYLATED A1C: CPT

## 2021-06-27 RX ORDER — BUPROPION HYDROCHLORIDE 300 MG/1
300 TABLET ORAL EVERY MORNING
Status: ON HOLD | COMMUNITY
Start: 2018-12-03 | End: 2021-06-30 | Stop reason: HOSPADM

## 2021-06-27 RX ORDER — MAGNESIUM HYDROXIDE/ALUMINUM HYDROXICE/SIMETHICONE 120; 1200; 1200 MG/30ML; MG/30ML; MG/30ML
30 SUSPENSION ORAL EVERY 6 HOURS PRN
Status: DISCONTINUED | OUTPATIENT
Start: 2021-06-27 | End: 2021-06-30 | Stop reason: HOSPADM

## 2021-06-27 RX ORDER — NICOTINE 21 MG/24HR
1 PATCH, TRANSDERMAL 24 HOURS TRANSDERMAL DAILY
Status: DISCONTINUED | OUTPATIENT
Start: 2021-06-27 | End: 2021-06-30 | Stop reason: HOSPADM

## 2021-06-27 RX ORDER — POLYETHYLENE GLYCOL 3350 17 G
2 POWDER IN PACKET (EA) ORAL
Status: DISCONTINUED | OUTPATIENT
Start: 2021-06-27 | End: 2021-06-30 | Stop reason: HOSPADM

## 2021-06-27 RX ORDER — NORETHINDRONE 0.35 MG/1
TABLET ORAL
Status: ON HOLD | COMMUNITY
Start: 2021-05-19 | End: 2022-07-12 | Stop reason: HOSPADM

## 2021-06-27 RX ORDER — HYDROXYZINE PAMOATE 25 MG/1
50 CAPSULE ORAL EVERY 6 HOURS PRN
Status: ON HOLD | COMMUNITY
Start: 2021-05-06 | End: 2021-06-30 | Stop reason: HOSPADM

## 2021-06-27 RX ORDER — BENZTROPINE MESYLATE 1 MG/ML
2 INJECTION INTRAMUSCULAR; INTRAVENOUS 2 TIMES DAILY PRN
Status: DISCONTINUED | OUTPATIENT
Start: 2021-06-27 | End: 2021-06-30 | Stop reason: HOSPADM

## 2021-06-27 RX ORDER — OLANZAPINE 10 MG/1
10 TABLET ORAL
Status: ACTIVE | OUTPATIENT
Start: 2021-06-27 | End: 2021-06-27

## 2021-06-27 RX ORDER — ACETAMINOPHEN 325 MG/1
650 TABLET ORAL EVERY 4 HOURS PRN
Status: DISCONTINUED | OUTPATIENT
Start: 2021-06-27 | End: 2021-06-29

## 2021-06-27 RX ORDER — DULOXETIN HYDROCHLORIDE 60 MG/1
CAPSULE, DELAYED RELEASE ORAL
Status: ON HOLD | COMMUNITY
Start: 2021-03-29 | End: 2021-06-30 | Stop reason: HOSPADM

## 2021-06-27 RX ORDER — OLANZAPINE 10 MG/1
10 INJECTION, POWDER, LYOPHILIZED, FOR SOLUTION INTRAMUSCULAR
Status: ACTIVE | OUTPATIENT
Start: 2021-06-27 | End: 2021-06-27

## 2021-06-27 RX ORDER — IBUPROFEN 400 MG/1
400 TABLET ORAL EVERY 6 HOURS PRN
Status: DISCONTINUED | OUTPATIENT
Start: 2021-06-27 | End: 2021-06-29

## 2021-06-27 ASSESSMENT — PAIN SCALES - GENERAL
PAINLEVEL_OUTOF10: 6
PAINLEVEL_OUTOF10: 0

## 2021-06-27 ASSESSMENT — SLEEP AND FATIGUE QUESTIONNAIRES
AVERAGE NUMBER OF SLEEP HOURS: 9
SLEEP PATTERN: RESTLESSNESS
DIFFICULTY ARISING: YES
DIFFICULTY STAYING ASLEEP: YES
DO YOU USE A SLEEP AID: NO
DO YOU HAVE DIFFICULTY SLEEPING: YES
RESTFUL SLEEP: NO
DIFFICULTY FALLING ASLEEP: NO

## 2021-06-27 ASSESSMENT — PATIENT HEALTH QUESTIONNAIRE - PHQ9: SUM OF ALL RESPONSES TO PHQ QUESTIONS 1-9: 14

## 2021-06-27 ASSESSMENT — PAIN DESCRIPTION - PAIN TYPE: TYPE: CHRONIC PAIN

## 2021-06-27 ASSESSMENT — LIFESTYLE VARIABLES: HISTORY_ALCOHOL_USE: COMMENT

## 2021-06-27 NOTE — ED NOTES
Level of Care Disposition: Admit     Patient was seen by ED provider and [facility] staff. The case presented to psychiatric provider on-call . Based on the ED evaluation and information presented to the provider by LAURA Adhikari it is the recommendation of the on call psychiatric provider that inpatient hospitalization is the least restrictive environment for the patient at this time. The patient will be admitted to the inpatient unit.      RATIONALE FOR ADMISSION:   Patient has a mental illness: depression  Patient at imminent risk of danger to self as demonstrated by having SI  Patient has shown an inability to care for self demonstrated by reports not being able to get of bed, shower, loss of appetite     Insurance Pre certification Authorization: Barbara Hunt  06/27/21 143

## 2021-06-27 NOTE — BH NOTE
Admission completed. Patient denies any thoughts of harming self or others at this time and CFS while on unit.       `[facility]al [facility]  Admission Note     Admission Type:   Admission Type: Voluntary    Reason for admission:  Reason for Admission: suicidal ideation    PATIENT STRENGTHS:  Strengths: No significant Physical Illness, Positive Support    Patient Strengths and Limitations:  Limitations: Apathetic / unmotivated    Addictive Behavior:   Addictive Behavior  In the past 3 months, have you felt or has someone told you that you have a problem with:  : None  Do you have a history of Chemical Use?: Comment (pt states has been sober for 1 yr)  Do you have a history of Alcohol Use?: Comment (pt states has been sober for 1 yr)  Do you have a history of Street Drug Abuse?: No  Histroy of Prescripton Drug Abuse?: No    Medical Problems:   Past Medical History:   Diagnosis Date    Anxiety     Chronic hepatitis C without hepatic coma (HCC)     Chronic post-traumatic stress disorder (PTSD) 10/29/2018    Depression     Hepatitis C 07/09/2020    Mild intermittent asthma without complication     Stimulant use disorder 9/23/2018       Status EXAM:  Status and Exam  Normal: Yes  Facial Expression: Flat, Brightened  Affect: Appropriate  Level of Consciousness: Alert  Mood:Normal: No  Mood: Depressed  Motor Activity:Normal: Yes  Interview Behavior: Cooperative  Preception: [place] to Person, [place] to Time, [place] to Place, [place] to Situation  Attention:Normal: Yes  Thought Processes: Other(See comment) (WNL; linear)  Thought Content:Normal: Yes  Hallucinations: None  Delusions: No  Memory:Normal: Yes  Insight and Judgment: No  Insight and Judgment: Poor Insight  Present Suicidal Ideation: No  Present Homicidal Ideation: No    Tobacco Screening:  Practical Counseling, on admission, david X, if applicable and completed (first 3 are required if patient doesn't refuse):            ( )  Recognizing danger situations (included triggers and roadblocks)                    ( )  Coping skills (new ways to manage stress, exercise, relaxation techniques, changing routine, distraction)                                                           ( )  Basic information about quitting (benefits of quitting, techniques in how to quit, available resources  ( ) Referral for counseling faxed to [facility]                                           ( ) Patient refused counseling  ( ) Patient has not smoked in the last 30 days    Metabolic Screening:    Lab Results   Component Value Date    LABA1C 5.1 07/19/2020       Lab Results   Component Value Date    CHOL 140 07/19/2020    CHOL 133 07/09/2020     Lab Results   Component Value Date    TRIG 68 07/19/2020    TRIG 128 07/09/2020     Lab Results   Component Value Date    HDL 50 07/19/2020    HDL 39 (L) 07/09/2020     No components found for: LDLCAL  Lab Results   Component Value Date    LABVLDL 14 07/19/2020    LABVLDL 26 07/09/2020         Body mass index is 29.12 kg/m². BP Readings from Last 2 Encounters:   06/27/21 94/64   06/23/21 112/75           Pt admitted with followings belongings:  Dentures: None  Vision - Corrective Lenses: None  Hearing Aid: None  Jewelry: None  Body Piercings Removed: N/A  Clothing: Footwear, Pants, Shirt  Were All Patient Medications Collected?: Not Applicable  Other Valuables: Cell phone, Illene Grass placed in safe and locker. Patient's home medications were N/A. Patient oriented to surroundings and program expectations and copy of patient rights given. Received admission packet:  yes. Consents reviewed, signed yes. Refused N/A. Patient verbalize understanding:  yes.     Patient education on precautions: yes                   Kassy Harp RN

## 2021-06-27 NOTE — ED NOTES
Pt was brought back to the [facility]-changed in safety gown, belongings locked in locker, and placed in room B4.       Finn Herron, LAURA  06/27/21 7860
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
## 2021-06-27 NOTE — ED PROVIDER NOTES
[address] ED      CHIEF COMPLAINT  Suicidal (pt has been of meds since March; has been depressed and not functioning for past month; has been having thoughts of taking a bunch of pills)       HISTORY OF PRESENT ILLNESS  Kurt Ronquillo is a 39 y.o. female with a past medical history of major depressive disorder, substance abuse, asthm and hepatitis C a who presents to the ED complaining of suicidal ideation. Reports she had severe respiratory illness in March and reports poor functioning since then. Suicidal ideation: yes  Plan: Overdose  Previous attempts: yes- OD  Homicidal ideation: denies  Access to firearms: denies  Audiovisual hallucinations: occasional hearing noises  Psychiatric medications: not currently on  Tobacco use: denies  Alcohol use: denies  Illicit drug use: denies    Somatic complaints: reports some chronic URI symptoms but states overall they have significantly improved. SHe reports some chronic of her left ear, states she has been seen by ENT multiple times with no concerns. No other complaints, modifying factors or associated symptoms. I have reviewed the following from the nursing documentation.     Past Medical History:   Diagnosis Date    Anxiety     Chronic hepatitis C without hepatic coma (HCC)     Chronic post-traumatic stress disorder (PTSD) 10/29/2018    Depression     Hepatitis C 2020    Mild intermittent asthma without complication     Stimulant use disorder 2018     Past Surgical History:   Procedure Laterality Date     SECTION  2005    DILATION AND CURETTAGE OF UTERUS  2006         Family History   Problem Relation Age of Onset    Cancer Father         lung    Mental Illness Father     Diabetes Maternal Grandmother     Hypertension Maternal Grandmother     Heart Failure Maternal Grandmother     Depression Paternal Grandmother     Diabetes Paternal Grandmother     Heart Failure Paternal Grandmother     Mental Illness Paternal Grandmother     Hypertension Mother     Diabetes Paternal Grandfather     Heart Failure Paternal Grandfather     Mental Illness Paternal Grandfather      Social History     Socioeconomic History    Marital status: Single     Spouse name: Not on file    Number of children: 2    Years of education: 15    Highest education level: Not on file   Occupational History    Occupation: unemployed past 2+ year   Tobacco Use    Smoking status: Former Smoker     Packs/day: 0.50     Types: E-Cigarettes     Quit date: 10/19/2020     Years since quittin.6    Smokeless tobacco: Never Used    Tobacco comment: using nicotine patches   Vaping Use    Vaping Use: Every day    Substances: Nicotine   Substance and Sexual Activity    Alcohol use: No     Alcohol/week: 1.0 standard drinks     Types: 1 Standard drinks or equivalent per week     Comment: quit 20    Drug use: Not Currently     Types: Methamphetamines, IV     Comment: last use 2020    Sexual activity: Yes     Partners: Male   Other Topics Concern    Not on file   Social History Narrative    Not on file     Social Determinants of Health     Financial Resource Strain:     Difficulty of Paying Living Expenses:    Food Insecurity:     Worried About Running Out of Food in the Last Year:     Ran Out of Food in the Last Year:    Transportation Needs:     Lack of Transportation (Medical):      Lack of Transportation (Non-Medical):    Physical Activity:     Days of Exercise per Week:     Minutes of Exercise per Session:    Stress:     Feeling of Stress :    Social Connections:     Frequency of Communication with Friends and Family:     Frequency of Social Gatherings with Friends and Family:     Attends Restoration Services:     Active Member of Clubs or Organizations:     Attends Club or Organization Meetings:     Marital Status:    Intimate Partner Violence:     Fear of Current or Ex-Partner:     Emotionally Abused:     Physically Abused:     Sexually Abused:      No current facility-administered medications for this encounter. Current Outpatient Medications   Medication Sig Dispense Refill    buPROPion (WELLBUTRIN XL) 300 MG extended release tablet       buPROPion (WELLBUTRIN XL) 300 MG extended release tablet Take 1 tablet by mouth every morning for 2 days 2 tablet 0    topiramate (TOPAMAX) 100 MG tablet Take 1 tablet by mouth daily for 2 days 2 tablet 0    topiramate (TOPAMAX) 100 MG tablet Take 1 tablet by mouth 2 times daily for 5 days 10 tablet 0    ARIPiprazole (ABILIFY) 5 MG tablet Take 5 mg by mouth daily      hydrOXYzine (VISTARIL) 25 MG capsule Take 25 mg by mouth 2 times daily       topiramate (TOPAMAX) 50 MG tablet Take 100 mg by mouth 2 times daily       buPROPion (WELLBUTRIN XL) 150 MG extended release tablet Take 1 tablet by mouth daily (Patient taking differently: Take 300 mg by mouth daily ) 30 tablet 0    DULoxetine (CYMBALTA) 30 MG extended release capsule Take 1 capsule by mouth 2 times daily 60 capsule 0     Allergies   Allergen Reactions    Latex Rash     \"I get a red inflamed rash. \"    Ceclor [Cefaclor] Rash       REVIEW OF SYSTEMS  10 systems reviewed, pertinent positives per HPI otherwise noted to be negative. PHYSICAL EXAM  /79   Pulse 84   Temp 97.7 °F (36.5 °C)   Resp 16   Ht 5' 5\" (1.651 m)   Wt 175 lb (79.4 kg)   SpO2 100%   BMI 29.12 kg/m²    GENERAL APPEARANCE: Awake and alert. Cooperative. no distress. HENT: Normocephalic. Atraumatic. Mucous membranes are moist. Bilateral TMs unremarkable, oropharynx unremarkable  NECK: Supple. Full range of motion of the neck without stiffness or pain. No meningmus  EYES: PERRL. EOM's grossly intact. HEART/CHEST: RRR. No murmurs. LUNGS: Respirations unlabored. CTAB. Good air exchange. Speaking comfortably in full sentences. ABDOMEN: No tenderness. Soft. Non-distended. No masses. No organomegaly. No guarding or rebound. MUSCULOSKELETAL: No extremity edema. Compartments soft. No deformity. No tenderness in the extremities. All extremities neurovascularly intact. SKIN: Warm and dry. No acute rashes. NEUROLOGICAL: Alert and oriented. No gross facial drooping. Strength 5/5, sensation intact. PSYCHIATRIC: reports SI, does nt appear to be responding to internal stimuli    LABS  I have reviewed all labs for this visit.    Results for orders placed or performed during the hospital encounter of 06/27/21   COVID-19, Rapid    Specimen: Nasopharyngeal Swab   Result Value Ref Range    SARS-CoV-2, NAAT Not Detected Not Detected   CBC Auto Differential   Result Value Ref Range    WBC 6.7 4.0 - 11.0 K/uL    RBC 3.99 (L) 4.00 - 5.20 M/uL    Hemoglobin 12.2 12.0 - 16.0 g/dL    Hematocrit 37.1 36.0 - 48.0 %    MCV 92.9 80.0 - 100.0 fL    MCH 30.6 26.0 - 34.0 pg    MCHC 32.9 31.0 - 36.0 g/dL    RDW 15.2 12.4 - 15.4 %    Platelets 497 489 - 422 K/uL    MPV 9.4 5.0 - 10.5 fL    Neutrophils % 63.0 %    Lymphocytes % 25.3 %    Monocytes % 9.8 %    Eosinophils % 1.0 %    Basophils % 0.9 %    Neutrophils Absolute 4.2 1.7 - 7.7 K/uL    Lymphocytes Absolute 1.7 1.0 - 5.1 K/uL    Monocytes Absolute 0.7 0.0 - 1.3 K/uL    Eosinophils Absolute 0.1 0.0 - 0.6 K/uL    Basophils Absolute 0.1 0.0 - 0.2 K/uL   Comprehensive Metabolic Panel w/ Reflex to MG   Result Value Ref Range    Sodium 141 136 - 145 mmol/L    Potassium reflex Magnesium 3.4 (L) 3.5 - 5.1 mmol/L    Chloride 107 99 - 110 mmol/L    CO2 21 21 - 32 mmol/L    Anion Gap 13 3 - 16    Glucose 70 70 - 99 mg/dL    BUN 15 7 - 20 mg/dL    CREATININE 0.9 0.6 - 1.1 mg/dL    GFR Non-African American >60 >60    GFR African American >60 >60    Calcium 9.2 8.3 - 10.6 mg/dL    Total Protein 7.7 6.4 - 8.2 g/dL    Albumin 4.8 3.4 - 5.0 g/dL    Albumin/Globulin Ratio 1.7 1.1 - 2.2    Total Bilirubin 0.3 0.0 - 1.0 mg/dL    Alkaline Phosphatase 48 40 - 129 U/L    ALT 39 10 - 40 U/L    AST 22 15 - 37 U/L    Globulin 2.9 g/dL   HCG Qualitative, Serum   Result Value Ref Range    hCG Qual Negative Detects HCG level >10 MIU/mL   Acetaminophen level   Result Value Ref Range    Acetaminophen Level <5 (L) 10 - 30 ug/mL   Salicylate   Result Value Ref Range    Salicylate, Serum <6.1 (L) 15.0 - 30.0 mg/dL   Ethanol   Result Value Ref Range    Ethanol Lvl None Detected mg/dL   Drug screen multi urine   Result Value Ref Range    Amphetamine Screen, Urine Neg Negative <1000ng/mL    Barbiturate Screen, Ur Neg Negative <200 ng/mL    Benzodiazepine Screen, Urine Neg Negative <200 ng/mL    Cannabinoid Scrn, Ur Neg Negative <50 ng/mL    Cocaine Metabolite Screen, Urine Neg Negative <300 ng/mL    Opiate Scrn, Ur Neg Negative <300 ng/mL    PCP Screen, Urine Neg Negative <25 ng/mL    Methadone Screen, Urine Neg Negative <300 ng/mL    Propoxyphene Scrn, Ur Neg Negative <300 ng/mL    Oxycodone Urine Neg Negative <100 ng/ml    pH, UA 6.0     Drug Screen Comment: see below    Magnesium   Result Value Ref Range    Magnesium 1.90 1.80 - 2.40 mg/dL       During the patient's ED course, the patient was given:  Medications - No data to display     ED COURSE / MDM  Patient seen and evaluated. Old records reviewed. Labs and imaging reviewed and results discussed with patient. Overall, well appearing patient in no acute distress, presenting for suicidal ideation. Reports no new somatic complaints. Physical exam unremarkable. Laboratory studies obtained for medical clearance. Work-up showed:    ED Course as of Jun 27 1552   Sun Jun 27, 2021   1301 Patient is not pregnant    [ER]   1301 No leukocytosis, anemia, thrombocytopenia. [ER]   1301 Urine drug screen negative    [ER]   1325 Mild hypokalemia, no other significant electrolyte abnormalities or evidence of kidney dysfunction.    [ER]   2190 Ethanol, salicylate, and acetaminophen levels negative. [ER]   1325 Covid swab negative.     [ER]   1325 Liver function testing unremarkable    [ER] 1342 Patient is medically cleared for psychiatric evaluation.    [ER]      ED Course User Index  [ER] Andria Lee MD     I have performed a medical clearance examination on this patient. It is my opinion that no medical conditions were discovered that would preclude admission to a behavioral health unit or discharge home. I feel that the patient is medically stable for disposition by the behavioral health team at this time. Patient admitted to psychiatry. CLINICAL IMPRESSION  1. Suicidal ideation        Blood pressure 117/79, pulse 84, temperature 97.7 °F (36.5 °C), resp. rate 16, height 5' 5\" (1.651 m), weight 175 lb (79.4 kg), SpO2 100 %, not currently breastfeeding. Zack Adam was admitted in stable condition. DISCLAIMER: This chart was created using Dragon dictation software. Efforts were made by me to ensure accuracy, however some errors may be present due to limitations of this technology and occasionally words are not transcribed correctly.       Andria Lee MD  06/27/21 1540

## 2021-06-28 PROBLEM — F11.21 OPIOID USE DISORDER, SEVERE, IN SUSTAINED REMISSION (HCC): Status: ACTIVE | Noted: 2021-06-28

## 2021-06-28 PROBLEM — F15.21 AMPHETAMINE USE DISORDER, SEVERE, IN SUSTAINED REMISSION (HCC): Status: ACTIVE | Noted: 2018-09-23

## 2021-06-28 LAB
CHOLESTEROL, TOTAL: 193 MG/DL (ref 0–199)
HDLC SERPL-MCNC: 58 MG/DL (ref 40–60)
LDL CHOLESTEROL CALCULATED: 110 MG/DL
TRIGL SERPL-MCNC: 127 MG/DL (ref 0–150)
VLDLC SERPL CALC-MCNC: 25 MG/DL

## 2021-06-28 PROCEDURE — 6370000000 HC RX 637 (ALT 250 FOR IP): Performed by: PSYCHIATRY & NEUROLOGY

## 2021-06-28 PROCEDURE — 1240000000 HC EMOTIONAL WELLNESS R&B

## 2021-06-28 PROCEDURE — 99221 1ST HOSP IP/OBS SF/LOW 40: CPT | Performed by: NURSE PRACTITIONER

## 2021-06-28 PROCEDURE — 99223 1ST HOSP IP/OBS HIGH 75: CPT | Performed by: PSYCHIATRY & NEUROLOGY

## 2021-06-28 RX ORDER — BUPROPION HYDROCHLORIDE 150 MG/1
150 TABLET, EXTENDED RELEASE ORAL 2 TIMES DAILY
Status: DISCONTINUED | OUTPATIENT
Start: 2021-06-28 | End: 2021-06-29

## 2021-06-28 RX ORDER — DULOXETIN HYDROCHLORIDE 60 MG/1
60 CAPSULE, DELAYED RELEASE ORAL NIGHTLY
Status: DISCONTINUED | OUTPATIENT
Start: 2021-06-28 | End: 2021-06-30 | Stop reason: HOSPADM

## 2021-06-28 RX ORDER — TOPIRAMATE 100 MG/1
100 TABLET, FILM COATED ORAL NIGHTLY
Status: DISCONTINUED | OUTPATIENT
Start: 2021-06-28 | End: 2021-06-30 | Stop reason: HOSPADM

## 2021-06-28 RX ADMIN — IBUPROFEN 400 MG: 400 TABLET, FILM COATED ORAL at 02:04

## 2021-06-28 RX ADMIN — BUPROPION HYDROCHLORIDE 150 MG: 150 TABLET, EXTENDED RELEASE ORAL at 20:49

## 2021-06-28 RX ADMIN — TOPIRAMATE 100 MG: 100 TABLET, FILM COATED ORAL at 20:49

## 2021-06-28 RX ADMIN — BUPROPION HYDROCHLORIDE 150 MG: 150 TABLET, EXTENDED RELEASE ORAL at 14:39

## 2021-06-28 RX ADMIN — DULOXETINE HYDROCHLORIDE 60 MG: 60 CAPSULE, DELAYED RELEASE ORAL at 20:49

## 2021-06-28 ASSESSMENT — SLEEP AND FATIGUE QUESTIONNAIRES
DIFFICULTY ARISING: YES
DIFFICULTY STAYING ASLEEP: YES
DO YOU HAVE DIFFICULTY SLEEPING: NO
RESTFUL SLEEP: NO
DO YOU USE A SLEEP AID: NO
AVERAGE NUMBER OF SLEEP HOURS: 8
DIFFICULTY FALLING ASLEEP: YES

## 2021-06-28 ASSESSMENT — LIFESTYLE VARIABLES: HISTORY_ALCOHOL_USE: NO

## 2021-06-28 ASSESSMENT — PATIENT HEALTH QUESTIONNAIRE - PHQ9: SUM OF ALL RESPONSES TO PHQ QUESTIONS 1-9: 14

## 2021-06-28 ASSESSMENT — PAIN SCALES - GENERAL: PAINLEVEL_OUTOF10: 7

## 2021-06-28 NOTE — H&P
MD   topiramate (TOPAMAX) 100 MG tablet Take 1 tablet by mouth daily for 2 days 6/23/21 6/25/21  Selma Murphy PA-C       Allergies:  Latex, Cephalosporins, Other, Sulfa antibiotics, and Cefaclor    Social History:    TOBACCO:   reports that she quit smoking about 8 months ago. Her smoking use included e-cigarettes. She smoked 0.50 packs per day. She has never used smokeless tobacco.  ETOH:   reports no history of alcohol use. Family History:   Positive as follows:        Problem Relation Age of Onset    Cancer Father         lung    Mental Illness Father     Diabetes Maternal Grandmother     Hypertension Maternal Grandmother     Heart Failure Maternal Grandmother     Depression Paternal Grandmother     Diabetes Paternal Grandmother     Heart Failure Paternal Grandmother     Mental Illness Paternal Grandmother     Hypertension Mother     Diabetes Paternal Grandfather     Heart Failure Paternal Grandfather     Mental Illness Paternal Grandfather        REVIEW OF SYSTEMS:       Constitutional: Negative for fever   HENT: Negative for sore throat   Respiratory: Negative  for dyspnea, cough   Cardiovascular: Negative for chest pain   Gastrointestinal: Negative for vomiting, diarrhea   Genitourinary: Negative for dysuria  Musculoskeletal: Negative for arthralgias   Skin: Negative for rash   Neurological: Negative for syncope   Psychiatric/Behavorial: per psychiatric evaluation    PHYSICAL EXAM:    /69   Pulse 78   Temp 97.5 °F (36.4 °C) (Temporal)   Resp 16   Ht 5' 5\" (1.651 m)   Wt 175 lb (79.4 kg)   SpO2 98%   BMI 29.12 kg/m²     Gen: No distress. Alert. Eyes: PERRL. No sclera icterus. No conjunctival injection. ENT: No discharge. Pharynx clear. Neck: Trachea midline. Resp: No accessory muscle use. No crackles. No wheezes. No rhonchi. CV: Regular rate. Regular rhythm. No murmur. No rub. No edema. GI: Non-tender. Non-distended. Normal bowel sounds. Skin: Warm and dry.  No nodule on exposed extremities. No rash on exposed extremities. M/S: No cyanosis. No joint deformity. No clubbing. Neuro: Awake. No focal neurologic deficit on exam.  Cranial nerves II through XII intact. Patient is able to ambulate without difficulty. Psych: Per psychiatry team evaluation       CBC:   Recent Labs     06/27/21  1230   WBC 6.7   HGB 12.2   HCT 37.1   MCV 92.9        BMP:   Recent Labs     06/27/21  1230      K 3.4*      CO2 21   BUN 15   CREATININE 0.9     LIVER PROFILE:   Recent Labs     06/27/21  1230   AST 22   ALT 39   BILITOT 0.3   ALKPHOS 48     UA:  Recent Labs     06/27/21  1230   PHUR 6.0          CULTURES  COVID: not detected      ASSESSMENT/PLAN:  Hypokalemia  - mild. Replete with diet. Depression, SI  - management per psychiatry    Asthma  - mild, intermittent  - rarely uses an inhaler. Hepatitis C  - states she tested negative for this at [facility]. - F/w PCP. Tobacco use   - recommend cessation  - Nicotine patch ordered. Pt has no medical complaints at this time. They were informed that should a medical concern arise during their admission they may have [facility] contact us.     María Walker FNP-C  6/28/2021 8:43 AM
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
## 2021-06-28 NOTE — PLAN OF CARE
Problem: Altered Mood, Depressive Behavior:  Goal: Able to verbalize acceptance of life and situations over which he or she has no control  Description: Able to verbalize acceptance of life and situations over which he or she has no control  Outcome: Ongoing     Problem: Altered Mood, Depressive Behavior:  Goal: Able to verbalize and/or display a decrease in depressive symptoms  Description: Able to verbalize and/or display a decrease in depressive symptoms  Outcome: Ongoing   Patient has been mainly withdrawn to her room throughout the shift. She does come out for meals and for request. She is A&Ox 3 and it is unclear if she is completely aware of the situation that lead to her staying in the hospital. She denies that she is having thoughts to harm self or others. She denies that she is experiencing hallucinations, but has an inappropriate smile and appears to have internal stimuli. She also seems guarded during the conversation with staff that no delusional statements were made, but she was brief and did not say much. She said she was here because she ended up off medications and went into a deep depression. She was cooperative with restarting medications. She is pleasant and on staff's approach has an over exaggerated smile. She has been no issue.

## 2021-06-28 NOTE — GROUP NOTE
Group Therapy Note    Date: 6/27/2021    Group Start Time: 2110  Group End Time: 2135  Group Topic: Lyman, RN        Group Therapy Note    Attendees: Wrap up group discussing goals of the day and night time unit rules. All patients were participatory and cooperative. Patient's Goal:  Get acclimated to unit and get meds straight    Notes:  Participatory    Status After Intervention:  Unchanged    Participation Level:  Active Listener    Participation Quality: Appropriate      Speech:  normal      Thought Process/Content: Linear      Affective Functioning: Congruent      Mood: euthymic      Level of consciousness:  alert      Response to Learning: Able to verbalize current knowledge/experience      Endings: None Reported    Modes of Intervention: Support      Discipline Responsible: Registered Nurse      Signature:  Wendy Edwards RN

## 2021-06-28 NOTE — FLOWSHEET NOTE
Purposeful Rounding    Patient Location Library    Patient willing to engage in conversationYes    Presentation/behavior Cooperative and Pleasant    Affect Brightens with interaction    Concerns reported: None    PRN medications given: No    Environmental assessment Room free from clutter, Clear path to bathroom  and Adequate lighting    Fall prevention interventions in place: Yellow non-skid socks on    Daily Laurel Fall Risk Score : 64    Daily Baldwin Fall Risk Score : Low risk

## 2021-06-28 NOTE — PROGRESS NOTES
Behavioral Services  Medicare Certification Upon Admission    I certify that this patient's inpatient psychiatric hospital admission is medically necessary for:    [x] (1) Treatment which could reasonably be expected to improve this patient's condition,       [x] (2) Or for diagnostic study;     AND     [x](2) The inpatient psychiatric services are provided while the individual is under the care of a physician and are included in the individualized plan of care.     Estimated length of stay/service 3-5 days    Plan for post-hospital care incomplete     Electronically signed by Kristin Navarro MD on 6/28/2021 at 2:30 PM

## 2021-06-28 NOTE — FLOWSHEET NOTE
Purposeful Rounding    Patient Location Patient room    Patient willing to engage in conversationNo    Presentation/behavior Patient asleep    Affect Patient asleep    Concerns reported: None    PRN medications given: No    Environmental assessment Room free from clutter, Clear path to bathroom  and Adequate lighting    Fall prevention interventions in place: Yellow non-skid socks on    Daily Ricardo Fall Risk Score : 64    Daily Baldwin Fall Risk Score : Low risk
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
## 2021-06-28 NOTE — PLAN OF CARE
Patient alert and oriented x 2 disoriented to date. Patient rates Depression 9/10 and Anxiety 8/10. Patient denies SI/HI/A/V/H. Patient attended and participated in wrap up group. Patient doesn't have HS medications scheduled. Patient denies self harm. No c/o's voiced at present.

## 2021-06-29 LAB
ESTIMATED AVERAGE GLUCOSE: 91.1 MG/DL
HBA1C MFR BLD: 4.8 %

## 2021-06-29 PROCEDURE — 97535 SELF CARE MNGMENT TRAINING: CPT

## 2021-06-29 PROCEDURE — 6370000000 HC RX 637 (ALT 250 FOR IP): Performed by: PSYCHIATRY & NEUROLOGY

## 2021-06-29 PROCEDURE — 1240000000 HC EMOTIONAL WELLNESS R&B

## 2021-06-29 PROCEDURE — 99233 SBSQ HOSP IP/OBS HIGH 50: CPT | Performed by: PSYCHIATRY & NEUROLOGY

## 2021-06-29 PROCEDURE — 97165 OT EVAL LOW COMPLEX 30 MIN: CPT

## 2021-06-29 RX ORDER — BUPROPION HYDROCHLORIDE 100 MG/1
100 TABLET, EXTENDED RELEASE ORAL ONCE
Status: COMPLETED | OUTPATIENT
Start: 2021-06-29 | End: 2021-06-29

## 2021-06-29 RX ORDER — BUPROPION HYDROCHLORIDE 100 MG/1
100 TABLET, EXTENDED RELEASE ORAL
Status: DISCONTINUED | OUTPATIENT
Start: 2021-06-29 | End: 2021-06-30 | Stop reason: HOSPADM

## 2021-06-29 RX ORDER — ACETAMINOPHEN 325 MG/1
650 TABLET ORAL EVERY 4 HOURS PRN
Status: DISCONTINUED | OUTPATIENT
Start: 2021-06-29 | End: 2021-06-30 | Stop reason: HOSPADM

## 2021-06-29 RX ORDER — HYDROXYZINE PAMOATE 25 MG/1
25 CAPSULE ORAL 3 TIMES DAILY PRN
Status: DISCONTINUED | OUTPATIENT
Start: 2021-06-29 | End: 2021-06-30 | Stop reason: HOSPADM

## 2021-06-29 RX ORDER — BUPROPION HYDROCHLORIDE 100 MG/1
200 TABLET, EXTENDED RELEASE ORAL DAILY
Status: DISCONTINUED | OUTPATIENT
Start: 2021-06-30 | End: 2021-06-30 | Stop reason: HOSPADM

## 2021-06-29 RX ORDER — BUPROPION HYDROCHLORIDE 150 MG/1
150 TABLET, EXTENDED RELEASE ORAL ONCE
Status: DISCONTINUED | OUTPATIENT
Start: 2021-06-29 | End: 2021-06-29

## 2021-06-29 RX ADMIN — BUPROPION HYDROCHLORIDE 100 MG: 100 TABLET, EXTENDED RELEASE ORAL at 10:37

## 2021-06-29 RX ADMIN — TOPIRAMATE 100 MG: 100 TABLET, FILM COATED ORAL at 20:43

## 2021-06-29 RX ADMIN — BUPROPION HYDROCHLORIDE 150 MG: 150 TABLET, EXTENDED RELEASE ORAL at 09:23

## 2021-06-29 RX ADMIN — DULOXETINE HYDROCHLORIDE 60 MG: 60 CAPSULE, DELAYED RELEASE ORAL at 20:43

## 2021-06-29 RX ADMIN — BUPROPION HYDROCHLORIDE 100 MG: 100 TABLET, EXTENDED RELEASE ORAL at 12:04

## 2021-06-29 RX ADMIN — IBUPROFEN 400 MG: 400 TABLET, FILM COATED ORAL at 09:23

## 2021-06-29 ASSESSMENT — PAIN SCALES - GENERAL
PAINLEVEL_OUTOF10: 7
PAINLEVEL_OUTOF10: 7

## 2021-06-29 ASSESSMENT — PAIN - FUNCTIONAL ASSESSMENT: PAIN_FUNCTIONAL_ASSESSMENT: ACTIVITIES ARE NOT PREVENTED

## 2021-06-29 ASSESSMENT — PAIN DESCRIPTION - ONSET: ONSET: ON-GOING

## 2021-06-29 ASSESSMENT — PAIN DESCRIPTION - PAIN TYPE: TYPE: CHRONIC PAIN;ACUTE PAIN

## 2021-06-29 ASSESSMENT — PAIN DESCRIPTION - PROGRESSION: CLINICAL_PROGRESSION: GRADUALLY WORSENING

## 2021-06-29 ASSESSMENT — PAIN DESCRIPTION - LOCATION: LOCATION: GENERALIZED

## 2021-06-29 NOTE — PLAN OF CARE
Problem: Altered Mood, Depressive Behavior:  Goal: Able to verbalize acceptance of life and situations over which he or she has no control  Description: Able to verbalize acceptance of life and situations over which he or she has no control  6/29/2021 0433 by Hilaria Arthur RN  Outcome: Ongoing     Problem: Altered Mood, Depressive Behavior:  Goal: Able to verbalize and/or display a decrease in depressive symptoms  Description: Able to verbalize and/or display a decrease in depressive symptoms  6/29/2021 0433 by Hilaria Arthur RN  Outcome: Ongoing   Pt was out on the unit. Alert and oriented, but does appear preoccupied at times. Talking about being clean x1 year, wanting to get her children back. Older rigo age 13 is with pt's mother. The rigo has had some anger toward pt. She plans to take her back at the beginning of the school year. Her younger child who is 11, is in foster care. Pt talked about being ill for a while in March, stopping her meds, not doing needed f/u and is now trying to get her life together again. States she rates her depression at a 7/10 at this time, but overall feels better and more hopeful. Denies hallucinations, SI and HI. She is also feeling more motivated. Seemed to sleep well. Not interacting much on unit but did very well in group tonight.

## 2021-06-29 NOTE — PROGRESS NOTES
Spoke with patient this morning who reports she is interested in IOP. Spoke with mother who is in favor of mental health treatment. Reports patient seems to have issues every 6 months, even when not using, and sees it as being cyclical mental illness rather than just substance abuse. Education to mother regarding mental illness. Mother also in agreement with IOP.  LAURA Gruber

## 2021-06-29 NOTE — BH NOTE
Rounding  0400 round        Patient Location Patient room     Patient willing to engage in conversationNo     Presentation/behavior sleeping     Affect sleeping     Concerns reported: none     PRN medications given:none     Environmental assessment Room free from clutter, Clear path to bathroom  and Adequate lighting     Fall prevention interventions in place: n/a     Daily [place] Fall Risk Score :67     Daily [place] Fall Risk Score : 0

## 2021-06-29 NOTE — BH NOTE
.Purposeful Rounding  Midnight round      Patient Location Patient room    Patient willing to engage in conversationNo    Presentation/behavior sleeping    Affect sleeping    Concerns reported: none    PRN medications given:none    Environmental assessment Room free from clutter, Clear path to bathroom  and Adequate lighting    Fall prevention interventions in place: n/a    Daily [place] Fall Risk Score :67    Daily [place] Fall Risk Score : 0

## 2021-06-29 NOTE — BH NOTE
Purposeful Rounding     Patient Location Day room     Patient willing to engage in conversationYes     Presentation/behavior Withdrawn, Controlled and Cooperative     Affect Flat/blunted     Concerns reported: none     PRN medications given:motrin     Environmental assessment No safety hazards noted     Fall prevention interventions in place: Yellow non-skid socks on     Daily [place] Fall Risk Score :69     Daily [place] Fall Risk Score : 0

## 2021-06-29 NOTE — PLAN OF CARE
Problem: Pain:  Goal: Pain level will decrease  Description: Pain level will decrease  Outcome: Ongoing   Patient c/o generalized chronic pain and acute left leg pain. Rates pain 7/10.  PRN motrin given per pt request.

## 2021-06-29 NOTE — BH NOTE
.Purposeful Rounding  For 2000      Patient Location Day room    Patient willing to engage in conversationYes    Presentation/behavior Guarded/Suspicious and Other s/w preoccupied*    Affect Depressed    Concerns reported: not at this time    PRN medications given:no    Environmental assessment Room free from clutter, Clear path to bathroom  and Adequate lighting    Fall prevention interventions in place: n/a    Daily [place] Fall Risk Score :67    Daily [place] Fall Risk Score : 0

## 2021-06-29 NOTE — BH NOTE
Purposeful Rounding     Patient Location Day room     Patient willing to engage in conversationYes     Presentation/behavior Withdrawn, Controlled and Cooperative     Affect Flat/blunted     Concerns reported: none     PRN medications given:motrin     Environmental assessment No safety hazards noted     Fall prevention interventions in place: Yellow non-skid socks on     Daily [place] Fall Risk Score :69     Daily [place] Fall Risk Score : 0
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
## 2021-06-29 NOTE — PROGRESS NOTES
Department of Psychiatry  Progress Note    Patient's chart was reviewed. Discussed with treatment team. Met with patient. SUBJECTIVE:      Tolerating the resumption of her outpatient regimen well    Continues with low mood but is more hopeful. Less SI.     ROS:   Patient has new complaints: no  Sleeping adequately:  Yes   Appetite adequate: Yes  Engaged in programming: limited    OBJECTIVE:  VITALS:  /66   Pulse 71   Temp 98.6 °F (37 °C) (Temporal)   Resp 16   Ht 5' 5\" (1.651 m)   Wt 175 lb (79.4 kg)   SpO2 96%   BMI 29.12 kg/m²     Mental Status Examination:    Appearance: fair grooming and hygiene  Behavior/Attitude toward examiner:  cooperative, attentive and fair eye contact  Speech: Normal rate, volume, amount  Mood:  \"ok\"  Affect:  blunted     Thought processes:  Goal directed, linear, no SVETLANA or gross disorganization  Thought Content: less SI, no HI, no delusions voiced, no obsessions  Perceptions: no AVH  Attention: attention span and concentration were intact to interview   Abstraction: intact  Cognition:  Alert and oriented to person, place, time, and situation, recall intact  Insight: fair  Judgment: fair    Medication:  Scheduled:   [START ON 6/30/2021] buPROPion  200 mg Oral Daily    And    buPROPion  100 mg Oral Lunch    DULoxetine  60 mg Oral Nightly    topiramate  100 mg Oral Nightly    nicotine  1 patch Transdermal Daily        PRN:  acetaminophen, hydrOXYzine, magnesium hydroxide, aluminum & magnesium hydroxide-simethicone, benztropine mesylate, nicotine polacrilex     FORMULATION:  This is a domiciled, never , unemployed 25-year-old  with a history of depression and substance use, who self-presented to  our emergency department with worsening symptoms of depression and  suicidality. The patient meets criteria for major depressive disorder,  recurrent, severe, without psychotic features.   This is in the setting  of multiple psychosocial stressors and treatment nonadherence. She also  meets criteria for amphetamine and opioid use disorders, severe, in  sustained remission. Given her presentation and history, she requires  inpatient stabilization and treatment. Principal Problem:    MDD (major depressive disorder), recurrent severe, without psychosis ([address]  Active Problems:    Amphetamine use disorder, severe, in sustained remission ([address]    Hypokalemia    Tobacco use    Chronic hepatitis C without hepatic coma (HCC)    Opioid use disorder, severe, in sustained remission ([facility]  Resolved Problems:    * No resolved hospital problems. *     PLAN:  1. Admitted to Inpatient Psychiatry for stabilization and treatment. 2.  On admission, started Wellbutrin  mg p.o. b.i.d. and Cymbalta 60 mg p.o. at Bedtime (as she had been taking it). Ordered every 15-minute checks for safety, programming, and p.r.n. medication for anxiety, agitation, and insomnia. 6/29/2021 - increase Wellbutrin SR to 200mg QAM and 100mg Qnoon. 3.  Internal medicine consult for admission. resume Topamax 100  mg p.o. at bedtime for migraines. Asthma  - mild, intermittent  - rarely uses an inhaler.      Hepatitis C  - states she tested negative for this at [facility]. - F/w PCP. 4.  Collateral information if available for diagnostic clarification and  care coordination. 5.  Estimated length of stay 3-5 days for stabilization. Patient to attending IOP after discharge. Total time with patient was 35 minutes and more than 50 % of that time was spent counseling the patient on their symptoms, treatment, and expected goals.      Chayito Dickerson MD

## 2021-06-29 NOTE — GROUP NOTE
Group Therapy Note    Date: 6/29/2021    Group Start Time: 1320  Group End Time: 7011  [facility]        Group Therapy Note    Attendees: 4         Patient's Goal:  Patient will complete worksheet on toxic guilt and will discuss how alleviating it applies to mental wellbeing. Notes:  Patient attended group. Completed the worksheet and discussed in group. Verbalized a basic understanding of toxic guilt and how it prevents happiness. Gave examples from her life. Status After Intervention:  Improved    Participation Level:  Active Listener and Interactive    Participation Quality: Appropriate and Attentive    Speech:  normal    Thought Process/Content: Logical    Affective Functioning: Congruent    Mood: anxious, depressed     Level of consciousness:  Oriented x4    Response to Learning: Able to verbalize current knowledge/experience and Able to verbalize/acknowledge new learning    Endings: None Reported    Modes of Intervention: Education, Support, Socialization and Exploration    Discipline Responsible: /Counselor    Signature:  Nehemias Freedman [facility]

## 2021-06-29 NOTE — PLAN OF CARE
Problem: Altered Mood, Depressive Behavior:  Goal: Able to verbalize and/or display a decrease in depressive symptoms  Description: Able to verbalize and/or display a decrease in depressive symptoms  6/29/2021 1256 by Rajeev Abdalla RN  Outcome: Ongoing   Patient has been quietly visible on unit this shift. She denies any thoughts of harming self or others and CFS. She has been compliant with meds and cooperative with staff. Pt reports having generalized pain and still having difficulty with motivation. Attending groups. Social with select peers. Calm and cooperative. No c/o voiced at this time. Will continue to monitor for safety.

## 2021-06-30 VITALS
DIASTOLIC BLOOD PRESSURE: 71 MMHG | BODY MASS INDEX: 29.16 KG/M2 | RESPIRATION RATE: 20 BRPM | HEART RATE: 70 BPM | WEIGHT: 175 LBS | SYSTOLIC BLOOD PRESSURE: 107 MMHG | TEMPERATURE: 98 F | OXYGEN SATURATION: 98 % | HEIGHT: 65 IN

## 2021-06-30 PROCEDURE — 99239 HOSP IP/OBS DSCHRG MGMT >30: CPT | Performed by: PSYCHIATRY & NEUROLOGY

## 2021-06-30 PROCEDURE — 6370000000 HC RX 637 (ALT 250 FOR IP): Performed by: PSYCHIATRY & NEUROLOGY

## 2021-06-30 PROCEDURE — 5130000000 HC BRIDGE APPOINTMENT

## 2021-06-30 RX ORDER — BUPROPION HYDROCHLORIDE 200 MG/1
200 TABLET, EXTENDED RELEASE ORAL DAILY
Qty: 30 TABLET | Refills: 0 | Status: ON HOLD | OUTPATIENT
Start: 2021-07-01 | End: 2022-07-12 | Stop reason: HOSPADM

## 2021-06-30 RX ORDER — DULOXETIN HYDROCHLORIDE 60 MG/1
60 CAPSULE, DELAYED RELEASE ORAL NIGHTLY
Qty: 30 CAPSULE | Refills: 0 | Status: ON HOLD | OUTPATIENT
Start: 2021-06-30 | End: 2022-07-12 | Stop reason: HOSPADM

## 2021-06-30 RX ORDER — TOPIRAMATE 100 MG/1
100 TABLET, FILM COATED ORAL NIGHTLY
Qty: 30 TABLET | Refills: 0 | Status: ON HOLD | OUTPATIENT
Start: 2021-06-30 | End: 2022-07-12 | Stop reason: HOSPADM

## 2021-06-30 RX ORDER — BUPROPION HYDROCHLORIDE 100 MG/1
100 TABLET, EXTENDED RELEASE ORAL
Qty: 30 TABLET | Refills: 0 | Status: ON HOLD | OUTPATIENT
Start: 2021-06-30 | End: 2022-07-12 | Stop reason: HOSPADM

## 2021-06-30 RX ORDER — NICOTINE 21 MG/24HR
1 PATCH, TRANSDERMAL 24 HOURS TRANSDERMAL DAILY
Qty: 15 PATCH | Refills: 0 | Status: SHIPPED | OUTPATIENT
Start: 2021-07-01 | End: 2021-07-16

## 2021-06-30 RX ADMIN — BUPROPION HYDROCHLORIDE 200 MG: 100 TABLET, EXTENDED RELEASE ORAL at 08:18

## 2021-06-30 NOTE — SUICIDE SAFETY PLAN
SAFETY PLAN    A suicide Safety Plan is a document that supports someone when they are having thoughts of suicide. Warning Signs that indicate a suicidal crisis may be developing: What (situations, thoughts, feelings, body sensations, behaviors, etc.) do you experience that lets you know you are beginning to think about suicide? 1. Sleeping too much  2. Picking at skin  3. Aloof or defensive     Internal Coping Strategies:  What things can I do (relaxation techniques, hobbies, physical activities, etc.) to take my mind off my problems without contacting another person? 1. read  2. walk  3. art    People and social settings that provide distraction: Who can I call or where can I go to distract me? 1. Name: [employer]  Phone: [phone]  2. Name: Katie Aceves     3. Name: Moisés Melendez            4. Name: My     People whom I can ask for help: Who can I call when I need help - for example, friends, family, clergy, someone else? 1. Name: [employer]  Phone: [phone]  2. Name: Katie Aceves     3. Name: Moisés Melendez            4. Name: My     Professionals or [address] [place] [address] Blvd I can contact during a crisis: Who can I call for help - for example, my doctor, my psychiatrist, my psychologist, a mental health provider, a suicide hotline? 1. Clinician Name: [address] [place] [address]   Address: [address] [place], [facility]      Phone  #: [phone]    2. Suicide Prevention Lifeline: 4-711-085-TALK (0438)    Making the environment safe: How can I make my environment (house/apartment/living space) safer? For example, can I remove guns, medications, and other items? 1. Remove sharp objects  2. Remove alcohol    Something that is important to me and worth living for is: a second chance with my children.

## 2021-06-30 NOTE — BH NOTE
[facility]  Discharge Note    Pt discharged with followings belongings:   Dentures: None  Vision - Corrective Lenses: None  Hearing Aid: None  Jewelry: None  Body Piercings Removed: N/A  Clothing: Footwear, Pants, Shirt  Were All Patient Medications Collected?: Not Applicable  Other Valuables: Cell phone, Purse   Valuables sent home with patient. Valuables retrieved from safe and returned to patient. Patient education on aftercare instructions: yes. Information faxed to N/A by this writer. Patient verbalize understanding of AVS:  yes. Status EXAM upon discharge:  Status and Exam  Normal: No  Facial Expression: Flat, Brightened (brightens during interactions)  Affect: Appropriate, Congruent  Level of Consciousness: Alert  Mood:Normal: No  Mood: Depressed  Motor Activity:Normal: Yes  Interview Behavior: Cooperative  Preception: [place] to Time, [place] to Person, Darren Bees to Place, [place] to Situation  Attention:Normal: Yes  Thought Processes: Other(See comment) (WNL; linear)  Thought Content:Normal: Yes  Hallucinations: None  Delusions: No  Memory:Normal: Yes  Memory: Poor Recent, Poor Remote  Insight and Judgment: No  Insight and Judgment: Poor Insight  Present Suicidal Ideation: No  Present Homicidal Ideation: No      Metabolic Screening:    Lab Results   Component Value Date    LABA1C 4.8 06/27/2021       Lab Results   Component Value Date    CHOL 193 06/27/2021    CHOL 140 07/19/2020    CHOL 133 07/09/2020     Lab Results   Component Value Date    TRIG 127 06/27/2021    TRIG 68 07/19/2020    TRIG 128 07/09/2020     Lab Results   Component Value Date    HDL 58 06/27/2021    HDL 50 07/19/2020    HDL 39 (L) 07/09/2020     No components found for: [facility]  Lab Results   Component Value Date    LABVLDL 25 06/27/2021    LABVLDL 14 07/19/2020    LABVLDL 26 07/09/2020       Pito Jung RN    Bridge Appointment completed: Reviewed Discharge Instructions with patient.     Patient verbalizes understanding and agreement with the discharge plan using the teachback method.      Referral for Outpatient Tobacco Cessation Counseling, upon discharge (david X if applicable and completed):    ( )  Hospital staff assisted patient to call Quit Line or faxed referral                                   during hospitalization                  (X)  Recognizing danger situations (included triggers and roadblocks), if not completed on admission                    (X)  Coping skills (new ways to manage stress, exercise, relaxation techniques, changing routine, distraction), if not completed on admission                                                           (X)  Basic information about quitting (benefits of quitting, techniques in how to quit, available resources, if not completed on admission  ( ) Referral for counseling faxed to Replaced by [facility]   ( ) Patient refused referral  ( ) Patient refused counseling  ( ) Patient refused smoking cessation medication upon discharge    Vaccinations (david X if applicable and completed):  ( ) Patient states already received influenza vaccine elsewhere  ( ) Patient received influenza vaccine during this hospitalization  ( ) Patient refused influenza vaccine at this time  (X) Not offered

## 2021-06-30 NOTE — CARE COORDINATION
[facility]  Treatment Team Note  Day 3    Review Date & Time: 0900  6/30/2021    Patient was not in treatment team      Status EXAM:   Status and Exam  Normal: No  Facial Expression: Flat, Brightened (brightens during interactions)  Affect: Appropriate, Congruent  Level of Consciousness: Alert  Mood:Normal: No  Mood: Depressed  Motor Activity:Normal: Yes  Interview Behavior: Cooperative  Preception: [place] to Time, [place] to Person, Kellie Owen to Place, [place] to Situation  Attention:Normal: Yes  Thought Processes: Other(See comment) (WNL; linear)  Thought Content:Normal: Yes  Hallucinations: None  Delusions: No  Memory:Normal: Yes  Memory: Poor Recent, Poor Remote  Insight and Judgment: No  Insight and Judgment: Poor Insight  Present Suicidal Ideation: No  Present Homicidal Ideation: No      Suicide Risk CSSR-S:  1) Within the past month, have you wished you were dead or wished you could go to sleep and not wake up? : Yes  2) Have you actually had any thoughts of killing yourself? : Yes  3) Have you been thinking about how you might kill yourself? : No  5) Have you started to work out or worked out the details of how to kill yourself? Do you intend to carry out this plan? : No  6) Have you ever done anything, started to do anything, or prepared to do anything to end your life?: No      PLAN/TREATMENT RECOMMENDATIONS UPDATE: Patient will take medication as prescribed, eat 75% of meals, attend groups, participate in milieu activities, participate in treatment team and care planning for discharge and follow up.       Ronnie Schaefer RN

## 2021-06-30 NOTE — GROUP NOTE
Group Therapy Note    Date: 6/30/2021    Group Start Time: 1000  Group End Time: 1055  Group Topic: Art Therapy     [facility]        Group Therapy Note    Attendees: 4         Patient's Goal:  Patients were invited to participate in an Art Therapy / Psycho-Education group on Radical Acceptance. Patient received a handout on Radical Acceptance and were invited to learn and discuss the concept. With therapist giving instructions, patients were asked to practice two radical acceptance DBT techniques: Willing Hands and Half-smile. Lastly, patients were asked to explore how they can practice radical acceptance in their lives using an art material of their choosing and were asked to share and process their work with the group. Notes:  Karly Hines appeared receptive to information provided on Radical Acceptance, practiced the two DBT techniques taught, and engaged in discussion on how to apply the topic to her life. Status After Intervention:  Improved    Participation Level:  Active Listener and Interactive    Participation Quality: Appropriate, Attentive and Sharing      Speech:  normal      Thought Process/Content: Linear      Affective Functioning: Congruent      Mood: euthymic      Level of consciousness:  Alert, Oriented x4 and Attentive      Response to Learning: Able to verbalize current knowledge/experience, Able to verbalize/acknowledge new learning, Able to retain information and Capable of insight      Endings: None Reported    Modes of Intervention: Education, Support, Socialization, Exploration, Clarifying, Problem-solving, Activity and Media      Discipline Responsible: Psychoeducational Specialist      Signature:  Alessandra Florez [facility]
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
## 2021-07-23 ENCOUNTER — APPOINTMENT (OUTPATIENT)
Dept: CT IMAGING | Age: 36
End: 2021-07-23
Payer: COMMERCIAL

## 2021-07-23 ENCOUNTER — HOSPITAL ENCOUNTER (EMERGENCY)
Age: 36
Discharge: HOME | End: 2021-07-23
Attending: EMERGENCY MEDICINE
Payer: COMMERCIAL

## 2021-07-23 VITALS
BODY MASS INDEX: 29.16 KG/M2 | HEIGHT: 65 IN | TEMPERATURE: 98 F | OXYGEN SATURATION: 97 % | WEIGHT: 175 LBS | SYSTOLIC BLOOD PRESSURE: 142 MMHG | RESPIRATION RATE: 16 BRPM | HEART RATE: 67 BPM | DIASTOLIC BLOOD PRESSURE: 103 MMHG

## 2021-07-23 DIAGNOSIS — R06.09 OTHER FORM OF DYSPNEA: ICD-10-CM

## 2021-07-23 DIAGNOSIS — R07.89 CHEST WALL PAIN: Primary | ICD-10-CM

## 2021-07-23 DIAGNOSIS — R07.89 MUSCULOSKELETAL CHEST PAIN: ICD-10-CM

## 2021-07-23 LAB
A/G RATIO: 1.6 (ref 1.1–2.2)
ALBUMIN SERPL-MCNC: 4.2 G/DL (ref 3.4–5)
ALP BLD-CCNC: 43 U/L (ref 40–129)
ALT SERPL-CCNC: 52 U/L (ref 10–40)
AMPHETAMINE SCREEN, URINE: NORMAL
ANION GAP SERPL CALCULATED.3IONS-SCNC: 11 MMOL/L (ref 3–16)
AST SERPL-CCNC: 34 U/L (ref 15–37)
BARBITURATE SCREEN URINE: NORMAL
BASOPHILS ABSOLUTE: 0 K/UL (ref 0–0.2)
BASOPHILS RELATIVE PERCENT: 0.8 %
BENZODIAZEPINE SCREEN, URINE: NORMAL
BILIRUB SERPL-MCNC: 0.3 MG/DL (ref 0–1)
BILIRUBIN DIRECT: <0.2 MG/DL (ref 0–0.3)
BILIRUBIN URINE: NEGATIVE
BILIRUBIN, INDIRECT: NORMAL MG/DL (ref 0–1)
BLOOD, URINE: ABNORMAL
BUN BLDV-MCNC: 7 MG/DL (ref 7–20)
CALCIUM SERPL-MCNC: 9.2 MG/DL (ref 8.3–10.6)
CANNABINOID SCREEN URINE: NORMAL
CHLORIDE BLD-SCNC: 103 MMOL/L (ref 99–110)
CLARITY: CLEAR
CO2: 23 MMOL/L (ref 21–32)
COCAINE METABOLITE SCREEN URINE: NORMAL
COLOR: YELLOW
CREAT SERPL-MCNC: 0.9 MG/DL (ref 0.6–1.1)
EKG ATRIAL RATE: 68 BPM
EKG DIAGNOSIS: NORMAL
EKG P AXIS: 35 DEGREES
EKG P-R INTERVAL: 132 MS
EKG Q-T INTERVAL: 416 MS
EKG QRS DURATION: 90 MS
EKG QTC CALCULATION (BAZETT): 442 MS
EKG R AXIS: 7 DEGREES
EKG T AXIS: 33 DEGREES
EKG VENTRICULAR RATE: 68 BPM
EOSINOPHILS ABSOLUTE: 0.1 K/UL (ref 0–0.6)
EOSINOPHILS RELATIVE PERCENT: 2 %
EPITHELIAL CELLS, UA: NORMAL /HPF (ref 0–5)
GFR AFRICAN AMERICAN: >60
GFR NON-AFRICAN AMERICAN: >60
GLOBULIN: 2.6 G/DL
GLUCOSE BLD-MCNC: 108 MG/DL (ref 70–99)
GLUCOSE URINE: NEGATIVE MG/DL
GONADOTROPIN, CHORIONIC (HCG) QUANT: <5 MIU/ML
HCT VFR BLD CALC: 33.6 % (ref 36–48)
HEMOGLOBIN: 11.4 G/DL (ref 12–16)
KETONES, URINE: ABNORMAL MG/DL
LEUKOCYTE ESTERASE, URINE: NEGATIVE
LYMPHOCYTES ABSOLUTE: 1.2 K/UL (ref 1–5.1)
LYMPHOCYTES RELATIVE PERCENT: 25.3 %
Lab: NORMAL
MAGNESIUM: 2.1 MG/DL (ref 1.8–2.4)
MCH RBC QN AUTO: 30.8 PG (ref 26–34)
MCHC RBC AUTO-ENTMCNC: 33.8 G/DL (ref 31–36)
MCV RBC AUTO: 91 FL (ref 80–100)
METHADONE SCREEN, URINE: NORMAL
MICROSCOPIC EXAMINATION: YES
MONOCYTES ABSOLUTE: 0.7 K/UL (ref 0–1.3)
MONOCYTES RELATIVE PERCENT: 14.3 %
NEUTROPHILS ABSOLUTE: 2.8 K/UL (ref 1.7–7.7)
NEUTROPHILS RELATIVE PERCENT: 57.6 %
NITRITE, URINE: NEGATIVE
OPIATE SCREEN URINE: NORMAL
OXYCODONE URINE: NORMAL
PDW BLD-RTO: 15.1 % (ref 12.4–15.4)
PH UA: 5.5
PH UA: 5.5 (ref 5–8)
PHENCYCLIDINE SCREEN URINE: NORMAL
PLATELET # BLD: 257 K/UL (ref 135–450)
PMV BLD AUTO: 8.2 FL (ref 5–10.5)
POTASSIUM REFLEX MAGNESIUM: 3.3 MMOL/L (ref 3.5–5.1)
PROPOXYPHENE SCREEN: NORMAL
PROTEIN UA: NEGATIVE MG/DL
RBC # BLD: 3.69 M/UL (ref 4–5.2)
RBC UA: NORMAL /HPF (ref 0–4)
SARS-COV-2, NAAT: NOT DETECTED
SODIUM BLD-SCNC: 137 MMOL/L (ref 136–145)
SPECIFIC GRAVITY UA: 1.01 (ref 1–1.03)
TOTAL PROTEIN: 6.8 G/DL (ref 6.4–8.2)
TROPONIN: <0.01 NG/ML
TROPONIN: <0.01 NG/ML
URINE REFLEX TO CULTURE: ABNORMAL
URINE TYPE: ABNORMAL
UROBILINOGEN, URINE: 0.2 E.U./DL
WBC # BLD: 4.8 K/UL (ref 4–11)
WBC UA: NORMAL /HPF (ref 0–5)

## 2021-07-23 PROCEDURE — 80307 DRUG TEST PRSMV CHEM ANLYZR: CPT

## 2021-07-23 PROCEDURE — 84702 CHORIONIC GONADOTROPIN TEST: CPT

## 2021-07-23 PROCEDURE — 96375 TX/PRO/DX INJ NEW DRUG ADDON: CPT

## 2021-07-23 PROCEDURE — 6360000004 HC RX CONTRAST MEDICATION: Performed by: NURSE PRACTITIONER

## 2021-07-23 PROCEDURE — 84484 ASSAY OF TROPONIN QUANT: CPT

## 2021-07-23 PROCEDURE — 99285 EMERGENCY DEPT VISIT HI MDM: CPT

## 2021-07-23 PROCEDURE — 36415 COLL VENOUS BLD VENIPUNCTURE: CPT

## 2021-07-23 PROCEDURE — 6370000000 HC RX 637 (ALT 250 FOR IP): Performed by: NURSE PRACTITIONER

## 2021-07-23 PROCEDURE — 80053 COMPREHEN METABOLIC PANEL: CPT

## 2021-07-23 PROCEDURE — 71260 CT THORAX DX C+: CPT

## 2021-07-23 PROCEDURE — 85025 COMPLETE CBC W/AUTO DIFF WBC: CPT

## 2021-07-23 PROCEDURE — 87635 SARS-COV-2 COVID-19 AMP PRB: CPT

## 2021-07-23 PROCEDURE — 96374 THER/PROPH/DIAG INJ IV PUSH: CPT

## 2021-07-23 PROCEDURE — 83735 ASSAY OF MAGNESIUM: CPT

## 2021-07-23 PROCEDURE — 81001 URINALYSIS AUTO W/SCOPE: CPT

## 2021-07-23 PROCEDURE — 6360000002 HC RX W HCPCS: Performed by: NURSE PRACTITIONER

## 2021-07-23 PROCEDURE — 93010 ELECTROCARDIOGRAM REPORT: CPT | Performed by: INTERNAL MEDICINE

## 2021-07-23 PROCEDURE — 93005 ELECTROCARDIOGRAM TRACING: CPT | Performed by: EMERGENCY MEDICINE

## 2021-07-23 PROCEDURE — 80074 ACUTE HEPATITIS PANEL: CPT

## 2021-07-23 RX ORDER — ALBUTEROL SULFATE 90 UG/1
2 AEROSOL, METERED RESPIRATORY (INHALATION) EVERY 4 HOURS PRN
Qty: 1 INHALER | Refills: 0 | Status: ON HOLD | OUTPATIENT
Start: 2021-07-23 | End: 2022-10-25 | Stop reason: SDUPTHER

## 2021-07-23 RX ORDER — ONDANSETRON 2 MG/ML
4 INJECTION INTRAMUSCULAR; INTRAVENOUS ONCE
Status: COMPLETED | OUTPATIENT
Start: 2021-07-23 | End: 2021-07-23

## 2021-07-23 RX ORDER — DROPERIDOL 2.5 MG/ML
1.25 INJECTION, SOLUTION INTRAMUSCULAR; INTRAVENOUS ONCE
Status: COMPLETED | OUTPATIENT
Start: 2021-07-23 | End: 2021-07-23

## 2021-07-23 RX ORDER — ACETAMINOPHEN 500 MG
1000 TABLET ORAL ONCE
Status: COMPLETED | OUTPATIENT
Start: 2021-07-23 | End: 2021-07-23

## 2021-07-23 RX ORDER — PREDNISONE 10 MG/1
TABLET ORAL
Qty: 44 TABLET | Refills: 0 | Status: SHIPPED | OUTPATIENT
Start: 2021-07-23 | End: 2021-08-02

## 2021-07-23 RX ORDER — POTASSIUM CHLORIDE 20 MEQ/1
40 TABLET, EXTENDED RELEASE ORAL ONCE
Status: COMPLETED | OUTPATIENT
Start: 2021-07-23 | End: 2021-07-23

## 2021-07-23 RX ORDER — METHOCARBAMOL 500 MG/1
500 TABLET, FILM COATED ORAL 3 TIMES DAILY
Qty: 30 TABLET | Refills: 0 | Status: SHIPPED | OUTPATIENT
Start: 2021-07-23 | End: 2021-08-02

## 2021-07-23 RX ORDER — ACETAMINOPHEN 325 MG/1
325 TABLET ORAL ONCE
Status: DISCONTINUED | OUTPATIENT
Start: 2021-07-23 | End: 2021-07-23

## 2021-07-23 RX ORDER — KETOROLAC TROMETHAMINE 30 MG/ML
30 INJECTION, SOLUTION INTRAMUSCULAR; INTRAVENOUS ONCE
Status: COMPLETED | OUTPATIENT
Start: 2021-07-23 | End: 2021-07-23

## 2021-07-23 RX ADMIN — ONDANSETRON 4 MG: 2 INJECTION INTRAMUSCULAR; INTRAVENOUS at 08:49

## 2021-07-23 RX ADMIN — ACETAMINOPHEN 1000 MG: 500 TABLET ORAL at 12:43

## 2021-07-23 RX ADMIN — POTASSIUM CHLORIDE 40 MEQ: 20 TABLET, EXTENDED RELEASE ORAL at 10:05

## 2021-07-23 RX ADMIN — DROPERIDOL 1.25 MG: 2.5 INJECTION, SOLUTION INTRAMUSCULAR; INTRAVENOUS at 13:05

## 2021-07-23 RX ADMIN — KETOROLAC TROMETHAMINE 30 MG: 30 INJECTION, SOLUTION INTRAMUSCULAR; INTRAVENOUS at 08:50

## 2021-07-23 RX ADMIN — IOPAMIDOL 75 ML: 755 INJECTION, SOLUTION INTRAVENOUS at 09:17

## 2021-07-23 ASSESSMENT — ENCOUNTER SYMPTOMS
WHEEZING: 0
ABDOMINAL PAIN: 0
DIARRHEA: 0
COUGH: 0
BACK PAIN: 0
SHORTNESS OF BREATH: 1
VOMITING: 0
COLOR CHANGE: 0
NAUSEA: 0

## 2021-07-23 ASSESSMENT — PAIN SCALES - GENERAL
PAINLEVEL_OUTOF10: 8
PAINLEVEL_OUTOF10: 10
PAINLEVEL_OUTOF10: 8
PAINLEVEL_OUTOF10: 10

## 2021-07-23 ASSESSMENT — PAIN DESCRIPTION - PAIN TYPE: TYPE: ACUTE PAIN

## 2021-07-23 ASSESSMENT — PAIN DESCRIPTION - ORIENTATION: ORIENTATION: RIGHT

## 2021-07-23 ASSESSMENT — PAIN DESCRIPTION - LOCATION: LOCATION: SHOULDER;NECK

## 2021-07-23 ASSESSMENT — PAIN DESCRIPTION - FREQUENCY: FREQUENCY: CONTINUOUS

## 2021-07-23 NOTE — ED PROVIDER NOTES
**ADVANCED PRACTICE PROVIDER, I HAVE EVALUATED THIS [facility]  ED  EMERGENCY DEPARTMENT ENCOUNTER      Pt Name: Viola Rosas  OSM:[id number]  Arianagfwiley 1985  Date of evaluation: 7/23/2021  Provider: ISSA Naqvi CNP      Chief Complaint:    Chief Complaint   Patient presents with    Chest Pain    Shortness of Breath         Nursing Notes, Past Medical Hx, Past Surgical Hx, Social Hx, Allergies, and Family Hx were all reviewed and agreed with or any disagreements were addressed in the HPI.    HPI: (Location, Duration, Timing, Severity, Quality, Assoc Sx, Context, Modifying factors)    Chief Complaint of chest pain and shortness of breath. This is a  39 y.o. female who presents with severe chest pain and feeling SOB off and on the past month, but has been consistent the past month. She states that she does not have a PCP. She states that she is on OCP. She is smoker, smokes about 1/4 ppd. She denies any fever or chills, no cough, congestion with her symptoms. She only has 1 of 2 covid19 vaccine. She states that she has been sober and clean since July 17th, 2020. She has been treated at [facility]. Scribes her pain as a pressure sensation, rates the pain an 8 out of 10. Denies any vomiting or diarrhea but has felt nauseated, no abdominal pain. Has been taking Tylenol Naprosyn for the pain, denies taking any additional medication, she has been in rehab for alcohol and IV drug use, states that she has not used since July of last year, she only used IV drugs for about 4 months. She denies any headache neck pain or neck stiffness. No additional symptoms or medication, no additional complaints, no additional aggravating relieving factors. Patient presents awake, alert and in no acute distress or toxic appearance.     PastMedical/Surgical History:      Diagnosis Date    Anxiety     Chronic hepatitis C without hepatic coma (HCC)     Chronic post-traumatic stress disorder (PTSD) 10/29/2018    Depression     Hepatitis C 2020    Mild intermittent asthma without complication     Stimulant use disorder 2018         Procedure Laterality Date     SECTION  2005    DILATION AND CURETTAGE OF UTERUS  2006           Medications:  Previous Medications    BUPROPION (WELLBUTRIN SR) 100 MG EXTENDED RELEASE TABLET    Take 1 tablet by mouth Daily with lunch    BUPROPION (WELLBUTRIN SR) 200 MG EXTENDED RELEASE TABLET    Take 1 tablet by mouth daily    DULOXETINE (CYMBALTA) 60 MG EXTENDED RELEASE CAPSULE    Take 1 capsule by mouth nightly    INCASSIA 0.35 MG TABLET    TAKE 1 TABLET BY MOUTH EVERY DAY    NICOTINE (NICODERM CQ) 21 MG/24HR    Place 1 patch onto the skin daily for 15 days    TOPIRAMATE (TOPAMAX) 100 MG TABLET    Take 1 tablet by mouth nightly         Review of Systems:  (2-9 systems needed)  Review of Systems   Constitutional: Negative for chills and fever. HENT: Negative for congestion. Respiratory: Positive for shortness of breath. Negative for cough and wheezing. She states she has pressure in her chest with breathing, feels like she cannot take a deep breath. She does have a history of asthma. Cardiovascular: Positive for chest pain. Patient complains of chest pain and feeling short of breath off and on for the past month however its been more consistent over the past 48 hours. Gastrointestinal: Negative for abdominal pain, diarrhea, nausea and vomiting. Genitourinary: Negative for difficulty urinating, dysuria, frequency and hematuria. Musculoskeletal: Negative for back pain. Skin: Negative for color change. Neurological: Negative for weakness, numbness and headaches. \"Positives and Pertinent negatives as per HPI\"    Physical Exam:  Physical Exam  Vitals and nursing note reviewed. Constitutional:       Appearance: She is well-developed. She is not diaphoretic. HENT:      Head: Normocephalic. [place],  [place], [address]   Phone [phone]   COMPREHENSIVE METABOLIC PANEL W/ REFLEX TO MG FOR LOW K - Abnormal; Notable for the following components:    Potassium reflex Magnesium 3.3 (*)     Glucose 108 (*)     ALT 52 (*)     All other components within normal limits    Narrative:     Performed at:  [address], [address]   Phone [phone]   URINE RT REFLEX TO CULTURE - Abnormal; Notable for the following components:    Ketones, Urine TRACE (*)     Blood, Urine SMALL (*)     All other components within normal limits    Narrative:     Performed at:  [address], [address]   Phone [phone], RAPID    Narrative:     Performed at:  [address], [address]   Phone [phone]   TROPONIN    Narrative:     Performed at:  [address], [address]   Phone [phone]   HCG, QUANTITATIVE, PREGNANCY    Narrative:     Performed at:  [address], [address]   Phone [phone]   URINE DRUG SCREEN    Narrative:     Performed at:  [address], [address]   Phone [phone]   MAGNESIUM    Narrative:     Performed at:  [address], [address]   Phone [phone]   HEPATIC FUNCTION PANEL    Narrative:     Performed at:  [address], [address]   Phone [phone]   MICROSCOPIC URINALYSIS    Narrative:     Performed at:  [address], [address]   Phone [phone]   TROPONIN    Narrative:     Performed at:  [facility] feeling SOB off and on the past month, but has been consistent the past month. She states that she does not have a PCP. She states that she is on OCP. She is smoker, smokes about 1/4 ppd. She denies any fever or chills, no cough, congestion with her symptoms. She only has 1 of 2 covid19 vaccine. She states that she has been sober and clean since July 17th, 2020. After evaluation and examination the patient, she is initially sleeping when I approached her in her room, she appears slightly lethargic and sleepy. I did ask her about alcohol or drug use, she immediately became what appears upset or defensive as she states she has not used since last year. I then spoke with her about risk of pulmonary emboli versus cardiac concerns, in addition I mentioned her hepatitis, she became upset stating that she was only told that she had hepatitis when she was inpatient at [facility] and no longer had it. I attempted to educate her about hepatitis markers, she insists on having this reevaluated. I did order IV access, blood work, chest x-ray, EKG, CT scan of the chest, anti-inflammatories and antiemetics. EKG shows sinus rhythm rate 68 bpm, please see Dr. Tulio Solitario EKG interpretation note. CBC shows no sepsis or anemia. Metabolic panel shows mild hypokalemia with a potassium 3.3, patient was given replacement potassium. Also reflux of magnesium. Magnesium level is normal at 2.1. Pregnancy is negative. Upon reevaluation vital signs are stable but she still having more pain, she was given Tylenol, because out again to the nursing staff the Tylenol is not working. I do have concerns about her possibly risk of relapsing, I ended up giving her droperidol. CT chest shows no evidence of PE, no acute pulmonary abnormality.   Upon reevaluation vital signs are stable, I informed her that this could be pleurisy along with costochondritis however, at this time of no concerns for pneumonia, pneumothorax, pulmonary emboli, acute coronary syndrome or other emergent etiology that requires admission at this time. Therefore, shared medical decision was made to the patient myself we agreed the patient could be discharged home with outpatient follow-up. Patient was discharged home with referral back to PCP. Discharged home with prescription for albuterol inhaler, Robaxin and prednisone. Educated take medicine as prescribed. Return to the ER for worsening or concerning symptoms. The patient tolerated their visit well. I evaluated the patient. The physician was available for consultation as needed. The patient and / or the family were informed of the results of any tests, a time was given to answer questions, a plan was proposed and they agreed with plan. Patient verbalized understanding of discharge instructions and was discharged in the department in stable condition. CLINICAL IMPRESSION:  1. Chest wall pain    2. Other form of dyspnea    3. Musculoskeletal chest pain        DISPOSITION Decision To Discharge 07/23/2021 01:49:12 PM      PATIENT REFERRED TO:  [facility]) Pre-Services  [phone]  Schedule an appointment as soon as possible for a visit in 3 days  You were given a family doctor referral, follow-up in the next 2 to 3 days for reevaluation    [facility] [address]  [phone]  Go to   If symptoms worsen      DISCHARGE MEDICATIONS:  New Prescriptions    ALBUTEROL SULFATE HFA (PROVENTIL HFA) 108 (90 BASE) MCG/ACT INHALER    Inhale 2 puffs into the lungs every 4 hours as needed for Wheezing or Shortness of Breath (Space out to every 6 hours as symptoms improve) Space out to every 6 hours as symptoms improve.     METHOCARBAMOL (ROBAXIN) 500 MG TABLET    Take 1 tablet by mouth 3 times daily for 10 days    PREDNISONE (DELTASONE) 10 MG TABLET    60 mg po x 5 days then   40 mg po x 2 days then  20 mg po x 2 days then  10 mg po x 2 days total of 11 days       DISCONTINUED MEDICATIONS:  Discontinued Medications    No medications on file              (Please note the MDM and HPI sections of this note were completed with a voice recognition program.  Efforts were made to edit the dictations but occasionally words are mis-transcribed.)    Electronically signed, ISSA Naqvi CNP,          ISSA Naqvi CNP  07/23/21 2305

## 2021-07-23 NOTE — ED NOTES
Ambulated pt in humphrey with pulse ox on room air. Pt's O2 sat % with HR 68-86. Pt had no complaints during ambulation.      Onofre Bowie  07/23/21 1042

## 2022-05-21 ENCOUNTER — HOSPITAL ENCOUNTER (EMERGENCY)
Age: 37
Discharge: HOME | End: 2022-05-21
Attending: EMERGENCY MEDICINE
Payer: COMMERCIAL

## 2022-05-21 VITALS
SYSTOLIC BLOOD PRESSURE: 129 MMHG | RESPIRATION RATE: 20 BRPM | HEIGHT: 65 IN | BODY MASS INDEX: 27.49 KG/M2 | WEIGHT: 165 LBS | HEART RATE: 97 BPM | OXYGEN SATURATION: 100 % | DIASTOLIC BLOOD PRESSURE: 88 MMHG | TEMPERATURE: 98.2 F

## 2022-05-21 DIAGNOSIS — F41.0 PANIC ATTACK: Primary | ICD-10-CM

## 2022-05-21 DIAGNOSIS — H92.02 LEFT EAR PAIN: ICD-10-CM

## 2022-05-21 PROBLEM — F29 PSYCHOSIS (HCC): Status: ACTIVE | Noted: 2022-05-21

## 2022-05-21 LAB
A/G RATIO: 2 (ref 1.1–2.2)
ACETAMINOPHEN LEVEL: <5 UG/ML (ref 10–30)
ALBUMIN SERPL-MCNC: 4.6 G/DL (ref 3.4–5)
ALP BLD-CCNC: 40 U/L (ref 40–129)
ALT SERPL-CCNC: 56 U/L (ref 10–40)
AMPHETAMINE SCREEN, URINE: NORMAL
ANION GAP SERPL CALCULATED.3IONS-SCNC: 12 MMOL/L (ref 3–16)
AST SERPL-CCNC: 31 U/L (ref 15–37)
BACTERIA: ABNORMAL /HPF
BARBITURATE SCREEN URINE: NORMAL
BASOPHILS ABSOLUTE: 0 K/UL (ref 0–0.2)
BASOPHILS RELATIVE PERCENT: 0.8 %
BENZODIAZEPINE SCREEN, URINE: NORMAL
BILIRUB SERPL-MCNC: 0.3 MG/DL (ref 0–1)
BILIRUBIN URINE: NEGATIVE
BLOOD, URINE: ABNORMAL
BUN BLDV-MCNC: 8 MG/DL (ref 7–20)
CALCIUM SERPL-MCNC: 9.4 MG/DL (ref 8.3–10.6)
CANNABINOID SCREEN URINE: NORMAL
CHLORIDE BLD-SCNC: 107 MMOL/L (ref 99–110)
CLARITY: CLEAR
CO2: 22 MMOL/L (ref 21–32)
COCAINE METABOLITE SCREEN URINE: NORMAL
COLOR: YELLOW
CREAT SERPL-MCNC: 0.9 MG/DL (ref 0.6–1.1)
EOSINOPHILS ABSOLUTE: 0 K/UL (ref 0–0.6)
EOSINOPHILS RELATIVE PERCENT: 0.3 %
EPITHELIAL CELLS, UA: ABNORMAL /HPF (ref 0–5)
ETHANOL: NORMAL MG/DL (ref 0–0.08)
GFR AFRICAN AMERICAN: >60
GFR NON-AFRICAN AMERICAN: >60
GLUCOSE BLD-MCNC: 92 MG/DL (ref 70–99)
GLUCOSE URINE: NEGATIVE MG/DL
HCG(URINE) PREGNANCY TEST: NEGATIVE
HCT VFR BLD CALC: 34.5 % (ref 36–48)
HEMOGLOBIN: 11.7 G/DL (ref 12–16)
KETONES, URINE: NEGATIVE MG/DL
LEUKOCYTE ESTERASE, URINE: NEGATIVE
LYMPHOCYTES ABSOLUTE: 1.3 K/UL (ref 1–5.1)
LYMPHOCYTES RELATIVE PERCENT: 23.2 %
Lab: NORMAL
MCH RBC QN AUTO: 31.1 PG (ref 26–34)
MCHC RBC AUTO-ENTMCNC: 33.9 G/DL (ref 31–36)
MCV RBC AUTO: 91.7 FL (ref 80–100)
METHADONE SCREEN, URINE: NORMAL
MICROSCOPIC EXAMINATION: YES
MONOCYTES ABSOLUTE: 0.7 K/UL (ref 0–1.3)
MONOCYTES RELATIVE PERCENT: 12.4 %
NEUTROPHILS ABSOLUTE: 3.6 K/UL (ref 1.7–7.7)
NEUTROPHILS RELATIVE PERCENT: 63.3 %
NITRITE, URINE: NEGATIVE
OPIATE SCREEN URINE: NORMAL
OXYCODONE URINE: NORMAL
PDW BLD-RTO: 14.3 % (ref 12.4–15.4)
PH UA: 7
PH UA: 7 (ref 5–8)
PHENCYCLIDINE SCREEN URINE: NORMAL
PLATELET # BLD: 188 K/UL (ref 135–450)
PMV BLD AUTO: 8.3 FL (ref 5–10.5)
POTASSIUM REFLEX MAGNESIUM: 3.9 MMOL/L (ref 3.5–5.1)
PROPOXYPHENE SCREEN: NORMAL
PROTEIN UA: NEGATIVE MG/DL
RBC # BLD: 3.76 M/UL (ref 4–5.2)
RBC UA: ABNORMAL /HPF (ref 0–4)
SALICYLATE, SERUM: <0.3 MG/DL (ref 15–30)
SARS-COV-2, NAAT: NOT DETECTED
SODIUM BLD-SCNC: 141 MMOL/L (ref 136–145)
SPECIFIC GRAVITY UA: <=1.005 (ref 1–1.03)
TOTAL PROTEIN: 6.9 G/DL (ref 6.4–8.2)
URINE REFLEX TO CULTURE: ABNORMAL
URINE TYPE: ABNORMAL
UROBILINOGEN, URINE: 0.2 E.U./DL
WBC # BLD: 5.7 K/UL (ref 4–11)
WBC UA: ABNORMAL /HPF (ref 0–5)

## 2022-05-21 PROCEDURE — 80143 DRUG ASSAY ACETAMINOPHEN: CPT

## 2022-05-21 PROCEDURE — 6370000000 HC RX 637 (ALT 250 FOR IP): Performed by: EMERGENCY MEDICINE

## 2022-05-21 PROCEDURE — 99283 EMERGENCY DEPT VISIT LOW MDM: CPT

## 2022-05-21 PROCEDURE — 85025 COMPLETE CBC W/AUTO DIFF WBC: CPT

## 2022-05-21 PROCEDURE — 80307 DRUG TEST PRSMV CHEM ANLYZR: CPT

## 2022-05-21 PROCEDURE — 81001 URINALYSIS AUTO W/SCOPE: CPT

## 2022-05-21 PROCEDURE — 80179 DRUG ASSAY SALICYLATE: CPT

## 2022-05-21 PROCEDURE — 84703 CHORIONIC GONADOTROPIN ASSAY: CPT

## 2022-05-21 PROCEDURE — 80053 COMPREHEN METABOLIC PANEL: CPT

## 2022-05-21 PROCEDURE — 36415 COLL VENOUS BLD VENIPUNCTURE: CPT

## 2022-05-21 PROCEDURE — 82077 ASSAY SPEC XCP UR&BREATH IA: CPT

## 2022-05-21 PROCEDURE — 87635 SARS-COV-2 COVID-19 AMP PRB: CPT

## 2022-05-21 RX ORDER — AMOXICILLIN AND CLAVULANATE POTASSIUM 875; 125 MG/1; MG/1
1 TABLET, FILM COATED ORAL ONCE
Status: COMPLETED | OUTPATIENT
Start: 2022-05-21 | End: 2022-05-21

## 2022-05-21 RX ORDER — AMOXICILLIN AND CLAVULANATE POTASSIUM 875; 125 MG/1; MG/1
1 TABLET, FILM COATED ORAL 2 TIMES DAILY
Qty: 20 TABLET | Refills: 0 | Status: SHIPPED | OUTPATIENT
Start: 2022-05-21 | End: 2022-05-31

## 2022-05-21 RX ORDER — IBUPROFEN 800 MG/1
800 TABLET ORAL ONCE
Status: COMPLETED | OUTPATIENT
Start: 2022-05-21 | End: 2022-05-21

## 2022-05-21 RX ORDER — IBUPROFEN 800 MG/1
800 TABLET ORAL EVERY 8 HOURS PRN
Qty: 16 TABLET | Refills: 0 | Status: SHIPPED | OUTPATIENT
Start: 2022-05-21 | End: 2022-05-21 | Stop reason: SDUPTHER

## 2022-05-21 RX ORDER — AMOXICILLIN AND CLAVULANATE POTASSIUM 875; 125 MG/1; MG/1
1 TABLET, FILM COATED ORAL 2 TIMES DAILY
Qty: 20 TABLET | Refills: 0 | Status: SHIPPED | OUTPATIENT
Start: 2022-05-21 | End: 2022-05-21 | Stop reason: SDUPTHER

## 2022-05-21 RX ORDER — IBUPROFEN 800 MG/1
800 TABLET ORAL EVERY 8 HOURS PRN
Qty: 16 TABLET | Refills: 0 | Status: ON HOLD | OUTPATIENT
Start: 2022-05-21 | End: 2022-07-12 | Stop reason: HOSPADM

## 2022-05-21 RX ADMIN — AMOXICILLIN AND CLAVULANATE POTASSIUM 1 TABLET: 875; 125 TABLET, FILM COATED ORAL at 09:17

## 2022-05-21 RX ADMIN — IBUPROFEN 800 MG: 800 TABLET, FILM COATED ORAL at 09:17

## 2022-05-21 ASSESSMENT — PAIN SCALES - GENERAL: PAINLEVEL_OUTOF10: 4

## 2022-05-21 ASSESSMENT — PAIN - FUNCTIONAL ASSESSMENT: PAIN_FUNCTIONAL_ASSESSMENT: NONE - DENIES PAIN

## 2022-05-21 NOTE — DISCHARGE INSTR - COC
Continuity of Care Form    Patient Name: Darian Haywood   :  1985  MRN:  [id number]    Admit date:  2022  Discharge date:  ***    Code Status Order: Prior   Advance Directives:      Admitting Physician:  Ernestine Ko MD  PCP: No primary care provider on file. Discharging Nurse: [facility] Unit/Room#: B3/B3  Discharging Unit Phone Number: ***    Emergency Contact:   Extended Emergency Contact Information  Primary Emergency Contact: Juan Loco Phone: [phone]  Relation: Grandparent  Secondary Emergency Contact: Chivo Thomas  Home Phone: [phone]  Mobile Phone: [phone]  Relation: Parent  Preferred language: English   needed?  No    Past Surgical History:  Past Surgical History:   Procedure Laterality Date     SECTION  2005    DILATION AND CURETTAGE OF UTERUS             Immunization History:   Immunization History   Administered Date(s) Administered    Influenza Virus Vaccine 2015    Tdap (Boostrix, Adacel) 2015       Active Problems:  Patient Active Problem List   Diagnosis Code    Joint problem, shoulder M25.9    BMI 36.0-36.9,adult Z68.36    MDD (major depressive disorder), recurrent severe, without psychosis ([address] F33.2    Amphetamine use disorder, severe, in sustained remission ([address] F15.21    Mild intermittent asthma without complication U19.66    Hypokalemia E87.6    Mild protein-calorie malnutrition (HCC) E44.1    Seasonal allergies J30.2    Transaminitis R74.01    Tobacco use Z72.0    Chronic hepatitis C without hepatic coma ([address] B18.2    Opioid use disorder, severe, in sustained remission ([address] F11.21    Psychosis ([address] F29       Isolation/Infection:   Isolation            No Isolation          Patient Infection Status       Infection Onset Added Last Indicated Last Indicated By Review Planned Expiration Resolved Resolved By    None active    Resolved    COVID-19 (Rule Out) 21 07/23/21 COVID-19, Rapid (Ordered)   07/23/21 Rule-Out Test Resulted    COVID-19 (Rule Out) 01/06/21 01/06/21 01/06/21 COVID-19 (Ordered)   01/06/21 Rule-Out Test Resulted    COVID-19 (Rule Out) 07/18/20 07/18/20 07/18/20 COVID-19 (Ordered)   07/18/20 Rule-Out Test Resulted    COVID-19 (Rule Out) 07/08/20 07/08/20 07/08/20 COVID-19 (Ordered)   07/08/20 Rule-Out Test Resulted            Nurse Assessment:  Last Vital Signs: /88   Pulse 97   Temp 98.2 °F (36.8 °C) (Oral)   Resp 20   Ht 5' 5\" (1.651 m)   Wt 165 lb (74.8 kg)   LMP 04/21/2022   SpO2 100%   BMI 27.46 kg/m²     Last documented pain score (0-10 scale): Pain Level: 4  Last Weight:   Wt Readings from Last 1 Encounters:   05/21/22 165 lb (74.8 kg)     Mental Status:  {IP PT MENTAL STATUS:20030}    IV Access:  { ALTAGRACIA IV ACCESS:744091084}    Nursing Mobility/ADLs:  Walking   {CHP DME ZDLN:471664558}  Transfer  {CHP DME TMUC:305847354}  Bathing  {CHP DME DOOE:827354673}  Dressing  {P DME ADCO:504249134}  Toileting  {CHP DME XQIY:089803983}  Feeding  {CHP DME GUOF:191273786}  Med Admin  {P DME MTDS:117706352}  Med Delivery   { ALTAGRACIA MED Delivery:335755118}    Wound Care Documentation and Therapy:        Elimination:  Continence: Bowel: {YES / WS:53238}  Bladder: {YES / LA:25959}  Urinary Catheter: {Urinary Catheter:011943814}   Colostomy/Ileostomy/Ileal Conduit: {YES / SQ:61156}       Date of Last BM: ***  No intake or output data in the 24 hours ending 05/21/22 1048  No intake/output data recorded.     Safety Concerns:     [facility] ALTAGRACIA Safety Concerns:176629232}    Impairments/Disabilities:      [address] ALTAGRACIA Impairments/Disabilities:061644767}    Nutrition Therapy:  Current Nutrition Therapy:   [facility] Diet List:432773906}    Routes of Feeding: {CHP DME Other Feedings:761497862}  Liquids: {Slp liquid thickness:95425}  Daily Fluid Restriction: {CHP DME Yes amt example:486403964}  Last Modified Barium Swallow with Video (Video Swallowing Test): {Done Not Done JJNO:495843000}    Treatments at the Time of Hospital Discharge:   Respiratory Treatments: ***  Oxygen Therapy:  {Therapy; copd oxygen:94765}  Ventilator:    {[facility] Vent ANDC:808531911}    Rehab Therapies: {THERAPEUTIC INTERVENTION:1704956061}  Weight Bearing Status/Restrictions: {[facility] Weight Bearin}  Other Medical Equipment (for information only, NOT a DME order):  {EQUIPMENT:565708464}  Other Treatments: ***    Patient's personal belongings (please select all that are sent with patient):  {[facility] DME Belongings:002749032}    RN SIGNATURE:  {Esignature:798994108}    CASE MANAGEMENT/SOCIAL WORK SECTION    Inpatient Status Date: ***    Readmission Risk Assessment Score:  Readmission Risk              Risk of Unplanned Readmission:  0           Discharging to Facility/ Agency   Name:   Address:  Phone:  Fax:    Dialysis Facility (if applicable)   Name:  Address:  Dialysis Schedule:  Phone:  Fax:    / signature: {Esignature:653830988}    PHYSICIAN SECTION    Prognosis: {Prognosis:3048900631}    Condition at Discharge: [facility] Patient Condition:328091782}    Rehab Potential (if transferring to Rehab): {Prognosis:4319226378}    Recommended Labs or Other Treatments After Discharge: ***    Physician Certification: I certify the above information and transfer of Beatris Silvestre  is necessary for the continuing treatment of the diagnosis listed and that she requires {Admit to Appropriate Level of Care:46332} for {GREATER/LESS:977979217} 30 days.      Update Admission H&P: {CHP DME Changes in PIFBQ:338469377}    PHYSICIAN SIGNATURE:  {Esignature:390387701}

## 2022-05-21 NOTE — ED NOTES
Patient has order to discharge home. Patient given discharge instructions. Patient left to lobby to await cab.       Liliana Suh RN  05/21/22 1571

## 2022-05-21 NOTE — ED NOTES
Patient brought back from main ED. Patient already in safety gown and belongings locked into locker. Will continue to monitor patient.       Anamika Vanessa RN  05/21/22 7435
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
## 2022-05-21 NOTE — ED NOTES
Pt changed in to street clothes, given d/c instructions, and walked out to [facility].        LAURA Arrington  05/21/22 1127

## 2022-05-21 NOTE — ED NOTES
Spoke with pt briefly. .    States she's experienced increased depressive symptoms, associated with \"panic attacks that can last for hours. \" Reports decreased sleep, issues with appetite, and difficulty in social situations. Reports concern about medication issues; concerns that her current medications are no longer effective. Pt had multiple medical complaints stating there was something wrong with her eyes, and right ear, head fog, and \"female issues,\" related to her anxiety. Pt also reported increased suicidal ideation going on for about a month now. Stated no specific plan, or intention to act on these thoughts, although did state she is thinking about \"how much better off she would if she were dead. \"    She did contract for safety while here in the hospital, and has no intention of harming herself while here. She also requested staff does not contact any family while here in the ED.

## 2022-05-21 NOTE — Clinical Note
Darian Edmond was seen and treated in our emergency department on 5/21/2022. She may return to work on 05/22/2022. If you have any questions or concerns, please don't hesitate to call.       Tim Mitchell, DO

## 2022-05-21 NOTE — ED PROVIDER NOTES
CHIEF COMPLAINT  Panic Attack      HISTORY OF PRESENT ILLNESS  Desiree Gamez is a 44 y.o. female presents to the ED with anxiety/panic attack tonight, thought she heard a noise at her window, worried someone was breaking in, improved since onset, brought in by EMS, states she does not have any anxiety meds anymore, gets chest tightness/SOB, tried inhaler w/o improvement, but those have improved, hx hep c, denies concern for pregnancy or STDs. Denies recent drug/etoh use, no fever, no known sick contacts, gets migraines but nothing new, no SI/HI, initially requesting admission, hx PTSD, did recently restart her wellbutrin,  No other complaints, modifying factors or associated symptoms. I have reviewed the following from the nursing documentation.     Past Medical History:   Diagnosis Date    Anxiety     Chronic hepatitis C without hepatic coma ([facility] [address]     Chronic post-traumatic stress disorder (PTSD) 10/29/2018    Depression     Hepatitis C 2020    Mild intermittent asthma without complication     Stimulant use disorder 2018     Past Surgical History:   Procedure Laterality Date     SECTION  2005    DILATION AND CURETTAGE OF UTERUS  2006         Family History   Problem Relation Age of Onset    Cancer Father         lung    Mental Illness Father     Diabetes Maternal Grandmother     Hypertension Maternal Grandmother     Heart Failure Maternal Grandmother     Depression Paternal Grandmother     Diabetes Paternal Grandmother     Heart Failure Paternal Grandmother     Mental Illness Paternal Grandmother     Hypertension Mother     Diabetes Paternal Grandfather     Heart Failure Paternal Grandfather     Mental Illness Paternal Grandfather      Social History     Socioeconomic History    Marital status: Single     Spouse name: Not on file    Number of children: 2    Years of education: 15    Highest education level: Not on file   Occupational History    Occupation: unemployed past 2+ year   Tobacco Use    Smoking status: Current Every Day Smoker     Packs/day: 0.50     Types: E-Cigarettes, Cigarettes     Last attempt to quit: 10/19/2020     Years since quittin.6    Smokeless tobacco: Never Used    Tobacco comment: using nicotine patches   Vaping Use    Vaping Use: Every day    Substances: Nicotine   Substance and Sexual Activity    Alcohol use: No     Alcohol/week: 1.0 standard drink     Types: 1 Standard drinks or equivalent per week     Comment: quit 20 pt went to AA    Drug use: Not Currently     Types: Methamphetamines (Crystal Meth), IV     Comment: last use 2020    Sexual activity: Yes     Partners: Male   Other Topics Concern    Not on file   Social History Narrative    Not on file     Social Determinants of Health     Financial Resource Strain:     Difficulty of Paying Living Expenses: Not on file   Food Insecurity:     Worried About Running Out of Food in the Last Year: Not on file    Kerri of Food in the Last Year: Not on file   Transportation Needs:     Lack of Transportation (Medical): Not on file    Lack of Transportation (Non-Medical):  Not on file   Physical Activity:     Days of Exercise per Week: Not on file    Minutes of Exercise per Session: Not on file   Stress:     Feeling of Stress : Not on file   Social Connections:     Frequency of Communication with Friends and Family: Not on file    Frequency of Social Gatherings with Friends and Family: Not on file    Attends Hinduism Services: Not on file    Active Member of Clubs or Organizations: Not on file    Attends Club or Organization Meetings: Not on file    Marital Status: Not on file   Intimate Partner Violence:     Fear of Current or Ex-Partner: Not on file    Emotionally Abused: Not on file    Physically Abused: Not on file    Sexually Abused: Not on file   Housing Stability:     Unable to Pay for Housing in the Last Year: Not on file    Number of Places Lived in the Last Year: Not on file    Unstable Housing in the Last Year: Not on file     No current facility-administered medications for this encounter. Current Outpatient Medications   Medication Sig Dispense Refill    amoxicillin-clavulanate (AUGMENTIN) 875-125 MG per tablet Take 1 tablet by mouth 2 times daily for 10 days 20 tablet 0    ibuprofen (ADVIL;MOTRIN) 800 MG tablet Take 1 tablet by mouth every 8 hours as needed for Pain 16 tablet 0    albuterol sulfate HFA (PROVENTIL HFA) 108 (90 Base) MCG/ACT inhaler Inhale 2 puffs into the lungs every 4 hours as needed for Wheezing or Shortness of Breath (Space out to every 6 hours as symptoms improve) Space out to every 6 hours as symptoms improve. 1 Inhaler 0    topiramate (TOPAMAX) 100 MG tablet Take 1 tablet by mouth nightly 30 tablet 0    buPROPion (WELLBUTRIN SR) 200 MG extended release tablet Take 1 tablet by mouth daily 30 tablet 0    buPROPion (WELLBUTRIN SR) 100 MG extended release tablet Take 1 tablet by mouth Daily with lunch 30 tablet 0    DULoxetine (CYMBALTA) 60 MG extended release capsule Take 1 capsule by mouth nightly 30 capsule 0    nicotine (NICODERM CQ) 21 MG/24HR Place 1 patch onto the skin daily for 15 days 15 patch 0    INCASSIA 0.35 MG tablet TAKE 1 TABLET BY MOUTH EVERY DAY       Allergies   Allergen Reactions    Latex Rash     \"I get a red inflamed rash. \"  \"I get a red inflamed rash. \"  \"I get a red inflamed rash. \"    Cephalosporins Hives    Other Hives     Cephazil  Cephazil      Sulfa Antibiotics     Cefaclor Rash and Hives       REVIEW OF SYSTEMS  10 systems reviewed, pertinent positives per HPI otherwise noted to be negative. PHYSICAL EXAM   /88   Pulse 97   Temp 98.2 °F (36.8 °C) (Oral)   Resp 20   Ht 5' 5\" (1.651 m)   Wt 165 lb (74.8 kg)   LMP 04/21/2022   SpO2 100%   BMI 27.46 kg/m²   GENERAL APPEARANCE: Awake and alert. Cooperative. No acute distress  HEAD: Normocephalic. Atraumatic.  No evangelista's sign. EYES: PERRL. EOM's grossly intact. No scleral icterus. No drainage. No periorbital ecchymosis. ENT: Mucous membranes are moist. Airway patent. No stridor. No epistaxis. L TM w/ mild erythema, increased vascularity, no bulging, small effusion, R TM wnl, No otorrhea or rhinorrhea. No exudates, midline uvula  NECK: Supple. No rigidity, no mastoid tenderness  HEART: RRR. No murmurs  LUNGS: Respirations unlabored, Lungs are clear to ausculation bilaterally, no wheezes/crackles/rhonchi   ABDOMEN: Soft. Non-distended. Non-tender. No guarding, no rebound tenderness, no rigidity. EXTREMITIES: No peripheral edema. Moves all extremities equally. No obvious deformities. SKIN: Warm and dry. No acute rashes. NEUROLOGICAL: Alert and oriented x4. No gross facial drooping. Speech normal, steady gait  PSYCHIATRIC: Normal mood and affect. Appears a little anxious, no SI/HI    LABS  I have reviewed all labs for this visit.    Results for orders placed or performed during the hospital encounter of 05/21/22   COVID-19, Rapid    Specimen: Nasopharyngeal Swab   Result Value Ref Range    SARS-CoV-2, NAAT Not Detected Not Detected   CBC with Auto Differential   Result Value Ref Range    WBC 5.7 4.0 - 11.0 K/uL    RBC 3.76 (L) 4.00 - 5.20 M/uL    Hemoglobin 11.7 (L) 12.0 - 16.0 g/dL    Hematocrit 34.5 (L) 36.0 - 48.0 %    MCV 91.7 80.0 - 100.0 fL    MCH 31.1 26.0 - 34.0 pg    MCHC 33.9 31.0 - 36.0 g/dL    RDW 14.3 12.4 - 15.4 %    Platelets 904 267 - 788 K/uL    MPV 8.3 5.0 - 10.5 fL    Neutrophils % 63.3 %    Lymphocytes % 23.2 %    Monocytes % 12.4 %    Eosinophils % 0.3 %    Basophils % 0.8 %    Neutrophils Absolute 3.6 1.7 - 7.7 K/uL    Lymphocytes Absolute 1.3 1.0 - 5.1 K/uL    Monocytes Absolute 0.7 0.0 - 1.3 K/uL    Eosinophils Absolute 0.0 0.0 - 0.6 K/uL    Basophils Absolute 0.0 0.0 - 0.2 K/uL   Comprehensive Metabolic Panel w/ Reflex to MG   Result Value Ref Range    Sodium 141 136 - 145 mmol/L    Potassium reflex Magnesium 3.9 3.5 - 5.1 mmol/L    Chloride 107 99 - 110 mmol/L    CO2 22 21 - 32 mmol/L    Anion Gap 12 3 - 16    Glucose 92 70 - 99 mg/dL    BUN 8 7 - 20 mg/dL    CREATININE 0.9 0.6 - 1.1 mg/dL    GFR Non-African American >60 >60    GFR African American >60 >60    Calcium 9.4 8.3 - 10.6 mg/dL    Total Protein 6.9 6.4 - 8.2 g/dL    Albumin 4.6 3.4 - 5.0 g/dL    Albumin/Globulin Ratio 2.0 1.1 - 2.2    Total Bilirubin 0.3 0.0 - 1.0 mg/dL    Alkaline Phosphatase 40 40 - 129 U/L    ALT 56 (H) 10 - 40 U/L    AST 31 15 - 37 U/L   Ethanol   Result Value Ref Range    Ethanol Lvl None Detected mg/dL   Salicylate   Result Value Ref Range    Salicylate, Serum <5.3 (L) 15.0 - 30.0 mg/dL   Acetaminophen Level   Result Value Ref Range    Acetaminophen Level <5 (L) 10 - 30 ug/mL   Urinalysis with Reflex to Culture    Specimen: Urine, clean catch   Result Value Ref Range    Color, UA Yellow Straw/Yellow    Clarity, UA Clear Clear    Glucose, Ur Negative Negative mg/dL    Bilirubin Urine Negative Negative    Ketones, Urine Negative Negative mg/dL    Specific Gravity, UA <=1.005 1.005 - 1.030    Blood, Urine TRACE-INTACT (A) Negative    pH, UA 7.0 5.0 - 8.0    Protein, UA Negative Negative mg/dL    Urobilinogen, Urine 0.2 <2.0 E.U./dL    Nitrite, Urine Negative Negative    Leukocyte Esterase, Urine Negative Negative    Microscopic Examination YES     Urine Type NotGiven     Urine Reflex to Culture Not Indicated    Pregnancy, Urine   Result Value Ref Range    HCG(Urine) Pregnancy Test Negative Detects HCG level >20 MIU/mL   Drug screen multi urine   Result Value Ref Range    Amphetamine Screen, Urine Neg Negative <1000ng/mL    Barbiturate Screen, Ur Neg Negative <200 ng/mL    Benzodiazepine Screen, Urine Neg Negative <200 ng/mL    Cannabinoid Scrn, Ur Neg Negative <50 ng/mL    Cocaine Metabolite Screen, Urine Neg Negative <300 ng/mL    Opiate Scrn, Ur Neg Negative <300 ng/mL    PCP Screen, Urine Neg Negative <25 ng/mL Methadone Screen, Urine Neg Negative <300 ng/mL    Propoxyphene Scrn, Ur Neg Negative <300 ng/mL    Oxycodone Urine Neg Negative <100 ng/ml    pH, UA 7.0     Drug Screen Comment: see below    Microscopic Urinalysis   Result Value Ref Range    WBC, UA None seen 0 - 5 /HPF    RBC, UA 5-10 (A) 0 - 4 /HPF    Epithelial Cells, UA 0-1 0 - 5 /HPF    Bacteria, UA 1+ (A) None Seen /HPF         ED COURSE/MDM  Patient seen and evaluated. Old records reviewed. Labs and imaging reviewed and results discussed with patient. 44 y.o. female here for panic attack, requested psych eval, no SI/HI, contracts for safety, she asked that I come back in and evaluate her ear pain and intermittent tinnitus, was well, given abx for mild OM, no current concern for mastoiditis/sepsis/meningitis/encephalitis, labs unremarkable, medically cleared for psych, I expect patient will be discharged, wrote script for abx, See AVS for final dispo information.       Orders Placed This Encounter   Procedures    COVID-19, Rapid    CBC with Auto Differential    Comprehensive Metabolic Panel w/ Reflex to MG    Ethanol    Salicylate    Acetaminophen Level    Urinalysis with Reflex to Culture    Pregnancy, Urine    Drug screen multi urine    Microscopic Urinalysis     Orders Placed This Encounter   Medications    amoxicillin-clavulanate (AUGMENTIN) 875-125 MG per tablet 1 tablet     Order Specific Question:   Antimicrobial Indications     Answer:   Head and Neck Infection    ibuprofen (ADVIL;MOTRIN) tablet 800 mg    DISCONTD: amoxicillin-clavulanate (AUGMENTIN) 875-125 MG per tablet     Sig: Take 1 tablet by mouth 2 times daily for 10 days     Dispense:  20 tablet     Refill:  0    DISCONTD: ibuprofen (ADVIL;MOTRIN) 800 MG tablet     Sig: Take 1 tablet by mouth every 8 hours as needed for Pain     Dispense:  16 tablet     Refill:  0    amoxicillin-clavulanate (AUGMENTIN) 875-125 MG per tablet     Sig: Take 1 tablet by mouth 2 times daily for 10 days     Dispense:  20 tablet     Refill:  0    ibuprofen (ADVIL;MOTRIN) 800 MG tablet     Sig: Take 1 tablet by mouth every 8 hours as needed for Pain     Dispense:  16 tablet     Refill:  0     ED Course as of 05/27/22 0908   Sat May 21, 2022   0920 Medically cleared for psych, starting antibiotics for left ear infection, she requested something for pain/headache so she was given ibuprofen. She also requested breakfast which was ordered [SY]      ED Course User Index  [SY] Mague Barrow DO         CLINICAL IMPRESSION  1. Panic attack    2. Left ear pain        Blood pressure 129/88, pulse 97, temperature 98.2 °F (36.8 °C), temperature source Oral, resp. rate 20, height 5' 5\" (1.651 m), weight 165 lb (74.8 kg), last menstrual period 04/21/2022, SpO2 100 %, not currently breastfeeding. DISPOSITION  Plan for discharge in stable condition. Discussed w/ day physician, awaiting final BAC recs for dispo.                   Mague Barrow DO  05/27/22 1934

## 2022-07-08 NOTE — FLOWSHEET NOTE
Group Therapy Note    Date: 7/27/2020  Start Time: 1300  End Time:  4210  Number of Participants: 11    Type of Group: [employer]    Notes:  Pt present for most of [employer] witness [employer]. While present, Pt sat quietly and listened. Pt left after 30 minutes in group. Participation Level:  Active Listener    Participation Quality: Appropriate and Attentive      Speech:  normal      Affective Functioning: Congruent      Endings: None Reported    Modes of Intervention: Support, Socialization and Exploration      Discipline Responsible: Chaplain Gray Mccarty       07/27/20 1426   Encounter Summary   Services provided to: Patient   Continue Visiting   (7/27 [employer] witness [employer])   Complexity of Encounter Moderate   Length of Encounter 30 minutes Aklief Pregnancy And Lactation Text: It is unknown if this medication is safe to use during pregnancy.  It is unknown if this medication is excreted in breast milk.  Breastfeeding women should use the topical cream on the smallest area of the skin for the shortest time needed while breastfeeding.  Do not apply to nipple and areola.

## 2022-07-11 ENCOUNTER — HOSPITAL ENCOUNTER (OUTPATIENT)
Age: 37
Setting detail: OBSERVATION
Discharge: HOME | End: 2022-07-12
Attending: PSYCHIATRY & NEUROLOGY | Admitting: PSYCHIATRY & NEUROLOGY
Payer: COMMERCIAL

## 2022-07-11 DIAGNOSIS — N94.6 DYSMENORRHEA: ICD-10-CM

## 2022-07-11 DIAGNOSIS — N92.1 MENORRHAGIA WITH IRREGULAR CYCLE: ICD-10-CM

## 2022-07-11 DIAGNOSIS — G89.29 CHRONIC THORACIC BACK PAIN, UNSPECIFIED BACK PAIN LATERALITY: ICD-10-CM

## 2022-07-11 DIAGNOSIS — M54.6 CHRONIC THORACIC BACK PAIN, UNSPECIFIED BACK PAIN LATERALITY: ICD-10-CM

## 2022-07-11 DIAGNOSIS — F33.3 SEVERE EPISODE OF RECURRENT MAJOR DEPRESSIVE DISORDER, WITH PSYCHOTIC FEATURES (HCC): Primary | ICD-10-CM

## 2022-07-11 PROBLEM — F33.9 MAJOR DEPRESSION, RECURRENT (HCC): Status: ACTIVE | Noted: 2022-07-11

## 2022-07-11 PROBLEM — F39 PSYCHOSIS, AFFECTIVE (HCC): Status: ACTIVE | Noted: 2022-07-11

## 2022-07-11 LAB
A/G RATIO: 2 (ref 1.1–2.2)
ACETAMINOPHEN LEVEL: <5 UG/ML (ref 10–30)
ALBUMIN SERPL-MCNC: 4.5 G/DL (ref 3.4–5)
ALP BLD-CCNC: 53 U/L (ref 40–129)
ALT SERPL-CCNC: 64 U/L (ref 10–40)
AMORPHOUS: ABNORMAL /HPF
AMPHETAMINE SCREEN, URINE: NORMAL
ANION GAP SERPL CALCULATED.3IONS-SCNC: 11 MMOL/L (ref 3–16)
AST SERPL-CCNC: 50 U/L (ref 15–37)
BARBITURATE SCREEN URINE: NORMAL
BASOPHILS ABSOLUTE: 0 K/UL (ref 0–0.2)
BASOPHILS RELATIVE PERCENT: 0.8 %
BENZODIAZEPINE SCREEN, URINE: NORMAL
BILIRUB SERPL-MCNC: 0.3 MG/DL (ref 0–1)
BILIRUBIN URINE: NEGATIVE
BLOOD, URINE: ABNORMAL
BUN BLDV-MCNC: 6 MG/DL (ref 7–20)
CALCIUM SERPL-MCNC: 9 MG/DL (ref 8.3–10.6)
CANNABINOID SCREEN URINE: NORMAL
CHLORIDE BLD-SCNC: 107 MMOL/L (ref 99–110)
CLARITY: ABNORMAL
CO2: 26 MMOL/L (ref 21–32)
COCAINE METABOLITE SCREEN URINE: NORMAL
COLOR: YELLOW
CREAT SERPL-MCNC: 0.7 MG/DL (ref 0.6–1.1)
EOSINOPHILS ABSOLUTE: 0.1 K/UL (ref 0–0.6)
EOSINOPHILS RELATIVE PERCENT: 1.1 %
EPITHELIAL CELLS, UA: ABNORMAL /HPF (ref 0–5)
ETHANOL: NORMAL MG/DL (ref 0–0.08)
GFR AFRICAN AMERICAN: >60
GFR NON-AFRICAN AMERICAN: >60
GLUCOSE BLD-MCNC: 86 MG/DL (ref 70–99)
GLUCOSE URINE: NEGATIVE MG/DL
HCG(URINE) PREGNANCY TEST: NEGATIVE
HCT VFR BLD CALC: 34.4 % (ref 36–48)
HEMOGLOBIN: 11.5 G/DL (ref 12–16)
INFLUENZA A: NOT DETECTED
INFLUENZA B: NOT DETECTED
KETONES, URINE: NEGATIVE MG/DL
LEUKOCYTE ESTERASE, URINE: NEGATIVE
LYMPHOCYTES ABSOLUTE: 1 K/UL (ref 1–5.1)
LYMPHOCYTES RELATIVE PERCENT: 19 %
Lab: NORMAL
MCH RBC QN AUTO: 31.1 PG (ref 26–34)
MCHC RBC AUTO-ENTMCNC: 33.3 G/DL (ref 31–36)
MCV RBC AUTO: 93.1 FL (ref 80–100)
METHADONE SCREEN, URINE: NORMAL
MICROSCOPIC EXAMINATION: YES
MONOCYTES ABSOLUTE: 0.5 K/UL (ref 0–1.3)
MONOCYTES RELATIVE PERCENT: 9.6 %
NEUTROPHILS ABSOLUTE: 3.8 K/UL (ref 1.7–7.7)
NEUTROPHILS RELATIVE PERCENT: 69.5 %
NITRITE, URINE: NEGATIVE
OPIATE SCREEN URINE: NORMAL
OXYCODONE URINE: NORMAL
PDW BLD-RTO: 14.1 % (ref 12.4–15.4)
PH UA: 6
PH UA: 6 (ref 5–8)
PHENCYCLIDINE SCREEN URINE: NORMAL
PLATELET # BLD: 248 K/UL (ref 135–450)
PMV BLD AUTO: 8.8 FL (ref 5–10.5)
POTASSIUM REFLEX MAGNESIUM: 3.7 MMOL/L (ref 3.5–5.1)
PROPOXYPHENE SCREEN: NORMAL
PROTEIN UA: NEGATIVE MG/DL
RBC # BLD: 3.69 M/UL (ref 4–5.2)
RBC UA: ABNORMAL /HPF (ref 0–4)
SALICYLATE, SERUM: <0.3 MG/DL (ref 15–30)
SARS-COV-2 RNA, RT PCR: NOT DETECTED
SODIUM BLD-SCNC: 144 MMOL/L (ref 136–145)
SPECIFIC GRAVITY UA: <=1.005 (ref 1–1.03)
TOTAL PROTEIN: 6.8 G/DL (ref 6.4–8.2)
URINE REFLEX TO CULTURE: ABNORMAL
URINE TYPE: ABNORMAL
UROBILINOGEN, URINE: 0.2 E.U./DL
WBC # BLD: 5.5 K/UL (ref 4–11)
WBC UA: ABNORMAL /HPF (ref 0–5)

## 2022-07-11 PROCEDURE — 82077 ASSAY SPEC XCP UR&BREATH IA: CPT

## 2022-07-11 PROCEDURE — 1240000000 HC EMOTIONAL WELLNESS R&B

## 2022-07-11 PROCEDURE — 80053 COMPREHEN METABOLIC PANEL: CPT

## 2022-07-11 PROCEDURE — 87636 SARSCOV2 & INF A&B AMP PRB: CPT

## 2022-07-11 PROCEDURE — G0378 HOSPITAL OBSERVATION PER HR: HCPCS

## 2022-07-11 PROCEDURE — 81001 URINALYSIS AUTO W/SCOPE: CPT

## 2022-07-11 PROCEDURE — 36415 COLL VENOUS BLD VENIPUNCTURE: CPT

## 2022-07-11 PROCEDURE — 83036 HEMOGLOBIN GLYCOSYLATED A1C: CPT

## 2022-07-11 PROCEDURE — 80307 DRUG TEST PRSMV CHEM ANLYZR: CPT

## 2022-07-11 PROCEDURE — 85025 COMPLETE CBC W/AUTO DIFF WBC: CPT

## 2022-07-11 PROCEDURE — 6370000000 HC RX 637 (ALT 250 FOR IP): Performed by: PHYSICIAN ASSISTANT

## 2022-07-11 PROCEDURE — 80143 DRUG ASSAY ACETAMINOPHEN: CPT

## 2022-07-11 PROCEDURE — 80179 DRUG ASSAY SALICYLATE: CPT

## 2022-07-11 PROCEDURE — 99285 EMERGENCY DEPT VISIT HI MDM: CPT

## 2022-07-11 PROCEDURE — 84703 CHORIONIC GONADOTROPIN ASSAY: CPT

## 2022-07-11 RX ORDER — TRAZODONE HYDROCHLORIDE 50 MG/1
50 TABLET ORAL NIGHTLY PRN
Status: DISCONTINUED | OUTPATIENT
Start: 2022-07-12 | End: 2022-07-11 | Stop reason: SDUPTHER

## 2022-07-11 RX ORDER — HYDROXYZINE 50 MG/1
50 TABLET, FILM COATED ORAL 3 TIMES DAILY PRN
Status: CANCELLED | OUTPATIENT
Start: 2022-07-11

## 2022-07-11 RX ORDER — LIDOCAINE 4 G/G
1 PATCH TOPICAL ONCE
Status: COMPLETED | OUTPATIENT
Start: 2022-07-11 | End: 2022-07-12

## 2022-07-11 RX ORDER — IBUPROFEN 400 MG/1
400 TABLET ORAL EVERY 6 HOURS PRN
Status: DISCONTINUED | OUTPATIENT
Start: 2022-07-11 | End: 2022-07-11 | Stop reason: SDUPTHER

## 2022-07-11 RX ORDER — MAGNESIUM HYDROXIDE/ALUMINUM HYDROXICE/SIMETHICONE 120; 1200; 1200 MG/30ML; MG/30ML; MG/30ML
30 SUSPENSION ORAL EVERY 6 HOURS PRN
Status: DISCONTINUED | OUTPATIENT
Start: 2022-07-11 | End: 2022-07-11 | Stop reason: SDUPTHER

## 2022-07-11 RX ORDER — ACETAMINOPHEN 325 MG/1
650 TABLET ORAL EVERY 4 HOURS PRN
Status: DISCONTINUED | OUTPATIENT
Start: 2022-07-11 | End: 2022-07-11 | Stop reason: SDUPTHER

## 2022-07-11 RX ORDER — POLYETHYLENE GLYCOL 3350 17 G
2 POWDER IN PACKET (EA) ORAL
Status: CANCELLED | OUTPATIENT
Start: 2022-07-11

## 2022-07-11 RX ORDER — ACETAMINOPHEN 325 MG/1
650 TABLET ORAL EVERY 4 HOURS PRN
Status: CANCELLED | OUTPATIENT
Start: 2022-07-11

## 2022-07-11 RX ORDER — POLYETHYLENE GLYCOL 3350 17 G
2 POWDER IN PACKET (EA) ORAL
Status: DISCONTINUED | OUTPATIENT
Start: 2022-07-11 | End: 2022-07-12 | Stop reason: HOSPADM

## 2022-07-11 RX ORDER — ACETAMINOPHEN 325 MG/1
650 TABLET ORAL EVERY 4 HOURS PRN
Status: DISCONTINUED | OUTPATIENT
Start: 2022-07-11 | End: 2022-07-12 | Stop reason: HOSPADM

## 2022-07-11 RX ORDER — HYDROXYZINE 50 MG/1
50 TABLET, FILM COATED ORAL 3 TIMES DAILY PRN
Status: DISCONTINUED | OUTPATIENT
Start: 2022-07-11 | End: 2022-07-11 | Stop reason: SDUPTHER

## 2022-07-11 RX ORDER — OLANZAPINE 10 MG/1
10 TABLET ORAL EVERY 8 HOURS PRN
Status: DISCONTINUED | OUTPATIENT
Start: 2022-07-11 | End: 2022-07-12 | Stop reason: HOSPADM

## 2022-07-11 RX ORDER — IBUPROFEN 400 MG/1
400 TABLET ORAL EVERY 6 HOURS PRN
Status: DISCONTINUED | OUTPATIENT
Start: 2022-07-11 | End: 2022-07-12

## 2022-07-11 RX ORDER — MAGNESIUM HYDROXIDE/ALUMINUM HYDROXICE/SIMETHICONE 120; 1200; 1200 MG/30ML; MG/30ML; MG/30ML
30 SUSPENSION ORAL EVERY 6 HOURS PRN
Status: CANCELLED | OUTPATIENT
Start: 2022-07-11

## 2022-07-11 RX ORDER — TRAZODONE HYDROCHLORIDE 50 MG/1
50 TABLET ORAL NIGHTLY PRN
Status: DISCONTINUED | OUTPATIENT
Start: 2022-07-11 | End: 2022-07-12 | Stop reason: HOSPADM

## 2022-07-11 RX ORDER — DIPHENHYDRAMINE HYDROCHLORIDE 50 MG/ML
50 INJECTION INTRAMUSCULAR; INTRAVENOUS EVERY 4 HOURS PRN
Status: CANCELLED | OUTPATIENT
Start: 2022-07-11

## 2022-07-11 RX ORDER — OLANZAPINE 10 MG/1
10 TABLET ORAL EVERY 8 HOURS PRN
Status: DISCONTINUED | OUTPATIENT
Start: 2022-07-11 | End: 2022-07-11

## 2022-07-11 RX ORDER — MAGNESIUM HYDROXIDE/ALUMINUM HYDROXICE/SIMETHICONE 120; 1200; 1200 MG/30ML; MG/30ML; MG/30ML
30 SUSPENSION ORAL EVERY 6 HOURS PRN
Status: DISCONTINUED | OUTPATIENT
Start: 2022-07-11 | End: 2022-07-12 | Stop reason: HOSPADM

## 2022-07-11 RX ORDER — OLANZAPINE 10 MG/1
10 TABLET ORAL EVERY 8 HOURS PRN
Status: CANCELLED | OUTPATIENT
Start: 2022-07-11

## 2022-07-11 RX ORDER — DIPHENHYDRAMINE HYDROCHLORIDE 50 MG/ML
50 INJECTION INTRAMUSCULAR; INTRAVENOUS EVERY 4 HOURS PRN
Status: DISCONTINUED | OUTPATIENT
Start: 2022-07-11 | End: 2022-07-12 | Stop reason: HOSPADM

## 2022-07-11 RX ORDER — HYDROXYZINE 50 MG/1
50 TABLET, FILM COATED ORAL 3 TIMES DAILY PRN
Status: DISCONTINUED | OUTPATIENT
Start: 2022-07-11 | End: 2022-07-12 | Stop reason: HOSPADM

## 2022-07-11 RX ORDER — IBUPROFEN 400 MG/1
400 TABLET ORAL EVERY 6 HOURS PRN
Status: CANCELLED | OUTPATIENT
Start: 2022-07-11

## 2022-07-11 RX ORDER — POLYETHYLENE GLYCOL 3350 17 G
2 POWDER IN PACKET (EA) ORAL
Status: DISCONTINUED | OUTPATIENT
Start: 2022-07-11 | End: 2022-07-11 | Stop reason: SDUPTHER

## 2022-07-11 RX ORDER — ACETAMINOPHEN 500 MG
1000 TABLET ORAL ONCE
Status: COMPLETED | OUTPATIENT
Start: 2022-07-11 | End: 2022-07-11

## 2022-07-11 RX ORDER — DIPHENHYDRAMINE HYDROCHLORIDE 50 MG/ML
50 INJECTION INTRAMUSCULAR; INTRAVENOUS EVERY 4 HOURS PRN
Status: DISCONTINUED | OUTPATIENT
Start: 2022-07-11 | End: 2022-07-11 | Stop reason: SDUPTHER

## 2022-07-11 RX ORDER — TRAZODONE HYDROCHLORIDE 50 MG/1
50 TABLET ORAL NIGHTLY PRN
Status: CANCELLED | OUTPATIENT
Start: 2022-07-12

## 2022-07-11 RX ADMIN — ACETAMINOPHEN 1000 MG: 500 TABLET ORAL at 20:46

## 2022-07-11 ASSESSMENT — PAIN DESCRIPTION - DESCRIPTORS: DESCRIPTORS: PRESSURE

## 2022-07-11 ASSESSMENT — PAIN SCALES - GENERAL
PAINLEVEL_OUTOF10: 10
PAINLEVEL_OUTOF10: 4
PAINLEVEL_OUTOF10: 8

## 2022-07-11 ASSESSMENT — PAIN DESCRIPTION - FREQUENCY: FREQUENCY: CONTINUOUS

## 2022-07-11 ASSESSMENT — PAIN DESCRIPTION - ORIENTATION: ORIENTATION: MID

## 2022-07-11 ASSESSMENT — PAIN - FUNCTIONAL ASSESSMENT: PAIN_FUNCTIONAL_ASSESSMENT: 0-10

## 2022-07-11 ASSESSMENT — PAIN DESCRIPTION - PAIN TYPE: TYPE: ACUTE PAIN

## 2022-07-11 ASSESSMENT — PAIN DESCRIPTION - LOCATION
LOCATION: BACK
LOCATION: BACK

## 2022-07-12 VITALS
SYSTOLIC BLOOD PRESSURE: 100 MMHG | HEIGHT: 66 IN | DIASTOLIC BLOOD PRESSURE: 60 MMHG | RESPIRATION RATE: 16 BRPM | TEMPERATURE: 98.2 F | OXYGEN SATURATION: 100 % | BODY MASS INDEX: 23.3 KG/M2 | WEIGHT: 145 LBS | HEART RATE: 72 BPM

## 2022-07-12 PROBLEM — R45.89 DEPRESSED MOOD: Status: ACTIVE | Noted: 2022-07-12

## 2022-07-12 PROBLEM — N94.6 DYSMENORRHEA: Status: ACTIVE | Noted: 2022-07-12

## 2022-07-12 PROCEDURE — 99239 HOSP IP/OBS DSCHRG MGMT >30: CPT | Performed by: PSYCHIATRY & NEUROLOGY

## 2022-07-12 PROCEDURE — 6370000000 HC RX 637 (ALT 250 FOR IP)

## 2022-07-12 PROCEDURE — 6370000000 HC RX 637 (ALT 250 FOR IP): Performed by: PSYCHIATRY & NEUROLOGY

## 2022-07-12 PROCEDURE — 5130000000 HC BRIDGE APPOINTMENT

## 2022-07-12 PROCEDURE — G0378 HOSPITAL OBSERVATION PER HR: HCPCS

## 2022-07-12 PROCEDURE — 90792 PSYCH DIAG EVAL W/MED SRVCS: CPT

## 2022-07-12 RX ORDER — ALBUTEROL SULFATE 90 UG/1
2 AEROSOL, METERED RESPIRATORY (INHALATION) EVERY 6 HOURS PRN
Status: DISCONTINUED | OUTPATIENT
Start: 2022-07-12 | End: 2022-07-12 | Stop reason: HOSPADM

## 2022-07-12 RX ORDER — IBUPROFEN 800 MG/1
800 TABLET ORAL EVERY 8 HOURS PRN
Status: DISCONTINUED | OUTPATIENT
Start: 2022-07-12 | End: 2022-07-12 | Stop reason: HOSPADM

## 2022-07-12 RX ORDER — BUPROPION HYDROCHLORIDE 150 MG/1
150 TABLET, EXTENDED RELEASE ORAL 2 TIMES DAILY
Status: DISCONTINUED | OUTPATIENT
Start: 2022-07-12 | End: 2022-07-12 | Stop reason: HOSPADM

## 2022-07-12 RX ORDER — LIDOCAINE 4 G/G
1 PATCH TOPICAL DAILY
Status: DISCONTINUED | OUTPATIENT
Start: 2022-07-13 | End: 2022-07-12 | Stop reason: HOSPADM

## 2022-07-12 RX ORDER — ACETAMINOPHEN AND CODEINE PHOSPHATE 120; 12 MG/5ML; MG/5ML
1 SOLUTION ORAL DAILY
Qty: 30 TABLET | Refills: 2 | Status: SHIPPED | OUTPATIENT
Start: 2022-07-12 | End: 2022-08-11

## 2022-07-12 RX ORDER — BUPROPION HYDROCHLORIDE 150 MG/1
150 TABLET, EXTENDED RELEASE ORAL 2 TIMES DAILY
Qty: 60 TABLET | Refills: 3 | Status: ON HOLD | OUTPATIENT
Start: 2022-07-12 | End: 2022-10-25 | Stop reason: HOSPADM

## 2022-07-12 RX ORDER — ACETAMINOPHEN AND CODEINE PHOSPHATE 120; 12 MG/5ML; MG/5ML
1 SOLUTION ORAL DAILY
Status: DISCONTINUED | OUTPATIENT
Start: 2022-07-12 | End: 2022-07-12 | Stop reason: HOSPADM

## 2022-07-12 RX ORDER — NICOTINE 21 MG/24HR
1 PATCH, TRANSDERMAL 24 HOURS TRANSDERMAL DAILY
Status: DISCONTINUED | OUTPATIENT
Start: 2022-07-12 | End: 2022-07-12 | Stop reason: HOSPADM

## 2022-07-12 RX ORDER — IBUPROFEN 800 MG/1
800 TABLET ORAL EVERY 8 HOURS PRN
Qty: 20 TABLET | Refills: 0 | Status: SHIPPED | OUTPATIENT
Start: 2022-07-12

## 2022-07-12 RX ORDER — LIDOCAINE 4 G/G
1 PATCH TOPICAL DAILY
Qty: 15 PATCH | Refills: 0 | Status: SHIPPED | OUTPATIENT
Start: 2022-07-13 | End: 2022-07-28

## 2022-07-12 RX ADMIN — NICOTINE POLACRILEX 2 MG: 2 LOZENGE ORAL at 09:38

## 2022-07-12 RX ADMIN — BUPROPION HYDROCHLORIDE 150 MG: 150 TABLET, EXTENDED RELEASE ORAL at 15:21

## 2022-07-12 RX ADMIN — NICOTINE POLACRILEX 2 MG: 2 LOZENGE ORAL at 15:27

## 2022-07-12 RX ADMIN — IBUPROFEN 400 MG: 400 TABLET, FILM COATED ORAL at 09:38

## 2022-07-12 SDOH — SOCIAL STABILITY: SOCIAL NETWORK: IN A TYPICAL WEEK, HOW MANY TIMES DO YOU TALK ON THE PHONE WITH FAMILY, FRIENDS, OR NEIGHBORS?: PATIENT DECLINED

## 2022-07-12 SDOH — ECONOMIC STABILITY: INCOME INSECURITY: IN THE LAST 12 MONTHS, WAS THERE A TIME WHEN YOU WERE NOT ABLE TO PAY THE MORTGAGE OR RENT ON TIME?: PATIENT REFUSED

## 2022-07-12 SDOH — ECONOMIC STABILITY: HOUSING INSECURITY

## 2022-07-12 SDOH — HEALTH STABILITY: PHYSICAL HEALTH
ON AVERAGE, HOW MANY DAYS PER WEEK DO YOU ENGAGE IN MODERATE TO STRENUOUS EXERCISE (LIKE A BRISK WALK)?: PATIENT DECLINED

## 2022-07-12 SDOH — ECONOMIC STABILITY: FOOD INSECURITY: WITHIN THE PAST 12 MONTHS, THE FOOD YOU BOUGHT JUST DIDN'T LAST AND YOU DIDN'T HAVE MONEY TO GET MORE.: PATIENT DECLINED

## 2022-07-12 SDOH — HEALTH STABILITY: MENTAL HEALTH
STRESS IS WHEN SOMEONE FEELS TENSE, NERVOUS, ANXIOUS, OR CAN'T SLEEP AT NIGHT BECAUSE THEIR MIND IS TROUBLED. HOW STRESSED ARE YOU?: PATIENT DECLINED

## 2022-07-12 SDOH — ECONOMIC STABILITY: HOUSING INSECURITY
IN THE LAST 12 MONTHS, WAS THERE A TIME WHEN YOU DID NOT HAVE A STEADY PLACE TO SLEEP OR SLEPT IN A SHELTER (INCLUDING NOW)?: PATIENT REFUSED

## 2022-07-12 SDOH — ECONOMIC STABILITY: TRANSPORTATION INSECURITY
IN THE PAST 12 MONTHS, HAS LACK OF TRANSPORTATION KEPT YOU FROM MEETINGS, WORK, OR FROM GETTING THINGS NEEDED FOR DAILY LIVING?: PATIENT DECLINED

## 2022-07-12 SDOH — ECONOMIC STABILITY: INCOME INSECURITY: HOW HARD IS IT FOR YOU TO PAY FOR THE VERY BASICS LIKE FOOD, HOUSING, MEDICAL CARE, AND HEATING?: PATIENT DECLINED

## 2022-07-12 SDOH — HEALTH STABILITY: PHYSICAL HEALTH

## 2022-07-12 SDOH — ECONOMIC STABILITY: TRANSPORTATION INSECURITY
IN THE PAST 12 MONTHS, HAS THE LACK OF TRANSPORTATION KEPT YOU FROM MEDICAL APPOINTMENTS OR FROM GETTING MEDICATIONS?: PATIENT DECLINED

## 2022-07-12 SDOH — SOCIAL STABILITY: SOCIAL NETWORK

## 2022-07-12 SDOH — SOCIAL STABILITY: SOCIAL NETWORK: HOW OFTEN DO YOU GET TOGETHER WITH FRIENDS OR RELATIVES?: PATIENT DECLINED

## 2022-07-12 SDOH — SOCIAL STABILITY: SOCIAL NETWORK: HOW OFTEN DO YOU ATTEND CHURCH OR RELIGIOUS SERVICES?: PATIENT DECLINED

## 2022-07-12 SDOH — ECONOMIC STABILITY: FOOD INSECURITY: WITHIN THE PAST 12 MONTHS, YOU WORRIED THAT YOUR FOOD WOULD RUN OUT BEFORE YOU GOT MONEY TO BUY MORE.: PATIENT DECLINED

## 2022-07-12 ASSESSMENT — SLEEP AND FATIGUE QUESTIONNAIRES
DO YOU HAVE DIFFICULTY SLEEPING: NO
DO YOU USE A SLEEP AID: NO
AVERAGE NUMBER OF SLEEP HOURS: 8

## 2022-07-12 ASSESSMENT — PAIN DESCRIPTION - LOCATION: LOCATION: HEAD

## 2022-07-12 ASSESSMENT — PAIN DESCRIPTION - ORIENTATION: ORIENTATION: OTHER (COMMENT)

## 2022-07-12 ASSESSMENT — PAIN DESCRIPTION - DESCRIPTORS
DESCRIPTORS: OTHER (COMMENT)
DESCRIPTORS: ACHING

## 2022-07-12 ASSESSMENT — PAIN SCALES - GENERAL: PAINLEVEL_OUTOF10: 6

## 2022-07-12 ASSESSMENT — LIFESTYLE VARIABLES
HOW OFTEN DO YOU HAVE A DRINK CONTAINING ALCOHOL: PATIENT DECLINED
HOW MANY STANDARD DRINKS CONTAINING ALCOHOL DO YOU HAVE ON A TYPICAL DAY: PATIENT DECLINED

## 2022-07-12 ASSESSMENT — PAIN DESCRIPTION - FREQUENCY: FREQUENCY: OTHER (COMMENT)

## 2022-07-12 NOTE — ED NOTES
2145- [facility]. RN states she is unable at this time since the [facility] is closed. Call to [facility] who informed writer to call Dr. Katarzyna Adams. Dr. Katarzyna Adams called and spoke with Corrine BURNS.      Ricardo Ley RN  07/11/22 [id number]

## 2022-07-12 NOTE — PROGRESS NOTES
Patient declined to sign most of her papers or answer the admission questions. \"They usually don't do this, until I'm here 24-48 hours. \" Patient was with loose disorganized thoughts & easily irritable. Patient was insisting that I put 1 in front of her emergency contact numbers, that was local numbers. Patient asked for maxi pads due to being her menses & writer noted only a small of blood on the pad, that she was replacing. Patient's ordered prn medications were reviewed with patient & patient declined to sign the medication form. \"I'm not taking any of those meds. \" Patient also denied having any allergies. \"Those on my records were a long time ago. \" Patient declined to put on an allergy bracelet.  Patient also declined to allow writer to do her physical. Adithya Peraza R.N.

## 2022-07-12 NOTE — ED NOTES
Report given to Adalberto Hernandez RN. Adalberto Hernandez RN states she is calling Dr Gisselle Sultana to figure out orders that are supposed to be for tomorrow that were put in under her last encounter. Lauren Espinosa states she will call when ready.    Prema Sutton RN  07/11/22 [phone], RN  07/11/22 [id number]

## 2022-07-12 NOTE — ED PROVIDER NOTES
[address]  EMERGENCY DEPARTMENT ENCOUNTER        Pt Name: Rip Vizcarra  MRN: [id number]  [place] 1985  Date of evaluation: 7/11/2022  Provider: GRANT Hawkins  PCP: No primary care provider on file. This patient was not seen and evaluated by the attending physician No att. providers found. I have evaluated this patient. My supervising physician was available for consultation. CHIEF COMPLAINT       Chief Complaint   Patient presents with    Vaginal Bleeding     vaginal bleeding starting thursday; pt states she thinks its her period; chronic lower back pain. HISTORY OF PRESENT ILLNESS   (Location/Symptom, Timing/Onset, Context/Setting, Quality, Duration, Modifying Factors, Severity)  Note limiting factors. Rip Vizcarra is a 44 y.o. female with past medical history of hepatitis C, anxiety, depression, asthma who presents via private vehicle from her home for evaluation of heavy menstrual bleeding, chronic back pain and requesting psych evaluation. Patient notes that she has been off her mental health medications for several months. She has not been taking her medicines because she has been unable to obtain transportation to and from her visits and has not been able to do telehealth visits because her phone is not working. She endorses being on Wellbutrin. She also notes that she used to follow with [facility] and was on Suboxone but she has not been doing that for a long time as well secondary to transportation issues and \"not being able to handle, not being organized\". Patient notes that she feels that she becomes even more disorganized and has more scattered thoughts when she gets her menstrual period, she notes that she has been having heavy bleeding for the last 2 days, today she has gone through 4 pads. She endorses abdominal pain and back pain with her periods consistent with cramping.   She points to her thoracic spine when she has the back pain, it is only with her menstrual period and she is concerned that it could be related to where she had her epidural when she had her C-sections. She denies any lower extremity numbness tingling or weakness. Denies chest pain or shortness of breath. She does not follow with GYN. She does not currently follow with psych. Denies nausea vomiting. Denies concern for STI. Suicidal ideation: denies but notes she is so depressed she wishes she could just sleep all the time  Plan: denies  Previous attempts: cutting  Homicidal ideation: denies  Access to firearms: denies  Audiovisual hallucinations: denies  Psychiatric medications: Wellbutrin no longer taking  Tobacco use: denies  Alcohol use: denies  Illicit drug use: former, denies current         Nursing Notes were all reviewed and agreed with or any disagreements were addressed  in the HPI. Pt was seen during the  pandemic. Appropriate PPE worn by ME during patient encounters. Pt seen during a time with constrained hospital bed capacity and other potential inpatient and outpatient resources were constrained due to the viral pandemic. REVIEW OF SYSTEMS    (2-9 systems for level 4, 10 or more for level 5)     Review of Systems    Positives and Pertinent negatives as per HPI. Except as noted abovein the ROS, all other systems were reviewed and negative.        PAST MEDICAL HISTORY     Past Medical History:   Diagnosis Date    Anxiety     Chronic [facility] C without hepatic coma [facility] [address]     Chronic post-traumatic stress disorder (PTSD) 10/29/2018    Depression     Hepatitis C 2020    Mild intermittent asthma without complication     Stimulant use disorder 2018         SURGICAL HISTORY     Past Surgical History:   Procedure Laterality Date     SECTION  2005    DILATION AND CURETTAGE OF UTERUS  2006             CURRENTMEDICATIONS       Previous Medications    ALBUTEROL SULFATE HFA (PROVENTIL HFA) 108 (90 BASE) MCG/ACT INHALER    Inhale 2 puffs into the lungs every 4 hours as needed for Wheezing or Shortness of Breath (Space out to every 6 hours as symptoms improve) Space out to every 6 hours as symptoms improve.     BUPROPION (WELLBUTRIN SR) 100 MG EXTENDED RELEASE TABLET    Take 1 tablet by mouth Daily with lunch    BUPROPION (WELLBUTRIN SR) 200 MG EXTENDED RELEASE TABLET    Take 1 tablet by mouth daily    DULOXETINE (CYMBALTA) 60 MG EXTENDED RELEASE CAPSULE    Take 1 capsule by mouth nightly    IBUPROFEN (ADVIL;MOTRIN) 800 MG TABLET    Take 1 tablet by mouth every 8 hours as needed for Pain    INCASSIA 0.35 MG TABLET    TAKE 1 TABLET BY MOUTH EVERY DAY    NICOTINE (NICODERM CQ) 21 MG/24HR    Place 1 patch onto the skin daily for 15 days    TOPIRAMATE (TOPAMAX) 100 MG TABLET    Take 1 tablet by mouth nightly         ALLERGIES     Latex, Cephalosporins, Other, Sulfa antibiotics, and Cefaclor    FAMILYHISTORY       Family History   Problem Relation Age of Onset    Cancer Father         lung    Mental Illness Father     Diabetes Maternal Grandmother     Hypertension Maternal Grandmother     Heart Failure Maternal Grandmother     Depression Paternal Grandmother     Diabetes Paternal Grandmother     Heart Failure Paternal Grandmother     Mental Illness Paternal Grandmother     Hypertension Mother     Diabetes Paternal Grandfather     Heart Failure Paternal Grandfather     Mental Illness Paternal Grandfather           SOCIAL HISTORY       Social History     Socioeconomic History    Marital status: Single     Spouse name: None    Number of children: 2    Years of education: 15    Highest education level: None   Occupational History    Occupation: unemployed past 2+ year   Tobacco Use    Smoking status: Current Every Day Smoker     Packs/day: 0.50     Types: E-Cigarettes, Cigarettes     Last attempt to quit: 10/19/2020     Years since quittin.7    Smokeless tobacco: Never Used    Tobacco comment: using nicotine patches   Vaping Use    Vaping Use: Every day    Substances: Nicotine   Substance and Sexual Activity    Alcohol use: No     Alcohol/week: 1.0 standard drink     Types: 1 Standard drinks or equivalent per week     Comment: quit 7/17/20 pt went to AA    Drug use: Not Currently     Types: Methamphetamines (Crystal Meth), IV     Comment: last use 7/17/2020    Sexual activity: Yes     Partners: Male   Other Topics Concern    None   Social History Narrative    None     Social Determinants of Health     Financial Resource Strain:     Difficulty of Paying Living Expenses: Not on file   Food Insecurity:     Worried About Running Out of Food in the Last Year: Not on file    Kerri of Food in the Last Year: Not on file   Transportation Needs:     Lack of Transportation (Medical): Not on file    Lack of Transportation (Non-Medical):  Not on file   Physical Activity:     Days of Exercise per Week: Not on file    Minutes of Exercise per Session: Not on file   Stress:     Feeling of Stress : Not on file   Social Connections:     Frequency of Communication with Friends and Family: Not on file    Frequency of Social Gatherings with Friends and Family: Not on file    Attends [employer] Services: Not on file    Active Member of [address] [place] [address] [employer] or Organizations: Not on file    Attends Club or Organization Meetings: Not on file    Marital Status: Not on file   Intimate Partner Violence:     Fear of Current or Ex-Partner: Not on file    Emotionally Abused: Not on file    Physically Abused: Not on file    Sexually Abused: Not on file   Housing Stability:     Unable to Pay for Housing in the Last Year: Not on file    Number of [place] in the Last Year: Not on file    Unstable Housing in the Last Year: Not on file       SCREENINGS    Janine Coma Scale  Eye Opening: Spontaneous  Best Verbal Response: Oriented  Best Motor Response: Obeys commands  Janine Coma Scale Score: 15      Atraumatic back pain:   Red movements intact. Conjunctiva/sclera: Conjunctivae normal.      Pupils: Pupils are equal, round, and reactive to light. Cardiovascular:      Rate and Rhythm: Normal rate and regular rhythm. Pulses: Normal pulses. Heart sounds: Normal heart sounds. No murmur heard. No friction rub. No gallop. Pulmonary:      Effort: Pulmonary effort is normal. No respiratory distress. Breath sounds: Normal breath sounds. No wheezing or rales. Abdominal:      General: There is no distension. Palpations: Abdomen is soft. Tenderness: There is no abdominal tenderness. There is no right CVA tenderness, left CVA tenderness, guarding or rebound. Genitourinary:     General: Normal vulva. Vagina: Normal.      Cervix: Cervical bleeding present. Uterus: Normal.       Comments: Mild amount of bleeding emanating from the cervical os. No source of bleeding from vaginal wall. No evidence acute hemorrhage   Musculoskeletal:      Cervical back: Normal range of motion and neck supple. No rigidity. Thoracic back: No swelling, edema, deformity or signs of trauma. Back:       Comments: T1 Move fingers apart: Present  T2-T12 Trunk Sensation: Present     Lymphadenopathy:      Cervical: No cervical adenopathy. Skin:     General: Skin is warm and dry. Capillary Refill: Capillary refill takes less than 2 seconds. Neurological:      General: No focal deficit present. Mental Status: She is alert and oriented to person, place, and time. Psychiatric:         Behavior: Behavior is cooperative. Comments: Laughing in exam room noting \"I'm just having a really hard time right now\".  Very odd affect, laughter does not coincide with her making a joke or trying to be lighthearted as she is describing how she cannot get out of bed because she is so depressed \"I think they call it Major depressive disorder\"   Disordered thought process, jumping around from topic to topic, being vague in her description of her somatic complaints  Hyper focused on her vaginal bleeding and also on the type of Wellbutrin she was taking \"the purple pill worked the best but sometimes there was a blue one if it was a different  and that one didn't work\"  Notes that every time she has a menstrual period she is unable to cope, unable to hold a job down because her bleeding and mental health deteriorate. Has not seen GYN because she \"did not have a good time there\". DIAGNOSTIC RESULTS   LABS:    Labs Reviewed   URINALYSIS WITH REFLEX TO CULTURE - Abnormal; Notable for the following components:       Result Value    Clarity, UA SL CLOUDY (*)     Blood, Urine LARGE (*)     All other components within normal limits   MICROSCOPIC URINALYSIS - Abnormal; Notable for the following components:    RBC, UA 11-20 (*)     All other components within normal limits   CBC WITH AUTO DIFFERENTIAL - Abnormal; Notable for the following components:    RBC 3.69 (*)     Hemoglobin 11.5 (*)     Hematocrit 34.4 (*)     All other components within normal limits   COMPREHENSIVE METABOLIC PANEL W/ REFLEX TO MG FOR LOW K - Abnormal; Notable for the following components:    BUN 6 (*)     ALT 64 (*)     AST 50 (*)     All other components within normal limits   SALICYLATE LEVEL - Abnormal; Notable for the following components:    Salicylate, Serum <1.7 (*)     All other components within normal limits   ACETAMINOPHEN LEVEL - Abnormal; Notable for the following components:    Acetaminophen Level <5 (*)     All other components within normal limits   COVID-19 & INFLUENZA COMBO   PREGNANCY, URINE   URINE DRUG SCREEN   ETHANOL       All other labs were within normal range or not returned as of this dictation. EKG: All EKG's are interpreted by the Emergency Department Physician who either signs orCo-signs this chart in the absence of a cardiologist.  Please see their note for interpretation of EKG.       RADIOLOGY:   Non-plain film images such as CT, Ultrasound and MRI are read by the radiologist. Plain radiographic images are visualized andpreliminarily interpreted by the  ED Provider with the below findings:        Interpretation perthe Radiologist below, if available at the time of this note:    No orders to display     No results found.        PROCEDURES   Unless otherwise noted below, none     Procedures    CRITICAL CARE TIME   N/A    CONSULTS:  IP CONSULT TO PSYCHIATRY      EMERGENCY DEPARTMENT COURSE and DIFFERENTIALDIAGNOSIS/MDM:   Vitals:    Vitals:    07/11/22 2007 07/11/22 2058 07/11/22 2131 07/11/22 2232   BP: 108/73  105/76 94/60   Pulse: 76 59 65 63   Resp: 16 18 16 16   Temp:       TempSrc:       SpO2: 100%  100% 100%   Weight:       Height:           Patient was given thefollowing medications:  Medications   lidocaine 4 % external patch 1 patch (1 patch TransDERmal Patch Applied 7/11/22 2047)   acetaminophen (TYLENOL) tablet 1,000 mg (1,000 mg Oral Given 7/11/22 2046)       PDMP Monitoring:    Last PDMP Dwayne Bartlett as Reviewed [facility]):  Review User Review Instant Review Result            Urine Drug Screenings (1 yr)     Drug screen multi urine  Collected: 5/21/2022  6:32 AM (Final result)    Complete Results          Urine Drug Screen  Collected: 7/23/2021 10:40 AM (Final result)   Narrative: Performed at:  [address],  [address], [address] [place] [address]   Phone [phone]     Complete Results          Drug screen multi urine  Collected: 6/27/2021 12:30 PM (Final result)   Narrative: Performed at:  [facility]  [address],  [place]   Phone [phone]     Complete Results          Urine Drug Screen  Collected: 1/6/2021  4:05 PM (Final result)   Narrative: Performed at:  [facility]  [address],  [place]   Phone [phone]     Complete Results          Urine Drug Screen  Collected: 7/17/2020 10:07 PM (Final result)   Narrative: Performed at:  [facility]  [address],  [place], [place]   Phone [phone]     Complete Results              Medication Contract and Consent for Opioid Use Documents Filed      No documents found                MDM:   Patient seen and evaluated. Old records reviewed. Diagnostic testing reviewed and results discussed. I have independently evaluated this patient based upon my scope of practice. Supervising physician was in the department for consultation as needed. 74-year-old female presents voluntarily for evaluation of vaginal bleeding and she is requesting psych evaluation. Work-up in the emergency department included physical exam which was unremarkable for evidence of acute massive uterine bleeding. She has small amount of blood in her pad, notes that she has changed her pad about 4 times today which is not indicative of concerning vaginal bleeding. She has soft nontender abdomen without rigidity or guarding. Blood work was obtained, she appears to be chronically anemic around her baseline. Urinalysis is negative for sign of infection. She has no concern for STI, testing was not completed in the department. I believe patient is hyper focused around her menstrual bleeding and I do have concern that this is related to possibly subacute psychosis. She has been off of her mental health medications, this is concerning. Additionally patient notes that she has been having back pain however she notes that this is chronic.   Unable to provide clear history of the back pain however she has no concerning red flag signs on physical exam, only red flag sign and her history is remote use of IV drugs however she is distally neurovascularly intact with intact strength and range of motion today has no loss of bowel or bladder control, no lower extremity numbness or weakness she is ambulatory without difficulty afebrile without acute leukocytosis vitals are stable and I have very low concern overall for acute spinal epidural abscess. Patient was given Tylenol and lidocaine patch in the department with improvement in her back pain. I have performed a medical clearance examination on this patient. It is my opinion that no medical conditions were discovered that would preclude admission to a behavioral health unit or discharge home. I feel that the patient is medically stable for disposition by the behavioral health team at this time. Is this patient to be included in the SEP-1 Core Measure due to severe sepsis or septic shock? No   Exclusion criteria - the patient is NOT to be included for SEP-1 Core Measure due to: Infection is not suspected      FINAL IMPRESSION      1. Severe episode of recurrent major depressive disorder, with psychotic features ([facility] [address]    2. Dysmenorrhea    3. Menorrhagia with irregular cycle    4. Chronic thoracic back pain, unspecified back pain laterality          DISPOSITION/PLAN   DISPOSITION Admitted 07/11/2022 10:30:48 PM      PATIENT REFERREDTO:  No follow-up provider specified.     DISCHARGE MEDICATIONS:  New Prescriptions    No medications on file       DISCONTINUED MEDICATIONS:  Discontinued Medications    No medications on file              (Please note that portions ofthis note were completed with a voice recognition program.  Efforts were made to edit the dictations but occasionally words are mis-transcribed.)    GRANT Becerra (electronically signed)        Nagi Becerra  07/11/22 0559

## 2022-07-12 NOTE — CARE COORDINATION
[address] [facility]  Discharge Note    Pt discharged with followings belongings:   Dental Appliances: None  Vision - Corrective Lenses: Other (Comment),Contact Lenses (sunglasses, placed in locker)  Hearing Aid: None  Jewelry: None  Body Piercings Removed: No  Clothing: Pants,Shirt,Undergarments  Other Valuables: Other (Comment),Purse,Keys ([place] Identification card in safe, 1 key,vape pen & Patient's [employer])   Valuables sent home with patient or returned to patient. Patient education on aftercare instructions: yes. Information faxed to [address] [place] [address] by this writer  at 3:54 PM .Patient verbalize understanding of AVS:  yes. Status EXAM upon discharge:  Mental Status and Behavioral Exam  Normal: No  Level of Assistance: Independent/Self  Facial Expression: Brightened  Affect: Appropriate  Level of Consciousness: Alert  Frequency of Checks: 4 times per hour, close  Mood:Normal: Yes  Mood: Sad,Anxious  Motor Activity:Normal: Yes  Motor Activity:  (wnl)  Eye Contact: Good  Observed Behavior: Friendly,Cooperative  Sexual Misconduct History: Current - no  Preception: [place] to person,[place] to time,[place] to place,[place] to situation  Attention:Normal: Yes  Attention: Distractible,Unable to concentrate  Thought Processes: Tangential  Thought Content:Normal: Yes  Thought Content: Obsessions,Preoccupations  Depression Symptoms: No problems reported or observed.   Anxiety Symptoms: Generalized  Elizabeth Symptoms: Poor judgment  Hallucinations: None  Delusions: No  Memory:Normal: Yes  Insight and Judgment: No  Insight and Judgment: Poor judgment,Poor insight    Tobacco Screening:  Practical Counseling, on admission, david X, if applicable and completed (first 3 are required if patient doesn't refuse):            (X) Recognizing danger situations (included triggers and roadblocks)   (X) Coping skills (new ways to manage stress,relaxation techniques, changing routine, distraction) (X) Basic information about quitting (benefits of quitting, techniques in how to quit, available resources  ( ) Referral for counseling faxed to [facility]              ( ) Patient refused counseling  ( ) Patient refused referral  ( ) Patient refused prescription upon discharge  ( ) Patient has not smoked in the last 30 days    Metabolic Screening:    Lab Results   Component Value Date    LABA1C 4.8 06/27/2021       Lab Results   Component Value Date    CHOL 193 06/27/2021    CHOL 140 07/19/2020    CHOL 133 07/09/2020     Lab Results   Component Value Date    TRIG 127 06/27/2021    TRIG 68 07/19/2020    TRIG 128 07/09/2020     Lab Results   Component Value Date    HDL 58 06/27/2021    HDL 50 07/19/2020    HDL 39 (L) 07/09/2020     No components found for: [facility]  Lab Results   Component Value Date    LABVLDL 25 06/27/2021    LABVLDL 14 07/19/2020    LABVLDL 26 07/09/2020       [facility] [place]    Bridge Appointment completed: Reviewed Discharge Instructions with patient. Patient verbalizes understanding and agreement with the discharge plan using the teachback method.      Referral for Outpatient Tobacco Cessation Counseling, upon discharge (david X if applicable and completed):    ( )  Hospital staff assisted patient to call Quit Line or faxed referral                                   during hospitalization                  (X)  Recognizing danger situations (included triggers and roadblocks), if not completed on admission                    (X)  Coping skills (new ways to manage stress, exercise, relaxation techniques, changing routine, distraction), if not completed on admission                                                           (X)  Basic information about quitting (benefits of quitting, techniques in how to quit, available resources, if not completed on admission  ( ) Referral for counseling faxed to [facility]   ( ) Patient refused referral  ( ) Patient refused counseling  ( ) Patient refused smoking cessation medication upon discharge    Vaccinations (david X if applicable and completed):  ( ) Patient states already received influenza vaccine elsewhere  ( ) Patient received influenza vaccine during this hospitalization  ( ) Patient refused influenza vaccine at this time  (X) Not offered

## 2022-07-12 NOTE — CARE COORDINATION
07/12/22 1349   Psychiatric History   Psychiatric history treatment Psychiatric admissions   Are there any medication issues? Yes   Recent Psychological Experiences Other(comment)  (Moved to new area and family issues)   Current Living Situation   Home Living Adequate   Living information Lives alone   Problems with living situation  No   Lack of basic needs No   SSDI/SSI SSDI   Other government assistance medicaid   Problems with environment none   Current abuse issues no   Supervised setting Other (Comment)   Relationship problems No   Medical and Self-Care Issues   Relevant medical problems asthma   Relevant self-care issues none   Barriers to treatment No   Family Constellation   Spouse/partner-name/age n/a single   Children-names/ages 2 daughter age 12 a son age 10   Parents mother [facility] [phone]   Siblings brother   Support services   (n/a)   Childhood   Raised by Biological mother   Biological mother mother [facility] [phone]   Relevant family history none   History of abuse No   Legal History   Legal history No   Juvenile legal history No   Abuse Assessment   Physical Abuse Denies   Verbal Abuse Denies   Emotional abuse Denies   Financial Abuse Denies   Sexual abuse Denies   Possible abuse reported to None needed   Substance Use   Use of substances  No   Motivation for SA Treatment   Stage of engagement   (n/a)   Motivation for treatment   (n/a)   Current barriers to treatment   (n/a)   Education   Education HS graduate -GED   Special education   (n/a)   Work History   Recent job loss or change   (n/a)   [facility]   (n/a)   /VA involvement n/a   Cultural and Spiritual   Spiritual concerns No   Cultural concerns No   Collateral Contacts   Contacts   (n/a)   Clinician met with the patient to conduct the psychosocial, leisure and C-SSR assessments. Patient was cooperative and answered all of the assessment questions. She denied any ideation, plan or intent.     Miki Osgood

## 2022-07-12 NOTE — PROGRESS NOTES
Patient arrived on the unit at 23:30, via wheelchair accompanied by 2 staff from this hospital's emergency room. Patient was pleasant & cooperative & greeted by Camilla Azevedo.  Jose Peraza R.N.

## 2022-07-12 NOTE — GROUP NOTE
Group Therapy Note    Date: 7/12/2022    Group Start Time: 1100  Group End Time: 3283  Group Topic: Psychoeducation    [facility]    DICK Vaughan        Group Therapy Note  Clinician introduced the assessment is your lifestyle causing you stress for the members to complete. Once they completed the assessment the clinician discussed good stress and that the body is working like it is supposed to do. The clinician then gave them the handout of Why people get stressed. The group discussed the topic and recognized that they automatically think negative thoughts that cause their stress. The clinician educated the on automatic negative thinking. Attendees: 6         Patient's Goal:      Notes:  Patient was cooperative and fully engaged in the group discussion and activity. She shared that her thoughts are mostly negative and her mind races at night. Status After Intervention:  Improved    Participation Level:  Active Listener and Interactive    Participation Quality: Appropriate      Speech:  normal      Thought Process/Content: Linear      Affective Functioning: Congruent      Mood: depressed      Level of consciousness:  Attentive      Response to Learning: Able to retain information      Endings: None Reported    Modes of Intervention: Education and Activity      Discipline Responsible: /Counselor      Signature:  DICK Vaughan

## 2022-07-12 NOTE — PROGRESS NOTES
[facility]  Admission Note     Admission Type:   Admission Type: Voluntary    Reason for admission:  Reason for Admission: Psychosis      Addictive Behavior:   Addictive Behavior  In the Past 3 Months, Have You Felt or Has Someone Told You That You Have a Problem With  :  (Patient declined  to answer questions)    Medical Problems:   Past Medical History:   Diagnosis Date    Anxiety     Chronic hepatitis C without hepatic coma (HCC)     Chronic post-traumatic stress disorder (PTSD) 10/29/2018    Depression     Hepatitis C 07/09/2020    Mild intermittent asthma without complication     Stimulant use disorder 9/23/2018       Status EXAM:  Mental Status and Behavioral Exam  Normal: No  Level of Assistance: Independent/Self  Facial Expression: Exaggerated  Affect: Blunt  Level of Consciousness: Alert  Frequency of Checks: 4 times per hour, close  Mood:Normal: No  Mood: Anxious,Irritable  Motor Activity:Normal: No  Motor Activity:  (wnl)  Observed Behavior: Preoccupied,Guarded  Sexual Misconduct History:  (Patient declined  to answer questions)  Preception: [place] to person (Patient declined  to answer questions)  Attention:Normal: No  Attention: Unable to concentrate,Distractible  Thought Processes: Tangential  Thought Content:Normal: No  Thought Content: Obsessions,Preoccupations  Anxiety Symptoms: Generalized  Elizabeth Symptoms: Poor judgment  Hallucinations: None  Delusions: No  Memory:Normal:  (Patient declined  to answer questions)  Insight and Judgment: No  Insight and Judgment: Poor judgment,Poor insight,Unrealistic    Tobacco Screening:  Practical Counseling, on admission, david X, if applicable and completed (first 3 are required if patient doesn't refuse)          ( ) Recognizing danger situations (included triggers and rxoadblocks)                    ( ) Coping skills (new ways to manage stress,relaxation techniques, changing routine, distraction) ( ) Basic information about quitting (benefits of quitting, techniques in how to quit, available resources  ( ) Referral for counseling faxed to [facility]                                                                                                                   ( ) Patient refused counseling  (X ) Patient has not smoked in the last 30 days    Metabolic Screening:    Lab Results   Component Value Date    LABA1C 4.8 06/27/2021       Lab Results   Component Value Date    CHOL 193 06/27/2021    CHOL 140 07/19/2020    CHOL 133 07/09/2020     Lab Results   Component Value Date    TRIG 127 06/27/2021    TRIG 68 07/19/2020    TRIG 128 07/09/2020     Lab Results   Component Value Date    HDL 58 06/27/2021    HDL 50 07/19/2020    HDL 39 (L) 07/09/2020     No components found for: LDLCAL  Lab Results   Component Value Date    LABVLDL 25 06/27/2021    LABVLDL 14 07/19/2020    LABVLDL 26 07/09/2020         Body mass index is 23.4 kg/m². BP Readings from Last 2 Encounters:   07/12/22 (!) 90/50   05/21/22 129/88           Pt admitted with followings belongings:  Dental Appliances: None  Vision - Corrective Lenses: Other (Comment),Contact Lenses (sunglasses, placed in locker)  Hearing Aid: None  Jewelry: None  Body Piercings Removed: No  Clothing: Pants,Shirt,Undergarments  Other Valuables: Other (Comment),Purse,Keys ([place] Identification card in safe, 1 key,vape pen & Patient's [employer])   Patient's home medications were none  Patient oriented to surroundings and program expectations and copy of patient rights given. Received admission packet:  yes.  Consents reviewed, signed -No patient declined.  Patient verbalize understanding: N/A  Patient education on precautions: yes                        Yulisa Rojas RN
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
## 2022-07-13 LAB
ESTIMATED AVERAGE GLUCOSE: 88.2 MG/DL
HBA1C MFR BLD: 4.7 %
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
## 2022-10-17 ENCOUNTER — HOSPITAL ENCOUNTER (INPATIENT)
Age: 37
LOS: 8 days | Discharge: HOME | DRG: 751 | End: 2022-10-25
Attending: PSYCHIATRY & NEUROLOGY | Admitting: PSYCHIATRY & NEUROLOGY
Payer: COMMERCIAL

## 2022-10-17 DIAGNOSIS — F32.A DEPRESSION WITH SUICIDAL IDEATION: Primary | ICD-10-CM

## 2022-10-17 DIAGNOSIS — R45.851 DEPRESSION WITH SUICIDAL IDEATION: Primary | ICD-10-CM

## 2022-10-17 LAB
A/G RATIO: 1.9 (ref 1.1–2.2)
ACETAMINOPHEN LEVEL: <5 UG/ML (ref 10–30)
ALBUMIN SERPL-MCNC: 4.4 G/DL (ref 3.4–5)
ALP BLD-CCNC: 47 U/L (ref 40–129)
ALT SERPL-CCNC: 39 U/L (ref 10–40)
AMPHETAMINE SCREEN, URINE: NORMAL
ANION GAP SERPL CALCULATED.3IONS-SCNC: 9 MMOL/L (ref 3–16)
AST SERPL-CCNC: 27 U/L (ref 15–37)
BARBITURATE SCREEN URINE: NORMAL
BASOPHILS ABSOLUTE: 0 K/UL (ref 0–0.2)
BASOPHILS RELATIVE PERCENT: 1 %
BENZODIAZEPINE SCREEN, URINE: NORMAL
BILIRUB SERPL-MCNC: 0.3 MG/DL (ref 0–1)
BILIRUBIN URINE: NEGATIVE
BLOOD, URINE: ABNORMAL
BUN BLDV-MCNC: 8 MG/DL (ref 7–20)
CALCIUM SERPL-MCNC: 8.9 MG/DL (ref 8.3–10.6)
CANNABINOID SCREEN URINE: NORMAL
CHLORIDE BLD-SCNC: 105 MMOL/L (ref 99–110)
CLARITY: CLEAR
CO2: 25 MMOL/L (ref 21–32)
COCAINE METABOLITE SCREEN URINE: NORMAL
COLOR: ABNORMAL
CREAT SERPL-MCNC: 0.7 MG/DL (ref 0.6–1.1)
EOSINOPHILS ABSOLUTE: 0 K/UL (ref 0–0.6)
EOSINOPHILS RELATIVE PERCENT: 0.9 %
ETHANOL: NORMAL MG/DL (ref 0–0.08)
FENTANYL SCREEN, URINE: NORMAL
GFR SERPL CREATININE-BSD FRML MDRD: >60 ML/MIN/{1.73_M2}
GLUCOSE BLD-MCNC: 85 MG/DL (ref 70–99)
GLUCOSE URINE: NEGATIVE MG/DL
HCG(URINE) PREGNANCY TEST: NEGATIVE
HCT VFR BLD CALC: 34.4 % (ref 36–48)
HEMOGLOBIN: 11.5 G/DL (ref 12–16)
KETONES, URINE: NEGATIVE MG/DL
LEUKOCYTE ESTERASE, URINE: ABNORMAL
LYMPHOCYTES ABSOLUTE: 1.2 K/UL (ref 1–5.1)
LYMPHOCYTES RELATIVE PERCENT: 23.7 %
Lab: NORMAL
MAGNESIUM: 2 MG/DL (ref 1.8–2.4)
MCH RBC QN AUTO: 30.8 PG (ref 26–34)
MCHC RBC AUTO-ENTMCNC: 33.5 G/DL (ref 31–36)
MCV RBC AUTO: 91.9 FL (ref 80–100)
METHADONE SCREEN, URINE: NORMAL
MICROSCOPIC EXAMINATION: YES
MONOCYTES ABSOLUTE: 0.6 K/UL (ref 0–1.3)
MONOCYTES RELATIVE PERCENT: 11.1 %
NEUTROPHILS ABSOLUTE: 3.1 K/UL (ref 1.7–7.7)
NEUTROPHILS RELATIVE PERCENT: 63.3 %
NITRITE, URINE: NEGATIVE
OPIATE SCREEN URINE: NORMAL
OXYCODONE URINE: NORMAL
PDW BLD-RTO: 14.6 % (ref 12.4–15.4)
PH UA: 6.5
PH UA: 6.5 (ref 5–8)
PHENCYCLIDINE SCREEN URINE: NORMAL
PLATELET # BLD: 221 K/UL (ref 135–450)
PMV BLD AUTO: 8.8 FL (ref 5–10.5)
POTASSIUM REFLEX MAGNESIUM: 3.3 MMOL/L (ref 3.5–5.1)
PROTEIN UA: 100 MG/DL
RBC # BLD: 3.75 M/UL (ref 4–5.2)
RBC UA: >100 /HPF (ref 0–4)
SALICYLATE, SERUM: <0.3 MG/DL (ref 15–30)
SARS-COV-2, NAAT: NOT DETECTED
SODIUM BLD-SCNC: 139 MMOL/L (ref 136–145)
SPECIFIC GRAVITY UA: <=1.005 (ref 1–1.03)
TOTAL PROTEIN: 6.7 G/DL (ref 6.4–8.2)
URINE TYPE: ABNORMAL
UROBILINOGEN, URINE: 0.2 E.U./DL
WBC # BLD: 5 K/UL (ref 4–11)
WBC UA: ABNORMAL /HPF (ref 0–5)

## 2022-10-17 PROCEDURE — 81001 URINALYSIS AUTO W/SCOPE: CPT

## 2022-10-17 PROCEDURE — 80143 DRUG ASSAY ACETAMINOPHEN: CPT

## 2022-10-17 PROCEDURE — 82077 ASSAY SPEC XCP UR&BREATH IA: CPT

## 2022-10-17 PROCEDURE — 87635 SARS-COV-2 COVID-19 AMP PRB: CPT

## 2022-10-17 PROCEDURE — 85025 COMPLETE CBC W/AUTO DIFF WBC: CPT

## 2022-10-17 PROCEDURE — 80307 DRUG TEST PRSMV CHEM ANLYZR: CPT

## 2022-10-17 PROCEDURE — 84703 CHORIONIC GONADOTROPIN ASSAY: CPT

## 2022-10-17 PROCEDURE — 83735 ASSAY OF MAGNESIUM: CPT

## 2022-10-17 PROCEDURE — 99285 EMERGENCY DEPT VISIT HI MDM: CPT

## 2022-10-17 PROCEDURE — 80179 DRUG ASSAY SALICYLATE: CPT

## 2022-10-17 PROCEDURE — 36415 COLL VENOUS BLD VENIPUNCTURE: CPT

## 2022-10-17 PROCEDURE — 1240000000 HC EMOTIONAL WELLNESS R&B

## 2022-10-17 PROCEDURE — 80053 COMPREHEN METABOLIC PANEL: CPT

## 2022-10-17 RX ORDER — POTASSIUM CHLORIDE 20 MEQ/1
40 TABLET, EXTENDED RELEASE ORAL ONCE
Status: DISCONTINUED | OUTPATIENT
Start: 2022-10-17 | End: 2022-10-25 | Stop reason: HOSPADM

## 2022-10-18 PROBLEM — Z00.00 ENCOUNTER FOR ROUTINE ADULT MEDICAL EXAMINATION: Status: ACTIVE | Noted: 2022-10-18

## 2022-10-18 PROBLEM — Z86.19 HX OF HEPATITIS C: Status: ACTIVE | Noted: 2022-10-18

## 2022-10-18 PROBLEM — F11.20 OPIOID USE DISORDER, SEVERE, DEPENDENCE (HCC): Status: ACTIVE | Noted: 2021-06-28

## 2022-10-18 PROBLEM — J45.909 ASTHMA WITHOUT ACUTE EXACERBATION: Status: ACTIVE | Noted: 2022-10-18

## 2022-10-18 PROCEDURE — 1240000000 HC EMOTIONAL WELLNESS R&B

## 2022-10-18 PROCEDURE — 6370000000 HC RX 637 (ALT 250 FOR IP): Performed by: PSYCHIATRY & NEUROLOGY

## 2022-10-18 PROCEDURE — 99221 1ST HOSP IP/OBS SF/LOW 40: CPT

## 2022-10-18 PROCEDURE — 99223 1ST HOSP IP/OBS HIGH 75: CPT | Performed by: PSYCHIATRY & NEUROLOGY

## 2022-10-18 PROCEDURE — 6370000000 HC RX 637 (ALT 250 FOR IP)

## 2022-10-18 RX ORDER — POLYETHYLENE GLYCOL 3350 17 G
2 POWDER IN PACKET (EA) ORAL
Status: DISCONTINUED | OUTPATIENT
Start: 2022-10-18 | End: 2022-10-25 | Stop reason: HOSPADM

## 2022-10-18 RX ORDER — PALIPERIDONE 3 MG/1
3 TABLET, EXTENDED RELEASE ORAL DAILY
Status: DISCONTINUED | OUTPATIENT
Start: 2022-10-18 | End: 2022-10-19

## 2022-10-18 RX ORDER — TRAZODONE HYDROCHLORIDE 50 MG/1
50 TABLET ORAL NIGHTLY PRN
Status: DISCONTINUED | OUTPATIENT
Start: 2022-10-18 | End: 2022-10-21

## 2022-10-18 RX ORDER — HYDROXYZINE HYDROCHLORIDE 25 MG/1
50 TABLET, FILM COATED ORAL 3 TIMES DAILY PRN
Status: DISCONTINUED | OUTPATIENT
Start: 2022-10-18 | End: 2022-10-25 | Stop reason: HOSPADM

## 2022-10-18 RX ORDER — NICOTINE 21 MG/24HR
1 PATCH, TRANSDERMAL 24 HOURS TRANSDERMAL DAILY
Status: DISCONTINUED | OUTPATIENT
Start: 2022-10-18 | End: 2022-10-25 | Stop reason: HOSPADM

## 2022-10-18 RX ORDER — LORAZEPAM 2 MG/1
2 TABLET ORAL
Status: ACTIVE | OUTPATIENT
Start: 2022-10-18 | End: 2022-10-19

## 2022-10-18 RX ORDER — ALBUTEROL SULFATE 90 UG/1
2 AEROSOL, METERED RESPIRATORY (INHALATION) EVERY 4 HOURS PRN
Status: DISCONTINUED | OUTPATIENT
Start: 2022-10-18 | End: 2022-10-25 | Stop reason: HOSPADM

## 2022-10-18 RX ORDER — ACETAMINOPHEN 325 MG/1
650 TABLET ORAL EVERY 4 HOURS PRN
Status: DISCONTINUED | OUTPATIENT
Start: 2022-10-18 | End: 2022-10-18

## 2022-10-18 RX ORDER — IBUPROFEN 800 MG/1
800 TABLET ORAL EVERY 6 HOURS PRN
Status: DISCONTINUED | OUTPATIENT
Start: 2022-10-18 | End: 2022-10-25 | Stop reason: HOSPADM

## 2022-10-18 RX ADMIN — IBUPROFEN 800 MG: 800 TABLET, FILM COATED ORAL at 14:48

## 2022-10-18 RX ADMIN — PALIPERIDONE 3 MG: 3 TABLET, EXTENDED RELEASE ORAL at 14:48

## 2022-10-18 RX ADMIN — IBUPROFEN 800 MG: 800 TABLET, FILM COATED ORAL at 21:07

## 2022-10-18 ASSESSMENT — SLEEP AND FATIGUE QUESTIONNAIRES
DO YOU HAVE DIFFICULTY SLEEPING: YES
DO YOU USE A SLEEP AID: YES
AVERAGE NUMBER OF SLEEP HOURS: 5
SLEEP PATTERN: DIFFICULTY FALLING ASLEEP;DIFFICULTY ARISING;DISTURBED/INTERRUPTED SLEEP;INSOMNIA;RESTLESSNESS;NIGHTMARES/TERRORS
DO YOU USE A SLEEP AID: YES
DO YOU HAVE DIFFICULTY SLEEPING: YES
AVERAGE NUMBER OF SLEEP HOURS: 5

## 2022-10-18 ASSESSMENT — PAIN DESCRIPTION - DESCRIPTORS
DESCRIPTORS: ACHING
DESCRIPTORS: ACHING
DESCRIPTORS: CRAMPING

## 2022-10-18 ASSESSMENT — PAIN DESCRIPTION - FREQUENCY: FREQUENCY: INTERMITTENT

## 2022-10-18 ASSESSMENT — PAIN DESCRIPTION - ONSET: ONSET: ON-GOING

## 2022-10-18 ASSESSMENT — PAIN DESCRIPTION - LOCATION
LOCATION: ABDOMEN
LOCATION: HEAD
LOCATION: BACK;HIP

## 2022-10-18 ASSESSMENT — PATIENT HEALTH QUESTIONNAIRE - PHQ9
SUM OF ALL RESPONSES TO PHQ QUESTIONS 1-9: 21
SUM OF ALL RESPONSES TO PHQ QUESTIONS 1-9: 21

## 2022-10-18 ASSESSMENT — LIFESTYLE VARIABLES
HOW MANY STANDARD DRINKS CONTAINING ALCOHOL DO YOU HAVE ON A TYPICAL DAY: PATIENT DOES NOT DRINK
HOW OFTEN DO YOU HAVE A DRINK CONTAINING ALCOHOL: NEVER

## 2022-10-18 ASSESSMENT — PAIN SCALES - GENERAL
PAINLEVEL_OUTOF10: 8
PAINLEVEL_OUTOF10: 5

## 2022-10-18 ASSESSMENT — PAIN DESCRIPTION - ORIENTATION: ORIENTATION: RIGHT;LOWER

## 2022-10-18 ASSESSMENT — PAIN - FUNCTIONAL ASSESSMENT: PAIN_FUNCTIONAL_ASSESSMENT: ACTIVITIES ARE NOT PREVENTED

## 2022-10-18 ASSESSMENT — PAIN DESCRIPTION - PAIN TYPE: TYPE: ACUTE PAIN

## 2022-10-18 NOTE — BH NOTE
Paul Horne arrived on the unit at [address] via wheelchair with 1 [facility] AN AFFILIATE OF [facility] staff and 1 . No obvious signs of distress noted. VSS. Snack & beverage provided.

## 2022-10-18 NOTE — GROUP NOTE
Group Therapy Note    Date: 10/18/2022    Group Start Time: 1100  Group End Time: 2276  Group Topic: Psychoeducation    [facility]    DICK Paniagua        Group Therapy Note  Clinician introduced the Zones of Regulation and handed out the feelings wheel chart. The group members were able to identify the Zones and circled the specific feelings that they identified with. They discussed the work sheet \"5 ways to get your emotions under control. \" They took the worksheets with them after the group ended. Attendees: 8       Patient's Goal:      Notes:  Patient was cooperative and fully engaged in the group discussion and activity. Patient gave appropriate responses and asked clarifying questions. Status After Intervention:  Improved    Participation Level:  Active Listener and Interactive    Participation Quality: Appropriate, Attentive, Sharing, and Supportive      Speech:  normal      Thought Process/Content: Logical      Affective Functioning: Congruent      Mood: anxious      Level of consciousness:  Alert      Response to Learning: Able to retain information      Endings: None Reported    Modes of Intervention: Education and Activity      Discipline Responsible: /Counselor      Signature:  DICK Paniagua

## 2022-10-18 NOTE — ED NOTES
Patient transferred to admitted unit [facility]. Patient currently in safety gown. Patients belongings and paperwork transferred to admitting unit with patient. Patient transported to admitting unit via wheelchair, and patient was escorted by this RN and Barbara Lynch. Patient updated on plan of care and is accepting of plan to admit. Patient signed application for voluntary admission.        Leoncio Catalan RN  10/18/22 0570

## 2022-10-18 NOTE — ED NOTES
Level of Care Disposition: Admit      Patient was seen by ED provider and [facility] AN AFFILIATE OF [facility] staff. The case presented to psychiatric provider on-call Zeinab Morrow MD.  Based on the ED evaluation and information presented to the provider by Ap Mekamlesh it is the recommendation of the on call psychiatric provider that inpatient hospitalization is the least restrictive environment for the patient at this time. The patient will be admitted to the inpatient unit. Admitting provider did not order suicide precautions based on patient reyna for safety.           Ulises Morin RN  10/17/22 4195

## 2022-10-18 NOTE — H&P
Hospital Medicine History & Physical      PCP: No primary care provider on file. Date of Admission: 10/17/2022    Date of Service: Pt seen/examined on 10/18/2022      Chief Complaint:    Chief Complaint   Patient presents with    Suicidal     Feeling suicidal/really intense for several         History Of Present Illness: The patient is a 44 y.o. female with asthma who presented to [facility] for depression. Patient was seen and evaluated in the ED by the ED medical provider, patient was medically cleared for admission to [facility] at [facility]. This note serves as an admission medical H&P. Tobacco use: 1-2 packs per month  ETOH use: rarely  Illicit drug use: patient endorses opiate abuse    Patient denies any medical complaints     Past Medical History:        Diagnosis Date    Anxiety     Chronic hepatitis C without hepatic coma (HCC)     Chronic post-traumatic stress disorder (PTSD) 10/29/2018    Depression     Hepatitis C 2020    Mild intermittent asthma without complication     Stimulant use disorder 2018       Past Surgical History:        Procedure Laterality Date     SECTION  2005    DILATION AND CURETTAGE OF UTERUS  2006           Medications Prior to Admission:    Prior to Admission medications    Medication Sig Start Date End Date Taking?  Authorizing Provider   ibuprofen (ADVIL;MOTRIN) 800 MG tablet Take 1 tablet by mouth every 8 hours as needed for Pain 22   Amy Irizarry MD   buPROPion [facility] SR) 150 MG extended release tablet Take 1 tablet by mouth 2 times daily  Patient not taking: Reported on 10/17/2022 7/12/22   Amy Irizarry MD   norethindrone (INCASSIA) 0.35 MG tablet Take 1 tablet by mouth daily 22  Amy Irizarry MD   albuterol sulfate HFA (PROVENTIL HFA) 108 (90 Base) MCG/ACT inhaler Inhale 2 puffs into the lungs every 4 hours as needed for Wheezing or Shortness of Breath (Space out to every 6 hours as symptoms improve) Space out to every 6 hours as symptoms improve. 7/23/21   Ruthie Zhang, APRN - CNP   nicotine (NICODERM CQ) 21 MG/24HR Place 1 patch onto the skin daily for 15 days 7/1/21 7/16/21  Lexis Grant MD       Allergies:  Latex, Cephalosporins, Other, Sulfa antibiotics, and Cefaclor    Social History:  The patient currently lives alone    TOBACCO:   reports that she has been smoking e-cigarettes and cigarettes. She has been smoking an average of .5 packs per day. She has never used smokeless tobacco.  ETOH:   reports no history of alcohol use. Family History:   Positive as follows:        Problem Relation Age of Onset    Cancer Father         lung    Mental Illness Father     Diabetes Maternal Grandmother     Hypertension Maternal Grandmother     Heart Failure Maternal Grandmother     Depression Paternal Grandmother     Diabetes Paternal Grandmother     Heart Failure Paternal Grandmother     Mental Illness Paternal Grandmother     Hypertension Mother     Diabetes Paternal Grandfather     Heart Failure Paternal Grandfather     Mental Illness Paternal Grandfather        REVIEW OF SYSTEMS:       Constitutional: Negative for fever   HENT: Negative for sore throat   Eyes: Negative for redness   Respiratory: Negative  for dyspnea, cough   Cardiovascular: Negative for chest pain   Gastrointestinal: Negative for vomiting, diarrhea   Genitourinary: Negative for hematuria   Musculoskeletal: Negative for arthralgias   Skin: Negative for rash   Neurological: Negative for syncope    Hematological: Negative for easy bruising/bleeding   Psychiatric/Behavorial: Per psychiatry team evaluation     PHYSICAL EXAM:    /72   Pulse 65   Temp 98 °F (36.7 °C) (Infrared)   Resp 16   Ht 5' 6\" (1.676 m)   Wt 143 lb (64.9 kg)   LMP 10/14/2022   SpO2 98%   Breastfeeding No   BMI 23.08 kg/m²     Gen: No distress. Alert. Eyes: PERRL. No sclera icterus. No conjunctival injection. ENT: No discharge. Pharynx clear.    Neck: No JVD.  Trachea midline. Resp: No accessory muscle use. No crackles. No wheezes. No rhonchi. CV: Regular rate. Regular rhythm. No murmur. No rub. No edema. GI: Non-tender. Non-distended. Normal bowel sounds. Skin: Warm and dry. No nodule on exposed extremities. No rash on exposed extremities. M/S: No cyanosis. No joint deformity. No clubbing. Neuro: Awake. No focal neurologic deficit on exam.  Cranial nerves II through XII intact. Patient is able to ambulate without difficulty.   Psych: Per psychiatry team evaluation     CBC:   Recent Labs     10/17/22  2030   WBC 5.0   HGB 11.5*   HCT 34.4*   MCV 91.9        BMP:   Recent Labs     10/17/22  2030      K 3.3*      CO2 25   BUN 8   CREATININE 0.7     LIVER PROFILE:   Recent Labs     10/17/22  2030   AST 27   ALT 39   BILITOT 0.3   ALKPHOS 47       UA:  Recent Labs     10/17/22  1950   COLORU RED*   PHUR 6.5  6.5   WBCUA None seen   RBCUA >100*   CLARITYU Clear   SPECGRAV <=1.005   LEUKOCYTESUR see below*   UROBILINOGEN 0.2   BILIRUBINUR Negative   BLOODU LARGE*   GLUCOSEU Negative         Latest Reference Range & Units 10/17/22 19:50   Amphetamine Screen, Urine Negative <1000ng/mL  Neg   Benzodiazepine Screen, Urine Negative <200 ng/mL  Neg   Cocaine Metabolite Screen, Urine Negative <300 ng/mL  Neg   FENTANYL SCREEN, URINE Negative <5 ng/mL  Neg   METHADONE SCREEN, URINE, 57177 Negative <300 ng/mL  Neg   Opiate Scrn, Ur Negative <300 ng/mL  Neg   Oxycodone Urine Negative <100 ng/ml  Neg   PCP Screen, Urine Negative <25 ng/mL  Neg   Cannabinoid Scrn, Ur Negative <50 ng/mL  Neg       U/A:    Lab Results   Component Value Date/Time    COLORU RED 10/17/2022 07:50 PM    WBCUA None seen 10/17/2022 07:50 PM    RBCUA >100 10/17/2022 07:50 PM    MUCUS 2+ 01/06/2021 04:05 PM    BACTERIA 1+ 05/21/2022 06:32 AM    CLARITYU Clear 10/17/2022 07:50 PM    SPECGRAV <=1.005 10/17/2022 07:50 PM    LEUKOCYTESUR see below 10/17/2022 07:50 PM    BLOODU LARGE 10/17/2022 07:50 PM    GLUCOSEU Negative 10/17/2022 07:50 PM    AMORPHOUS Rare 07/11/2022 07:23 PM       CULTURES  Covid not detected    EKG:  I have reviewed the EKG with the following interpretation:   NA    RADIOLOGY  No orders to display        ASSESSMENT/PLAN:  #Depression  - per psychiatry team    #Hypokalemia  -mild  -replaced    #Asthma without exacerbation  -albuterol PRN    #Hx of hepatitis C  -LFTs stable    #Opiate abuse  -pt endorsed, UDS negative  -recommend cessation    #Tobacco dependence  -recommend cessation  -nicotine patch    Sonia Perkins PA-C  10/18/2022 7:44 AM

## 2022-10-18 NOTE — ED NOTES
Pt arrived ambulatory to [facility] with Leigh Bailey RN. Pt changed into safety gown and all belongings secured in locker. Pt was oriented to [facility] and process. Blankets provided. Will monitor pt for safety.      [address] [place] [address]  10/17/22 2009

## 2022-10-18 NOTE — ED NOTES
Patient signed application for voluntary admission. Patient asked when she would be transferred upstairs. Patient updated on plan of care. No requests or complaints at this time. Patient sitting in bed.      Cassandra Capone RN  10/17/22 3122

## 2022-10-18 NOTE — GROUP NOTE
Group Therapy Note    Date: 10/18/2022    Group Start Time: 1600  Group End Time: 0750  Group Topic: Healthy Living/Wellness    [address], RN        Group Therapy Note    Attendees: 9       Social Communication via Bingo    Participation Level:  Active Listener and Interactive    Participation Quality: Appropriate        Signature:  Chinmay Martinez RN

## 2022-10-18 NOTE — PLAN OF CARE
Problem: Self Harm/Suicidality  Goal: Will have no self-injury during hospital stay  Description: INTERVENTIONS:  1. Q 30 MINUTES: Routine safety checks  2. Q SHIFT & PRN: Assess risk to determine if routine checks are adequate to maintain patient safety  Outcome: Progressing     Problem: Anxiety  Goal: Will report anxiety at manageable levels  Description: INTERVENTIONS:  1. Administer medication as ordered  2. Teach and rehearse alternative coping skills  3. Provide emotional support with 1:1 interaction with staff  Outcome: Progressing     Problem: Sleep Disturbance  Goal: Will exhibit normal sleeping pattern  Description: INTERVENTIONS:  1. Administer medication as ordered  2. Decrease environmental stimuli, including noise, as appropriate  3.  Discourage social isolation and naps during the day  Outcome: Progressing     Problem: Pain  Goal: Verbalizes/displays adequate comfort level or baseline comfort level  Outcome: Progressing

## 2022-10-18 NOTE — GROUP NOTE
Group Therapy Note    Date: 10/18/2022    Group Start Time: 1000  Group End Time: 6544  Group Topic: Psychoeducation    [facility]        Group Therapy Note    Topic: Worry, Anxiety responses - Group facilitator led discussion of triggers for worry/anxiety, real world vs hypothetical worries, and research supported responses to anxiety through establishing balance between pleasure, accomplishment, and connection. Attendees: 13       Notes: This pt was present and attentive during session, minimally verbal in reflection due to environmental stimulation influencing engagement due to constant distractions from group process. Status After Intervention:  Improved    Participation Level:  Active Listener and Interactive    Participation Quality: Appropriate and Attentive      Speech:  normal      Thought Process/Content: Logical      Affective Functioning: Congruent      Mood: euthymic      Level of consciousness:  Alert and Attentive      Response to Learning: Progressing to goal      Endings: None Reported    Modes of Intervention: Support, Socialization, Exploration, Clarifying, and Problem-solving      Discipline Responsible: Psychoeducational Specialist      Signature:  Rubina Terry MM, MT-BC

## 2022-10-18 NOTE — BH NOTE
[facility]  Admission Note     Admission Type:   Admission Type: Voluntary    Reason for admission:  Reason for Admission: SI      Addictive Behavior:   Addictive Behavior  In the Past 3 Months, Have You Felt or Has Someone Told You That You Have a Problem With  : None    Medical Problems:   Past Medical History:   Diagnosis Date    Anxiety     Chronic hepatitis C without hepatic coma (HCC)     Chronic post-traumatic stress disorder (PTSD) 10/29/2018    Depression     Hepatitis C 07/09/2020    Mild intermittent asthma without complication     Stimulant use disorder 9/23/2018       Status EXAM:  Mental Status and Behavioral Exam  Normal: No  Level of Assistance: Independent/Self  Facial Expression: Flat, Worried  Affect: Congruent  Level of Consciousness: Alert  Frequency of Checks: 4 times per hour, close  Mood:Normal: No  Mood: Depressed, Anxious  Motor Activity:Normal: Yes  Eye Contact: Poor  Observed Behavior: Cooperative, Guarded, Preoccupied  Sexual Misconduct History: Current - no  Preception: [place] to person, [place] to time, [place] to place, [place] to situation  Attention:Normal: No  Attention: Unable to concentrate  Thought Processes: Loose association  Thought Content:Normal: No  Thought Content: Delusions, Preoccupations  Depression Symptoms: Appetite change, Change in energy level, Feelings of helplessness, Feelings of hopelessess, Impaired concentration, Isolative, Loss of interest, Sleep disturbance  Anxiety Symptoms: Generalized  Elizabeth Symptoms: Poor judgment  Hallucinations:  Auditory (comment) (Patient told [facility] staff that her house speaks to her)  Delusions: Yes  Delusions: Referential (Patient told [facility] staff that she lives her life vicariously through TV shows)  Memory:Normal: No  Memory: Poor recent, Poor remote (\"My memory is not in a good place at this time\")  Insight and Judgment: No  Insight and Judgment: Poor judgment, Poor insight    Tobacco Screening:  Practical Counseling, on admission, david X, if applicable and completed (first 3 are required if patient doesn't refuse):            ( ) Recognizing danger situations (included triggers and roadblocks)                    ( ) Coping skills (new ways to manage stress,relaxation techniques, changing routine, distraction)                                                           ( ) Basic information about quitting (benefits of quitting, techniques in how to quit, available resources  ( ) Referral for counseling faxed to [facility]                                                                                                                   (X) Patient refused counseling  ( ) Patient has not smoked in the last 30 days    Metabolic Screening:    Lab Results   Component Value Date    LABA1C 4.7 07/11/2022       Lab Results   Component Value Date    CHOL 193 06/27/2021    CHOL 140 07/19/2020    CHOL 133 07/09/2020     Lab Results   Component Value Date    TRIG 127 06/27/2021    TRIG 68 07/19/2020    TRIG 128 07/09/2020     Lab Results   Component Value Date    HDL 58 06/27/2021    HDL 50 07/19/2020    HDL 39 (L) 07/09/2020     No components found for: LDLCAL  Lab Results   Component Value Date    LABVLDL 25 06/27/2021    LABVLDL 14 07/19/2020    LABVLDL 26 07/09/2020         Body mass index is 23.08 kg/m². BP Readings from Last 2 Encounters:   10/18/22 109/72   07/12/22 100/60           Pt admitted with followings belongings:  Dental Appliances: None  Vision - Corrective Lenses: Other (Comment) (sun glasses)  Hearing Aid: None  Jewelry: None  Body Piercings Removed: N/A  Clothing: Shirt, Pants, Jacket/Coat, Undergarments, Footwear  Other Valuables:  Other (Comment) (vap)    Katie Marcelo RN

## 2022-10-18 NOTE — PROGRESS NOTES
Behavioral Services  Medicare Certification Upon Admission    I certify that this patient's inpatient psychiatric hospital admission is medically necessary for:    [x] (1) Treatment which could reasonably be expected to improve this patient's condition,       [x] (2) Or for diagnostic study;     AND     [x](2) The inpatient psychiatric services are provided while the individual is under the care of a physician and are included in the individualized plan of care.     Estimated length of stay/service 5-8 days    Plan for post-hospital care incomplete     Electronically signed by Sherie Baez MD on 10/18/2022 at 12:54 PM

## 2022-10-18 NOTE — PLAN OF CARE
Problem: Self Harm/Suicidality  Goal: Will have no self-injury during hospital stay  Description: INTERVENTIONS:  1. Q 30 MINUTES: Routine safety checks  2. Q SHIFT & PRN: Assess risk to determine if routine checks are adequate to maintain patient safety  10/18/2022 1613 by Juni Quan LPN  Outcome: Progressing     Problem: Anxiety  Goal: Will report anxiety at manageable levels  Description: INTERVENTIONS:  1. Administer medication as ordered  2. Teach and rehearse alternative coping skills  3. Provide emotional support with 1:1 interaction with staff  10/18/2022 1613 by Juni Quan LPN  Outcome: Progressing      10/18/22 1605   Mental Status and Behavioral Exam   Normal No   Level of Assistance Independent/Self   Facial Expression Flat   Affect Constricted   Level of Consciousness Alert   Frequency of Checks 4 times per hour, close   Mood:Normal No   Mood Anxious   Motor Activity:Normal Yes   Eye Contact Good   Observed Behavior Cooperative   Sexual Misconduct History Current - no   Preception [place] to person;[place] to place   Attention:Normal No   Thought Processes Flight of ideas   Thought Content:Normal No   Thought Content Preoccupations;Delusions   Depression Symptoms Impaired concentration   Anxiety Symptoms Generalized   Elizabeth Symptoms Flight of ideas;Poor judgment   Hallucinations Auditory (comment)   Delusions Yes   Delusions Paranoid   Memory:Normal No   Memory Poor recent; Poor remote   Insight and Judgment Yes   Insight and Judgment Poor judgment;Poor insight   Tracey Lorenzo has been calm, cooperative and medication compliant. Patient denies SI and HI. Will continue to monitor for safety.
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
## 2022-10-18 NOTE — ED PROVIDER NOTES
[address] ED  EMERGENCY DEPARTMENT ENCOUNTER        Pt Name: Aye Robert  MRN: [id number]  [place] 1985  Date of evaluation: 10/17/2022  Provider: GRANT Ruiz  PCP: No primary care provider on file. This patient was not seen and evaluated by the attending physician No att. providers found. I have evaluated this patient. My supervising physician was available for consultation. CHIEF COMPLAINT       Chief Complaint   Patient presents with    Suicidal     Feeling suicidal/really intense for several       HISTORY OF PRESENT ILLNESS   (Location/Symptom, Timing/Onset, Context/Setting, Quality, Duration, Modifying Factors, Severity)  Note limiting factors. Aye Robert is a 44 y.o. female with past medical history of PTSD, stimulant use disorder, anxiety, hepatitis C, depression, asthma Who presents via private vehicle from her home, privately for psych evaluation. Suicidal ideation: Endorses  Plan: To run into oncoming traffic or ingest pills  Previous attempts: Both of the above  Homicidal ideation: Denies  Access to firearms: Denies  Audiovisual hallucinations: Denies although she notes that she is very sensitive to vibrations  Psychiatric medications: Noncompliant with bupropion  Tobacco use: Denies  Alcohol use: Denies  Illicit drug use: Denies    Somatic complaints: Denies. Is currently on her menstrual period. Nursing Notes were all reviewed and agreed with or any disagreements were addressed  in the HPI. Pt was seen during the [place] 19 pandemic. Appropriate PPE worn by ME during patient encounters. Pt seen during a time with constrained hospital bed capacity and other potential inpatient and outpatient resources were constrained due to the viral pandemic. REVIEW OF SYSTEMS    (2-9 systems for level 4, 10 or more for level 5)     Review of Systems    Positives and Pertinent negatives as per HPI.   Except as noted abovein the ROS, all other systems were reviewed and negative. PAST MEDICAL HISTORY     Past Medical History:   Diagnosis Date    Anxiety     Chronic hepatitis C without hepatic coma ([facility] [address]     Chronic post-traumatic stress disorder (PTSD) 10/29/2018    Depression     Hepatitis C 2020    Mild intermittent asthma without complication     Stimulant use disorder 2018         SURGICAL HISTORY     Past Surgical History:   Procedure Laterality Date     SECTION  2005    DILATION AND CURETTAGE OF UTERUS  2006             CURRENTMEDICATIONS       Previous Medications    ALBUTEROL SULFATE HFA (PROVENTIL HFA) 108 (90 BASE) MCG/ACT INHALER    Inhale 2 puffs into the lungs every 4 hours as needed for Wheezing or Shortness of Breath (Space out to every 6 hours as symptoms improve) Space out to every 6 hours as symptoms improve.     BUPROPION (WELLBUTRIN SR) 150 MG EXTENDED RELEASE TABLET    Take 1 tablet by mouth 2 times daily    IBUPROFEN (ADVIL;MOTRIN) 800 MG TABLET    Take 1 tablet by mouth every 8 hours as needed for Pain    NICOTINE (NICODERM CQ) 21 MG/24HR    Place 1 patch onto the skin daily for 15 days    NORETHINDRONE (INCASSIA) 0.35 MG TABLET    Take 1 tablet by mouth daily         ALLERGIES     Latex, Cephalosporins, Other, Sulfa antibiotics, and Cefaclor    FAMILYHISTORY       Family History   Problem Relation Age of Onset    Cancer Father         lung    Mental Illness Father     Diabetes Maternal Grandmother     Hypertension Maternal Grandmother     Heart Failure Maternal Grandmother     Depression Paternal Grandmother     Diabetes Paternal Grandmother     Heart Failure Paternal Grandmother     Mental Illness Paternal Grandmother     Hypertension Mother     Diabetes Paternal Grandfather     Heart Failure Paternal Grandfather     Mental Illness Paternal Grandfather           SOCIAL HISTORY       Social History     Socioeconomic History    Marital status: Single    Number of children: 2    Years of education: 15 Occupational History    Occupation: unemployed past 2+ year   Tobacco Use    Smoking status: Every Day     Packs/day: 0.50     Types: E-Cigarettes, Cigarettes     Last attempt to quit: 10/19/2020     Years since quittin.9    Smokeless tobacco: Never    Tobacco comments:     using nicotine patches   Vaping Use    Vaping Use: Every day    Substances: Nicotine   Substance and Sexual Activity    Alcohol use: No     Alcohol/week: 1.0 standard drink     Types: 1 Standard drinks or equivalent per week     Comment: quit 20 pt went to AA    Drug use: Not Currently     Types: Methamphetamines (Crystal Meth), IV     Comment: last use 2020    Sexual activity: Yes     Partners: Male     Social Determinants of Health     Financial Resource Strain: Unknown    Difficulty of Paying Living Expenses: Patient refused   Food Insecurity: Unknown    Worried About Running Out of Food in the Last Year: Patient refused    Ran Out of Food in the Last Year: Patient refused   Transportation Needs: Unknown    Lack of Transportation (Medical): Patient refused    Lack of Transportation (Non-Medical): Patient refused   Physical Activity: Unknown    Days of Exercise per Week: Patient refused   Stress: Unknown    Feeling of Stress : Patient refused   Social Connections: Unknown    Frequency of Communication with Friends and Family: Patient refused    Frequency of Social Gatherings with Friends and Family: Patient refused    Attends Mosque Services: Patient refused   Housing Stability: Unknown    Unable to Pay for Housing in the Last Year: Patient refused    Unstable Housing in the Last Year: Patient refused       SCREENINGS    Janine Coma Scale  Eye Opening: Spontaneous  Best Verbal Response: Oriented  Best Motor Response: Obeys commands  Mead Coma Scale Score: 15        PHYSICAL EXAM    (up to 7 for level 4, 8 or more for level 5)     ED Triage Vitals [10/17/22 1949]   BP Temp Temp Source Heart Rate Resp SpO2 Height Weight 118/75 98.1 °F (36.7 °C) Oral 84 16 99 % 5' 6\" (1.676 m) 143 lb (64.9 kg)       Physical Exam  Vitals and nursing note reviewed. Constitutional:       General: She is not in acute distress. Appearance: She is well-developed. She is not ill-appearing, toxic-appearing or diaphoretic. HENT:      Head: Normocephalic and atraumatic. Right Ear: External ear normal.      Left Ear: External ear normal.      Nose: Nose normal.      Mouth/Throat:      Mouth: Mucous membranes are moist.      Pharynx: Oropharynx is clear. Eyes:      General:         Right eye: No discharge. Left eye: No discharge. Extraocular Movements: Extraocular movements intact. Conjunctiva/sclera: Conjunctivae normal.      Pupils: Pupils are equal, round, and reactive to light. Cardiovascular:      Rate and Rhythm: Normal rate and regular rhythm. Pulses: Normal pulses. Heart sounds: Normal heart sounds. No murmur heard. No friction rub. No gallop. Pulmonary:      Effort: Pulmonary effort is normal. No respiratory distress. Breath sounds: Normal breath sounds. No wheezing or rales. Abdominal:      General: There is no distension. Palpations: Abdomen is soft. Tenderness: There is no abdominal tenderness. Musculoskeletal:      Cervical back: Normal range of motion and neck supple. No rigidity. Lymphadenopathy:      Cervical: No cervical adenopathy. Skin:     General: Skin is warm and dry. Capillary Refill: Capillary refill takes less than 2 seconds. Neurological:      General: No focal deficit present. Mental Status: She is alert and oriented to person, place, and time. Psychiatric:         Attention and Perception: Attention and perception normal.         Mood and Affect: Mood is anxious. Affect is labile. Speech: Speech is rapid and pressured. Behavior: Behavior normal.         Thought Content: Thought content includes suicidal ideation.  Thought content includes suicidal plan. DIAGNOSTIC RESULTS   LABS:    Labs Reviewed - No data to display    All other labs were within normal range or not returned as of this dictation. EKG: All EKG's are interpreted by the Emergency Department Physician who either signs orCo-signs this chart in the absence of a cardiologist.  Please see their note for interpretation of EKG. RADIOLOGY:   Non-plain film images such as CT, Ultrasound and MRI are read by the radiologist. Plain radiographic images are visualized andpreliminarily interpreted by the  ED Provider with the below findings:        Interpretation [facility] Radiologist below, if available at the time of this note:    No orders to display     No results found.        PROCEDURES   Unless otherwise noted below, none     Procedures    CRITICAL CARE TIME   N/A    CONSULTS:  None      EMERGENCY DEPARTMENT COURSE and DIFFERENTIALDIAGNOSIS/MDM:   Vitals:    Vitals:    10/17/22 1949   BP: 118/75   Pulse: 84   Resp: 16   Temp: 98.1 °F (36.7 °C)   TempSrc: Oral   SpO2: 99%   Weight: 143 lb (64.9 kg)   Height: 5' 6\" (1.676 m)       Patient was given thefollowing medications:  Medications - No data to display    PDMP Monitoring:    Last PDMP Phillip Ward as Reviewed [facility]):  Review User Review Instant Review Result            Urine Drug Screenings (1 yr)       Drug screen multi urine  Collected: 7/11/2022  7:53 PM (Final result)              Drug screen multi urine  Collected: 5/21/2022  6:32 AM (Final result)              Urine Drug Screen  Collected: 7/23/2021 10:40 AM (Final result)   Narrative: Performed at:  [facility]  [address], [facility]   Phone [phone]             Drug screen multi urine  Collected: 6/27/2021 12:30 PM (Final result)   Narrative: Performed at:  [facility]  [address],  [place], [facility]   Phone [phone]             Urine Drug Screen  Collected: 1/6/2021  4:05 PM (Final result)   Narrative: Performed at:  [facility] - [facility]  [address],  [place] [facility]   Phone [phone]                 Medication Contract and Consent for Opioid Use Documents Filed        No documents found                    MDM:   Patient seen and evaluated. Old records reviewed. Diagnostic testing reviewed and results discussed. I have independently evaluated this patient based upon my scope of practice. Supervising physician was in the department for consultation as needed. 62-year-old female presents for psych evaluation. Patient presents voluntarily. Patient seen and evaluated and examined by myself. She had basic work-up in the department, she has mild hypokalemia, potassium was repleted in the department. No sign of drug of abuse, no sign of acute sepsis or acute toxicity.n I have performed a medical clearance examination on this patient. It is my opinion that no medical conditions were discovered that would preclude admission to a behavioral health unit or discharge home. I feel that the patient is medically stable for disposition by the behavioral health team at this time. Is this patient to be included in the SEP-1 Core Measure due to severe sepsis or septic shock? No   Exclusion criteria - the patient is NOT to be included for SEP-1 Core Measure due to: Infection is not suspected        FINAL IMPRESSION      1. Depression with suicidal ideation          DISPOSITION/PLAN   DISPOSITION        PATIENT REFERREDTO:  No follow-up provider specified.     DISCHARGE MEDICATIONS:  New Prescriptions    No medications on file       DISCONTINUED MEDICATIONS:  Discontinued Medications    No medications on file              (Please note that portions ofthis note were completed with a voice recognition program.  Efforts were made to edit the dictations but occasionally words are mis-transcribed.)    GRANT Lora (electronically signed) G[place]alo [place]  10/17/22 3757
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
## 2022-10-18 NOTE — PLAN OF CARE
[facility]  Treatment Team Note  Review Date & Time: 10/18/22  0900    Patient was not in treatment team      Status EXAM:   Mental Status and Behavioral Exam  Normal: No  Level of Assistance: Independent/Self  Facial Expression: Flat, Worried, Sad  Affect: Constricted  Level of Consciousness: Alert  Frequency of Checks: 4 times per hour, close  Mood:Normal: No  Mood: Depressed, Anxious, Sad  Motor Activity:Normal: Yes  Eye Contact: Good  Observed Behavior: Cooperative, Friendly  Sexual Misconduct History: Past - no  Preception: [place] to person, [place] to time, [place] to place, [place] to situation  Attention:Normal: No  Attention: Unable to concentrate, Distractible  Thought Processes: Loose association, Flight of ideas  Thought Content:Normal: No  Thought Content: Delusions, Preoccupations  Depression Symptoms: Appetite change, Feelings of helplessness, Feelings of hopelessess, Feelings of worthlessness, Impaired concentration, Change in energy level, Sleep disturbance, Isolative, Loss of interest  Anxiety Symptoms: Generalized  Elizabeth Symptoms: Poor judgment, Flight of ideas  Hallucinations: Auditory (comment) (sounds and vibrations)  Delusions: Yes  Delusions: Referential  Memory:Normal: No  Memory: Poor recent, Poor remote, Other (comment) (\"keep a notebook to help remember things but lately she has not been journaling and is having difficulty with long and short term memory. \")  Insight and Judgment: Yes  Insight and Judgment: Poor judgment, Poor insight      Suicide Risk CSSR-S:  1) Within the past month, have you wished you were dead or wished you could go to sleep and not wake up? : Yes  2) Have you actually had any thoughts of killing yourself? : Yes  3) Have you been thinking about how you might kill yourself? : Yes  5) Have you started to work out or worked out the details of how to kill yourself?  Do you intend to carry out this plan? : Yes  6) Have you ever done anything, started to do anything, or prepared to do anything to end your life?: Yes      PLAN/TREATMENT RECOMMENDATIONS UPDATE:   Patient will take medication as prescribed, eat 75% of meals, attend groups, participate in milieu activities, participate in treatment team and care planning for discharge and follow up.            Corby Hannon RN

## 2022-10-19 LAB
ANION GAP SERPL CALCULATED.3IONS-SCNC: 8 MMOL/L (ref 3–16)
BUN BLDV-MCNC: 13 MG/DL (ref 7–20)
CALCIUM SERPL-MCNC: 9.2 MG/DL (ref 8.3–10.6)
CHLORIDE BLD-SCNC: 110 MMOL/L (ref 99–110)
CO2: 26 MMOL/L (ref 21–32)
CREAT SERPL-MCNC: 0.8 MG/DL (ref 0.6–1.1)
FOLATE: 6.41 NG/ML (ref 4.78–24.2)
GFR SERPL CREATININE-BSD FRML MDRD: >60 ML/MIN/{1.73_M2}
GLUCOSE BLD-MCNC: 100 MG/DL (ref 70–99)
POTASSIUM SERPL-SCNC: 4.1 MMOL/L (ref 3.5–5.1)
SODIUM BLD-SCNC: 144 MMOL/L (ref 136–145)
VITAMIN B-12: 365 PG/ML (ref 211–911)

## 2022-10-19 PROCEDURE — 86780 TREPONEMA PALLIDUM: CPT

## 2022-10-19 PROCEDURE — 99233 SBSQ HOSP IP/OBS HIGH 50: CPT | Performed by: PSYCHIATRY & NEUROLOGY

## 2022-10-19 PROCEDURE — 6370000000 HC RX 637 (ALT 250 FOR IP)

## 2022-10-19 PROCEDURE — 6370000000 HC RX 637 (ALT 250 FOR IP): Performed by: PSYCHIATRY & NEUROLOGY

## 2022-10-19 PROCEDURE — 82607 VITAMIN B-12: CPT

## 2022-10-19 PROCEDURE — 80048 BASIC METABOLIC PNL TOTAL CA: CPT

## 2022-10-19 PROCEDURE — 1240000000 HC EMOTIONAL WELLNESS R&B

## 2022-10-19 PROCEDURE — 82746 ASSAY OF FOLIC ACID SERUM: CPT

## 2022-10-19 PROCEDURE — 0065U SYFLS TST NONTREPONEMAL ANTB: CPT

## 2022-10-19 PROCEDURE — 36415 COLL VENOUS BLD VENIPUNCTURE: CPT

## 2022-10-19 RX ORDER — LORAZEPAM 2 MG/1
2 TABLET ORAL
Status: ACTIVE | OUTPATIENT
Start: 2022-10-19 | End: 2022-10-20

## 2022-10-19 RX ORDER — PALIPERIDONE 3 MG/1
3 TABLET, EXTENDED RELEASE ORAL NIGHTLY
Status: DISCONTINUED | OUTPATIENT
Start: 2022-10-19 | End: 2022-10-23

## 2022-10-19 RX ADMIN — IBUPROFEN 800 MG: 800 TABLET, FILM COATED ORAL at 08:39

## 2022-10-19 RX ADMIN — IBUPROFEN 800 MG: 800 TABLET, FILM COATED ORAL at 21:15

## 2022-10-19 RX ADMIN — PALIPERIDONE 3 MG: 3 TABLET, EXTENDED RELEASE ORAL at 08:34

## 2022-10-19 RX ADMIN — TRAZODONE HYDROCHLORIDE 50 MG: 50 TABLET ORAL at 21:15

## 2022-10-19 RX ADMIN — PALIPERIDONE 3 MG: 3 TABLET, EXTENDED RELEASE ORAL at 21:15

## 2022-10-19 ASSESSMENT — PAIN DESCRIPTION - ORIENTATION: ORIENTATION: LOWER

## 2022-10-19 ASSESSMENT — PAIN SCALES - GENERAL
PAINLEVEL_OUTOF10: 6
PAINLEVEL_OUTOF10: 6

## 2022-10-19 ASSESSMENT — PAIN DESCRIPTION - LOCATION
LOCATION: HIP;BACK
LOCATION: BACK

## 2022-10-19 ASSESSMENT — PAIN DESCRIPTION - DESCRIPTORS
DESCRIPTORS: ACHING
DESCRIPTORS: ACHING

## 2022-10-19 ASSESSMENT — PAIN DESCRIPTION - FREQUENCY: FREQUENCY: INTERMITTENT

## 2022-10-19 ASSESSMENT — PAIN DESCRIPTION - PAIN TYPE: TYPE: CHRONIC PAIN

## 2022-10-19 NOTE — BH NOTE
Patient requesting a pain pill for right hip and lower back pain. Patient medicated with Ibuprofen 800 mg po.

## 2022-10-19 NOTE — PLAN OF CARE
Pt A&O, visible on unit eating in dinning room and attends groups. Pt social with peers no distress noted at this time. Denies SI,HI,AVH, contracts for safety, pleasant on approach and cooperative with care.

## 2022-10-19 NOTE — GROUP NOTE
Group Therapy Note    Date: 10/19/2022    Group Start Time: 1100  Group End Time: 6922  Group Topic: Cognitive Skills    [address] [facility]        Group Therapy Note    Attendees: 8    Group members engaged in the exercise \"Creating Connections. \" Group members were given a ball of yarn and were given various prompts. With the yarn, group members created a spider web that was known as the connection. The goal of the group was to show people the similarities and differences of one another, but they can still be connected even with differences. Notes:  Mirna Vergara attended group for the full duration. Mirna Vergara remained engaged and interacted appropriately with other members of the group. Status After Intervention:  Improved    Participation Level:  Active Listener and Interactive    Participation Quality: Appropriate, Attentive, and Sharing      Speech:  normal      Thought Process/Content: Logical      Affective Functioning: Congruent      Mood: Brightened, Engaging       Level of consciousness:  Alert and Oriented x4      Response to Learning: Able to verbalize current knowledge/experience      Endings: None Reported    Modes of Intervention: Socialization, Exploration, and Activity      Discipline Responsible: [employer]      Signature:  DICK Pérez

## 2022-10-19 NOTE — PLAN OF CARE
Patient alert and oriented x 3. Patient rates Depression 6/10 and Anxiety 6/10. Patient visible on the milieu social with peers. Patient denies SI/HI/A/V/H. Patient doesn't have HS medications scheduled. Patient denies self harm. Contracts for safety with writer. No angry outbursts noted.

## 2022-10-19 NOTE — PROGRESS NOTES
Group Therapy Note    Date: 10/18/2022  Start Time: 20:00  End Time:  21:00  Number of Participants: 12    Type of Group: Recreational  wrap up    Wellness Binder Information  Module Name:  /  Session Number:  /    Patient's Goal:  go to group    Notes:  goal completed per pt    Status After Intervention:  Unchanged    Participation Level:  Active Listener and Interactive    Participation Quality: Appropriate, Attentive, and Sharing      Speech:  pressured      Thought Process/Content: Logical      Affective Functioning: Blunted      Mood: anxious      Level of consciousness:  Alert and Attentive      Response to Learning: Able to change behavior      Endings: None Reported    Modes of Intervention: Socialization and Problem-solving      Discipline Responsible: [employer] [address], [employer]      Signature:  Corrine Yadav

## 2022-10-19 NOTE — PROGRESS NOTES
Department of Psychiatry  Progress Note    Patient's chart was reviewed. Discussed with treatment team. Met with patient. SUBJECTIVE:      Remains severely disorganized and impaired. Seems more cognitively intact today. Reports some sedation on Invega. Isn't sure she wants to continue an anti-psychotic medication. Agrees to switch to QHS dosing. ROS:   Patient has new complaints: no  Sleeping adequately:  Yes   Appetite adequate: Yes  Engaged in programming: Yes    OBJECTIVE:  VITALS:  BP 99/64   Pulse 62   Temp 98.6 °F (37 °C) (Oral)   Resp 16   Ht 5' 6\" (1.676 m)   Wt 143 lb (64.9 kg)   LMP 10/14/2022   SpO2 100%   Breastfeeding No   BMI 23.08 kg/m²     Mental Status Examination:    Appearance: fair grooming and hygiene  Behavior/Attitude toward examiner:  pleasant   Speech: stilted  Mood:  \"ok\"  Affect:  blunted     Thought processes:  disorganized  Thought Content: less SI, no HI, delusions less prominent  Perceptions: no AVH; not RTIS  Attention: limited  Abstraction: limited  Cognition:  Alert and oriented to person, place, time, and situation, recall intact  Insight: Limited   Judgment: Limited    Medication:  Scheduled:   nicotine  1 patch TransDERmal Daily    paliperidone  3 mg Oral Daily    potassium chloride  40 mEq Oral Once        PRN:  hydrOXYzine HCl, traZODone, nicotine polacrilex, albuterol sulfate HFA, ibuprofen     FORMULATION:  This is a domiciled, never , unemployed 42-year-old  with a history of mood and psychotic symptoms, and opioid and  amphetamine use disorders, whose mother brought her to our emergency  department with worsening symptoms of depression and psychosis. She  presents disorganized, delusional, and impaired. She also presents with  some symptoms of depression and cognitive impairment. Given the severity of her symptoms and history, she was admitted for further evaluation and treatment.     Principal Problem:    Psychosis ([address]  Active

## 2022-10-20 PROCEDURE — 1240000000 HC EMOTIONAL WELLNESS R&B

## 2022-10-20 PROCEDURE — 6370000000 HC RX 637 (ALT 250 FOR IP): Performed by: PSYCHIATRY & NEUROLOGY

## 2022-10-20 PROCEDURE — 94640 AIRWAY INHALATION TREATMENT: CPT

## 2022-10-20 PROCEDURE — 6370000000 HC RX 637 (ALT 250 FOR IP)

## 2022-10-20 PROCEDURE — 99233 SBSQ HOSP IP/OBS HIGH 50: CPT | Performed by: PSYCHIATRY & NEUROLOGY

## 2022-10-20 RX ORDER — BUPROPION HYDROCHLORIDE 150 MG/1
150 TABLET, EXTENDED RELEASE ORAL DAILY
Status: DISCONTINUED | OUTPATIENT
Start: 2022-10-20 | End: 2022-10-25 | Stop reason: HOSPADM

## 2022-10-20 RX ADMIN — IBUPROFEN 800 MG: 800 TABLET, FILM COATED ORAL at 11:31

## 2022-10-20 RX ADMIN — TRAZODONE HYDROCHLORIDE 50 MG: 50 TABLET ORAL at 21:33

## 2022-10-20 RX ADMIN — IBUPROFEN 800 MG: 800 TABLET, FILM COATED ORAL at 21:32

## 2022-10-20 RX ADMIN — Medication 2 PUFF: at 18:16

## 2022-10-20 RX ADMIN — PALIPERIDONE 3 MG: 3 TABLET, EXTENDED RELEASE ORAL at 20:45

## 2022-10-20 RX ADMIN — BUPROPION HYDROCHLORIDE 150 MG: 150 TABLET, EXTENDED RELEASE ORAL at 11:34

## 2022-10-20 ASSESSMENT — PAIN DESCRIPTION - LOCATION: LOCATION: HIP

## 2022-10-20 ASSESSMENT — PAIN SCALES - GENERAL: PAINLEVEL_OUTOF10: 5

## 2022-10-20 ASSESSMENT — PAIN DESCRIPTION - DESCRIPTORS: DESCRIPTORS: ACHING

## 2022-10-20 ASSESSMENT — PAIN DESCRIPTION - ORIENTATION: ORIENTATION: RIGHT

## 2022-10-20 NOTE — PLAN OF CARE
Problem: Self Harm/Suicidality  Goal: Will have no self-injury during hospital stay  Description: INTERVENTIONS:  1. Q 30 MINUTES: Routine safety checks  2. Q SHIFT & PRN: Assess risk to determine if routine checks are adequate to maintain patient safety  10/20/2022 0155 by Jason Saldana RN  Outcome: Progressing     Problem: Anxiety  Goal: Will report anxiety at manageable levels  Description: INTERVENTIONS:  1. Administer medication as ordered  2. Teach and rehearse alternative coping skills  3. Provide emotional support with 1:1 interaction with staff  10/20/2022 0155 by Jason Saldana RN  Outcome: Progressing     Problem: Sleep Disturbance  Goal: Will exhibit normal sleeping pattern  Description: INTERVENTIONS:  1. Administer medication as ordered  2. Decrease environmental stimuli, including noise, as appropriate  3. Discourage social isolation and naps during the day  10/20/2022 0155 by Jason Saldana RN  Outcome: Progressing     Problem: Pain  Goal: Verbalizes/displays adequate comfort level or baseline comfort level  10/20/2022 0155 by Jason Saldana RN  Outcome: Not Progressing  Pt denying SI and contracts for safety. Affect blunted. Converses well but doesn't offer a great deal.  Denies psychotic sx, but does seem slightly paranoid. Pt had c/o of right hip and back pain that she rated a 6/10. Given  mg at 2115. {Pt later seemed to be sleeping well at a FLACC 0.  Pt seems depressed and slightly anxious.

## 2022-10-20 NOTE — BH NOTE
WRAP UP GROUP       10-19-22  2015 T0 2045      Attendees 8    Affect: overall flat    Very cooperative and did participate well. Mood: Sad    No SI or HI disclosed in group    Goal:Was to be more active. Pt did meet her goal. Pt states she has been walking more in the last day and a half. Thinks she is feeling some better.

## 2022-10-20 NOTE — PROGRESS NOTES
Patient requesting her inhaler. Patient not noted to be in any distress at this time. Respiratory notified.

## 2022-10-20 NOTE — PROGRESS NOTES
Department of Psychiatry  Progress Note    Patient's chart was reviewed. Discussed with treatment team. Met with patient. SUBJECTIVE:      More organized this morning. Cognition continues to improve too. \"I want a whole new life. New family and friends. \"    Requests to be put back on Wellbutrin SR in the AM. Dicussed at length. Continues to tolerate and take Invega. ROS:   Patient has new complaints: no  Sleeping adequately:  Yes   Appetite adequate: Yes  Engaged in programming: Yes    OBJECTIVE:  VITALS:  BP (!) 92/53   Pulse 79   Temp 98.6 °F (37 °C) (Temporal)   Resp 16   Ht 5' 6\" (1.676 m)   Wt 143 lb (64.9 kg)   LMP 10/14/2022   SpO2 97%   Breastfeeding No   BMI 23.08 kg/m²     Mental Status Examination:    Appearance: fair grooming and hygiene  Behavior/Attitude toward examiner:  pleasant   Speech: stilted  Mood:  \"ok\"  Affect:  blunted     Thought processes:  disorganized  Thought Content: less SI, no HI, delusions less prominent  Perceptions: no AVH; not RTIS  Attention: limited  Abstraction: limited  Cognition:  Alert and oriented to person, place, time, and situation, recall intact  Insight: Limited   Judgment: Limited    Medication:  Scheduled:   paliperidone  3 mg Oral Nightly    nicotine  1 patch TransDERmal Daily    potassium chloride  40 mEq Oral Once        PRN:  hydrOXYzine HCl, traZODone, nicotine polacrilex, albuterol sulfate HFA, ibuprofen     FORMULATION:  This is a domiciled, never , unemployed 59-year-old  with a history of mood and psychotic symptoms, and opioid and  amphetamine use disorders, whose mother brought her to our emergency  department with worsening symptoms of depression and psychosis. She  presents disorganized, delusional, and impaired. She also presents with  some symptoms of depression and cognitive impairment. Given the severity of her symptoms and history, she was admitted for further evaluation and treatment.     Principal Problem: Psychosis ([facility]  Active Problems:    Dysmenorrhea    Depression, unspecified    Asthma without acute exacerbation    Hx of hepatitis C    Amphetamine use disorder, severe, dependence (HCC)    Opioid use disorder, severe, dependence ([facility]  Resolved Problems:    Hypokalemia     PLAN:  1. Admitted to inpatient psychiatry for stabilization and treatment. 2.  On admission, discussed treatment options at length. She was reluctant to try  another antipsychotic or mood stabilizer but ultimately agreed to [facility]  [facility]. Started at 3 mg daily. Ordered q. 15-minute checks for safety, programming, and p.r.n. medication for anxiety, agitation and insomnia. MoCA?    10/19/2022 - changed Invega to QHS dosing starting tonight. B12/folate WNL. RPR results pending. 10/20/2022 - resume Wellbutrin SR 150mg daily. 3.  Hospitalist consult for admission. #Hypokalemia - resolved   -mild  -replaced     #Asthma without exacerbation  -albuterol PRN     #Hx of hepatitis C  -LFTs stable    4. Voluntary. Total time with patient was 35 minutes and more than 50 % of that time was spent counseling the patient on their symptoms, treatment, and expected goals.      Liang La MD

## 2022-10-20 NOTE — PLAN OF CARE
Problem: Anxiety  Goal: Will report anxiety at manageable levels  Description: INTERVENTIONS:  1. Administer medication as ordered  2. Teach and rehearse alternative coping skills  3. Provide emotional support with 1:1 interaction with staff  10/20/2022 1311 by Sabas Nicholas LPN  Outcome: Progressing  10/20/2022 0155 by Purnima Cherry RN  Outcome: Progressing     Problem: Sleep Disturbance  Goal: Will exhibit normal sleeping pattern  Description: INTERVENTIONS:  1. Administer medication as ordered  2. Decrease environmental stimuli, including noise, as appropriate  3.  Discourage social isolation and naps during the day  10/20/2022 1311 by Sabas Nicholas LPN  Outcome: Progressing  10/20/2022 0155 by Purnima Cherry RN  Outcome: Progressing     Problem: Pain  Goal: Verbalizes/displays adequate comfort level or baseline comfort level  10/20/2022 1311 by Sabas Nicholas LPN  Outcome: Progressing  10/20/2022 0155 by Purnima Cherry RN  Outcome: Not Progressing     Problem: Pain  Goal: Verbalizes/displays adequate comfort level or baseline comfort level  10/20/2022 1311 by Sabas Nicholas LPN  Outcome: Progressing  10/20/2022 0155 by Purnima Cherry RN  Outcome: Not Progressing

## 2022-10-20 NOTE — BH NOTE
[facility]  Treatment Team Note  Review Date & Time: 10/20/22  0935    Patient was not in treatment team      Status EXAM:   Mental Status and Behavioral Exam  Normal: No  Level of Assistance: Independent/Self  Facial Expression: Flat  Affect: Blunt  Level of Consciousness: Alert  Frequency of Checks: 4 times per hour, close  Mood:Normal: No  Mood: Depressed, Anxious  Motor Activity:Normal: Yes  Eye Contact: Good  Observed Behavior: Friendly, Cooperative  Sexual Misconduct History: Current - no  Preception: Fowler to person, Fowler to time, Fowler to place  Attention:Normal: No  Attention: Distractible  Thought Processes:  (linear)  Thought Content:Normal: No  Thought Content: Preoccupations  Depression Symptoms: Change in energy level  Anxiety Symptoms: Generalized  Elizabeth Symptoms: No problems reported or observed. Hallucinations: None  Delusions: No  Delusions: Paranoid  Memory:Normal: No  Memory: Confabulation  Insight and Judgment: No  Insight and Judgment: Poor judgment, Poor insight      Suicide Risk CSSR-S:  1) Within the past month, have you wished you were dead or wished you could go to sleep and not wake up? : Yes  2) Have you actually had any thoughts of killing yourself? : Yes  3) Have you been thinking about how you might kill yourself? : Yes  5) Have you started to work out or worked out the details of how to kill yourself? Do you intend to carry out this plan? : Yes  6) Have you ever done anything, started to do anything, or prepared to do anything to end your life?: Yes      PLAN/TREATMENT RECOMMENDATIONS UPDATE: Patient will take medication as prescribed, eat 75% of meals, attend groups, participate in milieu activities, participate in treatment team and care planning for discharge and follow up.            Keiry Mckeon RN

## 2022-10-21 LAB — TOTAL SYPHILLIS IGG/IGM: NORMAL

## 2022-10-21 PROCEDURE — 6370000000 HC RX 637 (ALT 250 FOR IP): Performed by: PSYCHIATRY & NEUROLOGY

## 2022-10-21 PROCEDURE — 1240000000 HC EMOTIONAL WELLNESS R&B

## 2022-10-21 PROCEDURE — 99233 SBSQ HOSP IP/OBS HIGH 50: CPT | Performed by: PSYCHIATRY & NEUROLOGY

## 2022-10-21 PROCEDURE — 6370000000 HC RX 637 (ALT 250 FOR IP)

## 2022-10-21 RX ORDER — TRAZODONE HYDROCHLORIDE 50 MG/1
25 TABLET ORAL NIGHTLY PRN
Status: DISCONTINUED | OUTPATIENT
Start: 2022-10-21 | End: 2022-10-25 | Stop reason: HOSPADM

## 2022-10-21 RX ADMIN — IBUPROFEN 800 MG: 800 TABLET, FILM COATED ORAL at 17:37

## 2022-10-21 RX ADMIN — BUPROPION HYDROCHLORIDE 150 MG: 150 TABLET, EXTENDED RELEASE ORAL at 08:44

## 2022-10-21 RX ADMIN — PALIPERIDONE 3 MG: 3 TABLET, EXTENDED RELEASE ORAL at 21:28

## 2022-10-21 RX ADMIN — TRAZODONE HYDROCHLORIDE 25 MG: 50 TABLET ORAL at 21:28

## 2022-10-21 ASSESSMENT — PAIN DESCRIPTION - LOCATION: LOCATION: NECK

## 2022-10-21 ASSESSMENT — PAIN - FUNCTIONAL ASSESSMENT: PAIN_FUNCTIONAL_ASSESSMENT: ACTIVITIES ARE NOT PREVENTED

## 2022-10-21 ASSESSMENT — PAIN SCALES - GENERAL: PAINLEVEL_OUTOF10: 8

## 2022-10-21 ASSESSMENT — PAIN DESCRIPTION - DESCRIPTORS: DESCRIPTORS: DISCOMFORT;ACHING

## 2022-10-21 ASSESSMENT — PAIN DESCRIPTION - ORIENTATION: ORIENTATION: RIGHT

## 2022-10-21 NOTE — PROGRESS NOTES
Patient complaining of neck pain. Rates her pain a 8 on a 1-10 scale. Ibuprofen given for pain per order.   See [facility]

## 2022-10-21 NOTE — PLAN OF CARE
Patient alert and oriented x 3. Patent rates Depression 5/10 and Anxiety 5/10. Patient visible and social on the milieu. Patient denies SI/HI/A/V/H. Patient took HS medication. Patient denies self harm. No angry outbursts noted. No c/o's voiced @ present.

## 2022-10-21 NOTE — PROGRESS NOTES
Department of Psychiatry  Progress Note    Patient's chart was reviewed. Discussed with treatment team. Met with patient. SUBJECTIVE:      Making gains. Tolerating resuming Wellbutrin well so far. Believes she'd benefit from a higher dose. Is very interested in residential for GEETA suggesting she has been using more frequently than reported. ROS:   Patient has new complaints: no  Sleeping adequately:  Yes   Appetite adequate: Yes  Engaged in programming: Yes    OBJECTIVE:  VITALS:  /69   Pulse 57   Temp 97.7 °F (36.5 °C) (Oral)   Resp 16   Ht 5' 6\" (1.676 m)   Wt 143 lb (64.9 kg)   LMP 10/14/2022   SpO2 99%   Breastfeeding No   BMI 23.08 kg/m²     Mental Status Examination:    Appearance: fair grooming and hygiene  Behavior/Attitude toward examiner:  pleasant   Speech: stilted  Mood:  \"ok\"  Affect:  blunted     Thought processes:  more organized  Thought Content: no SI, no HI, delusions less prominent  Perceptions: no AVH; not RTIS  Attention: limited  Abstraction: limited  Cognition:  Alert and oriented to person, place, time, and situation, recall intact  Insight: Limited   Judgment: Limited    Medication:  Scheduled:   buPROPion  150 mg Oral Daily    paliperidone  3 mg Oral Nightly    nicotine  1 patch TransDERmal Daily    potassium chloride  40 mEq Oral Once        PRN:  hydrOXYzine HCl, traZODone, nicotine polacrilex, albuterol sulfate HFA, ibuprofen     FORMULATION:  This is a domiciled, never , unemployed 59-year-old  with a history of mood and psychotic symptoms, and opioid and  amphetamine use disorders, whose mother brought her to our emergency  department with worsening symptoms of depression and psychosis. She  presents disorganized, delusional, and impaired. She also presents with  some symptoms of depression and cognitive impairment. Given the severity of her symptoms and history, she was admitted for further evaluation and treatment.     Principal Problem: Psychosis ([facility]  Active Problems:    Dysmenorrhea    Depression, unspecified    Asthma without acute exacerbation    Hx of hepatitis C    Amphetamine use disorder, severe, dependence (HCC)    Opioid use disorder, severe, dependence ([facility]  Resolved Problems:    Hypokalemia     PLAN:  1. Admitted to inpatient psychiatry for stabilization and treatment. 2.  On admission, discussed treatment options at length. She was reluctant to try another antipsychotic or mood stabilizer but ultimately agreed to [facility]. Started at 3 mg daily. Ordered q. 15-minute checks for safety, programming, and p.r.n. medication for anxiety, agitation and insomnia. MoCA?    10/19/2022 - changed Invega to QHS dosing. B12/folate WNL. RPR negative. 10/20/2022 - resumed Wellbutrin SR 150mg daily. 3.  Hospitalist consult for admission. #Hypokalemia - resolved   -mild  -replaced     #Asthma without exacerbation  -albuterol PRN     #Hx of hepatitis C  -LFTs stable    4. Voluntary. Total time with patient was 35 minutes and more than 50 % of that time was spent counseling the patient on their symptoms, treatment, and expected goals.      Prabhu Gan MD

## 2022-10-21 NOTE — BH NOTE
Patient requesting pain pill and something for sleep. Patient rates right hip pain a 5/10. Patient medicated with Ibuprofen 800 mg po for pain and Trazodone 50 mg po for sleep.

## 2022-10-21 NOTE — GROUP NOTE
Group Therapy Note    Date: 10/20/2022    Group Start Time: 2000  Group End Time: 2030  Group Topic: Wrap-Up    [address] [place] [address]        Group Therapy Note    Attendees: 7       Patient's Goal:  to make better choices    Notes:  met goal    Status After Intervention:  Unchanged    Participation Level: Minimal    Participation Quality: Resistant      Speech:  normal      Thought Process/Content: Logical      Affective Functioning: Congruent      Mood: angry      Level of consciousness:  Alert      Response to Learning: Able to verbalize current knowledge/experience      Endings: None Reported    Modes of Intervention: Support      Discipline Responsible: [address], [email or web address]      Signature:  Crystal Garcia

## 2022-10-22 PROCEDURE — 1240000000 HC EMOTIONAL WELLNESS R&B

## 2022-10-22 PROCEDURE — 6370000000 HC RX 637 (ALT 250 FOR IP): Performed by: PSYCHIATRY & NEUROLOGY

## 2022-10-22 PROCEDURE — 6370000000 HC RX 637 (ALT 250 FOR IP)

## 2022-10-22 RX ADMIN — TRAZODONE HYDROCHLORIDE 25 MG: 50 TABLET ORAL at 21:13

## 2022-10-22 RX ADMIN — BUPROPION HYDROCHLORIDE 150 MG: 150 TABLET, EXTENDED RELEASE ORAL at 08:40

## 2022-10-22 RX ADMIN — IBUPROFEN 800 MG: 800 TABLET, FILM COATED ORAL at 08:39

## 2022-10-22 RX ADMIN — IBUPROFEN 800 MG: 800 TABLET, FILM COATED ORAL at 21:13

## 2022-10-22 RX ADMIN — NICOTINE POLACRILEX 2 MG: 2 LOZENGE ORAL at 18:10

## 2022-10-22 RX ADMIN — PALIPERIDONE 3 MG: 3 TABLET, EXTENDED RELEASE ORAL at 21:13

## 2022-10-22 RX ADMIN — NICOTINE POLACRILEX 2 MG: 2 LOZENGE ORAL at 08:44

## 2022-10-22 ASSESSMENT — PAIN DESCRIPTION - LOCATION
LOCATION: NECK;SHOULDER
LOCATION: NECK;SHOULDER

## 2022-10-22 ASSESSMENT — PAIN DESCRIPTION - PAIN TYPE: TYPE: ACUTE PAIN

## 2022-10-22 ASSESSMENT — PAIN DESCRIPTION - ORIENTATION: ORIENTATION: RIGHT

## 2022-10-22 ASSESSMENT — PAIN DESCRIPTION - DESCRIPTORS
DESCRIPTORS: ACHING;DISCOMFORT
DESCRIPTORS: ACHING

## 2022-10-22 ASSESSMENT — PAIN SCALES - GENERAL
PAINLEVEL_OUTOF10: 5
PAINLEVEL_OUTOF10: 5

## 2022-10-22 ASSESSMENT — PAIN - FUNCTIONAL ASSESSMENT: PAIN_FUNCTIONAL_ASSESSMENT: ACTIVITIES ARE NOT PREVENTED

## 2022-10-22 NOTE — PLAN OF CARE
Patient requested Motrin at 21:25 & was instructed that it was too early. Patient complained of right shoulder & right side of neck pain, being a 5/10. Patient appeared asleep at 22:00, with no further complaint of discomfort.  Martell Peraza R.N.

## 2022-10-22 NOTE — PLAN OF CARE
Patient was out with patient group, briefly early in the shift & was social. Patient attended group. Patient was verbal in 1:1 & reported feeling 35% improved, since admission. \"I'm still having some paranoia, but I can focus on things. \" Patient denied having feelings of self harm. Patient denied having hallucinations & didn't appear to be responding to internal stimuli. Patient appeared asleep at 22:00, after receiving trazodone at 21:30.  Mio Peraza R.N.

## 2022-10-22 NOTE — PLAN OF CARE
Problem: Anxiety  Goal: Will report anxiety at manageable levels  Description: INTERVENTIONS:  1. Administer medication as ordered  2. Teach and rehearse alternative coping skills  3. Provide emotional support with 1:1 interaction with staff  10/22/2022 1431 by Humera Simental RN  Outcome: Progressing   Pt has been intermittently visible on the unit today. She has attended groups, compliant with medications. She received ibuprofen earlier for chronic neck pain and it was effective. She denies SI/HI. She does endorse some auditory hallucinations but states they are improving with the medication she is taking. She states she is sleeping pretty well at night and overall mood improving. She is hopeful to get some outpatient support from [facility] upon discharge. 24-Aug-1996

## 2022-10-23 PROCEDURE — 6370000000 HC RX 637 (ALT 250 FOR IP)

## 2022-10-23 PROCEDURE — 6370000000 HC RX 637 (ALT 250 FOR IP): Performed by: PSYCHIATRY & NEUROLOGY

## 2022-10-23 PROCEDURE — 99233 SBSQ HOSP IP/OBS HIGH 50: CPT | Performed by: PSYCHIATRY & NEUROLOGY

## 2022-10-23 PROCEDURE — 1240000000 HC EMOTIONAL WELLNESS R&B

## 2022-10-23 RX ORDER — PALIPERIDONE 3 MG/1
6 TABLET, EXTENDED RELEASE ORAL NIGHTLY
Status: DISCONTINUED | OUTPATIENT
Start: 2022-10-23 | End: 2022-10-25 | Stop reason: HOSPADM

## 2022-10-23 RX ADMIN — IBUPROFEN 800 MG: 800 TABLET, FILM COATED ORAL at 09:55

## 2022-10-23 RX ADMIN — IBUPROFEN 800 MG: 800 TABLET, FILM COATED ORAL at 20:43

## 2022-10-23 RX ADMIN — BUPROPION HYDROCHLORIDE 150 MG: 150 TABLET, EXTENDED RELEASE ORAL at 09:55

## 2022-10-23 RX ADMIN — TRAZODONE HYDROCHLORIDE 25 MG: 50 TABLET ORAL at 20:43

## 2022-10-23 RX ADMIN — PALIPERIDONE 6 MG: 3 TABLET, EXTENDED RELEASE ORAL at 20:43

## 2022-10-23 ASSESSMENT — PAIN SCALES - GENERAL: PAINLEVEL_OUTOF10: 3

## 2022-10-23 NOTE — PROGRESS NOTES
Gertrude Blunt is resting in bed with her eyes closed. There are no obvious signs of distress or pain. Ibuprofen and Trazodone are noted to be effective.

## 2022-10-23 NOTE — PROGRESS NOTES
RT Inhaler-Nebulizer Bronchodilator Protocol Note    There is a bronchodilator order in the chart from a provider indicating to follow the RT Bronchodilator Protocol and there is an Initiate RT Inhaler-Nebulizer Bronchodilator Protocol order as well (see protocol at bottom of note). CXR Findings:  No results found. The findings from the last RT Protocol Assessment were as follows:   History Pulmonary Disease: (P) Chronic pulmonary disease  Respiratory Pattern: (P) Regular pattern and RR 12-20 bpm  Breath Sounds: (P) Clear breath sounds  Cough: (P) Strong, spontaneous, non-productive  Indication for Bronchodilator Therapy: (P) On home bronchodilators  Bronchodilator Assessment Score: (P) 2    Aerosolized bronchodilator medication orders have been revised according to the RT Inhaler-Nebulizer Bronchodilator Protocol below. Respiratory Therapist to perform RT Therapy Protocol Assessment initially then follow the protocol. Repeat RT Therapy Protocol Assessment PRN for score 0-3 or on second treatment, BID, and PRN for scores above 3. No Indications - adjust the frequency to every 6 hours PRN wheezing or bronchospasm, if no treatments needed after 48 hours then discontinue using Per Protocol order mode. If indication present, adjust the RT bronchodilator orders based on the Bronchodilator Assessment Score as indicated below. Use Inhaler orders unless patient has one or more of the following: on home nebulizer, not able to hold breath for 10 seconds, is not alert and oriented, cannot activate and use MDI correctly, or respiratory rate 25 breaths per minute or more, then use the equivalent nebulizer order(s) with same Frequency and PRN reasons based on the score. If a patient is on this medication at home then do not decrease Frequency below that used at home.     0-3 - enter or revise RT bronchodilator order(s) to equivalent RT Bronchodilator order with Frequency of every 4 hours PRN for wheezing or increased work of breathing using Per Protocol order mode. 4-6 - enter or revise RT Bronchodilator order(s) to two equivalent RT bronchodilator orders with one order with BID Frequency and one order with Frequency of every 4 hours PRN wheezing or increased work of breathing using Per Protocol order mode. 7-10 - enter or revise RT Bronchodilator order(s) to two equivalent RT bronchodilator orders with one order with TID Frequency and one order with Frequency of every 4 hours PRN wheezing or increased work of breathing using Per Protocol order mode. 11-13 - enter or revise RT Bronchodilator order(s) to one equivalent RT bronchodilator order with QID Frequency and an Albuterol order with Frequency of every 4 hours PRN wheezing or increased work of breathing using Per Protocol order mode. Greater than 13 - enter or revise RT Bronchodilator order(s) to one equivalent RT bronchodilator order with every 4 hours Frequency and an Albuterol order with Frequency of every 2 hours PRN wheezing or increased work of breathing using Per Protocol order mode. RT to enter RT Home Evaluation for COPD & MDI Assessment order using Per Protocol order mode.     Electronically signed by Estelle Soto RCP on 10/23/2022 at 1:31 PM

## 2022-10-23 NOTE — PROGRESS NOTES
Department of Psychiatry  Progress Note    Patient's chart was reviewed. Discussed with treatment team. Met with patient. SUBJECTIVE:      Flat but more organized. Improved cognition. Agrees to increase TOTEMS (formerly Nitrogram). Remains interested in [facility] program.     Told RN she is less paranoid and the AH have improved. ROS:   Patient has new complaints: no  Sleeping adequately:  Yes   Appetite adequate: Yes  Engaged in programming: Yes    OBJECTIVE:  VITALS:  BP 99/66   Pulse (!) 101   Temp 98.7 °F (37.1 °C) (Temporal)   Resp 14   Ht 5' 6\" (1.676 m)   Wt 143 lb (64.9 kg)   LMP 10/14/2022   SpO2 98%   Breastfeeding No   BMI 23.08 kg/m²     Mental Status Examination:    Appearance: fair grooming and hygiene  Behavior/Attitude toward examiner:  pleasant   Speech: less stilted  Mood:  \"ok\"  Affect:  flat    Thought processes:  more organized  Thought Content: no SI, no HI, delusions less prominent  Perceptions: less AH  Attention: improving  Abstraction: limited  Cognition:  Alert and oriented to person, place, time, and situation, recall intact  Insight: Limited   Judgment: Limited    Medication:  Scheduled:   buPROPion  150 mg Oral Daily    paliperidone  3 mg Oral Nightly    nicotine  1 patch TransDERmal Daily    potassium chloride  40 mEq Oral Once        PRN:  traZODone, hydrOXYzine HCl, nicotine polacrilex, albuterol sulfate HFA, ibuprofen     FORMULATION:  This is a domiciled, never , unemployed 42-year-old  with a history of mood and psychotic symptoms, and opioid and  amphetamine use disorders, whose mother brought her to our emergency  department with worsening symptoms of depression and psychosis. She  presents disorganized, delusional, and impaired. She also presents with  some symptoms of depression and cognitive impairment. Given the severity of her symptoms and history, she was admitted for further evaluation and treatment.     Principal Problem:    Psychosis ([address]  Active Problems:    Dysmenorrhea    Depression, unspecified    Asthma without acute exacerbation    Hx of hepatitis C    Amphetamine use disorder, severe, dependence (HCC)    Opioid use disorder, severe, dependence ([facility]  Resolved Problems:    Hypokalemia     PLAN:  1. Admitted to inpatient psychiatry for stabilization and treatment. 2.  On admission, discussed treatment options at length. She was reluctant to try another antipsychotic or mood stabilizer but ultimately agreed to [facility]. Started at 3 mg daily. Ordered q. 15-minute checks for safety, programming, and p.r.n. medication for anxiety, agitation and insomnia. MoCA?    10/19/2022 - changed Invega to QHS dosing. B12/folate WNL. RPR negative. 10/20/2022 - resumed Wellbutrin SR 150mg daily. 10/23/2022 - increase Invega to 6mg QHS. 3.  Hospitalist consult for admission. #Hypokalemia - resolved   -mild  -replaced     #Asthma without exacerbation  -albuterol PRN     #Hx of hepatitis C  -LFTs stable    4. Voluntary. Total time with patient was 35 minutes and more than 50 % of that time was spent counseling the patient on their symptoms, treatment, and expected goals.      Evonne Khan MD

## 2022-10-23 NOTE — PLAN OF CARE
Problem: Anxiety  Goal: Will report anxiety at manageable levels  Outcome: Progressing     Problem: Pain  Goal: Verbalizes/displays adequate comfort level or baseline comfort level  Outcome: Progressing   This shift pt is observed to have blunt affect and their mood was depressed. Pt appearance was unkept and they did not take a shower. Pt was quick and to the point, very guarded with staff and they were noted to be withdrawn from peers. They slept  or in bed  all shift. They did take meds and required no PRNs.  They deny SI/HI, and AVH , no delusions or RTIS were observed or reported

## 2022-10-23 NOTE — FLOWSHEET NOTE
10/23/22 1609   Mental Status and Behavioral Exam   Normal No   Level of Assistance Independent/Self   Facial Expression Expressionless   Affect Blunt   Level of Consciousness Alert   Frequency of Checks 4 times per hour, close   Mood:Normal No   Mood Depressed   Motor Activity:Normal Yes   Eye Contact Good   Observed Behavior Cooperative;Guarded   Sexual Misconduct History Current - no   Preception [place] to person;[place] to time;[place] to place;[place] to situation   Attention:Normal No   Attention Distractible   Thought Processes Blocking   Thought Content:Normal No   Thought Content Preoccupations   Depression Symptoms No problems reported or observed. Anxiety Symptoms Generalized   Elizabeth Symptoms No problems reported or observed.    Hallucinations None   Delusions No   Memory:Normal Yes   Insight and Judgment No   Insight and Judgment Unmotivated

## 2022-10-23 NOTE — PLAN OF CARE
Visible & social - watched TV w/ peers. +meds; +group. Requested PRN Trazodone & Ibuprofen (neck/shoulder pain) - both were effective. Doesn't appear to be RTIS. Denies all. Problem: Self Harm/Suicidality  Goal: Will have no self-injury during hospital stay  Description: INTERVENTIONS:  1. Q 30 MINUTES: Routine safety checks  2. Q SHIFT & PRN: Assess risk to determine if routine checks are adequate to maintain patient safety  Outcome: Progressing     Problem: Anxiety  Goal: Will report anxiety at manageable levels  Description: INTERVENTIONS:  1. Administer medication as ordered  2. Teach and rehearse alternative coping skills  3. Provide emotional support with 1:1 interaction with staff  10/22/2022 1747 by Judy Hall RN  Outcome: Progressing     Problem: Sleep Disturbance  Goal: Will exhibit normal sleeping pattern  Description: INTERVENTIONS:  1. Administer medication as ordered  2. Decrease environmental stimuli, including noise, as appropriate  3.  Discourage social isolation and naps during the day  Outcome: Progressing     Problem: Pain  Goal: Verbalizes/displays adequate comfort level or baseline comfort level  Outcome: Progressing

## 2022-10-23 NOTE — BH NOTE
Comments: + interactions, + contribution, and + engagement.         Time: 3321-3923      Type of Group: Wrap-up/Relaxation      Level of Participation: 11/20

## 2022-10-24 PROCEDURE — 1240000000 HC EMOTIONAL WELLNESS R&B

## 2022-10-24 PROCEDURE — 6370000000 HC RX 637 (ALT 250 FOR IP)

## 2022-10-24 PROCEDURE — 6370000000 HC RX 637 (ALT 250 FOR IP): Performed by: PSYCHIATRY & NEUROLOGY

## 2022-10-24 PROCEDURE — 99233 SBSQ HOSP IP/OBS HIGH 50: CPT | Performed by: PSYCHIATRY & NEUROLOGY

## 2022-10-24 RX ADMIN — NICOTINE POLACRILEX 2 MG: 2 LOZENGE ORAL at 10:51

## 2022-10-24 RX ADMIN — BUPROPION HYDROCHLORIDE 150 MG: 150 TABLET, EXTENDED RELEASE ORAL at 08:11

## 2022-10-24 RX ADMIN — PALIPERIDONE 6 MG: 3 TABLET, EXTENDED RELEASE ORAL at 21:23

## 2022-10-24 RX ADMIN — NICOTINE POLACRILEX 2 MG: 2 LOZENGE ORAL at 15:13

## 2022-10-24 RX ADMIN — TRAZODONE HYDROCHLORIDE 25 MG: 50 TABLET ORAL at 21:23

## 2022-10-24 RX ADMIN — IBUPROFEN 800 MG: 800 TABLET, FILM COATED ORAL at 21:23

## 2022-10-24 ASSESSMENT — PAIN SCALES - GENERAL
PAINLEVEL_OUTOF10: 5
PAINLEVEL_OUTOF10: 0

## 2022-10-24 ASSESSMENT — PAIN DESCRIPTION - LOCATION: LOCATION: BACK

## 2022-10-24 ASSESSMENT — PAIN - FUNCTIONAL ASSESSMENT: PAIN_FUNCTIONAL_ASSESSMENT: ACTIVITIES ARE NOT PREVENTED

## 2022-10-24 ASSESSMENT — PAIN DESCRIPTION - DESCRIPTORS: DESCRIPTORS: ACHING

## 2022-10-24 NOTE — GROUP NOTE
Group Therapy Note    Date: 10/24/2022    Group Start Time: 1000  Group End Time: 6436  Group Topic: Psychoeducation    [address] [place] [address]        Group Therapy Note    Topic: Describing and Articulating Emotions    Group members engaged in collaborative social group. Participants collaborated to identify 8 subjects they resonate with at present. Topics are as follows: depression, confidence, hurt, guilt, communication, the future, identifying feelings, loneliness. Participants were provided list of song titles, 4 titles for each topic. Members then explored relating to these song titles, opening dialogue for collaborative socialization. Attendees: 12       Notes:  Pt actively engaged and collaborative with peers across discussions of each topic. Receptive to feedback and encouragement from peers during group session. Status After Intervention:  Improved    Participation Level:  Active Listener and Interactive    Participation Quality: Appropriate and Sharing      Speech:  normal      Thought Process/Content: Logical      Affective Functioning: Congruent      Mood: euthymic      Level of consciousness:  Alert and Oriented x4      Response to Learning: Capable of insight and Progressing to goal      Endings: None Reported    Modes of Intervention: Education, Support, Socialization, and Exploration      Discipline Responsible: Psychoeducational Specialist      Signature:  Elizabeth Horowitz MM, MT-BC

## 2022-10-24 NOTE — BH NOTE
Comments: + interactions, + contribution, and + engagement.   Goal met      Time: 2045-2115      Type of Group: Wrap up,Relaxation      Level of Participation: 12/21

## 2022-10-24 NOTE — PLAN OF CARE
Brighter. More animated with staff & peers. Exclaimed loudly with excitement about [employer]. +group; +meds. Requested PRN Trazodone & Ibuprofen for neck/back pain & they were effective. Not observed RTIS. Denies all. Problem: Self Harm/Suicidality  Goal: Will have no self-injury during hospital stay  Description: INTERVENTIONS:  1. Q 30 MINUTES: Routine safety checks  2. Q SHIFT & PRN: Assess risk to determine if routine checks are adequate to maintain patient safety  Outcome: Progressing     Problem: Anxiety  Goal: Will report anxiety at manageable levels  Description: INTERVENTIONS:  1. Administer medication as ordered  2. Teach and rehearse alternative coping skills  3. Provide emotional support with 1:1 interaction with staff  10/23/2022 2339 by Briana Hester RN  Outcome: Progressing     Problem: Sleep Disturbance  Goal: Will exhibit normal sleeping pattern  Description: INTERVENTIONS:  1. Administer medication as ordered  2. Decrease environmental stimuli, including noise, as appropriate  3.  Discourage social isolation and naps during the day  Outcome: Progressing     Problem: Pain  Goal: Verbalizes/displays adequate comfort level or baseline comfort level  10/23/2022 2339 by Briana Hester RN  Outcome: Progressing

## 2022-10-24 NOTE — PLAN OF CARE
Problem: Psychosis  Goal: Will report no hallucinations or delusions  Outcome: Progressing     Problem: Behavior  Goal: Pt/Family maintain appropriate behavior and adhere to behavioral management agreement, if implemented  Outcome: Progressing   This shift pt is observed to have appropriate and congruent  affect and their mood was anxious. Pt appearance was WDL and they did take a shower. Pt was cooperative with staff and they were noted to be interacting more with peers. They did attend groups. They did take meds and required only nicotine for PRNs.  They deny SI/HI, no AVH , no delusions or RTIS

## 2022-10-24 NOTE — PROGRESS NOTES
Group Therapy Note    Date: 10/24/2022  Start Time: 1300  End Time:  1400  Number of Participants: 9    Type of Group: Music Group    Notes:  Pt present for Music Group. Pt participated by making song selections. Participation Level:  Active Listener and Interactive    Participation Quality: Appropriate and Attentive      Speech:  normal      Affective Functioning: Congruent      Endings: None Reported    Modes of Intervention: Support, Socialization, Activity, and Media      Discipline Responsible: Chaplain Eran Azevedo       10/24/22 9310   Encounter Summary   Encounter Overview/Reason  Behavioral Health   Service Provided For: Patient   Last Encounter    (10/24 Music Group)   Complexity of Encounter Moderate   Begin Time 1300   End Time  1400   Total Time Calculated 60 min   Behavioral Health    Type  Spirituality Group

## 2022-10-25 VITALS
BODY MASS INDEX: 22.98 KG/M2 | HEART RATE: 96 BPM | OXYGEN SATURATION: 99 % | RESPIRATION RATE: 16 BRPM | TEMPERATURE: 98.4 F | DIASTOLIC BLOOD PRESSURE: 67 MMHG | SYSTOLIC BLOOD PRESSURE: 95 MMHG | WEIGHT: 143 LBS | HEIGHT: 66 IN

## 2022-10-25 PROBLEM — F32.A DEPRESSION, UNSPECIFIED: Status: RESOLVED | Noted: 2022-10-17 | Resolved: 2022-10-25

## 2022-10-25 PROCEDURE — 94640 AIRWAY INHALATION TREATMENT: CPT

## 2022-10-25 PROCEDURE — 99239 HOSP IP/OBS DSCHRG MGMT >30: CPT | Performed by: PSYCHIATRY & NEUROLOGY

## 2022-10-25 PROCEDURE — 5130000000 HC BRIDGE APPOINTMENT

## 2022-10-25 PROCEDURE — 6370000000 HC RX 637 (ALT 250 FOR IP)

## 2022-10-25 PROCEDURE — 6370000000 HC RX 637 (ALT 250 FOR IP): Performed by: PSYCHIATRY & NEUROLOGY

## 2022-10-25 RX ORDER — PALIPERIDONE 6 MG/1
6 TABLET, EXTENDED RELEASE ORAL NIGHTLY
Qty: 30 TABLET | Refills: 0 | Status: ON HOLD | OUTPATIENT
Start: 2022-10-25 | End: 2022-10-28 | Stop reason: HOSPADM

## 2022-10-25 RX ORDER — ALBUTEROL SULFATE 90 UG/1
2 AEROSOL, METERED RESPIRATORY (INHALATION) EVERY 4 HOURS PRN
Qty: 1 EACH | Refills: 1 | Status: SHIPPED | OUTPATIENT
Start: 2022-10-25

## 2022-10-25 RX ORDER — BUPROPION HYDROCHLORIDE 150 MG/1
150 TABLET, EXTENDED RELEASE ORAL DAILY
Qty: 30 TABLET | Refills: 0 | Status: ON HOLD | OUTPATIENT
Start: 2022-10-26 | End: 2022-10-28 | Stop reason: HOSPADM

## 2022-10-25 RX ADMIN — BUPROPION HYDROCHLORIDE 150 MG: 150 TABLET, EXTENDED RELEASE ORAL at 08:51

## 2022-10-25 RX ADMIN — Medication 2 PUFF: at 08:58

## 2022-10-25 NOTE — PLAN OF CARE
Problem: Self Harm/Suicidality  Goal: Will have no self-injury during hospital stay  Description: INTERVENTIONS:  1. Q 30 MINUTES: Routine safety checks  2. Q SHIFT & PRN: Assess risk to determine if routine checks are adequate to maintain patient safety  10/25/2022 0953 by Chasity Ely RN  Outcome: Progressing     Problem: Depression  Goal: Will be euthymic at discharge  Description: INTERVENTIONS:  1. Administer medication as ordered  2. Provide emotional support via 1:1 interaction with staff  3. Encourage involvement in milieu/groups/activities  4. Monitor for social isolation  10/25/2022 0953 by Chasity Ely RN  Outcome: Progressing     Problem: Anxiety  Goal: Will report anxiety at manageable levels  Description: INTERVENTIONS:  1. Administer medication as ordered  2. Teach and rehearse alternative coping skills  3. Provide emotional support with 1:1 interaction with staff  10/25/2022 0953 by Chasity Ely RN  Outcome: Progressing     Problem: Sleep Disturbance  Goal: Will exhibit normal sleeping pattern  Description: INTERVENTIONS:  1. Administer medication as ordered  2. Decrease environmental stimuli, including noise, as appropriate  3. Discourage social isolation and naps during the day  10/25/2022 0953 by Chasity Ely RN  Outcome: Progressing     Problem: Pain  Goal: Verbalizes/displays adequate comfort level or baseline comfort level  10/25/2022 0953 by Chasity Ely RN  Outcome: Progressing     Problem: Psychosis  Goal: Will report no hallucinations or delusions  Description: INTERVENTIONS:  1. Administer medication as  ordered  2. Assist with reality testing to support increasing orientation  3.  Assess if patient's hallucinations or delusions are encouraging self harm or harm to others and intervene as appropriate  10/25/2022 0953 by Chasity Ely RN  Outcome: Progressing    Problem: Behavior  Goal: Pt/Family maintain appropriate behavior and adhere to behavioral management agreement, if implemented  Description: INTERVENTIONS:  1. Assess patient/family's coping skills and  non-compliant behavior (including use of illegal substances)  2. Notify security of behavior or suspected illegal substances which indicate the need for search of the family and/or belongings  3. Encourage verbalization of thoughts and concerns in a socially appropriate manner  4. Utilize positive, consistent limit setting strategies supporting safety of patient, staff and others  5. Encourage participation in the decision making process about the behavioral management agreement  6. If a visitor's behavior poses a threat to safety call refer to organization policy. 7. Initiate consult with , Psychosocial CNS, Spiritual Care as appropriate  10/25/2022 1501 by Elian Oliver RN  Outcome: Progressing    Patient is interactive with staff. Patient denies SI/HI/AVH. Patient states they slept \"okay\" last night. Patient states, \"I've been having nightmares more lately. \" Patient verbalizes they will notify staff if suicidal thoughts worsen or is unable to CFS. She reports feeling \"okay. \" Patient compliant with medications. Patient is cooperative.

## 2022-10-25 NOTE — PROGRESS NOTES
Department of Psychiatry  Progress Note    Patient's chart was reviewed. Discussed with treatment team. Met with patient. SUBJECTIVE:      Doing better. Less disorganized. Tolerating increased dose of Invega so far. Acknowledges it's been helpful. Active in the milieu. Remains delusional about electronic devices. ROS:   Patient has new complaints: no  Sleeping adequately:  Yes   Appetite adequate: Yes  Engaged in programming: Yes    OBJECTIVE:  VITALS:  /67   Pulse 71   Temp 98.3 °F (36.8 °C) (Temporal)   Resp 16   Ht 5' 6\" (1.676 m)   Wt 143 lb (64.9 kg)   LMP 10/14/2022   SpO2 98%   Breastfeeding No   BMI 23.08 kg/m²     Mental Status Examination:    Appearance: fair grooming and hygiene  Behavior/Attitude toward examiner:  pleasant   Speech: less stilted  Mood:  \"ok\"  Affect:  flat    Thought processes:  more organized  Thought Content: no SI, no HI, delusions less prominent  Perceptions: less AH  Attention: improving  Abstraction: limited  Cognition:  Alert and oriented to person, place, time, and situation, recall intact  Insight: improving  Judgment:improving    Medication:  Scheduled:   paliperidone  6 mg Oral Nightly    buPROPion  150 mg Oral Daily    nicotine  1 patch TransDERmal Daily    potassium chloride  40 mEq Oral Once        PRN:  traZODone, hydrOXYzine HCl, nicotine polacrilex, albuterol sulfate HFA, ibuprofen     FORMULATION:  This is a domiciled, never , unemployed 48-year-old  with a history of mood and psychotic symptoms, and opioid and  amphetamine use disorders, whose mother brought her to our emergency  department with worsening symptoms of depression and psychosis. She  presents disorganized, delusional, and impaired. She also presents with  some symptoms of depression and cognitive impairment. Given the severity of her symptoms and history, she was admitted for further evaluation and treatment.     Principal Problem:    Psychosis ([address]  Active

## 2022-10-25 NOTE — BH NOTE
Purposeful Rounding    Patient Location: Patient room    Patient willing to engage in conversation: Yes    Presentation/behavior: Cooperative    Affect: Anxious    Concerns reported: None    PRN medications given: N/A    Environmental assessment: No safety hazards noted    Fall prevention interventions in place: Lighting appropriate, Room free of clutter, and Clear path to bathroom

## 2022-10-25 NOTE — BH NOTE
[facility]  Discharge Note    Pt discharged with followings belongings:   Dental Appliances: None  Vision - Corrective Lenses: Other (Comment) (sun glasses)  Hearing Aid: None  Jewelry: None  Body Piercings Removed: N/A  Clothing: Shirt, Pants, Jacket/Coat, Undergarments, Footwear  Other Valuables: Other (Comment) (vap)   Valuables sent home with patient. Patient educated on aftercare instructions: Yes  Information faxed to [facility] by Patty Sims RN  at 2:20 PM .Patient verbalize understanding of AVS:  Yes. Status EXAM upon discharge:  Mental Status and Behavioral Exam  Normal: No  Level of Assistance: Independent/Self  Facial Expression: Flat  Affect: Appropriate  Level of Consciousness: Alert  Frequency of Checks: 4 times per hour, close  Mood:Normal: No  Mood: Anxious  Motor Activity:Normal: Yes  Motor Activity: Increased  Eye Contact: Good  Observed Behavior: Cooperative, Guarded  Sexual Misconduct History: Current - no  Preception: [place] to person, [place] to time, [place] to place, [place] to situation  Attention:Normal: No  Attention: Distractible  Thought Processes: Blocking  Thought Content:Normal: No  Thought Content: Poverty of content  Depression Symptoms: No problems reported or observed. Anxiety Symptoms: Generalized  Elizabeth Symptoms: No problems reported or observed.   Hallucinations: None (Patient denies)  Delusions: No  Delusions: Paranoid  Memory:Normal: Yes  Memory: Poor recent  Insight and Judgment: No  Insight and Judgment: Poor judgment    Tobacco Screening:  Practical Counseling, on admission, david X, if applicable and completed (first 3 are required if patient doesn't refuse):            ( ) Recognizing danger situations (included triggers and roadblocks)                    ( ) Coping skills (new ways to manage stress,relaxation techniques, changing routine, distraction)                                                           ( ) Basic information about quitting (benefits of quitting, techniques in how to quit, available resources  ( ) Referral for counseling faxed to [facility]                                                                                                                   ( x) Patient refused counseling  ( ) Patient refused referral  ( ) Patient refused prescription upon discharge  ( ) Patient has not smoked in the last 30 days    Metabolic Screening:    Lab Results   Component Value Date    LABA1C 4.7 07/11/2022       Lab Results   Component Value Date    CHOL 193 06/27/2021    CHOL 140 07/19/2020    CHOL 133 07/09/2020     Lab Results   Component Value Date    TRIG 127 06/27/2021    TRIG 68 07/19/2020    TRIG 128 07/09/2020     Lab Results   Component Value Date    HDL 58 06/27/2021    HDL 50 07/19/2020    HDL 39 (L) 07/09/2020     No components found for: [facility] [place]  Lab Results   Component Value Date    LABVLDL 25 06/27/2021    LABVLDL 14 07/19/2020    LABVLDL 26 07/09/2020       Esthela Jesus, CON    Bridge Appointment completed: Reviewed Discharge Instructions with patient. Patient verbalizes understanding and agreement with the discharge plan using the teachback method.      Vaccinations (david X if applicable and completed):  ( ) Patient states already received influenza vaccine elsewhere  ( ) Patient received influenza vaccine during this hospitalization  (x ) Patient refused influenza vaccine at this time

## 2022-10-25 NOTE — DISCHARGE INSTRUCTIONS
Advanced Directives:  Patient does not have a surrogate decision maker appointed   Name (if yes): declined Phone Number:   Patient does not have a psychiatric and/ or medical advanced directive or power of . Patient was offered psychiatric and/ or medical advanced directive or power of  information/completion but declined to complete   Why not? Discharge Planning is notComplete. Discharge Date: 10/25/22  Reason for Hospitalization: Unspecified psychosis, substance use disorder  Discharge Diagnosis: Unspecified psychosis, substance use disorder  Discharging to: Home    Your instructions: Your physician here was Humera Lozano MD. If you have any questions please call the unit at [phone] for the adult unit or [phone] for [facility]. Please note that we have a patient family advisory [place] that meets the second Wednesday of January and the second Wednesday of July at [address] in [place] at [facility]. Department leadership would love for you to attend to give feedback on what we are doing well and areas in which we can improve our patient care. Please come if you are able and feel free to reach out to [facility] at [phone] with any questions. The Crisis Number for [facility] is [phone]([facility]). This Hotline may be accessed 24 hours a day, 7 days a week. Please follow up with your PCP regarding any pending labs. You may access counseling and substance use disorder services at [facility] or [address] [place] [address] Dr Dickinson are being referred to [facility]. Your appointment is today upon discharge.   Name of Provider: Nery Matthew    Provider specialty/license: substance use disorder   Date and time of appointment: Tuesday, October 25th 2022, at 3:00 PM  The type/s of services requested are: substance use disorder counseling and medication management  Agency name: Nery Matthew  Address: [facility]  [address]  Phone Number: [phone]  Special instructions (what to bring to appointment, etc.): Please call 24 hours in advance and you may go to open access any Monday through Friday at 8:00am. Please do not take any suboxone, if you are on it, prior to this intake. It will be an all day appointment and you may want to bring a lunch. Also bring a picture ID and insurance card       Discharge Completed By: Aftab Crandall [facility]  Fax to: Follow up provider name and number  BV [phone]    The following personal items were collected during your admission and were returned to you:    Belongings  Dental Appliances: None  Vision - Corrective Lenses: Other (Comment) (sun glasses)  Hearing Aid: None  Clothing: Shirt, Pants, Jacket/Coat, Undergarments, Footwear  Jewelry: None  Body Piercings Removed: N/A  Electronic Devices: Cell Phone,   Weapons (Notify Protective Services/Security): None  Other Valuables: Other (Comment) (vap)  Home Medications: None  Valuables Given To: Other (Comment) (cards phone locked in med room)  Provide Name(s) of Who Valuable(s) Were Given To: In the safe  Responsible person(s) in the waiting room: N/A  Patient approves for provider to speak to responsible person post operatively: No    By signing below, I understand and acknowledge receipt of the instructions indicated above.

## 2022-10-25 NOTE — GROUP NOTE
Group Therapy Note    Date: 10/24/2022    Group Start Time: 2047  Group End Time: 2117  Group Topic: Wrap-Up    [address]        Group Therapy Note    Attendees: 13       Patient's Goal:  Pts goal was to stay busy and participate and go to groups. Notes:  Pt says she believes to have participated in all groups today. Status After Intervention:  Unchanged    Participation Level:  Active Listener    Participation Quality: Appropriate and Attentive      Speech:  normal      Thought Process/Content: Logical      Affective Functioning: Congruent      Mood: anxious      Level of consciousness:  Alert      Response to Learning: Able to verbalize current knowledge/experience, Able to verbalize/acknowledge new learning, and Able to change behavior      Endings: None Reported    Modes of Intervention: Education, Support, and Socialization      Discipline Responsible: Behavorial Health Tech/PCA      Signature:  Donna Cantu

## 2022-10-25 NOTE — BH NOTE
Purposeful Rounding     Patient Location: Day room     Patient willing to engage in conversation: Yes     Presentation/behavior: Cooperative     Affect: Anxious     Concerns reported: None     PRN medications given: N/A     Environmental assessment: No safety hazards noted     Fall prevention interventions in place: Lighting appropriate, Room free of clutter, and Clear path to bathroom

## 2022-10-25 NOTE — PLAN OF CARE
Brighter. Social & visible. +meds & group. PRN Ibuprofen for back pain & Trazodone administered & effective. Denied SI, HI, & AVH and was not observed RTIS. Problem: Self Harm/Suicidality  Goal: Will have no self-injury during hospital stay  Description: INTERVENTIONS:  1. Q 30 MINUTES: Routine safety checks  2. Q SHIFT & PRN: Assess risk to determine if routine checks are adequate to maintain patient safety  Outcome: Progressing     Problem: Depression  Goal: Will be euthymic at discharge  Description: INTERVENTIONS:  1. Administer medication as ordered  2. Provide emotional support via 1:1 interaction with staff  3. Encourage involvement in milieu/groups/activities  4. Monitor for social isolation  Outcome: Progressing     Problem: Anxiety  Goal: Will report anxiety at manageable levels  Description: INTERVENTIONS:  1. Administer medication as ordered  2. Teach and rehearse alternative coping skills  3. Provide emotional support with 1:1 interaction with staff  Outcome: Progressing     Problem: Sleep Disturbance  Goal: Will exhibit normal sleeping pattern  Description: INTERVENTIONS:  1. Administer medication as ordered  2. Decrease environmental stimuli, including noise, as appropriate  3. Discourage social isolation and naps during the day  Outcome: Progressing     Problem: Psychosis  Goal: Will report no hallucinations or delusions  Description: INTERVENTIONS:  1. Administer medication as  ordered  2. Assist with reality testing to support increasing orientation  3. Assess if patient's hallucinations or delusions are encouraging self harm or harm to others and intervene as appropriate  Outcome: Progressing     Problem: Behavior  Goal: Pt/Family maintain appropriate behavior and adhere to behavioral management agreement, if implemented  Description: INTERVENTIONS:  1. Assess patient/family's coping skills and  non-compliant behavior (including use of illegal substances)  2.  Notify security of behavior or suspected illegal substances which indicate the need for search of the family and/or belongings  3. Encourage verbalization of thoughts and concerns in a socially appropriate manner  4. Utilize positive, consistent limit setting strategies supporting safety of patient, staff and others  5. Encourage participation in the decision making process about the behavioral management agreement  6. If a visitor's behavior poses a threat to safety call refer to organization policy.   7. Initiate consult with , Psychosocial CNS, Spiritual Care as appropriate  Outcome: Progressing     Problem: Pain  Goal: Verbalizes/displays adequate comfort level or baseline comfort level  Outcome: Progressing

## 2022-10-27 ENCOUNTER — HOSPITAL ENCOUNTER (INPATIENT)
Age: 37
LOS: 1 days | Discharge: PSYCHIATRIC OR SUBSTANCE USE FACILITY | DRG: 817 | End: 2022-10-28
Attending: EMERGENCY MEDICINE | Admitting: INTERNAL MEDICINE
Payer: COMMERCIAL

## 2022-10-27 DIAGNOSIS — T14.91XA SUICIDE ATTEMPT (HCC): ICD-10-CM

## 2022-10-27 DIAGNOSIS — T50.902A INTENTIONAL OVERDOSE, INITIAL ENCOUNTER (HCC): Primary | ICD-10-CM

## 2022-10-27 PROBLEM — G93.40 ACUTE ENCEPHALOPATHY: Status: ACTIVE | Noted: 2022-10-27

## 2022-10-27 LAB
A/G RATIO: 1.9 (ref 1.1–2.2)
ACETAMINOPHEN LEVEL: <5 UG/ML (ref 10–30)
ALBUMIN SERPL-MCNC: 4 G/DL (ref 3.4–5)
ALP BLD-CCNC: 51 U/L (ref 40–129)
ALT SERPL-CCNC: 45 U/L (ref 10–40)
AMPHETAMINE SCREEN, URINE: NORMAL
ANION GAP SERPL CALCULATED.3IONS-SCNC: 9 MMOL/L (ref 3–16)
AST SERPL-CCNC: 31 U/L (ref 15–37)
BARBITURATE SCREEN URINE: NORMAL
BASOPHILS ABSOLUTE: 0 K/UL (ref 0–0.2)
BASOPHILS RELATIVE PERCENT: 0.4 %
BENZODIAZEPINE SCREEN, URINE: NORMAL
BILIRUB SERPL-MCNC: 0.4 MG/DL (ref 0–1)
BUN BLDV-MCNC: 12 MG/DL (ref 7–20)
CALCIUM SERPL-MCNC: 8.6 MG/DL (ref 8.3–10.6)
CANNABINOID SCREEN URINE: NORMAL
CHLORIDE BLD-SCNC: 105 MMOL/L (ref 99–110)
CO2: 24 MMOL/L (ref 21–32)
COCAINE METABOLITE SCREEN URINE: NORMAL
CREAT SERPL-MCNC: 0.6 MG/DL (ref 0.6–1.1)
EKG ATRIAL RATE: 67 BPM
EKG DIAGNOSIS: NORMAL
EKG P AXIS: 11 DEGREES
EKG P-R INTERVAL: 146 MS
EKG Q-T INTERVAL: 452 MS
EKG QRS DURATION: 86 MS
EKG QTC CALCULATION (BAZETT): 477 MS
EKG R AXIS: 41 DEGREES
EKG T AXIS: 22 DEGREES
EKG VENTRICULAR RATE: 67 BPM
EOSINOPHILS ABSOLUTE: 0 K/UL (ref 0–0.6)
EOSINOPHILS RELATIVE PERCENT: 0.1 %
ETHANOL: NORMAL MG/DL (ref 0–0.08)
FENTANYL SCREEN, URINE: NORMAL
GFR SERPL CREATININE-BSD FRML MDRD: >60 ML/MIN/{1.73_M2}
GLUCOSE BLD-MCNC: 102 MG/DL (ref 70–99)
HCG(URINE) PREGNANCY TEST: NEGATIVE
HCT VFR BLD CALC: 31.4 % (ref 36–48)
HEMOGLOBIN: 10.5 G/DL (ref 12–16)
INFLUENZA A: NOT DETECTED
INFLUENZA B: NOT DETECTED
LYMPHOCYTES ABSOLUTE: 0.7 K/UL (ref 1–5.1)
LYMPHOCYTES RELATIVE PERCENT: 13 %
Lab: NORMAL
MCH RBC QN AUTO: 30.5 PG (ref 26–34)
MCHC RBC AUTO-ENTMCNC: 33.4 G/DL (ref 31–36)
MCV RBC AUTO: 91.4 FL (ref 80–100)
METHADONE SCREEN, URINE: NORMAL
MONOCYTES ABSOLUTE: 0.4 K/UL (ref 0–1.3)
MONOCYTES RELATIVE PERCENT: 7.7 %
NEUTROPHILS ABSOLUTE: 4.4 K/UL (ref 1.7–7.7)
NEUTROPHILS RELATIVE PERCENT: 78.8 %
OPIATE SCREEN URINE: NORMAL
OXYCODONE URINE: NORMAL
PDW BLD-RTO: 14.1 % (ref 12.4–15.4)
PH UA: 6
PHENCYCLIDINE SCREEN URINE: NORMAL
PLATELET # BLD: 200 K/UL (ref 135–450)
PMV BLD AUTO: 8.6 FL (ref 5–10.5)
POTASSIUM REFLEX MAGNESIUM: 3.7 MMOL/L (ref 3.5–5.1)
RBC # BLD: 3.43 M/UL (ref 4–5.2)
SALICYLATE, SERUM: <0.3 MG/DL (ref 15–30)
SARS-COV-2 RNA, RT PCR: NOT DETECTED
SODIUM BLD-SCNC: 138 MMOL/L (ref 136–145)
TOTAL PROTEIN: 6.1 G/DL (ref 6.4–8.2)
TROPONIN: <0.01 NG/ML
WBC # BLD: 5.6 K/UL (ref 4–11)

## 2022-10-27 PROCEDURE — 93005 ELECTROCARDIOGRAM TRACING: CPT | Performed by: EMERGENCY MEDICINE

## 2022-10-27 PROCEDURE — 80179 DRUG ASSAY SALICYLATE: CPT

## 2022-10-27 PROCEDURE — 99223 1ST HOSP IP/OBS HIGH 75: CPT | Performed by: INTERNAL MEDICINE

## 2022-10-27 PROCEDURE — 2580000003 HC RX 258: Performed by: INTERNAL MEDICINE

## 2022-10-27 PROCEDURE — 87636 SARSCOV2 & INF A&B AMP PRB: CPT

## 2022-10-27 PROCEDURE — 6370000000 HC RX 637 (ALT 250 FOR IP): Performed by: INTERNAL MEDICINE

## 2022-10-27 PROCEDURE — 94640 AIRWAY INHALATION TREATMENT: CPT

## 2022-10-27 PROCEDURE — 80307 DRUG TEST PRSMV CHEM ANLYZR: CPT

## 2022-10-27 PROCEDURE — 6360000002 HC RX W HCPCS: Performed by: INTERNAL MEDICINE

## 2022-10-27 PROCEDURE — 82077 ASSAY SPEC XCP UR&BREATH IA: CPT

## 2022-10-27 PROCEDURE — 99285 EMERGENCY DEPT VISIT HI MDM: CPT

## 2022-10-27 PROCEDURE — 2000000000 HC ICU R&B

## 2022-10-27 PROCEDURE — 36415 COLL VENOUS BLD VENIPUNCTURE: CPT

## 2022-10-27 PROCEDURE — 80143 DRUG ASSAY ACETAMINOPHEN: CPT

## 2022-10-27 PROCEDURE — 80053 COMPREHEN METABOLIC PANEL: CPT

## 2022-10-27 PROCEDURE — 84703 CHORIONIC GONADOTROPIN ASSAY: CPT

## 2022-10-27 PROCEDURE — 94761 N-INVAS EAR/PLS OXIMETRY MLT: CPT

## 2022-10-27 PROCEDURE — 84484 ASSAY OF TROPONIN QUANT: CPT

## 2022-10-27 PROCEDURE — 93010 ELECTROCARDIOGRAM REPORT: CPT | Performed by: INTERNAL MEDICINE

## 2022-10-27 PROCEDURE — 85025 COMPLETE CBC W/AUTO DIFF WBC: CPT

## 2022-10-27 RX ORDER — MAGNESIUM SULFATE IN WATER 40 MG/ML
2000 INJECTION, SOLUTION INTRAVENOUS PRN
Status: DISCONTINUED | OUTPATIENT
Start: 2022-10-27 | End: 2022-10-28 | Stop reason: HOSPADM

## 2022-10-27 RX ORDER — ACETAMINOPHEN 650 MG/1
650 SUPPOSITORY RECTAL EVERY 6 HOURS PRN
Status: DISCONTINUED | OUTPATIENT
Start: 2022-10-27 | End: 2022-10-28 | Stop reason: HOSPADM

## 2022-10-27 RX ORDER — ONDANSETRON 4 MG/1
4 TABLET, ORALLY DISINTEGRATING ORAL EVERY 8 HOURS PRN
Status: DISCONTINUED | OUTPATIENT
Start: 2022-10-27 | End: 2022-10-28 | Stop reason: HOSPADM

## 2022-10-27 RX ORDER — ACETAMINOPHEN 325 MG/1
650 TABLET ORAL EVERY 6 HOURS PRN
Status: DISCONTINUED | OUTPATIENT
Start: 2022-10-27 | End: 2022-10-28 | Stop reason: HOSPADM

## 2022-10-27 RX ORDER — POTASSIUM CHLORIDE 7.45 MG/ML
10 INJECTION INTRAVENOUS PRN
Status: DISCONTINUED | OUTPATIENT
Start: 2022-10-27 | End: 2022-10-28 | Stop reason: HOSPADM

## 2022-10-27 RX ORDER — ENOXAPARIN SODIUM 100 MG/ML
40 INJECTION SUBCUTANEOUS DAILY
Status: DISCONTINUED | OUTPATIENT
Start: 2022-10-27 | End: 2022-10-28 | Stop reason: HOSPADM

## 2022-10-27 RX ORDER — ONDANSETRON 2 MG/ML
4 INJECTION INTRAMUSCULAR; INTRAVENOUS EVERY 6 HOURS PRN
Status: DISCONTINUED | OUTPATIENT
Start: 2022-10-27 | End: 2022-10-28 | Stop reason: HOSPADM

## 2022-10-27 RX ORDER — ALBUTEROL SULFATE 90 UG/1
2 AEROSOL, METERED RESPIRATORY (INHALATION) EVERY 4 HOURS PRN
Status: DISCONTINUED | OUTPATIENT
Start: 2022-10-27 | End: 2022-10-28 | Stop reason: HOSPADM

## 2022-10-27 RX ORDER — SODIUM CHLORIDE 9 MG/ML
INJECTION, SOLUTION INTRAVENOUS PRN
Status: DISCONTINUED | OUTPATIENT
Start: 2022-10-27 | End: 2022-10-28 | Stop reason: HOSPADM

## 2022-10-27 RX ORDER — SODIUM CHLORIDE 9 MG/ML
INJECTION, SOLUTION INTRAVENOUS CONTINUOUS
Status: DISCONTINUED | OUTPATIENT
Start: 2022-10-27 | End: 2022-10-28

## 2022-10-27 RX ORDER — NICOTINE 21 MG/24HR
1 PATCH, TRANSDERMAL 24 HOURS TRANSDERMAL DAILY
Status: DISCONTINUED | OUTPATIENT
Start: 2022-10-27 | End: 2022-10-28 | Stop reason: HOSPADM

## 2022-10-27 RX ORDER — SODIUM CHLORIDE 0.9 % (FLUSH) 0.9 %
5-40 SYRINGE (ML) INJECTION PRN
Status: DISCONTINUED | OUTPATIENT
Start: 2022-10-27 | End: 2022-10-28 | Stop reason: HOSPADM

## 2022-10-27 RX ORDER — POLYETHYLENE GLYCOL 3350 17 G/17G
17 POWDER, FOR SOLUTION ORAL DAILY PRN
Status: DISCONTINUED | OUTPATIENT
Start: 2022-10-27 | End: 2022-10-28 | Stop reason: HOSPADM

## 2022-10-27 RX ORDER — POTASSIUM CHLORIDE 29.8 MG/ML
20 INJECTION INTRAVENOUS PRN
Status: DISCONTINUED | OUTPATIENT
Start: 2022-10-27 | End: 2022-10-28 | Stop reason: HOSPADM

## 2022-10-27 RX ORDER — SODIUM CHLORIDE 0.9 % (FLUSH) 0.9 %
5-40 SYRINGE (ML) INJECTION EVERY 12 HOURS SCHEDULED
Status: DISCONTINUED | OUTPATIENT
Start: 2022-10-27 | End: 2022-10-28 | Stop reason: HOSPADM

## 2022-10-27 RX ADMIN — SODIUM CHLORIDE: 9 INJECTION, SOLUTION INTRAVENOUS at 08:24

## 2022-10-27 RX ADMIN — Medication 2 PUFF: at 11:13

## 2022-10-27 RX ADMIN — ONDANSETRON HYDROCHLORIDE 4 MG: 2 INJECTION, SOLUTION INTRAMUSCULAR; INTRAVENOUS at 11:51

## 2022-10-27 RX ADMIN — Medication 2 PUFF: at 19:01

## 2022-10-27 RX ADMIN — ACETAMINOPHEN 650 MG: 325 TABLET ORAL at 20:11

## 2022-10-27 RX ADMIN — Medication 10 ML: at 08:26

## 2022-10-27 RX ADMIN — Medication 10 ML: at 20:16

## 2022-10-27 RX ADMIN — MUPIROCIN: 20 OINTMENT TOPICAL at 20:16

## 2022-10-27 RX ADMIN — ACETAMINOPHEN 650 MG: 325 TABLET ORAL at 11:51

## 2022-10-27 RX ADMIN — ENOXAPARIN SODIUM 40 MG: 100 INJECTION SUBCUTANEOUS at 08:28

## 2022-10-27 RX ADMIN — SODIUM CHLORIDE: 9 INJECTION, SOLUTION INTRAVENOUS at 17:31

## 2022-10-27 ASSESSMENT — PAIN DESCRIPTION - FREQUENCY: FREQUENCY: INTERMITTENT

## 2022-10-27 ASSESSMENT — PAIN - FUNCTIONAL ASSESSMENT: PAIN_FUNCTIONAL_ASSESSMENT: NONE - DENIES PAIN

## 2022-10-27 ASSESSMENT — PAIN SCALES - GENERAL
PAINLEVEL_OUTOF10: 7
PAINLEVEL_OUTOF10: 3

## 2022-10-27 ASSESSMENT — PAIN DESCRIPTION - DIRECTION: RADIATING_TOWARDS: DOWN BACK

## 2022-10-27 ASSESSMENT — PAIN DESCRIPTION - DESCRIPTORS: DESCRIPTORS: PRESSURE

## 2022-10-27 ASSESSMENT — PAIN DESCRIPTION - PAIN TYPE: TYPE: ACUTE PAIN;CHRONIC PAIN

## 2022-10-27 ASSESSMENT — PAIN DESCRIPTION - LOCATION: LOCATION: NECK

## 2022-10-27 NOTE — CONSULTS
Patient is being seen at the request of Desiree Shook MD  for a consultation for intentional drug overdose    HISTORY OF PRESENT ILLNESS:   44years old presented with drug overdose, Wellbutrin and Invega yesterday- hand full and does not remember how many. + nausea with no vomiting. Mild to Moderate. Not sure what makes better or worse. Associated with lethargy in ED. SUicidal ideation and did it to harm herself. She called 911. Recent admission for psychosis/depression discharge 10/25. History of polysubstance abuse amphetamine and opioids.  cc/hr   RA      PAST MEDICAL HISTORY:  Past Medical History:   Diagnosis Date    Anxiety     Chronic hepatitis C without hepatic coma (HCC)     Chronic post-traumatic stress disorder (PTSD) 10/29/2018    Depression     Hepatitis C 2020    Mild intermittent asthma without complication     Stimulant use disorder 2018     PAST SURGICAL HISTORY:  Past Surgical History:   Procedure Laterality Date     SECTION  2005    DILATION AND CURETTAGE OF UTERUS  2006           FAMILY HISTORY:  family history includes Cancer in her father; Depression in her paternal grandmother; Diabetes in her maternal grandmother, paternal grandfather, and paternal grandmother; Heart Failure in her maternal grandmother, paternal grandfather, and paternal grandmother; Hypertension in her maternal grandmother and mother; Mental Illness in her father, paternal grandfather, and paternal grandmother. SOCIAL HISTORY:   reports that she has been smoking e-cigarettes and cigarettes. She has been smoking an average of .5 packs per day.  She has never used smokeless tobacco.    Scheduled Meds:   nicotine  1 patch TransDERmal Daily    sodium chloride flush  5-40 mL IntraVENous 2 times per day    enoxaparin  40 mg SubCUTAneous Daily     Continuous Infusions:   sodium chloride      sodium chloride       PRN Meds:  albuterol sulfate HFA, sodium chloride flush, sodium chloride, ondansetron **OR** ondansetron, polyethylene glycol, acetaminophen **OR** acetaminophen, potassium chloride **OR** potassium chloride, magnesium sulfate    ALLERGIES:  Patient is allergic to latex, cephalosporins, other, sulfa antibiotics, and cefaclor. REVIEW OF SYSTEMS:  Constitutional: Negative for fever  HENT: Negative for sore throat  Eyes: Negative for redness   Respiratory: Negative for dyspnea, cough  Cardiovascular: Negative for chest pain  Gastrointestinal: Negative for vomiting, diarrhea   Genitourinary: Negative for hematuria   Musculoskeletal: Negative for arthralgias   Skin: Negative for rash  Neurological: Negative for syncope  Hematological: Negative for adenopathy  Psychiatric/Behavorial: + suicidal     PHYSICAL EXAM:  Blood pressure 109/77, pulse 78, temperature 98.6 °F (37 °C), temperature source Oral, resp. rate 11, height 5' 6\" (1.676 m), weight 143 lb (64.9 kg), last menstrual period 10/14/2022, SpO2 98 %, not currently breastfeeding.' on RA  Gen: No distress. Eyes: PERRL. No sclera icterus. No conjunctival injection. ENT: No discharge. Pharynx clear. Neck: Trachea midline. No obvious mass. Resp: No accessory muscle use. No crackles. No wheezes. No rhonchi. No dullness on percussion. CV: Regular rate. Regular rhythm. No murmur or rub. No edema. GI: Non-tender. Non-distended. No hernia. Skin: Warm and dry. No nodule on exposed extremities. Lymph: No cervical LAD. No supraclavicular LAD. M/S: No cyanosis. No joint deformity. No clubbing. Neuro: Awake. Alert. Moves all four extremities. Psych: Oriented x 3. No anxiety.      LABS:  CBC:   Recent Labs     10/27/22  0026   WBC 5.6   HGB 10.5*   HCT 31.4*   MCV 91.4        BMP:   Recent Labs     10/27/22  0026      K 3.7      CO2 24   BUN 12   CREATININE 0.6     LIVER PROFILE:   Recent Labs     10/27/22  0026   AST 31   ALT 45*   BILITOT 0.4   ALKPHOS 51     PT/INR: No results for input(s): PROTIME, INR in the last 72 hours. APTT: No results for input(s): APTT in the last 72 hours. UA:  Recent Labs     10/27/22  0105   PHUR 6.0     No results for input(s): PHART, KDG5VOS, PO2ART in the last 72 hours.     Chest imaging     ASSESSMENT:  Drug overdose-Wellbutrin and Invega  Acute encephalopathy/Metabolic encephalopathy   Suicidal attempt   Depression/Anxiety   Hepatitis C    PLAN:  Supplemental oxygen to maintain SaO2 >92%; wean as tolerated  Closely monitory airways, clinical status, cardiac rhythm, vital signs, and urine output   Poison control consult   Psych consult   Suicidal precaution  IVF NS  Urine drug screen- negative    this am   Monitor electrolytes  Pregnancy test  Alcohol, Salicylate, Tylenol levels negative   DVT prophylaxis: Lovenox  MRSA prophylaxis: Bactroban

## 2022-10-27 NOTE — H&P
History and Physical        HISTORY OF PRESENT ILLNESS: A 28-year-old female with a history of depression presented with drug overdose with Wellbutrin and Invega. She states that she took a handful of them. Does not remember how many. Admits to having some nausea. No vomiting. Recent admissions for depression and discharge on 10/25    Patient is allergic to latex, cephalosporins, other, sulfa antibiotics, and cefaclor. Past Medical History:   Diagnosis Date    Anxiety     Chronic hepatitis C without hepatic coma (HCC)     Chronic post-traumatic stress disorder (PTSD) 10/29/2018    Depression     Hepatitis C 2020    Mild intermittent asthma without complication     Stimulant use disorder 2018       Past Surgical History:   Procedure Laterality Date     SECTION  2005    DILATION AND CURETTAGE OF UTERUS  2006           Scheduled Meds:   nicotine  1 patch TransDERmal Daily    sodium chloride flush  5-40 mL IntraVENous 2 times per day    enoxaparin  40 mg SubCUTAneous Daily       Continuous Infusions:   sodium chloride      sodium chloride         PRN Meds:  albuterol sulfate HFA, sodium chloride flush, sodium chloride, ondansetron **OR** ondansetron, polyethylene glycol, acetaminophen **OR** acetaminophen, potassium chloride **OR** potassium chloride, magnesium sulfate       reports that she has been smoking e-cigarettes and cigarettes. She has been smoking an average of .5 packs per day.  She has never used smokeless tobacco.    Family History   Problem Relation Age of Onset    Cancer Father         lung    Mental Illness Father     Diabetes Maternal Grandmother     Hypertension Maternal Grandmother     Heart Failure Maternal Grandmother     Depression Paternal Grandmother     Diabetes Paternal Grandmother     Heart Failure Paternal Grandmother     Mental Illness Paternal Grandmother     Hypertension Mother     Diabetes Paternal Grandfather     Heart Failure Paternal Grandfather     Mental Illness Paternal Grandfather        Social History     Socioeconomic History    Marital status: Single     Spouse name: None    Number of children: 2    Years of education: 14    Highest education level: None   Occupational History    Occupation: unemployed past 2+ year   Tobacco Use    Smoking status: Every Day     Packs/day: 0.50     Types: E-Cigarettes, Cigarettes     Last attempt to quit: 10/19/2020     Years since quittin.0    Smokeless tobacco: Never    Tobacco comments:     using nicotine patches   Vaping Use    Vaping Use: Every day    Substances: Nicotine   Substance and Sexual Activity    Alcohol use: No     Alcohol/week: 1.0 standard drink     Types: 1 Standard drinks or equivalent per week     Comment: quit 20 pt went to AA    Drug use: Not Currently     Types: Methamphetamines (Crystal Meth), IV     Comment: last use 2020    Sexual activity: Yes     Partners: Male     Social Determinants of Health     Financial Resource Strain: Unknown    Difficulty of Paying Living Expenses: Patient refused   Food Insecurity: Unknown    Worried About Running Out of Food in the Last Year: Patient refused    Ran Out of Food in the Last Year: Patient refused   Transportation Needs: Unknown    Lack of Transportation (Medical): Patient refused    Lack of Transportation (Non-Medical): Patient refused   Physical Activity: Unknown    Days of Exercise per Week: Patient refused   Stress: Unknown    Feeling of Stress : Patient refused   Social Connections: Unknown    Frequency of Communication with Friends and Family: Patient refused    Frequency of Social Gatherings with Friends and Family: Patient refused    Attends Jewish Services: Patient refused   Housing Stability: Unknown    Unable to Pay for Housing in the Last Year: Patient refused    Unstable Housing in the Last Year: Patient refused     REVIEW OF SYSTEMS:   Constitutional: Negative for fever   HENT: Negative for sore throat Eyes: Negative for redness   Respiratory: Negative for dyspnea, cough   Cardiovascular: Negative for chest pain   Gastrointestinal: Negative for vomiting, diarrhea   Genitourinary: Negative for hematuria   Musculoskeletal: Negative for arthralgias   Skin: Negative for rash   Neurological: Negative for syncope   Hematological: Negative for adenopathy       Vitals:    10/27/22 0749   BP: 111/82   Pulse: 84   Resp: 17   Temp: 98.2 °F (36.8 °C)   SpO2: 98%     Gen: No distress. Somnolent, easily arousable  Eyes: PERRL. No sclera icterus. No conjunctival injection. ENT: No discharge. Pharynx clear. Neck: Trachea midline. Normal thyroid. Resp: No accessory muscle use. No crackles. No wheezes. No rhonchi. No dullness on percussion. CV: Regular rate. Regular rhythm. No murmur or rub. No edema. GI: Non-tender. Non-distended. No masses. No organomegaly. Normal bowel sounds. No hernia. Skin: Warm and dry. No nodule on exposed extremities. No rash on exposed extremities. Lymph: No cervical LAD. No supraclavicular LAD. M/S: No cyanosis. No joint deformity. No clubbing. Neuro: Somnolent, easily arousable. Oriented    CBC:   Recent Labs     10/27/22  0026   WBC 5.6   HGB 10.5*   HCT 31.4*   MCV 91.4        BMP:   Recent Labs     10/27/22  0026      K 3.7      CO2 24   BUN 12   CREATININE 0.6     LIVER PROFILE:   Recent Labs     10/27/22  0026   AST 31   ALT 45*   BILITOT 0.4   ALKPHOS 51     PT/INR: No results for input(s): PROTIME, INR in the last 72 hours. APTT: No results for input(s): APTT in the last 72 hours. UA:  Recent Labs     10/27/22  0105   PHUR 6.0       No orders to display         ASSESSMENT:    [email or web address] Problem:    Acute drug overdose, intentional self-harm, initial encounter [facility])  Active Problems:    Acute encephalopathy    Suicide attempt ([facility] [address]    Intentional overdose ([facility] [address]  Resolved Problems:    * No resolved hospital problems.  *      PLAN:    Intentional drug overdose with Wellbutrin and Invega. Admitted to the ICU. Monitor airways closely, monitor vital signs and cardiac rhythm closely. Poison control consult. Sitter at bedside. Suicide precautions. Seizure precautions given Wellbutrin overdose  Psych consult. Continue IV fluids. Toxic encephalopathy  Secondary to drug overdose.   Now she is somnolent but easily arousable and oriented    Lovenox for DVT prophylaxis   full code  General diet      Juma Higuera MD 10/27/2022 7:56 AM

## 2022-10-27 NOTE — PROGRESS NOTES
RT Inhaler-Nebulizer Bronchodilator Protocol Note    There is a bronchodilator order in the chart from a provider indicating to follow the RT Bronchodilator Protocol and there is an Initiate RT Inhaler-Nebulizer Bronchodilator Protocol order as well (see protocol at bottom of note). CXR Findings:  No results found. The findings from the last RT Protocol Assessment were as follows:   History Pulmonary Disease: (P) Smoker 15 pack years or more  Respiratory Pattern: (P) Regular pattern and RR 12-20 bpm  Breath Sounds: (P) Slightly diminished and/or crackles  Cough: (P) Strong, spontaneous, non-productive  Indication for Bronchodilator Therapy: (P) Decreased or absent breath sounds  Bronchodilator Assessment Score: (P) 3    Aerosolized bronchodilator medication orders have been revised according to the RT Inhaler-Nebulizer Bronchodilator Protocol below. Respiratory Therapist to perform RT Therapy Protocol Assessment initially then follow the protocol. Repeat RT Therapy Protocol Assessment PRN for score 0-3 or on second treatment, BID, and PRN for scores above 3. No Indications - adjust the frequency to every 6 hours PRN wheezing or bronchospasm, if no treatments needed after 48 hours then discontinue using Per Protocol order mode. If indication present, adjust the RT bronchodilator orders based on the Bronchodilator Assessment Score as indicated below. Use Inhaler orders unless patient has one or more of the following: on home nebulizer, not able to hold breath for 10 seconds, is not alert and oriented, cannot activate and use MDI correctly, or respiratory rate 25 breaths per minute or more, then use the equivalent nebulizer order(s) with same Frequency and PRN reasons based on the score. If a patient is on this medication at home then do not decrease Frequency below that used at home.     0-3 - enter or revise RT bronchodilator order(s) to equivalent RT Bronchodilator order with Frequency of every 4 hours PRN for wheezing or increased work of breathing using Per Protocol order mode. 4-6 - enter or revise RT Bronchodilator order(s) to two equivalent RT bronchodilator orders with one order with BID Frequency and one order with Frequency of every 4 hours PRN wheezing or increased work of breathing using Per Protocol order mode. 7-10 - enter or revise RT Bronchodilator order(s) to two equivalent RT bronchodilator orders with one order with TID Frequency and one order with Frequency of every 4 hours PRN wheezing or increased work of breathing using Per Protocol order mode. 11-13 - enter or revise RT Bronchodilator order(s) to one equivalent RT bronchodilator order with QID Frequency and an Albuterol order with Frequency of every 4 hours PRN wheezing or increased work of breathing using Per Protocol order mode. Greater than 13 - enter or revise RT Bronchodilator order(s) to one equivalent RT bronchodilator order with every 4 hours Frequency and an Albuterol order with Frequency of every 2 hours PRN wheezing or increased work of breathing using Per Protocol order mode.          Electronically signed by Olaf Peng RCP on 10/27/2022 at 11:11 AM
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
## 2022-10-27 NOTE — DISCHARGE INSTRUCTIONS
[facility] find a Physician line [phone]. Please call them to find a physician. They accept patients that have insurance. The crisis number for [facility] is [phone] (SAVE). This crisis line is available 24 hours a day, seven days a week. Other Outpatient Mental Health Treatment Services  Mental Health Therapy and Psychiatry (Medication Management)  [facility]  Location: [facility], [address] [place] [address], [address] [place] [address]  Phone: [phone]     [facility]  Location: [facility] in [facility] [address], [address]. Phone: [phone]     [address] [place] [address]  Location: Multiple offices in the [facility]  Phone: [phone] or [address] [facility]  Locations: Multiple locations in [facility] and [place]  Phone: [phone]     [facility]  Location: [facility]  Phone: [address]  Location: Multiple offices in [facility]   Phone: Denton Kennedy ([phone])     Jose Braun  Location: [facility] [address]  Phone: [phone] ([phone])     [address] [facility])  Location: [facility], [facility], [address]  Phone: [phone]     [email or web address]  Location: offices in [address] [place] [address]  Phone: [address]  Location: [address] [place] [address]  Phone: [phone]     Dr. Herrera Parker   Location: [address]    Phone: Michelle Polk 420  Location: [address] Phone: [phone]     Mental Health Therapy Only, No Psychiatry (Medication Management)     [facility]  Location: [address]  Phone: [phone]     [facility]  Location: [address] [place] [address]  Phone: [phone]    Resources:      For Life threatening emergencies, dial [address] 24-hour Crisis Hotline: [address] Suicide Prevention 24-hour Lifeline: 413.706.9437    LEROY Lopez 24-hour Helpline: [address] Domestic Violence 24-hour Hotline: [address] Sexual Assault 24-hour Hotline: [facility] Control 24-hour Hotline: [address] 24-hour Hotline: [address]: [facility] 555: [address]: [phone]    Transportation:    [employer] [phone]--[facility] must call them to set up transportation.

## 2022-10-27 NOTE — ED NOTES
Presenting Problem:Patient presents to [facility] voluntarily via ambulance. Patient reports that she was recently discharged from [facility] on 10/25 and became overwhelmed and anxious related to being discharged and not being able to contact her mother during her ride home. Patient reports that this anxiety became worse upon arriving home where she lives alone leading her to have suicidal thoughts and attempt suicide by taking unknown amount of Invega 6mg and Wellbutrin 150mg in an attempt to commit suicide. Patient reports current SI and does not feel safe being discharged at this time. Appearance/Hygiene:  ill-appearing, hospital attire, lying in bed, fair grooming, and fair hygiene   Motor Behavior: Patient is calm with purposeful non-aggressive movements. Patient has an unsteady gait at this time related to medications taken to OD. Attitude: cooperative, but has trouble focusing  Affect: flat affect   Speech: increased latency of response and soft  Mood: depressed   Thought Processes: Goal directed  Perceptions: Present - Patient reports auditory hallucinations in her home. Patient reports that the noises made her feel that someone was going to come and hurt her. Thought content: Patient describes paranoid ideations related to auditory hallucinations that someone is coming to harm her. Patient reports that once she was discharged she became increasingly anxious and regretted being discharge on the way home. Patient reports continued suicidal ideation. Orientation: A&Ox4   Memory: impaired recent memory--patient reports she has difficulty remembering what happened after she took the medications in an attempt to OD. Concentration: Fair    Insight/ judgement: impaired insight and judgment      Psychosocial and contextual factors: Never , unemployed, 42yo female who currently lives alone and does not feel safe in her home related to paranoid thoughts and auditory hallucinations.  Patient has hx of reclusive behavior in her home and reported a weak support system and rarely speaks with family. Denies any substance abuse--hx of amphetamine/opioid abuse. Reports continued depression symptoms, and anxiety. Reports no sleep since d/c from [facility]. Denies trauma/abuse hx. Patient has two children that have been removed from her care since 2020. C-SSRS flowsheet is  Complete. Psychiatric History (including current outpatient provider and past inpatient admissions): MDD, amphetamine use disorder-sustained remission, amphetamine use disorder--sustained remission, anxiety, PTSD, suicidal ideation, suicide attempt, psychosis    Inpatient:  10/17-10/25/22 Kelsey Zaman MD: Psychosis, and depression  7/11-7/12/22 Kelsey Zaman MD: depressed mood--denies SI  6/27-6/30/21 [facility] Josselin Winter MD: MDD, suicidal ideation  7/18-7/29/20 Kelsey Zaman MD: MDD, with auditory hallucinations  7/08-7/16/20 [facility] Archie Atkins MD: MDD, psychosis   10/28-11/5/18 Chino Arteaga MD: suicidal ideation, MDD, polysubstance abuse  9/22-10/01/18 Chino Arteaga MD: MDD, polysubstance abuse    Outpatient: No current follow-up    Medications: Patient attempted to OD on Rx medications. No current facility-administered medications on file prior to encounter. Current Outpatient Medications on File Prior to Encounter   Medication Sig Dispense Refill    buPROPion (WELLBUTRIN SR) 150 MG extended release tablet Take 1 tablet by mouth daily 30 tablet 0    paliperidone (INVEGA) 6 MG extended release tablet Take 1 tablet by mouth at bedtime 30 tablet 0    albuterol sulfate HFA (PROVENTIL HFA) 108 (90 Base) MCG/ACT inhaler Inhale 2 puffs into the lungs every 4 hours as needed for Wheezing or Shortness of Breath (Space out to every 6 hours as symptoms improve) Space out to every 6 hours as symptoms improve.  1 each 1    ibuprofen (ADVIL;MOTRIN) 800 MG tablet Take 1 tablet by mouth every 8 hours as needed for Pain 20 tablet 0    norethindrone (INCASSIA) 0.35 MG tablet Take 1 tablet by mouth daily 30 tablet 2    nicotine (NICODERM CQ) 21 MG/24HR Place 1 patch onto the skin daily for 15 days 15 patch 0     Access to Firearms: Denies    ASSESSMENT FOR IMMINENT FUTURE DANGER:    RISK FACTORS:    []  Age <25 or >49   []  Male gender   [x]  Depressed mood   [x]  Active suicidal ideation   [x]  Suicide plan   [x]  Suicide attempt   [x]  Access to lethal means   [x]  Prior suicide attempt   []  Active substance abuse (if yes pleases add details )   [x]  Highly impulsive behaviors   [x]  Not attending to self-care/ADLs    []  Recent significant loss   []  Chronic pain or medical illness   [x]  Social isolation   []  History of violence (if yes please elaborate )   [x]  Active psychosis   []  Cognitive impairment    [x]  No outpatient services in place   [x]  Medication noncompliance   [x]  No collateral information to support safety  [] Self- injurious/ Self-harm behavior    PROTECTIVE FACTORS:  [x] Age >25 and <55  [x] Female gender   [] Denies depression  [] Denies suicidal ideation  [] Does not have lethal plan   [x] Does not have access to guns or weapons  [] Patient is verbally reyna for safety  [] No prior suicide attempts  [x] No active substance abuse  [] Patient has social or family support  [] No active psychosis or cognitive dysfunction  [x] Physically healthy  [] Has outpatient services in place  [] Compliant with recommended medications  [] Collateral information from  supports patient safety   [] Patient is future oriented with plans to            Omar Fitzgerald RN  10/27/22 1392

## 2022-10-27 NOTE — PLAN OF CARE
Problem: Discharge Planning  Goal: Discharge to home or other facility with appropriate resources  Recent Flowsheet Documentation  Taken 10/27/2022 1906 by Meron Mccormack RN  Discharge to home or other facility with appropriate resources: Identify barriers to discharge with patient and caregiver
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
## 2022-10-27 NOTE — PLAN OF CARE
65-year-old female presents after ingesting multiple tablets of Wellbutrin and Invega. She is somnolent in the ER. Otherwise work-up is reassuring. Of note, patient was just discharged from behavioral on 10/25 where she was treated for psychosis and depression. She also has history of polysubstance abuse (amphetamines and opioids). Admitted to ICU for close monitoring. She was placed on a hold in the ER. Intentional overdose with multiple agents, intent self-harm.   Acute encephalopathy comment mild to moderate  Depression  Psychosis

## 2022-10-27 NOTE — ED PROVIDER NOTES
[facility] Emergency Department      CHIEF COMPLAINT  Psychiatric Evaluation (Patient states she was just released from mental health and she took 8 of her \"mental health meds\" because they released her too early. She took them to harm herself.)      HISTORY OF PRESENT ILLNESS  Primo Hightower is a 44 y.o. female with a history of hepatitis C, anxiety and depression presents after an intentional overdose. She states she was recently admitted to the behavioral health inpatient unit but she thinks they discharged her too soon. She was discharged 2 days ago on 10/25. She states earlier in the day today sometime this morning she took 4 of her Wellbutrin pills. This evening she took 8 of her Invega pills. She does not recall the exact time she took the medications. this was an attempt to kill herself. She called 911. She lives alone she states she is having chest pressure now and feels sleepy. .   No other complaints, modifying factors or associated symptoms. I have reviewed the following from the nursing documentation.     Past Medical History:   Diagnosis Date    Anxiety     Chronic hepatitis C without hepatic coma (HCC)     Chronic post-traumatic stress disorder (PTSD) 10/29/2018    Depression     Hepatitis C 2020    Mild intermittent asthma without complication     Stimulant use disorder 2018     Past Surgical History:   Procedure Laterality Date     SECTION  2005    DILATION AND CURETTAGE OF UTERUS  2006         Family History   Problem Relation Age of Onset    Cancer Father         lung    Mental Illness Father     Diabetes Maternal Grandmother     Hypertension Maternal Grandmother     Heart Failure Maternal Grandmother     Depression Paternal Grandmother     Diabetes Paternal Grandmother     Heart Failure Paternal Grandmother     Mental Illness Paternal Grandmother     Hypertension Mother     Diabetes Paternal Grandfather     Heart Failure Paternal Grandfather Mental Illness Paternal Grandfather      Social History     Socioeconomic History    Marital status: Single     Spouse name: Not on file    Number of children: 2    Years of education: 14    Highest education level: Not on file   Occupational History    Occupation: unemployed past 2+ year   Tobacco Use    Smoking status: Every Day     Packs/day: 0.50     Types: E-Cigarettes, Cigarettes     Last attempt to quit: 10/19/2020     Years since quittin.0    Smokeless tobacco: Never    Tobacco comments:     using nicotine patches   Vaping Use    Vaping Use: Every day    Substances: Nicotine   Substance and Sexual Activity    Alcohol use: No     Alcohol/week: 1.0 standard drink     Types: 1 Standard drinks or equivalent per week     Comment: quit 20 pt went to AA    Drug use: Not Currently     Types: Methamphetamines (Crystal Meth), IV     Comment: last use 2020    Sexual activity: Yes     Partners: Male   Other Topics Concern    Not on file   Social History Narrative    Not on file     Social Determinants of Health     Financial Resource Strain: Unknown    Difficulty of Paying Living Expenses: Patient refused   Food Insecurity: Unknown    Worried About Running Out of Food in the Last Year: Patient refused    Ran Out of Food in the Last Year: Patient refused   Transportation Needs: Unknown    Lack of Transportation (Medical): Patient refused    Lack of Transportation (Non-Medical): Patient refused   Physical Activity: Unknown    Days of Exercise per Week: Patient refused    Minutes of Exercise per Session: Not on file   Stress: Unknown    Feeling of Stress : Patient refused   Social Connections: Unknown    Frequency of Communication with Friends and Family: Patient refused    Frequency of Social Gatherings with Friends and Family: Patient refused    Attends Religion Services: Patient refused    Active Member of Clubs or Organizations: Not on file    Attends Club or Organization Meetings: Not on file    Marital Status: Not on file   Intimate Partner Violence: Not on file   Housing Stability: Unknown    Unable to Pay for Housing in the Last Year: Patient refused    Number of Places Lived in the Last Year: Not on file    Unstable Housing in the Last Year: Patient refused     No current facility-administered medications for this encounter. Current Outpatient Medications   Medication Sig Dispense Refill    buPROPion (WELLBUTRIN SR) 150 MG extended release tablet Take 1 tablet by mouth daily 30 tablet 0    paliperidone (INVEGA) 6 MG extended release tablet Take 1 tablet by mouth at bedtime 30 tablet 0    albuterol sulfate HFA (PROVENTIL HFA) 108 (90 Base) MCG/ACT inhaler Inhale 2 puffs into the lungs every 4 hours as needed for Wheezing or Shortness of Breath (Space out to every 6 hours as symptoms improve) Space out to every 6 hours as symptoms improve. 1 each 1    ibuprofen (ADVIL;MOTRIN) 800 MG tablet Take 1 tablet by mouth every 8 hours as needed for Pain 20 tablet 0    norethindrone (INCASSIA) 0.35 MG tablet Take 1 tablet by mouth daily 30 tablet 2    nicotine (NICODERM CQ) 21 MG/24HR Place 1 patch onto the skin daily for 15 days 15 patch 0     Allergies   Allergen Reactions    Latex Rash     \"I get a red inflamed rash. \"  \"I get a red inflamed rash. \"  \"I get a red inflamed rash. \"    I'm not allergic to this anymore\"    Cephalosporins Hives     \"I'm not allergic to this anymore. \"    Other Hives     Cephazil  Cephazil  \"I'm not allergic to this anymore. \"      Sulfa Antibiotics      \"I'm not allergic to this anymore. \"    Cefaclor Hives and Rash     \"I'm not allergic to this anymore. \"       REVIEW OF SYSTEMS    General:  No fevers  Eyes:  No recent vison changes  ENT:  No sore throat, no nasal congestion  Cardiovascular:  no palpitations  Respiratory:   no cough, no wheezing  Gastrointestinal:  No abdominal pain, no vomiting, no diarrhea  Musculoskeletal:  No muscle pain, no joint pain  Skin:  No rash   Neurologic:  No speech problems, no headache, no extremity numbness, no extremity weakness  Genitourinary:  No dysuria  Extremities:  no edema, no pain      Unless otherwise stated in this report, this patient's positive and negative responses for review of systems (constitutional, eyes, ENT, cardiovascular, respiratory, gastrointestinal, neurological, genitourinary, musculoskeletal, integument systems and systems related to the presenting problem) are either stated in the preceding paragraph, were not pertinent or were negative for the symptoms and/or complaints related to the medical problem. PHYSICAL EXAM  /75   Pulse 83   Temp 98.6 °F (37 °C) (Oral)   Resp 18   Ht 5' 6\" (1.676 m)   Wt 143 lb (64.9 kg)   LMP 10/14/2022   SpO2 99%   BMI 23.08 kg/m²   GENERAL APPEARANCE: Awake. Somnolent, easily arousable no acute distress. HEAD: Normocephalic. Atraumatic. EYES: PERRL. EOM's grossly intact. ENT: Mucous membranes are moist.   NECK: Supple, trachea midline. HEART: RRR. LUNGS: Respirations unlabored. CTAB. Good air exchange. No wheezes, rales, or rhonchi. Speaking comfortably in full sentences. ABDOMEN: Soft. Non-distended. Non-tender. No guarding or rebound. EXTREMITIES: No peripheral edema. MAEE. No acute deformities. SKIN: Warm, dry and intact. No acute rashes. NEUROLOGICAL: Somnolent, easily arousable and oriented X 3. CN II-XII grossly intact. Strength 5/5, sensation intact. PSYCHIATRIC: Flat affect. Reports suicidal ideation    LABS  I have reviewed all labs for this visit.    Results for orders placed or performed during the hospital encounter of 10/27/22   COVID-19 & Influenza Combo    Specimen: Nasopharyngeal Swab   Result Value Ref Range    SARS-CoV-2 RNA, RT PCR NOT DETECTED NOT DETECTED    INFLUENZA A NOT DETECTED NOT DETECTED    INFLUENZA B NOT DETECTED NOT DETECTED   Comprehensive Metabolic Panel w/ Reflex to MG   Result Value Ref Range    Sodium 138 136 - 145 mmol/L    Potassium reflex Magnesium 3.7 3.5 - 5.1 mmol/L    Chloride 105 99 - 110 mmol/L    CO2 24 21 - 32 mmol/L    Anion Gap 9 3 - 16    Glucose 102 (H) 70 - 99 mg/dL    BUN 12 7 - 20 mg/dL    Creatinine 0.6 0.6 - 1.1 mg/dL    Est Glom Filt Rate >60 >60    Calcium 8.6 8.3 - 10.6 mg/dL    Total Protein 6.1 (L) 6.4 - 8.2 g/dL    Albumin 4.0 3.4 - 5.0 g/dL    Albumin/Globulin Ratio 1.9 1.1 - 2.2    Total Bilirubin 0.4 0.0 - 1.0 mg/dL    Alkaline Phosphatase 51 40 - 129 U/L    ALT 45 (H) 10 - 40 U/L    AST 31 15 - 37 U/L   CBC with Auto Differential   Result Value Ref Range    WBC 5.6 4.0 - 11.0 K/uL    RBC 3.43 (L) 4.00 - 5.20 M/uL    Hemoglobin 10.5 (L) 12.0 - 16.0 g/dL    Hematocrit 31.4 (L) 36.0 - 48.0 %    MCV 91.4 80.0 - 100.0 fL    MCH 30.5 26.0 - 34.0 pg    MCHC 33.4 31.0 - 36.0 g/dL    RDW 14.1 12.4 - 15.4 %    Platelets 268 821 - 979 K/uL    MPV 8.6 5.0 - 10.5 fL    Neutrophils % 78.8 %    Lymphocytes % 13.0 %    Monocytes % 7.7 %    Eosinophils % 0.1 %    Basophils % 0.4 %    Neutrophils Absolute 4.4 1.7 - 7.7 K/uL    Lymphocytes Absolute 0.7 (L) 1.0 - 5.1 K/uL    Monocytes Absolute 0.4 0.0 - 1.3 K/uL    Eosinophils Absolute 0.0 0.0 - 0.6 K/uL    Basophils Absolute 0.0 0.0 - 0.2 K/uL   Urine Drug Screen   Result Value Ref Range    Amphetamine Screen, Urine Neg Negative <1000ng/mL    Barbiturate Screen, Ur Neg Negative <200 ng/mL    Benzodiazepine Screen, Urine Neg Negative <200 ng/mL    Cannabinoid Scrn, Ur Neg Negative <50 ng/mL    Cocaine Metabolite Screen, Urine Neg Negative <300 ng/mL    Opiate Scrn, Ur Neg Negative <300 ng/mL    PCP Screen, Urine Neg Negative <25 ng/mL    Methadone Screen, Urine Neg Negative <300 ng/mL    Oxycodone Urine Neg Negative <100 ng/ml    FENTANYL SCREEN, URINE Neg Negative <5 ng/mL    pH, UA 6.0     Drug Screen Comment: see below    Ethanol   Result Value Ref Range    Ethanol Lvl None Detected mg/dL   Salicylate   Result Value Ref Range    Salicylate, Serum <8.4 (L) 15.0 - 30.0 mg/dL   Acetaminophen Level   Result Value Ref Range    Acetaminophen Level <5 (L) 10 - 30 ug/mL   Pregnancy, Urine   Result Value Ref Range    HCG(Urine) Pregnancy Test Negative Detects HCG level >20 MIU/mL       EKG  The Ekg interpreted by myself  normal sinus rhythm with a rate of 67  Axis is   Normal  QTc is  normal  Intervals and Durations are unremarkable. No specific ST-T wave changes appreciated. No evidence of acute ischemia. No significant change from prior EKG dated July 23, 2021    Cardiac Monitoring: The cardiac monitor revealed normal sinus rhythm as interpreted by me. The cardiac monitor was ordered secondary to the patient's complaint of overdose and to monitor the patient for dysrhythmia. RADIOLOGY  X-RAYS: ALL IMAGES INCLUDING PLAIN FILMS, CT, ULTRASOUND AND MRI HAVE BEEN READ BY THE RADIOLOGIST. I have personally reviewed plain film images and have reviewed the radiology reports. No orders to display              Rechecks: Physical assessment performed. Patient is resting comfortably. She has been updated on the plan for medical admission and she is in agreement. Critical Care:I personally spent a total of 35 minutes of critical care time in obtaining history, performing a physical exam, bedside monitoring of interventions, collecting and interpreting tests and discussion with consultants but excluding time spent performing procedures, treating other patients and teaching time. Sepsis:  Is this patient to be included in the SEP-1 Core Measure due to severe sepsis or septic shock? No   Exclusion criteria - the patient is NOT to be included for SEP-1 Core Measure due to: Infection is not suspected         ED COURSE/MDM  Patient seen and evaluated. Here the patient is afebrile with normal vitals signs. Old records reviewed. Here the patient is somnolent but easily arousable.   EKG shows sinus rhythm with no ischemic changes. Normal QRS and QT intervals. Lab work is reassuring, toxicology screen is negative. COVID and flu tests are negative. I spoke with poison control and they are recommending a 24-hour medical observation after both the Wellbutrin and Invega ingestions. I have placed her on involuntary hold paperwork. I will admit her medically for observation and anticipate inpatient psychiatric consultation. labs and imaging reviewed and results discussed with patient. Patient was reassessed as noted above . Plan of care discussed with patient. Patient in agreement with plan. CLINICAL IMPRESSION  1. Intentional overdose, initial encounter ([facility] [address]    2. Suicide attempt ([facility] [address]        Blood pressure 104/75, pulse 83, temperature 98.6 °F (37 °C), temperature source Oral, resp. rate 18, height 5' 6\" (1.676 m), weight 143 lb (64.9 kg), last menstrual period 10/14/2022, SpO2 99 %, not currently breastfeeding. Froylan Nunez was admitted in stable condition. Hudson Mays MD am the primary clinician of record.     (Please note this note was completed with a voice recognition program.  Efforts were made to edit the dictations but occasionally words are mis-transcribed.)        Shante Marley MD  10/27/22 [id number]

## 2022-10-27 NOTE — PROGRESS NOTES
Pt requesting a liquid diet, including ensures. She states that her body does not absorb food very well, so she would like to stick to liquids. Per Dr. Edwin Caballero, ok to order liquid diet and ensures.

## 2022-10-27 NOTE — ACP (ADVANCE CARE PLANNING)
Advance Care Planning     General Advance Care Planning (ACP) Conversation    Date of Conversation: 10/27/2022  Conducted with: Patient with Decision Making Capacity      Content/Action Overview:  DECLINED ACP Conversation - will revisit periodically    Length of Voluntary ACP Conversation in minutes:  <16 minutes (Non-Billable)    Renu JENNINGS, GLORIA-S

## 2022-10-27 NOTE — PROGRESS NOTES
Pt admitted to ICU 6 at this time. Is alert, but lethargic. Pt weak, but able to ambulated to [facility] with assistance x1. Pt with c/o shortness of breath, though sating 99%. When asked if pt has had any thoughts of harming herself since she was last asked, pt stated \"I just want everything to be done, I'm so upset with everything that is going on\". Pt did not explain further and closed her eyes. Will work on admission questions with pt when she is more awake.

## 2022-10-27 NOTE — ED NOTES
Dr. Callie Streeter was perfect served at  for routine inpatient consult     Marcy Encinas  10/27/22 8211

## 2022-10-27 NOTE — PROGRESS NOTES
4 Eyes Skin Assessment     The patient is being assess for   Admission    I agree that 2 RN's have performed a thorough Head to Toe Skin Assessment on the patient. ALL assessment sites listed below have been assessed. Areas assessed for pressure by both nurses:   [x]   Head, Face, and Ears   [x]   Shoulders, Back, and Chest, Abdomen  [x]   Arms, Elbows, and Hands   [x]   Coccyx, Sacrum, and Ischium  [x]   Legs, Feet, and Heels    No noted areas of concern. Skin Assessed Under all Medical Devices by both nurses:  NA               All Mepilex Borders were peeled back and area peeked at by both nurses:  No: NA  Please list where Mepilex Borders are located:  NA             **SHARE this note so that the co-signing nurse is able to place an eSignature**    Co-signer eSignature: Electronically signed by Britney Bobo RN on 10/27/22 at 6:27 PM EDT    Does the Patient have Skin Breakdown related to pressure?    HERIBERTO             Jose Prevention initiated:  NA   Wound Care Orders initiated:  NA      [facility] nurse consulted for Pressure Injury (Stage 3,4, Unstageable, DTI, NWPT, Complex wounds)and New or Established Ostomies:  NA      Primary Nurse eSignature: Electronically signed by Rosemarie Whitney RN on 10/27/22 at 6:18 PM EDT

## 2022-10-28 ENCOUNTER — HOSPITAL ENCOUNTER (INPATIENT)
Age: 37
LOS: 5 days | Discharge: HOME | DRG: 817 | End: 2022-11-02
Attending: PSYCHIATRY & NEUROLOGY | Admitting: PSYCHIATRY & NEUROLOGY
Payer: COMMERCIAL

## 2022-10-28 VITALS
RESPIRATION RATE: 11 BRPM | OXYGEN SATURATION: 97 % | TEMPERATURE: 98.6 F | HEART RATE: 73 BPM | SYSTOLIC BLOOD PRESSURE: 115 MMHG | WEIGHT: 132.6 LBS | DIASTOLIC BLOOD PRESSURE: 87 MMHG | BODY MASS INDEX: 21.31 KG/M2 | HEIGHT: 66 IN

## 2022-10-28 PROBLEM — F32.3 MAJOR DEPRESSIVE DISORDER WITH PSYCHOTIC FEATURES (HCC): Status: ACTIVE | Noted: 2022-10-28

## 2022-10-28 LAB
A/G RATIO: 1.5 (ref 1.1–2.2)
ALBUMIN SERPL-MCNC: 3.4 G/DL (ref 3.4–5)
ALP BLD-CCNC: 45 U/L (ref 40–129)
ALT SERPL-CCNC: 36 U/L (ref 10–40)
ANION GAP SERPL CALCULATED.3IONS-SCNC: 8 MMOL/L (ref 3–16)
AST SERPL-CCNC: 23 U/L (ref 15–37)
BASOPHILS ABSOLUTE: 0 K/UL (ref 0–0.2)
BASOPHILS RELATIVE PERCENT: 1 %
BILIRUB SERPL-MCNC: 0.4 MG/DL (ref 0–1)
BUN BLDV-MCNC: 10 MG/DL (ref 7–20)
CALCIUM SERPL-MCNC: 8.3 MG/DL (ref 8.3–10.6)
CHLORIDE BLD-SCNC: 107 MMOL/L (ref 99–110)
CO2: 22 MMOL/L (ref 21–32)
CREAT SERPL-MCNC: <0.5 MG/DL (ref 0.6–1.1)
EOSINOPHILS ABSOLUTE: 0.1 K/UL (ref 0–0.6)
EOSINOPHILS RELATIVE PERCENT: 1.6 %
GFR SERPL CREATININE-BSD FRML MDRD: >60 ML/MIN/{1.73_M2}
GLUCOSE BLD-MCNC: 78 MG/DL (ref 70–99)
HCT VFR BLD CALC: 31 % (ref 36–48)
HEMOGLOBIN: 10.5 G/DL (ref 12–16)
LYMPHOCYTES ABSOLUTE: 1.2 K/UL (ref 1–5.1)
LYMPHOCYTES RELATIVE PERCENT: 27.2 %
MCH RBC QN AUTO: 31.3 PG (ref 26–34)
MCHC RBC AUTO-ENTMCNC: 33.9 G/DL (ref 31–36)
MCV RBC AUTO: 92.2 FL (ref 80–100)
MONOCYTES ABSOLUTE: 0.5 K/UL (ref 0–1.3)
MONOCYTES RELATIVE PERCENT: 11.8 %
NEUTROPHILS ABSOLUTE: 2.6 K/UL (ref 1.7–7.7)
NEUTROPHILS RELATIVE PERCENT: 58.4 %
PDW BLD-RTO: 14.3 % (ref 12.4–15.4)
PLATELET # BLD: 198 K/UL (ref 135–450)
PMV BLD AUTO: 8.2 FL (ref 5–10.5)
POTASSIUM REFLEX MAGNESIUM: 3.9 MMOL/L (ref 3.5–5.1)
RBC # BLD: 3.37 M/UL (ref 4–5.2)
SODIUM BLD-SCNC: 137 MMOL/L (ref 136–145)
TOTAL PROTEIN: 5.6 G/DL (ref 6.4–8.2)
TSH SERPL DL<=0.05 MIU/L-ACNC: 3.13 UIU/ML (ref 0.27–4.2)
WBC # BLD: 4.5 K/UL (ref 4–11)

## 2022-10-28 PROCEDURE — 99238 HOSP IP/OBS DSCHRG MGMT 30/<: CPT | Performed by: INTERNAL MEDICINE

## 2022-10-28 PROCEDURE — 2580000003 HC RX 258: Performed by: INTERNAL MEDICINE

## 2022-10-28 PROCEDURE — 6360000002 HC RX W HCPCS: Performed by: INTERNAL MEDICINE

## 2022-10-28 PROCEDURE — 94640 AIRWAY INHALATION TREATMENT: CPT

## 2022-10-28 PROCEDURE — 6370000000 HC RX 637 (ALT 250 FOR IP)

## 2022-10-28 PROCEDURE — 1240000000 HC EMOTIONAL WELLNESS R&B

## 2022-10-28 PROCEDURE — 99222 1ST HOSP IP/OBS MODERATE 55: CPT

## 2022-10-28 PROCEDURE — 99233 SBSQ HOSP IP/OBS HIGH 50: CPT | Performed by: INTERNAL MEDICINE

## 2022-10-28 PROCEDURE — 36415 COLL VENOUS BLD VENIPUNCTURE: CPT

## 2022-10-28 PROCEDURE — 80053 COMPREHEN METABOLIC PANEL: CPT

## 2022-10-28 PROCEDURE — 6370000000 HC RX 637 (ALT 250 FOR IP): Performed by: INTERNAL MEDICINE

## 2022-10-28 PROCEDURE — 94761 N-INVAS EAR/PLS OXIMETRY MLT: CPT

## 2022-10-28 PROCEDURE — 85025 COMPLETE CBC W/AUTO DIFF WBC: CPT

## 2022-10-28 PROCEDURE — 84443 ASSAY THYROID STIM HORMONE: CPT

## 2022-10-28 RX ORDER — POLYETHYLENE GLYCOL 3350 17 G
2 POWDER IN PACKET (EA) ORAL
Status: DISCONTINUED | OUTPATIENT
Start: 2022-10-28 | End: 2022-11-02 | Stop reason: HOSPADM

## 2022-10-28 RX ORDER — HYDROXYZINE 50 MG/1
50 TABLET, FILM COATED ORAL 3 TIMES DAILY PRN
Status: DISCONTINUED | OUTPATIENT
Start: 2022-10-28 | End: 2022-11-02 | Stop reason: HOSPADM

## 2022-10-28 RX ORDER — OLANZAPINE 5 MG/1
5 TABLET ORAL EVERY 4 HOURS PRN
Status: DISCONTINUED | OUTPATIENT
Start: 2022-10-28 | End: 2022-11-02 | Stop reason: HOSPADM

## 2022-10-28 RX ORDER — DIPHENHYDRAMINE HYDROCHLORIDE 50 MG/ML
50 INJECTION INTRAMUSCULAR; INTRAVENOUS EVERY 4 HOURS PRN
Status: DISCONTINUED | OUTPATIENT
Start: 2022-10-28 | End: 2022-11-02 | Stop reason: HOSPADM

## 2022-10-28 RX ORDER — IBUPROFEN 400 MG/1
400 TABLET ORAL EVERY 6 HOURS PRN
Status: DISCONTINUED | OUTPATIENT
Start: 2022-10-28 | End: 2022-11-02 | Stop reason: HOSPADM

## 2022-10-28 RX ORDER — TRAZODONE HYDROCHLORIDE 50 MG/1
50 TABLET ORAL NIGHTLY PRN
Status: DISCONTINUED | OUTPATIENT
Start: 2022-10-28 | End: 2022-11-02 | Stop reason: HOSPADM

## 2022-10-28 RX ORDER — ACETAMINOPHEN 325 MG/1
650 TABLET ORAL EVERY 4 HOURS PRN
Status: DISCONTINUED | OUTPATIENT
Start: 2022-10-28 | End: 2022-11-02 | Stop reason: HOSPADM

## 2022-10-28 RX ORDER — MAGNESIUM HYDROXIDE/ALUMINUM HYDROXICE/SIMETHICONE 120; 1200; 1200 MG/30ML; MG/30ML; MG/30ML
30 SUSPENSION ORAL EVERY 6 HOURS PRN
Status: DISCONTINUED | OUTPATIENT
Start: 2022-10-28 | End: 2022-11-02 | Stop reason: HOSPADM

## 2022-10-28 RX ORDER — PALIPERIDONE 3 MG/1
3 TABLET, EXTENDED RELEASE ORAL DAILY
Status: DISCONTINUED | OUTPATIENT
Start: 2022-10-29 | End: 2022-11-02 | Stop reason: HOSPADM

## 2022-10-28 RX ADMIN — ENOXAPARIN SODIUM 40 MG: 100 INJECTION SUBCUTANEOUS at 10:49

## 2022-10-28 RX ADMIN — NICOTINE POLACRILEX 2 MG: 2 LOZENGE ORAL at 17:15

## 2022-10-28 RX ADMIN — Medication 10 ML: at 10:50

## 2022-10-28 RX ADMIN — OLANZAPINE 5 MG: 5 TABLET, FILM COATED ORAL at 17:15

## 2022-10-28 RX ADMIN — HYDROXYZINE HYDROCHLORIDE 50 MG: 50 TABLET, FILM COATED ORAL at 17:15

## 2022-10-28 RX ADMIN — SODIUM CHLORIDE: 9 INJECTION, SOLUTION INTRAVENOUS at 01:42

## 2022-10-28 RX ADMIN — ACETAMINOPHEN 650 MG: 325 TABLET ORAL at 10:49

## 2022-10-28 RX ADMIN — Medication 2 PUFF: at 07:44

## 2022-10-28 ASSESSMENT — SLEEP AND FATIGUE QUESTIONNAIRES
AVERAGE NUMBER OF SLEEP HOURS: 6
DO YOU USE A SLEEP AID: YES
DO YOU HAVE DIFFICULTY SLEEPING: YES
SLEEP PATTERN: DISTURBED/INTERRUPTED SLEEP;RESTLESSNESS;DIFFICULTY ARISING

## 2022-10-28 ASSESSMENT — PAIN SCALES - GENERAL: PAINLEVEL_OUTOF10: 2

## 2022-10-28 ASSESSMENT — LIFESTYLE VARIABLES
HOW OFTEN DO YOU HAVE A DRINK CONTAINING ALCOHOL: 2-4 TIMES A MONTH
HOW MANY STANDARD DRINKS CONTAINING ALCOHOL DO YOU HAVE ON A TYPICAL DAY: 3 OR 4

## 2022-10-28 NOTE — PROGRESS NOTES
Bedside handoff report received and patient assessment completed. A&O x4. SR/ST per monitor with occ PVC's. Sao2 maintained on RA. PRN tylenol given for c/o neck pain. Pt ambulating throughout room with steady gait. Sitter remains at bedside. POC discussed and all questions addressed. Denies any current needs. Will cont to monitor.

## 2022-10-28 NOTE — BH NOTE
Pt states she over-took her medications due to having panic attack and just wanted it to stop. States she is able to contract for safety while she is here. Pt requests something for agitation and anxiety.

## 2022-10-28 NOTE — BH NOTE
[facility]  Admission Note     Admission Type:  Voluntary       Reason for admission: Suicide Attempt         Addictive Behavior: polysubstance abuse       Medical Problems:   Past Medical History:   Diagnosis Date    Anxiety     Chronic hepatitis C without hepatic coma (HCC)     Chronic post-traumatic stress disorder (PTSD) 10/29/2018    Depression     Hepatitis C 07/09/2020    Mild intermittent asthma without complication     Stimulant use disorder 9/23/2018       Status EXAM:  Mental Status and Behavioral Exam  Normal: No  Level of Assistance: Independent/Self  Facial Expression: Brightened  Affect: Congruent, Constricted  Level of Consciousness: Alert  Frequency of Checks: 4 times per hour, close  Mood:Normal: No  Mood: Anxious, Labile, Irritable  Motor Activity:Normal: No  Motor Activity: Increased (slightly agitated)  Eye Contact: Good  Observed Behavior: Cooperative, Friendly, Other (comment) (wanted to be finished with interview)  Sexual Misconduct History: Current - no  Preception: [place] to person, [place] to time, [place] to place, [place] to situation  Attention:Normal: No  Attention: Distractible  Thought Processes: Other (comment) (linear, logical)  Thought Content:Normal: Yes  Thought Content: Poverty of content  Depression Symptoms: No problems reported or observed. Anxiety Symptoms: Generalized  Elizabeth Symptoms: No problems reported or observed.   Hallucinations: None  Delusions: No  Memory:Normal: Yes  Insight and Judgment: No  Insight and Judgment: Poor judgment, Poor insight    Tobacco Screening:  Practical Counseling, on admission, david X, if applicable and completed (first 3 are required if patient doesn't refuse):            (x) Recognizing danger situations (included triggers and roadblocks)                    (x) Coping skills (new ways to manage stress,relaxation techniques, changing routine, distraction)                                                           (x) Basic information about quitting (benefits of quitting, techniques in how to quit, available resources  ( ) Referral for counseling faxed to [facility]                                                                                                                   (x) Patient refused counseling  ( ) Patient has not smoked in the last 30 days    Metabolic Screening:    Lab Results   Component Value Date    LABA1C 4.7 07/11/2022       Lab Results   Component Value Date    CHOL 193 06/27/2021    CHOL 140 07/19/2020    CHOL 133 07/09/2020     Lab Results   Component Value Date    TRIG 127 06/27/2021    TRIG 68 07/19/2020    TRIG 128 07/09/2020     Lab Results   Component Value Date    HDL 58 06/27/2021    HDL 50 07/19/2020    HDL 39 (L) 07/09/2020     No components found for: LDLCAL  Lab Results   Component Value Date    LABVLDL 25 06/27/2021    LABVLDL 14 07/19/2020    LABVLDL 26 07/09/2020         Body mass index is 21.46 kg/m². BP Readings from Last 2 Encounters:   10/28/22 112/80   10/28/22 115/87           Pt admitted with followings belongings:  Dental Appliances: None  Vision - Corrective Lenses: None  Hearing Aid: None  Jewelry: None  Body Piercings Removed: N/A  Clothing: Sweater  Other Valuables:  At home    Yahir Langley RN

## 2022-10-28 NOTE — PLAN OF CARE
Problem: Discharge Planning  Goal: Discharge to home or other facility with appropriate resources  Outcome: Progressing  Flowsheets  Taken 10/27/2022 2000 by Silvia Alves RN  Discharge to home or other facility with appropriate resources:   Identify barriers to discharge with patient and caregiver   Arrange for needed discharge resources and transportation as appropriate   Refer to discharge planning if patient needs post-hospital services based on physician order or complex needs related to functional status, cognitive ability or social support system  Taken 10/27/2022 1906 by Fauzia Carroll RN  Discharge to home or other facility with appropriate resources: Identify barriers to discharge with patient and caregiver     Problem: Safety - Adult  Goal: Free from fall injury  Outcome: Progressing     Problem: Pain  Goal: Verbalizes/displays adequate comfort level or baseline comfort level  Outcome: Progressing

## 2022-10-28 NOTE — CARE COORDINATION
INTERDISCIPLINARY PLAN OF CARE CONFERENCE and DC Note    Date/Time: 10/28/2022 9:54 AM  Completed by: Greta Carbajal RN, Case Management      Patient Name:  Ely Rushing  YOB: 1985  Admitting Diagnosis: Suicide attempt [facility]) Natanael Rosas  Acute drug overdose, intentional self-harm, initial encounter ([facility] [address] Rolando Ramirez  Intentional overdose, initial encounter ([facility] [address] Rolando Arenasky     Admit Date/Time:  10/27/2022 12:09 AM    Chart reviewed. Interdisciplinary team contacted or reviewed plan related to patient progress and discharge plans. Disciplines included Case Management, Nursing, and Dietitian. Current Status:ongoing; ICU-will transfer to [facility] when medically stable-plan to transfer out of ICU today    PT/OT recommendation for discharge plan of care: NA    Expected D/C Disposition:  [facility]    Discharge Plan Comments: Chart reviewed. Pt from home alone. Plan continues for pt to transfer to [facility] once medically cleared. Home O2 in place on admit: No    ADDENDUM: DC order noted. Pt to discharge to [facility] today.

## 2022-10-28 NOTE — DISCHARGE SUMMARY
Name:  Juice Schumacher  Room:  3006/3006-01  MRN:    [id number]    Discharge Summary      This discharge summary is in conjunction with a complete physical exam done on the day of discharge. Discharging Physician: Lyle Zelaya MD      Admit: 10/27/2022  Discharge:  10/28/2022    Diagnoses this Admission    Principal Problem:    Acute drug overdose, intentional self-harm, initial encounter [facility])  Active Problems:    Acute encephalopathy    Suicide attempt ([facility] [address]    Intentional overdose ([facility] [address]  Resolved Problems:    * No resolved hospital problems. *          Procedures (Please Review Full Report for Details)      Consults    IP CONSULT TO HOSPITALIST  IP CONSULT TO CRITICAL CARE  IP CONSULT TO PSYCHIATRY      HPI:  A 34-year-old female with a history of depression presented with drug overdose with Wellbutrin and Invega. She states that she took a handful of them. Does not remember how many. Admits to having some nausea. No vomiting. Recent admissions for depression and discharge on 10/25      Physical Exam at Discharge:  /87   Pulse 73   Temp 98.6 °F (37 °C)   Resp 11   Ht 5' 6\" (1.676 m)   Wt 132 lb 9.6 oz (60.1 kg)   LMP 10/14/2022   SpO2 97%   BMI 21.40 kg/m²     Gen: No distress. Eyes: PERRL. No sclera icterus. No conjunctival injection. ENT: No discharge. Pharynx clear. Neck: Trachea midline. Normal thyroid. Resp: No accessory muscle use. No crackles. No wheezes. No rhonchi. No dullness on percussion. CV: Regular rate. Regular rhythm. No murmur or rub. No edema. GI: Non-tender. Non-distended. No masses. No organomegaly. Normal bowel sounds. No hernia. Skin: Warm and dry. No nodule on exposed extremities. No rash on exposed extremities. Lymph: No cervical LAD. No supraclavicular LAD. M/S: No cyanosis. No joint deformity. No clubbing. Neuro: Awake alert and oriented    Hospital Course      Intentional drug overdose with Wellbutrin and Invega.   Admitted to the ICU.  Monitor airways closely, monitor vital signs and cardiac rhythm closely. Poison control consult. Sitter at bedside. Suicide precautions. Seizure precautions given Wellbutrin overdose  Psych consult. Transfer to [facility] when medically stable  Continue IV fluids. Toxic encephalopathy  Secondary to drug overdose. Today she is awake alert and oriented. No orders to display          Discharge Medications     Medication List        CONTINUE taking these medications      albuterol sulfate  (90 Base) MCG/ACT inhaler  Commonly known as: Proventil HFA  Inhale 2 puffs into the lungs every 4 hours as needed for Wheezing or Shortness of Breath (Space out to every 6 hours as symptoms improve) Space out to every 6 hours as symptoms improve. ibuprofen 800 MG tablet  Commonly known as: ADVIL;MOTRIN  Take 1 tablet by mouth every 8 hours as needed for Pain     nicotine 21 MG/24HR  Commonly known as: NICODERM CQ  Place 1 patch onto the skin daily for 15 days     norethindrone 0.35 MG tablet  Commonly known as: Incassia  Take 1 tablet by mouth daily            STOP taking these medications      buPROPion 150 MG extended release tablet  Commonly known as: WELLBUTRIN SR     paliperidone 6 MG extended release tablet  Commonly known as: INVEGA                Discharge Condition/Location: Stable to [facility]    Follow Up: Follow up with PCP.         Horacio Kearney MD 10/28/2022 11:43 AM

## 2022-10-28 NOTE — PROGRESS NOTES
Pt taken to [facility] with assist of RN and security. All belongings gathered and put in pt bag and given to RN on new unit. Left facility without incident.

## 2022-10-28 NOTE — PROGRESS NOTES
Report called to Rema Cardozo RN at [facility]. Pt to go to room 2308.

## 2022-10-28 NOTE — PROGRESS NOTES
Behavioral Services  Medicare Certification Upon Admission    I certify that this patient's inpatient psychiatric hospital admission is medically necessary for:    [x] (1) Treatment which could reasonably be expected to improve this patient's condition,       [x] (2) Or for diagnostic study;     AND     [x](2) The inpatient psychiatric services are provided while the individual is under the care of a physician and are included in the individualized plan of care.     Estimated length of stay/service 2-5 days    Plan for post-hospital care outpatient services    Electronically signed by ISSA Hickman CNP on 10/28/2022 at 3:25 PM
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
## 2022-10-28 NOTE — PROGRESS NOTES
AM assessment completed, see flow sheet. Pt is alert and oriented. Vital signs are WNL. Respirations are even & easy. Pt states that she is still feeling pretty tired. Pt denies further needs needs at this time. SR up x 2, and bed in low position. Call light is within reach. Sitter remains at bedside for pt safety.

## 2022-10-28 NOTE — PROGRESS NOTES
Pulmonary & Critical Care Medicine ICU Progress Note    CC: Drug overdose    Events of Last 24 hours:  RA    cc/hr     Vascular lines: IV: PIVs         / / /   No results for input(s): PHART, ZDU2KTJ, PO2ART in the last 72 hours. IV:   sodium chloride      sodium chloride 125 mL/hr at 10/28/22 0142       Vitals:  Blood pressure 98/75, pulse 68, temperature 98.3 °F (36.8 °C), temperature source Core, resp. rate 13, height 5' 6\" (1.676 m), weight 132 lb 9.6 oz (60.1 kg), last menstrual period 10/14/2022, SpO2 97 %, not currently breastfeeding. on RA    Temp  Av.2 °F (36.8 °C)  Min: 98 °F (36.7 °C)  Max: 98.4 °F (36.9 °C)    Intake/Output Summary (Last 24 hours) at 10/28/2022 0717  Last data filed at 10/28/2022 0502  Gross per 24 hour   Intake 2915.63 ml   Output 750 ml   Net 2165.63 ml     PE:  Gen: No distress. Eyes: PERRL. No sclera icterus. No conjunctival injection. ENT: No discharge. Pharynx clear. Neck: Trachea midline. No obvious mass. Resp: No accessory muscle use. No crackles. No wheezes. No rhonchi. No dullness on percussion. CV: Regular rate. Regular rhythm. No murmur or rub. No edema. GI: Non-tender. Non-distended. No hernia. Skin: Warm and dry. No nodule on exposed extremities. Lymph: No cervical LAD. No supraclavicular LAD. M/S: No cyanosis. No joint deformity. No clubbing. Neuro: More Awake. Alert. Moves all four extremities. Psych: Oriented x 3.  No anxiety    Scheduled Meds:   nicotine  1 patch TransDERmal Daily    sodium chloride flush  5-40 mL IntraVENous 2 times per day    enoxaparin  40 mg SubCUTAneous Daily    mupirocin   Nasal BID       Data:  CBC:   Recent Labs     10/27/22  0026 10/28/22  0449   WBC 5.6 4.5   HGB 10.5* 10.5*   HCT 31.4* 31.0*   MCV 91.4 92.2    198     BMP:   Recent Labs     10/27/22  0026 10/28/22  0449    137   K 3.7 3.9    107   CO2 24 22   BUN 12 10   CREATININE 0.6 <0.5*     LIVER PROFILE:   Recent Labs 10/27/22  0026 10/28/22  0449   AST 31 23   ALT 45* 36   BILITOT 0.4 0.4   ALKPHOS 51 39       Microbiology:  10/27 COVID-19 not detected    Imaging:  Chest imaging      ASSESSMENT:  Drug overdose-Wellbutrin and Invega  Acute encephalopathy/Metabolic encephalopathy   Suicidal attempt   Depression/Anxiety   Hepatitis C     PLAN:  Supplemental oxygen to maintain SaO2 >92%; wean as tolerated  Closely monitory airways, clinical status, cardiac rhythm, vital signs, and urine output   Psych following   Suicidal precaution  IVF NS- D/C   Urine drug screen- negative    this am   Monitor electrolytes  DVT prophylaxis: Lovenox  MRSA prophylaxis: Bactroban  Okay to move out of the ICU from our perspective

## 2022-10-29 LAB
CHOLESTEROL, TOTAL: 145 MG/DL (ref 0–199)
HDLC SERPL-MCNC: 60 MG/DL (ref 40–60)
LDL CHOLESTEROL CALCULATED: 77 MG/DL
TRIGL SERPL-MCNC: 41 MG/DL (ref 0–150)
VLDLC SERPL CALC-MCNC: 8 MG/DL

## 2022-10-29 PROCEDURE — 6370000000 HC RX 637 (ALT 250 FOR IP)

## 2022-10-29 PROCEDURE — 1240000000 HC EMOTIONAL WELLNESS R&B

## 2022-10-29 PROCEDURE — 99233 SBSQ HOSP IP/OBS HIGH 50: CPT

## 2022-10-29 PROCEDURE — 80061 LIPID PANEL: CPT

## 2022-10-29 PROCEDURE — 83036 HEMOGLOBIN GLYCOSYLATED A1C: CPT

## 2022-10-29 PROCEDURE — 94640 AIRWAY INHALATION TREATMENT: CPT

## 2022-10-29 PROCEDURE — 94761 N-INVAS EAR/PLS OXIMETRY MLT: CPT

## 2022-10-29 PROCEDURE — 36415 COLL VENOUS BLD VENIPUNCTURE: CPT

## 2022-10-29 RX ORDER — POLYETHYLENE GLYCOL 3350 17 G
2 POWDER IN PACKET (EA) ORAL
Status: DISCONTINUED | OUTPATIENT
Start: 2022-10-29 | End: 2022-11-02 | Stop reason: HOSPADM

## 2022-10-29 RX ORDER — NICOTINE 21 MG/24HR
1 PATCH, TRANSDERMAL 24 HOURS TRANSDERMAL DAILY
Status: DISCONTINUED | OUTPATIENT
Start: 2022-10-29 | End: 2022-11-02 | Stop reason: HOSPADM

## 2022-10-29 RX ORDER — ALBUTEROL SULFATE 90 UG/1
2 AEROSOL, METERED RESPIRATORY (INHALATION) EVERY 4 HOURS PRN
Status: DISCONTINUED | OUTPATIENT
Start: 2022-10-29 | End: 2022-11-02 | Stop reason: HOSPADM

## 2022-10-29 RX ADMIN — HYDROXYZINE HYDROCHLORIDE 50 MG: 50 TABLET, FILM COATED ORAL at 21:35

## 2022-10-29 RX ADMIN — Medication 2 PUFF: at 21:16

## 2022-10-29 RX ADMIN — MAGNESIUM HYDROXIDE 30 ML: 400 SUSPENSION ORAL at 09:24

## 2022-10-29 RX ADMIN — TRAZODONE HYDROCHLORIDE 50 MG: 50 TABLET ORAL at 21:35

## 2022-10-29 RX ADMIN — HYDROXYZINE HYDROCHLORIDE 50 MG: 50 TABLET, FILM COATED ORAL at 12:30

## 2022-10-29 RX ADMIN — PALIPERIDONE 3 MG: 3 TABLET, EXTENDED RELEASE ORAL at 09:22

## 2022-10-29 ASSESSMENT — PAIN SCALES - GENERAL: PAINLEVEL_OUTOF10: 0

## 2022-10-29 NOTE — H&P
Patient has been seen and evaluated within 30 days by IM provider and medically cleared for admission to [facility]. Please refer to medical H&P from 10/27/2022. Vitals reviewed and stable. Labs reviewed and nothing to follow. Orders reviewed. Please contact with IM provider with concerns.     Nagi Medrano  10/29/22  8:27 AM

## 2022-10-29 NOTE — CASE COMMUNICATION
SW completed CCSR-S. Pt is a readmit. Pt did have an attempt by OD. Pt is wanting to go into treatment with [facility] or [place] No thoughts of hurting self at this time. Contracted or safety.      Toribio Osorio, MSW, LSW

## 2022-10-29 NOTE — PLAN OF CARE
Problem: Self Harm/Suicidality  Goal: Will have no self-injury during hospital stay  Description: INTERVENTIONS:  1. Q 30 MINUTES: Routine safety checks  2. Q SHIFT & PRN: Assess risk to determine if routine checks are adequate to maintain patient safety  Outcome: Progressing     Problem: Anxiety  Goal: Will report anxiety at manageable levels  Description: INTERVENTIONS:  1. Administer medication as ordered  2. Teach and rehearse alternative coping skills  3. Provide emotional support with 1:1 interaction with staff  Outcome: Progressing     Problem: Coping  Goal: Pt/Family able to verbalize concerns and demonstrate effective coping strategies  Description: INTERVENTIONS:  1. Assist patient/family to identify coping skills, available support systems and cultural and spiritual values  2. Provide emotional support, including active listening and acknowledgement of concerns of patient and caregivers  3. Reduce environmental stimuli, as able  4. Instruct patient/family in relaxation techniques, as appropriate  5.  Assess for spiritual pain/suffering and initiate Spiritual Care, Psychosocial Clinical Specialist consults as needed  Outcome: Progressing

## 2022-10-29 NOTE — PLAN OF CARE
Patient alert and oriented x 3. Patient rates Depression 6/10 and Anxiety 5/10. Patient seclusive to her bed and room this evening. Patient denies SI/HI/A/V/H. Patient doesn't have HS medications scheduled. Patient denies self harm. No c/o's voiced @ present.

## 2022-10-29 NOTE — PROGRESS NOTES
Pt up ad anthony. Isolative to self. Drank ensure for breakfast, states she wants no food, only ensures for all of her meals. Order obtained from [facility] NP. Pt denies all this morning. Has a flat affect, although pleasant and cooperative. She brightens with interaction. Pt states she feels like she left too early when she was discharged on the 25th. She is currently resting in her room at this time. Will continue to monitor for safety and comfort.

## 2022-10-30 LAB
ESTIMATED AVERAGE GLUCOSE: 88.2 MG/DL
HBA1C MFR BLD: 4.7 %

## 2022-10-30 PROCEDURE — 94761 N-INVAS EAR/PLS OXIMETRY MLT: CPT

## 2022-10-30 PROCEDURE — 6370000000 HC RX 637 (ALT 250 FOR IP)

## 2022-10-30 PROCEDURE — 1240000000 HC EMOTIONAL WELLNESS R&B

## 2022-10-30 PROCEDURE — 94640 AIRWAY INHALATION TREATMENT: CPT

## 2022-10-30 RX ORDER — POLYETHYLENE GLYCOL 3350 17 G/17G
17 POWDER, FOR SOLUTION ORAL DAILY PRN
Status: DISCONTINUED | OUTPATIENT
Start: 2022-10-30 | End: 2022-11-02 | Stop reason: HOSPADM

## 2022-10-30 RX ADMIN — HYDROXYZINE HYDROCHLORIDE 50 MG: 50 TABLET, FILM COATED ORAL at 16:49

## 2022-10-30 RX ADMIN — HYDROXYZINE HYDROCHLORIDE 50 MG: 50 TABLET, FILM COATED ORAL at 09:13

## 2022-10-30 RX ADMIN — PALIPERIDONE 3 MG: 3 TABLET, EXTENDED RELEASE ORAL at 09:13

## 2022-10-30 RX ADMIN — NICOTINE POLACRILEX 2 MG: 2 LOZENGE ORAL at 09:17

## 2022-10-30 RX ADMIN — Medication 2 PUFF: at 14:39

## 2022-10-30 RX ADMIN — MAGNESIUM HYDROXIDE 30 ML: 400 SUSPENSION ORAL at 09:17

## 2022-10-30 RX ADMIN — TRAZODONE HYDROCHLORIDE 50 MG: 50 TABLET ORAL at 20:32

## 2022-10-30 NOTE — PROGRESS NOTES
Pt up ad anthony. Drank an ensure for breakfast.  Has been withdrawn to self. Continues to complain of constipation, she took another dose of milk of magnesia. She also took Atarax per her request for anxiety. She has a flat affect but brightens with interaction. Will continue to monitor for safety and comfort.

## 2022-10-30 NOTE — PLAN OF CARE
Patient was out on the large side, early in the shift & was social, with select peers. Patient was verbal in 1:1 & stated that she was here for anxiety & that she was discharged from here too soon. Patient denied feelings of self harm. Patient reported that her family was working on getting her a bed at [facility] after discharge. Patient appeared asleep at 22:45 after receiving atarax & trazodone at 2135.  Katlyn Peraza R.N.

## 2022-10-30 NOTE — GROUP NOTE
Group Therapy Note    Date: 10/30/2022    Group Start Time: 1300  Group End Time: 1400  Group Topic:  Group    [address], [place]        Group Therapy Note    Attendees: 15       Patient's Goal: To relieve stress and fatigue while creating a more positive self-image. Notes: Pt stayed for the full duration of group. Pt participated in the activity but did not share her drawing with the group. At the end of group pt poked holes in to her drawing and ripped it. Met goal.     Status After Intervention:  Unchanged    Participation Level:  Active Listener and Minimal    Participation Quality: Resistant      Speech:  mute      Thought Process/Content: KORI      Affective Functioning: Flat      Mood: depressed and irritable      Level of consciousness:  Alert and Oriented x4      Response to Learning: Progressing to goal and Resistant      Endings: None Reported    Modes of Intervention: Socialization and Activity      Discipline Responsible: /Counselor      Signature:  LAURA Trotter

## 2022-10-30 NOTE — PROGRESS NOTES
Group Therapy Note    Date: 10/29/2022  Start Time: 20:00  End Time:  21:00  Number of Participants: 15    Type of Group: Recreational  wrap up    Wellness Binder Information  Module Name:  /  Session Number:  /    Patient's Goal:  coping skills    Notes:  continuing to work on goal    Status After Intervention:  Unchanged    Participation Level:  Active Listener and Interactive    Participation Quality: Appropriate, Attentive, and Sharing      Speech:  pressured      Thought Process/Content: Logical      Affective Functioning: Blunted      Mood: anxious      Level of consciousness:  Alert and Attentive      Response to Learning: Able to change behavior      Endings: None Reported    Modes of Intervention: Socialization and Problem-solving      Discipline Responsible: [address]      Signature:  Pako Colon

## 2022-10-30 NOTE — PROGRESS NOTES
Patient was given atarax 50 mg po, per request for anxiety & trazodone 50 mg, po per request for sleep.  Jt Peraza R.N.

## 2022-10-30 NOTE — PLAN OF CARE
Problem: Anxiety  Goal: Will report anxiety at manageable levels  Description: INTERVENTIONS:  1. Administer medication as ordered  2. Teach and rehearse alternative coping skills  3. Provide emotional support with 1:1 interaction with staff  10/30/2022 0930 by Dwayne Presley RN  Outcome: Not Progressing  10/29/2022 2329 by Lloyd Owens RN  Outcome: Progressing     Problem: Self Harm/Suicidality  Goal: Will have no self-injury during hospital stay  Description: INTERVENTIONS:  1. Q 30 MINUTES: Routine safety checks  2. Q SHIFT & PRN: Assess risk to determine if routine checks are adequate to maintain patient safety  10/29/2022 2329 by Lloyd Owens RN  Outcome: Progressing     Problem: Coping  Goal: Pt/Family able to verbalize concerns and demonstrate effective coping strategies  Description: INTERVENTIONS:  1. Assist patient/family to identify coping skills, available support systems and cultural and spiritual values  2. Provide emotional support, including active listening and acknowledgement of concerns of patient and caregivers  3. Reduce environmental stimuli, as able  4. Instruct patient/family in relaxation techniques, as appropriate  5. Assess for spiritual pain/suffering and initiate Spiritual Care, Psychosocial Clinical Specialist consults as needed  10/30/2022 0930 by Dwayne Presley RN  Outcome: Progressing  10/29/2022 2329 by Lloyd Owens RN  Outcome: Progressing     Problem: Decision Making  Goal: Pt/Family able to effectively weigh alternatives and participate in decision making related to treatment and care  Description: INTERVENTIONS:  1. Determine when there are differences between patient's view, family's view, and healthcare provider's view of condition  2. Facilitate patient and family articulation of goals for care  3. Help patient and family identify pros/cons of alternative solutions  4. Provide information as requested by patient/family  5.  Respect patient/family right to receive or not to receive information  6. Serve as a liaison between patient and family and health care team  7. Initiate Consults from Ethics, Palliative Care or initiate [facility] as is appropriate  10/29/2022 2329 by Leny Esposito RN  Outcome: Progressing     Problem: Confusion  Goal: Confusion, delirium, dementia, or psychosis is improved or at baseline  Description: INTERVENTIONS:  1. Assess for possible contributors to thought disturbance, including medications, impaired vision or hearing, underlying metabolic abnormalities, dehydration, psychiatric diagnoses, and notify attending LIP  2. Lennon high risk fall precautions, as indicated  3. Provide frequent short contacts to provide reality reorientation, refocusing and direction  4. Decrease environmental stimuli, including noise as appropriate  5. Monitor and intervene to maintain adequate nutrition, hydration, elimination, sleep and activity  6. If unable to ensure safety without constant attention obtain sitter and review sitter guidelines with assigned personnel  7. Initiate Psychosocial CNS and Spiritual Care consult, as indicated  10/29/2022 2329 by Leny Esposito RN  Outcome: Progressing     Problem: Behavior  Goal: Pt/Family maintain appropriate behavior and adhere to behavioral management agreement, if implemented  Description: INTERVENTIONS:  1. Assess patient/family's coping skills and  non-compliant behavior (including use of illegal substances)  2. Notify security of behavior or suspected illegal substances which indicate the need for search of the family and/or belongings  3. Encourage verbalization of thoughts and concerns in a socially appropriate manner  4. Utilize positive, consistent limit setting strategies supporting safety of patient, staff and others  5. Encourage participation in the decision making process about the behavioral management agreement  6.  If a visitor's behavior poses a threat to safety call refer to organization policy. 7. Initiate consult with , Psychosocial CNS, Spiritual Care as appropriate  10/29/2022 2329 by Jm Coello RN  Outcome: Progressing     Problem: Depression/Self Harm  Goal: Effect of psychiatric condition will be minimized and patient will be protected from self harm  Description: INTERVENTIONS:  1. Assess impact of patient's symptoms on level of functioning, self care needs and offer support as indicated  2. Assess patient/family knowledge of depression, impact on illness and need for teaching  3. Provide emotional support, presence and reassurance  4. Assess for possible suicidal thoughts or ideation. If patient expresses suicidal thoughts or statements do not leave alone, initiate Suicide Precautions, move to a room close to the nursing station and obtain sitter  5. Initiate consults as appropriate with Mental Health Professional, Spiritual Care, Psychosocial CNS, and consider a recommendation to the LIP for a Psychiatric Consultation  10/29/2022 2329 by Jm Coello RN  Outcome: Progressing     Problem: Anxiety  Goal: Will report anxiety at manageable levels  Description: INTERVENTIONS:  1. Administer medication as ordered  2. Teach and rehearse alternative coping skills  3.  Provide emotional support with 1:1 interaction with staff  10/30/2022 0930 by Laura Asher RN  Outcome: Not Progressing  10/29/2022 2329 by Jm Coello RN  Outcome: Progressing

## 2022-10-30 NOTE — GROUP NOTE
Group Therapy Note    Date: 10/30/2022    Group Start Time: 1000  Group End Time: 1030  [facility]        [facility] Note    Attendees: 13     Patient's Goal:  \"Talk to family about future plans and where I am going after this\"    Notes: Pt stayed for the almost the full duration but left before group concluded. Pt responses were appropriate for topic of group. Met goal.      Status After Intervention:  Improved    Participation Level:  Active Listener and Interactive    Participation Quality: Appropriate, Attentive, and Sharing      Speech:  normal      Thought Process/Content: Logical  Linear      Affective Functioning: Flat      Mood: depressed      Level of consciousness:  Alert, Oriented x4, and Attentive      Response to Learning: Able to verbalize current knowledge/experience and Progressing to goal      Endings: None Reported    Modes of Intervention: Socialization      Discipline Responsible: /Counselor      Signature:  LAURA Wu

## 2022-10-30 NOTE — PROGRESS NOTES
Patient complained of constipation & stated no results from th milk of magnesia, that she received at 09:25. Patient reported that she had nothing else ordered & that the milk of magnesia was only ordered once a day. Patient's bowel sounds were present in all 4 quadrants & her abdomen was soft. Patient was instructed & agreed to drink some apple juice.  Mio Peraza R.N.

## 2022-10-30 NOTE — PROGRESS NOTES
Patient asked  a respiratory therapist, who was on the unit, for her albuterol inhaler, which was not ordered. LOVE Stratton was notified, of patient's request, with order received.  Louis Peraza R.N.

## 2022-10-30 NOTE — PROGRESS NOTES
10/30/22 0100   RT Protocol   History Pulmonary Disease 1   Respiratory pattern 0   Breath sounds 0   Cough 0   Indications for Bronchodilator Therapy None   Bronchodilator Assessment Score 1   RT Inhaler-Nebulizer Bronchodilator Protocol Note    There is a bronchodilator order in the chart from a provider indicating to follow the RT Bronchodilator Protocol and there is an Initiate RT Inhaler-Nebulizer Bronchodilator Protocol order as well (see protocol at bottom of note). CXR Findings:  No results found. The findings from the last RT Protocol Assessment were as follows:   History Pulmonary Disease: Smoker 15 pack years or more  Respiratory Pattern: Regular pattern and RR 12-20 bpm  Breath Sounds: Clear breath sounds  Cough: Strong, spontaneous, non-productive  Indication for Bronchodilator Therapy: None  Bronchodilator Assessment Score: 1    Aerosolized bronchodilator medication orders have been revised according to the RT Inhaler-Nebulizer Bronchodilator Protocol below. Respiratory Therapist to perform RT Therapy Protocol Assessment initially then follow the protocol. Repeat RT Therapy Protocol Assessment PRN for score 0-3 or on second treatment, BID, and PRN for scores above 3. No Indications - adjust the frequency to every 6 hours PRN wheezing or bronchospasm, if no treatments needed after 48 hours then discontinue using Per Protocol order mode. If indication present, adjust the RT bronchodilator orders based on the Bronchodilator Assessment Score as indicated below. Use Inhaler orders unless patient has one or more of the following: on home nebulizer, not able to hold breath for 10 seconds, is not alert and oriented, cannot activate and use MDI correctly, or respiratory rate 25 breaths per minute or more, then use the equivalent nebulizer order(s) with same Frequency and PRN reasons based on the score.   If a patient is on this medication at home then do not decrease Frequency below that used at home. 0-3 - enter or revise RT bronchodilator order(s) to equivalent RT Bronchodilator order with Frequency of every 4 hours PRN for wheezing or increased work of breathing using Per Protocol order mode. 4-6 - enter or revise RT Bronchodilator order(s) to two equivalent RT bronchodilator orders with one order with BID Frequency and one order with Frequency of every 4 hours PRN wheezing or increased work of breathing using Per Protocol order mode. 7-10 - enter or revise RT Bronchodilator order(s) to two equivalent RT bronchodilator orders with one order with TID Frequency and one order with Frequency of every 4 hours PRN wheezing or increased work of breathing using Per Protocol order mode. 11-13 - enter or revise RT Bronchodilator order(s) to one equivalent RT bronchodilator order with QID Frequency and an Albuterol order with Frequency of every 4 hours PRN wheezing or increased work of breathing using Per Protocol order mode. Greater than 13 - enter or revise RT Bronchodilator order(s) to one equivalent RT bronchodilator order with every 4 hours Frequency and an Albuterol order with Frequency of every 2 hours PRN wheezing or increased work of breathing using Per Protocol order mode.        Electronically signed by Jessenia Pride RCP on 10/30/2022 at 1:55 AM

## 2022-10-31 PROCEDURE — 6370000000 HC RX 637 (ALT 250 FOR IP)

## 2022-10-31 PROCEDURE — 1240000000 HC EMOTIONAL WELLNESS R&B

## 2022-10-31 PROCEDURE — 94640 AIRWAY INHALATION TREATMENT: CPT

## 2022-10-31 PROCEDURE — 99233 SBSQ HOSP IP/OBS HIGH 50: CPT

## 2022-10-31 RX ORDER — BUPROPION HYDROCHLORIDE 150 MG/1
150 TABLET, EXTENDED RELEASE ORAL DAILY
Status: DISCONTINUED | OUTPATIENT
Start: 2022-10-31 | End: 2022-11-02 | Stop reason: HOSPADM

## 2022-10-31 RX ADMIN — BUPROPION HYDROCHLORIDE 150 MG: 150 TABLET, EXTENDED RELEASE ORAL at 15:40

## 2022-10-31 RX ADMIN — PALIPERIDONE 3 MG: 3 TABLET, EXTENDED RELEASE ORAL at 09:33

## 2022-10-31 RX ADMIN — Medication 2 PUFF: at 13:17

## 2022-10-31 NOTE — PLAN OF CARE
Problem: Self Harm/Suicidality  Goal: Will have no self-injury during hospital stay  Description: INTERVENTIONS:  1. Q 30 MINUTES: Routine safety checks  2. Q SHIFT & PRN: Assess risk to determine if routine checks are adequate to maintain patient safety  Outcome: Progressing     Problem: Anxiety  Goal: Will report anxiety at manageable levels  Description: INTERVENTIONS:  1. Administer medication as ordered  2. Teach and rehearse alternative coping skills  3. Provide emotional support with 1:1 interaction with staff  Outcome: Progressing      10/31/22 1122   Mental Status and Behavioral Exam   Normal No   Level of Assistance Independent/Self   Facial Expression Flat   Affect Appropriate   Level of Consciousness Alert   Frequency of Checks 4 times per hour, close   Mood:Normal No   Mood Depressed   Motor Activity:Normal Yes   Eye Contact Good   Observed Behavior Cooperative   Sexual Misconduct History Current - no   Preception [place] to time;[place] to place;[place] to situation;[place] to person   Attention:Normal Yes   Thought Content:Normal Yes   Depression Symptoms No problems reported or observed. Anxiety Symptoms Generalized   Elizabeth Symptoms No problems reported or observed. Hallucinations None   Delusions No   Insight and Judgment No   Insight and Judgment Poor judgment;Poor insight   Patient has been calm, cooperative and medication compliant. Patient denies SI and HI.

## 2022-10-31 NOTE — BH NOTE
Patient has been withdrawn to her room and to her bed all shift. She is cooperative and friendly with nurse. She took a Trazadone po prn HS for sleep and ate a snack. She declined to go to evening group. She denies all suicidal ideations and hallucinations. She denies having any further anxiety at this time. She looks only slightly disheveled but is dressed appropriately and interacts with appropriate thought content and interactions. She denies any pain and no reports of further constipation. 02:00 a Patient continues to lie in her bed with her eyes closed and her respirations even and unlabored.

## 2022-10-31 NOTE — PROGRESS NOTES
Department of Psychiatry  AttendingProgress Note  Chief Complaint: MDD    Patient's chart was reviewed and collaborated with  about the treatment plan. SUBJECTIVE:    Pt was restarted on Invega over the weekend. Tolerated well. Pt tells me today that she feels ok, but still feels that she is missing a \"spark\" to get things going in the morning. We discussed restarting Wellbutrin today, she was agreeable. Pt states that she is having trouble focusing, had been on Adderall in the past, she thinks maybe she needs something like that. We discussed treating immediate needs and making sure she was stable for discharge. Pt's grandfather continues to look for after care treatment, social work aware as well that she was hoping to go into treatment when ready to discharge. Patient is feeling better. Suicidal ideation:  passive. Patient does not have medication side effects. ROS: Patient has new complaints: no  Sleeping adequately:  Yes   Appetite adequate: Yes  Attending groups: No: no groups  Visitors:No    OBJECTIVE    Physical  VITALS:  BP 95/62   Pulse 83   Temp 98.7 °F (37.1 °C) (Temporal)   Resp 16   Ht 5' 6.5\" (1.689 m)   Wt 135 lb (61.2 kg)   LMP 10/14/2022   SpO2 99%   BMI 21.46 kg/m²     Mental Status Examination:  Patients appearance was well-appearing. Thoughts are Logical. Homicidal ideations none. No abnormal movements, tics or mannerisms. Memory intact Aims 0. Concentration Fair. Alert and oriented X 4. Insight and Judgement impaired judgment. Patient was cooperative.  Patient gait normal. Mood constricted, affect flat affect Hallucinations Absent, suicidal ideations no specific plan to harm self Speech normal pitch and normal volume    Data  Labs:   Admission on 10/28/2022   Component Date Value Ref Range Status    TSH 10/28/2022 3.13  0.27 - 4.20 uIU/mL Final    Cholesterol, Total 10/29/2022 145  0 - 199 mg/dL Final    Triglycerides 10/29/2022 41  0 - 150 mg/dL Final HDL 10/29/2022 60  40 - 60 mg/dL Final    Comment: An HDL cholesterol less than 40 mg/dL is low and  constitutes a coronary heart disease risk factor. An HDL cholesterol greater than 60 mg/dL is a  negative risk factor for coronary heart disease.       LDL Calculated 10/29/2022 77  <100 mg/dL Final    VLDL Cholesterol Calculated 10/29/2022 8  Not Established mg/dL Final    Hemoglobin A1C 10/29/2022 4.7  See comment % Final    Comment: Comment:  Diagnosis of Diabetes: > or = 6.5%  Increased risk of diabetes (Prediabetes): 5.7-6.4%  Glycemic Control: Nonpregnant Adults: <7.0%                    Pregnant: <6.0%        eAG 10/29/2022 88.2  mg/dL Final            Medications  Current Facility-Administered Medications: buPROPion [facility]) extended release tablet 150 mg, 150 mg, Oral, Daily  polyethylene glycol (GLYCOLAX) packet 17 g, 17 g, Oral, Daily PRN  nicotine (NICODERM CQ) 21 MG/24HR 1 patch, 1 patch, TransDERmal, Daily  nicotine polacrilex (COMMIT) lozenge 2 mg, 2 mg, Oral, Q1H PRN  albuterol sulfate HFA (PROVENTIL;VENTOLIN;PROAIR) 108 (90 Base) MCG/ACT inhaler 2 puff, 2 puff, Inhalation, Q4H PRN  acetaminophen (TYLENOL) tablet 650 mg, 650 mg, Oral, Q4H PRN  ibuprofen (ADVIL;MOTRIN) tablet 400 mg, 400 mg, Oral, Q6H PRN  magnesium hydroxide (MILK OF MAGNESIA) 400 MG/5ML suspension 30 mL, 30 mL, Oral, Daily PRN  nicotine polacrilex (COMMIT) lozenge 2 mg, 2 mg, Oral, Q1H PRN  aluminum & magnesium hydroxide-simethicone (MAALOX) 200-200-20 MG/5ML suspension 30 mL, 30 mL, Oral, Q6H PRN  hydrOXYzine HCl (ATARAX) tablet 50 mg, 50 mg, Oral, TID PRN  OLANZapine (ZYPREXA) tablet 5 mg, 5 mg, Oral, Q4H PRN **OR** OLANZapine (ZYPREXA) 10 mg in sterile water 2 mL injection, 10 mg, IntraMUSCular, Q4H PRN  diphenhydrAMINE (BENADRYL) injection 50 mg, 50 mg, IntraMUSCular, Q4H PRN  traZODone (DESYREL) tablet 50 mg, 50 mg, Oral, Nightly PRN  paliperidone (INVEGA) extended release tablet 3 mg, 3 mg, Oral, Daily    ASSESSMENT AND PLAN    Principal Problem:    Major depressive disorder with psychotic features ([facility] [address]  Resolved Problems:    * No resolved hospital problems. *       1. Patient's symptoms   are improving  2. Probable discharge is next week  3. Discharge planning is incomplete  4. Suicidal ideation is better  5. Total time with patient was 40 minutes and more than 50 % of that time was spent counseling the patient on their symptoms, treatment and expected goals.      Miguel Argueta, PERLAP-BC

## 2022-10-31 NOTE — PLAN OF CARE
Problem: Self Harm/Suicidality  Goal: Will have no self-injury during hospital stay  Description: INTERVENTIONS:  1. Q 30 MINUTES: Routine safety checks  2. Q SHIFT & PRN: Assess risk to determine if routine checks are adequate to maintain patient safety  Outcome: Progressing     Problem: Anxiety  Goal: Will report anxiety at manageable levels  Description: INTERVENTIONS:  1. Administer medication as ordered  2. Teach and rehearse alternative coping skills  3. Provide emotional support with 1:1 interaction with staff  10/30/2022 2146 by Nella Jama RN  Outcome: Progressing  10/30/2022 0930 by Aisha Bryant RN  Outcome: Not Progressing     Problem: Coping  Goal: Pt/Family able to verbalize concerns and demonstrate effective coping strategies  Description: INTERVENTIONS:  1. Assist patient/family to identify coping skills, available support systems and cultural and spiritual values  2. Provide emotional support, including active listening and acknowledgement of concerns of patient and caregivers  3. Reduce environmental stimuli, as able  4. Instruct patient/family in relaxation techniques, as appropriate  5. Assess for spiritual pain/suffering and initiate Spiritual Care, Psychosocial Clinical Specialist consults as needed  10/30/2022 2146 by Nella Jama RN  Outcome: Progressing  10/30/2022 0930 by Aisha Bryant RN  Outcome: Progressing     Problem: Decision Making  Goal: Pt/Family able to effectively weigh alternatives and participate in decision making related to treatment and care  Description: INTERVENTIONS:  1. Determine when there are differences between patient's view, family's view, and healthcare provider's view of condition  2. Facilitate patient and family articulation of goals for care  3. Help patient and family identify pros/cons of alternative solutions  4. Provide information as requested by patient/family  5.  Respect patient/family right to receive or not to receive information  6. Serve as a liaison between patient and family and health care team  7. Initiate Consults from Ethics, Palliative Care or initiate [facility] as is appropriate  Outcome: Progressing     Problem: Confusion  Goal: Confusion, delirium, dementia, or psychosis is improved or at baseline  Description: INTERVENTIONS:  1. Assess for possible contributors to thought disturbance, including medications, impaired vision or hearing, underlying metabolic abnormalities, dehydration, psychiatric diagnoses, and notify attending LIP  2. [place] high risk fall precautions, as indicated  3. Provide frequent short contacts to provide reality reorientation, refocusing and direction  4. Decrease environmental stimuli, including noise as appropriate  5. Monitor and intervene to maintain adequate nutrition, hydration, elimination, sleep and activity  6. If unable to ensure safety without constant attention obtain sitter and review sitter guidelines with assigned personnel  7. Initiate Psychosocial CNS and Spiritual Care consult, as indicated  Outcome: Progressing     Problem: Behavior  Goal: Pt/Family maintain appropriate behavior and adhere to behavioral management agreement, if implemented  Description: INTERVENTIONS:  1. Assess patient/family's coping skills and  non-compliant behavior (including use of illegal substances)  2. Notify security of behavior or suspected illegal substances which indicate the need for search of the family and/or belongings  3. Encourage verbalization of thoughts and concerns in a socially appropriate manner  4. Utilize positive, consistent limit setting strategies supporting safety of patient, staff and others  5. Encourage participation in the decision making process about the behavioral management agreement  6. If a visitor's behavior poses a threat to safety call refer to organization policy.   7. Initiate consult with , Psychosocial CNS, Spiritual Care as appropriate  Outcome: Progressing     Problem: Depression/Self Harm  Goal: Effect of psychiatric condition will be minimized and patient will be protected from self harm  Description: INTERVENTIONS:  1. Assess impact of patient's symptoms on level of functioning, self care needs and offer support as indicated  2. Assess patient/family knowledge of depression, impact on illness and need for teaching  3. Provide emotional support, presence and reassurance  4. Assess for possible suicidal thoughts or ideation. If patient expresses suicidal thoughts or statements do not leave alone, initiate Suicide Precautions, move to a room close to the nursing station and obtain sitter  5. Initiate consults as appropriate with Mental Health Professional, Spiritual Care, Psychosocial CNS, and consider a recommendation to the LIP for a Psychiatric Consultation  Outcome: Progressing     Problem: Drug Abuse/Detox  Goal: Will have no detox symptoms and will verbalize plan for changing drug-related behavior  Description: INTERVENTIONS:  1. Administer medication as ordered  2. Monitor physical status  3. Provide emotional support with 1:1 interaction with staff  4. Encourage  recovery focused treatment   Outcome: Progressing     Problem: Depression  Goal: Will be euthymic at discharge  Description: INTERVENTIONS:  1. Administer medication as ordered  2. Provide emotional support via 1:1 interaction with staff  3. Encourage involvement in milieu/groups/activities  4. Monitor for social isolation  Outcome: Progressing     Problem: Elizabeth  Goal: Will exhibit normal sleep and speech and no impulsivity  Description: INTERVENTIONS:  1. Administer medication as ordered  2. Set limits on impulsive behavior  3. Make attempts to decrease external stimuli as possible  Outcome: Progressing     Problem: Psychosis  Goal: Will report no hallucinations or delusions  Description: INTERVENTIONS:  1. Administer medication as  ordered  2.  Assist with reality testing to support increasing orientation  3. Assess if patient's hallucinations or delusions are encouraging self harm or harm to others and intervene as appropriate  Outcome: Progressing     Problem: Sleep Disturbance  Goal: Will exhibit normal sleeping pattern  Description: INTERVENTIONS:  1. Administer medication as ordered  2. Decrease environmental stimuli, including noise, as appropriate  3. Discourage social isolation and naps during the day  Outcome: Progressing     Problem: Self Care Deficit  Goal: Return ADL status to a safe level of function  Description: INTERVENTIONS:  1. Administer medication as ordered  2. Assess ADL deficits and provide assistive devices as needed  3. Obtain PT/OT consults as needed  4. Assist and instruct patient to increase activity and self care as tolerated  Outcome: Progressing     Problem: Anxiety  Goal: Will report anxiety at manageable levels  Description: INTERVENTIONS:  1. Administer medication as ordered  2. Teach and rehearse alternative coping skills  3.  Provide emotional support with 1:1 interaction with staff  10/30/2022 2146 by Tootie Streeter RN  Outcome: Progressing  10/30/2022 0930 by Grace Arreaga RN  Outcome: Not Progressing

## 2022-10-31 NOTE — GROUP NOTE
Group Therapy Note    Date: 10/31/2022    Group Start Time: 1000  Group End Time: 5980  Group Topic: Psychoeducation    [address] GLORIA Gamble        Group Therapy Note    Attendees: 11       Patient's Goal: to learn and discuss the communication styles of being assertive, passive and aggressive . Pt 's learned that communicating in an assertive manner is the healthiest way to communicate. Pt's asked to apply to their lives. Notes: pt came to group late. Pt participated in group discussion and was able to apply to herself. Status After Intervention:  Improved    Participation Level:  Active Listener and Interactive    Participation Quality: Appropriate, Attentive, and Sharing      Speech:  hesitant      Thought Process/Content: Logical      Affective Functioning: Congruent      Mood: anxious      Level of consciousness:  Alert, Oriented x4, and Attentive      Response to Learning: Able to verbalize current knowledge/experience, Able to verbalize/acknowledge new learning, and Progressing to goal      Endings: None Reported    Modes of Intervention: Education, Support, Socialization, Exploration, and Clarifying      Discipline Responsible: /Counselor      Signature:  GLORIA Rocha

## 2022-11-01 PROCEDURE — 6370000000 HC RX 637 (ALT 250 FOR IP)

## 2022-11-01 PROCEDURE — 99233 SBSQ HOSP IP/OBS HIGH 50: CPT

## 2022-11-01 PROCEDURE — 1240000000 HC EMOTIONAL WELLNESS R&B

## 2022-11-01 RX ADMIN — MAGNESIUM HYDROXIDE 30 ML: 400 SUSPENSION ORAL at 10:18

## 2022-11-01 RX ADMIN — HYDROXYZINE HYDROCHLORIDE 50 MG: 50 TABLET, FILM COATED ORAL at 02:49

## 2022-11-01 RX ADMIN — NICOTINE POLACRILEX 2 MG: 2 LOZENGE ORAL at 17:32

## 2022-11-01 RX ADMIN — BUPROPION HYDROCHLORIDE 150 MG: 150 TABLET, EXTENDED RELEASE ORAL at 08:25

## 2022-11-01 RX ADMIN — HYDROXYZINE HYDROCHLORIDE 50 MG: 50 TABLET, FILM COATED ORAL at 10:18

## 2022-11-01 RX ADMIN — HYDROXYZINE HYDROCHLORIDE 50 MG: 50 TABLET, FILM COATED ORAL at 20:13

## 2022-11-01 RX ADMIN — PALIPERIDONE 3 MG: 3 TABLET, EXTENDED RELEASE ORAL at 08:25

## 2022-11-01 RX ADMIN — NICOTINE POLACRILEX 2 MG: 2 LOZENGE ORAL at 20:14

## 2022-11-01 RX ADMIN — IBUPROFEN 400 MG: 400 TABLET, FILM COATED ORAL at 20:13

## 2022-11-01 RX ADMIN — NICOTINE POLACRILEX 2 MG: 2 LOZENGE ORAL at 08:31

## 2022-11-01 ASSESSMENT — PAIN DESCRIPTION - ORIENTATION: ORIENTATION: POSTERIOR

## 2022-11-01 ASSESSMENT — PAIN SCALES - GENERAL
PAINLEVEL_OUTOF10: 5
PAINLEVEL_OUTOF10: 5

## 2022-11-01 ASSESSMENT — PAIN DESCRIPTION - DESCRIPTORS
DESCRIPTORS: ACHING;DISCOMFORT
DESCRIPTORS: ACHING

## 2022-11-01 ASSESSMENT — PAIN DESCRIPTION - LOCATION
LOCATION: NECK
LOCATION: NECK

## 2022-11-01 NOTE — PLAN OF CARE
Patient was out with patient group, early in the shift & was watching television. Patient was pleasant & verbal, during 1:1. Patient denied feelings of self harm & having hallucinations, with no delusions or paranoia noted. Patient reported feeling 50% improved, since admission. Patient also stated that she plans to go to [facility] in [place], [place] for [place]hab after discharge.  Niurka Peraza R.N.

## 2022-11-01 NOTE — DISCHARGE INSTRUCTIONS
Advanced Directives:  Patient does not have a surrogate decision maker appointed   Name (if yes): declined Phone Number:   Patient does not have a psychiatric and/ or medical advanced directive or power of . Patient was offered psychiatric and/ or medical advanced directive or power of  information/completion but declined to complete   Why not? declined    Discharge Planning is Complete. Discharge Date: 11/2/2022  Reason for Hospitalization: Suicidal Ideation  Discharge Diagnosis: Major depressive disorder with psychotic features [facility])  Discharging to: [facility] treatment [facility]    Your instructions: Your physician here was Nae Lobo MD. If you have any questions please call the unit at [phone] for the adult unit or [phone] for [facility]. Please note that we have a patient family advisory [place] that meets the second Wednesday of January and the second Wednesday of July at [address] [facility] in [place] at [facility]. Department leadership would love for you to attend to give feedback on what we are doing well and areas in which we can improve our patient care. Please come if you are able and feel free to reach out to [facility] at [phone] with any questions. The National Suicide and Crisis Hotline Number is 65. You can call, chat, or text this number at any time to access emergency mental health services. Please follow up with your PCP regarding any pending labs.      Your next appointment is:  Name of Provider: Erika Bearden  Provider specialty/license: residential treatment  Date and time of appointment: 11/2/2022 @ 1:30 PM  The type/s of services requested are: residential treatment  Agency name: [facility]  Address: [address] [place] [address] [address], [address] [place] [address] Road, [address] [place] [address]  Phone Number: [phone]  Special instructions (what to bring to appointment, etc.):       Discharge Completed By: [facility] AT [facility] [address] [facility]  Fax to: Follow up provider name and number  Ashanti Vergara [phone]       The following personal items were collected during your admission and were returned to you:    Belongings  Dental Appliances: None  Vision - Corrective Lenses: None  Hearing Aid: None  Clothing: Pants, Shirt, Footwear  Jewelry: None  Body Piercings Removed: N/A  Electronic Devices:  (phone screen cracked on left corner upon arrival to [facility])  Weapons (Notify Protective Services/Security): None  Other Valuables: At home  Home Medications: None  Valuables Given To: Patient  Provide Name(s) of Who Valuable(s) Were Given To: phone and  in safe    By signing below, I understand and acknowledge receipt of the instructions indicated above.

## 2022-11-01 NOTE — PLAN OF CARE
[facility]  Treatment Team Note  Review Date & Time: 10/01/22  0900    Patient was not in treatment team      Status EXAM:   Mental Status and Behavioral Exam  Normal: No  Level of Assistance: Independent/Self  Facial Expression: Flat  Affect: Appropriate  Level of Consciousness: Alert  Frequency of Checks: 4 times per hour, close  Mood:Normal: No  Mood: Depressed  Motor Activity:Normal: No  Motor Activity: Decreased  Eye Contact: Good  Observed Behavior: Cooperative  Sexual Misconduct History: Current - no  Preception: [place] to person, [place] to time, [place] to place, [place] to situation  Attention:Normal: Yes  Attention: Others (comment) (wnl)  Thought Processes: Other (comment) (WNL)  Thought Content:Normal: Yes  Thought Content: Other (comment) (WNL)  Depression Symptoms: No problems reported or observed. Anxiety Symptoms: No problems reported or observed. Elizabeth Symptoms: No problems reported or observed. Hallucinations: None  Delusions: No  Delusions: Other (comment) (None)  Memory:Normal: Yes  Memory: Other (comment) (WNL)  Insight and Judgment: No  Insight and Judgment: Poor judgment, Poor insight (Improving)      Suicide Risk CSSR-S:  1) Within the past month, have you wished you were dead or wished you could go to sleep and not wake up? : Yes  2) Have you actually had any thoughts of killing yourself? : Yes  3) Have you been thinking about how you might kill yourself? : Yes  5) Have you started to work out or worked out the details of how to kill yourself? Do you intend to carry out this plan? : Yes  6) Have you ever done anything, started to do anything, or prepared to do anything to end your life?: Yes      PLAN/TREATMENT RECOMMENDATIONS UPDATE:   Patient will take medication as prescribed, eat 75% of meals, attend groups, participate in milieu activities, participate in treatment team and care planning for discharge and follow up.            Megan More RN

## 2022-11-01 NOTE — PROGRESS NOTES
Department of Psychiatry  AttendingProgress Note  Chief Complaint: MDD    Patient's chart was reviewed and collaborated with  about the treatment plan. SUBJECTIVE:    Pt tolerating being back on her medications. Social work able to coordinate with the facilities that her family had shown interest in for pt's discharge. Pt has a bed at [facility] for continued treatment upon discharge. Pt agreeable to transfer to this facility. Pt states that her goal was to go somewhere that she could get treatment, and would have others around her. Pt states she felt unsafe being alone in her apartment, worried when she could not reach family. Pt states she relies on everybody around her, and just wants to get to a point that she does not need to do that. Pt contracts for safety at this time. Patient is feeling better. Suicidal ideation:  passive. Patient does not have medication side effects. ROS: Patient has new complaints: no  Sleeping adequately:  Yes   Appetite adequate: Yes  Attending groups: No: no groups  Visitors:No    OBJECTIVE    Physical  VITALS:  /71   Pulse 88   Temp 98.2 °F (36.8 °C) (Temporal)   Resp 16   Ht 5' 6.5\" (1.689 m)   Wt 135 lb (61.2 kg)   LMP 10/14/2022   SpO2 99%   BMI 21.46 kg/m²     Mental Status Examination:  Patients appearance was well-appearing. Thoughts are Logical. Homicidal ideations none. No abnormal movements, tics or mannerisms. Memory intact [place] 0. Concentration Fair. Alert and oriented X 4. Insight and Judgement impaired judgment. Patient was cooperative.  Patient gait normal. Mood constricted, affect flat affect Hallucinations Absent, suicidal ideations no specific plan to harm self Speech normal pitch and normal volume    Data  Labs:   Admission on 10/28/2022   Component Date Value Ref Range Status    TSH 10/28/2022 3.13  0.27 - 4.20 uIU/mL Final    Cholesterol, Total 10/29/2022 145  0 - 199 mg/dL Final    Triglycerides 10/29/2022 41  0 - 150 mg/dL Final    HDL 10/29/2022 60  40 - 60 mg/dL Final    Comment: An HDL cholesterol less than 40 mg/dL is low and  constitutes a coronary heart disease risk factor. An HDL cholesterol greater than 60 mg/dL is a  negative risk factor for coronary heart disease.       LDL Calculated 10/29/2022 77  <100 mg/dL Final    VLDL Cholesterol Calculated 10/29/2022 8  Not Established mg/dL Final    Hemoglobin A1C 10/29/2022 4.7  See comment % Final    Comment: Comment:  Diagnosis of Diabetes: > or = 6.5%  Increased risk of diabetes (Prediabetes): 5.7-6.4%  Glycemic Control: Nonpregnant Adults: <7.0%                    Pregnant: <6.0%        eAG 10/29/2022 88.2  mg/dL Final            Medications  Current Facility-Administered Medications: buPROPion [facility]) extended release tablet 150 mg, 150 mg, Oral, Daily  polyethylene glycol (GLYCOLAX) packet 17 g, 17 g, Oral, Daily PRN  nicotine (NICODERM CQ) 21 MG/24HR 1 patch, 1 patch, TransDERmal, Daily  nicotine polacrilex (COMMIT) lozenge 2 mg, 2 mg, Oral, Q1H PRN  albuterol sulfate HFA (PROVENTIL;VENTOLIN;PROAIR) 108 (90 Base) MCG/ACT inhaler 2 puff, 2 puff, Inhalation, Q4H PRN  acetaminophen (TYLENOL) tablet 650 mg, 650 mg, Oral, Q4H PRN  ibuprofen (ADVIL;MOTRIN) tablet 400 mg, 400 mg, Oral, Q6H PRN  magnesium hydroxide (MILK OF MAGNESIA) 400 MG/5ML suspension 30 mL, 30 mL, Oral, Daily PRN  nicotine polacrilex (COMMIT) lozenge 2 mg, 2 mg, Oral, Q1H PRN  aluminum & magnesium hydroxide-simethicone (MAALOX) 200-200-20 MG/5ML suspension 30 mL, 30 mL, Oral, Q6H PRN  hydrOXYzine HCl (ATARAX) tablet 50 mg, 50 mg, Oral, TID PRN  OLANZapine (ZYPREXA) tablet 5 mg, 5 mg, Oral, Q4H PRN **OR** OLANZapine (ZYPREXA) 10 mg in sterile water 2 mL injection, 10 mg, IntraMUSCular, Q4H PRN  diphenhydrAMINE (BENADRYL) injection 50 mg, 50 mg, IntraMUSCular, Q4H PRN  traZODone (DESYREL) tablet 50 mg, 50 mg, Oral, Nightly PRN  paliperidone (INVEGA) extended release tablet 3 mg, 3 mg, Oral, Daily    ASSESSMENT AND PLAN    Principal Problem:    Major depressive disorder with psychotic features ([facility] [address]  Resolved Problems:    * No resolved hospital problems. *       1. Patient's symptoms   are improving  2. Probable discharge is Wednesday   3. Discharge planning is incomplete  4. Suicidal ideation is better  5. Total time with patient was 40 minutes and more than 50 % of that time was spent counseling the patient on their symptoms, treatment and expected goals.      Mihir Sebastian, PMHNP-BC

## 2022-11-01 NOTE — PLAN OF CARE
Adelfo Rdz is pleasant and cooperative. She has a linear thought process. She denies depression, but continues to have intermittent anxiety. She is organized and planning for the future. She plans to go to drug rehab on discharge from here. She denies any suicidal or homicidal ideations. She has not been noted to be RTIS, and denies any hallucinations. She is social with her peers.

## 2022-11-01 NOTE — PROGRESS NOTES
Patient was awake & to the teams station. Patient was given atarax 50 mg po, per request for anxiety.  Scotty Peraza R.N.

## 2022-11-02 VITALS
WEIGHT: 135 LBS | HEIGHT: 67 IN | HEART RATE: 80 BPM | DIASTOLIC BLOOD PRESSURE: 82 MMHG | BODY MASS INDEX: 21.19 KG/M2 | TEMPERATURE: 98.8 F | OXYGEN SATURATION: 98 % | SYSTOLIC BLOOD PRESSURE: 101 MMHG | RESPIRATION RATE: 18 BRPM

## 2022-11-02 PROBLEM — F32.2 MDD (MAJOR DEPRESSIVE DISORDER), SEVERE (HCC): Status: ACTIVE | Noted: 2022-11-02

## 2022-11-02 PROCEDURE — 99239 HOSP IP/OBS DSCHRG MGMT >30: CPT

## 2022-11-02 PROCEDURE — 6370000000 HC RX 637 (ALT 250 FOR IP)

## 2022-11-02 PROCEDURE — 94640 AIRWAY INHALATION TREATMENT: CPT

## 2022-11-02 PROCEDURE — 5130000000 HC BRIDGE APPOINTMENT

## 2022-11-02 RX ORDER — PALIPERIDONE 3 MG/1
3 TABLET, EXTENDED RELEASE ORAL DAILY
Qty: 30 TABLET | Refills: 0 | Status: SHIPPED | OUTPATIENT
Start: 2022-11-03

## 2022-11-02 RX ORDER — BUPROPION HYDROCHLORIDE 150 MG/1
150 TABLET, EXTENDED RELEASE ORAL DAILY
Qty: 30 TABLET | Refills: 0 | Status: SHIPPED | OUTPATIENT
Start: 2022-11-03

## 2022-11-02 RX ORDER — CARBOXYMETHYLCELLULOSE SODIUM 10 MG/ML
1 GEL OPHTHALMIC EVERY 4 HOURS PRN
Status: DISCONTINUED | OUTPATIENT
Start: 2022-11-02 | End: 2022-11-02 | Stop reason: HOSPADM

## 2022-11-02 RX ORDER — PALIPERIDONE 3 MG/1
3 TABLET, EXTENDED RELEASE ORAL DAILY
Qty: 30 TABLET | Refills: 0 | Status: SHIPPED | OUTPATIENT
Start: 2022-11-03 | End: 2022-11-02 | Stop reason: SDUPTHER

## 2022-11-02 RX ORDER — BUPROPION HYDROCHLORIDE 150 MG/1
150 TABLET, EXTENDED RELEASE ORAL DAILY
Qty: 30 TABLET | Refills: 0 | Status: SHIPPED | OUTPATIENT
Start: 2022-11-03 | End: 2022-11-02 | Stop reason: SDUPTHER

## 2022-11-02 RX ORDER — ZINC OXIDE 216 MG/ML
1 LOTION TOPICAL
Qty: 90 EACH | Refills: 0 | Status: SHIPPED | OUTPATIENT
Start: 2022-11-02 | End: 2022-12-02

## 2022-11-02 RX ADMIN — Medication 2 PUFF: at 11:43

## 2022-11-02 RX ADMIN — BUPROPION HYDROCHLORIDE 150 MG: 150 TABLET, EXTENDED RELEASE ORAL at 08:15

## 2022-11-02 RX ADMIN — NICOTINE POLACRILEX 2 MG: 2 LOZENGE ORAL at 09:38

## 2022-11-02 RX ADMIN — TRAZODONE HYDROCHLORIDE 50 MG: 50 TABLET ORAL at 00:10

## 2022-11-02 RX ADMIN — CARBOXYMETHYLCELLULOSE SODIUM 1 DROP: 10 GEL OPHTHALMIC at 10:25

## 2022-11-02 RX ADMIN — PALIPERIDONE 3 MG: 3 TABLET, EXTENDED RELEASE ORAL at 08:15

## 2022-11-02 NOTE — DISCHARGE SUMMARY
Discharge Summary     Admit Date: 10/28/2022   Discharge Date:    11/2/2022    Final Dx: Major depressive disorder with psychotic features ([address]     At Discharge: 61-70 mild symptoms   All conditions and active problems were treated while patient was hospitalized. Condition on DC  Mood and affect are stable and pt is not suicidal     VITALS:  /74   Pulse 97   Temp 98.1 °F (36.7 °C) (Oral)   Resp 16   Ht 5' 6.5\" (1.689 m)   Wt 135 lb (61.2 kg)   LMP 10/14/2022   SpO2 95%   BMI 21.46 kg/m²     Brief Summary Present Illness   Patient is a 44 y.o. female who presents  after an intentional overdose. She states she was recently admitted to the behavioral health inpatient unit but she thinks they discharged her too soon. She was discharged 2 days ago on 10/25. She states earlier in the day today sometime this morning she took 4 of her Wellbutrin pills. This evening she took 8 of her Invega pills. She does not recall the exact time she took the medications. this was an attempt to kill herself. She called 911. She lives alone she states she is having chest pressure now and feels sleepy. Pt was admitted to ICU for stabilization before transfer back to [facility] for continued treatment of her depression. Pt was slowly restarted back on her medications, tolerated well. Pt voiced concerns over discharging home alone, states that she did not feel safe being alone, relies heavily on her family for financial support and help with transportation. When she cannot reach family members she begins to feel even more isolated, and thoughts of self harm take over. Pt's grandfather had been trying to find a longer residential treatment program for patient after discharge, but was unable to secure a bed for her before she last left the unit. Pt was agreeable to a program, and with social works assistance, was able to find a bed in treatment program in [facility]. Pt is alert and oriented at this time.   Pt denies all SI/HI and AVH. Pt can safely discharge today to [facility]. Hospital Course Patient stabilized on meds and milieu treatment. Patient was discharged to home to continue recovery in the community. PE: (reviewed) and labs (see medical H&PE)  Labs:    Admission on 10/28/2022   Component Date Value Ref Range Status    TSH 10/28/2022 3.13  0.27 - 4.20 uIU/mL Final    Cholesterol, Total 10/29/2022 145  0 - 199 mg/dL Final    Triglycerides 10/29/2022 41  0 - 150 mg/dL Final    HDL 10/29/2022 60  40 - 60 mg/dL Final    Comment: An HDL cholesterol less than 40 mg/dL is low and  constitutes a coronary heart disease risk factor. An HDL cholesterol greater than 60 mg/dL is a  negative risk factor for coronary heart disease.       LDL Calculated 10/29/2022 77  <100 mg/dL Final    VLDL Cholesterol Calculated 10/29/2022 8  Not Established mg/dL Final    Hemoglobin A1C 10/29/2022 4.7  See comment % Final    Comment: Comment:  Diagnosis of Diabetes: > or = 6.5%  Increased risk of diabetes (Prediabetes): 5.7-6.4%  Glycemic Control: Nonpregnant Adults: <7.0%                    Pregnant: <6.0%        eAG 10/29/2022 88.2  mg/dL Final          Mental Status Exam at Discharge:  Level of consciousness:  awake  Appearance:  well-appearing, in chair, good grooming and good hygiene well-developed, well-nourished  Behavior/Motor:  no abnormalities noted normal gait and station AIMS: 0  Attitude toward examiner:  cooperative, attentive and good eye contact  Speech:  spontaneous, normal rate, normal volume and well articulated  Mood:  dysthymic  Affect:  mood congruent Anxiety: mild  Hallucinations: Absent  Thought processes:  coherent Attention span, Concentration & Attention:  attention span and concentration were age appropriate  Thought content:  No evidence of delusions OCD: none    Insight: normal insight and judgment Cognition:  oriented to person, place, and time  Fund of Knowledge: average  IQ:average Memory: intact  Suicide:  No specific plan to harm self  Sleep: sleeps through the night  Appetite: ok     Reassess Suicide Risk:  no specific plan to harm self Pt has phone numbers to contact if suicidal thoughts recur and states pt will return to the hospital if suicidal feelings return. Hospital Routine Meds:     buPROPion  150 mg Oral Daily    nicotine  1 patch TransDERmal Daily    paliperidone  3 mg Oral Daily      Hospital PRN Meds: carboxymethylcellulose PF, polyethylene glycol, nicotine polacrilex, albuterol sulfate HFA, acetaminophen, ibuprofen, magnesium hydroxide, nicotine polacrilex, aluminum & magnesium hydroxide-simethicone, hydrOXYzine HCl, OLANZapine **OR** OLANZapine (ZyPREXA) in sterile water IntraMUSCular, diphenhydrAMINE, traZODone   Discharge Meds:    Current Discharge Medication List             Details   buPROPion (WELLBUTRIN SR) 150 MG extended release tablet Take 1 tablet by mouth daily  Qty: 30 tablet, Refills: 0      paliperidone (INVEGA) 3 MG extended release tablet Take 1 tablet by mouth daily  Qty: 30 tablet, Refills: 0      dextran 70-hypromellose, PF, (ARTIFICIAL TEARS) 0.1-0.3 % SOLN opthalmic solution Place 1 drop into both eyes every 4 hours as needed (redness)  Qty: 1 each, Refills: 0      Nutritional Supplement LIQD Take 1 each by mouth 3 times daily (with meals) Regular supplemental nutritional supplement drink offered with each meal  Qty: 90 each, Refills: 0                Details   albuterol sulfate HFA (PROVENTIL HFA) 108 (90 Base) MCG/ACT inhaler Inhale 2 puffs into the lungs every 4 hours as needed for Wheezing or Shortness of Breath (Space out to every 6 hours as symptoms improve) Space out to every 6 hours as symptoms improve.   Qty: 1 each, Refills: 1      ibuprofen (ADVIL;MOTRIN) 800 MG tablet Take 1 tablet by mouth every 8 hours as needed for Pain  Qty: 20 tablet, Refills: 0      norethindrone (INCASSIA) 0.35 MG tablet Take 1 tablet by mouth daily  Qty: 30 tablet, Refills: 2 nicotine (NICODERM CQ) 21 MG/24HR Place 1 patch onto the skin daily for 15 days  Qty: 15 patch, Refills: 0                 Disposition - [facility]       Follow Up:  See Discharge Instructions     Total time with patient was 40 minutes and more than 50 % of that time was spent counseling the patient on their symptoms, treatment and expected goals.      Gamaliel Hernández - PMHNP-BC

## 2022-11-02 NOTE — PLAN OF CARE
Watched TV most of the evening. No scheduled meds, but requested PRN Trazodone, Atarax, & Ibuprofen. All effective. May have been RTIS - While watching a show about sharks, she made a petting motion with her hand several times. Also observed talking while sleeping for several rounds and then came out to ask for Trazodone because she hadn't been asleep. Denies SI, HI, & AVH. Problem: Self Harm/Suicidality  Goal: Will have no self-injury during hospital stay  Description: INTERVENTIONS:  1. Q 30 MINUTES: Routine safety checks  2. Q SHIFT & PRN: Assess risk to determine if routine checks are adequate to maintain patient safety  11/2/2022 0046 by Yoan Florez RN  Outcome: Progressing     Problem: Anxiety  Goal: Will report anxiety at manageable levels  Description: INTERVENTIONS:  1. Administer medication as ordered  2. Teach and rehearse alternative coping skills  3. Provide emotional support with 1:1 interaction with staff  11/2/2022 0046 by Yoan Florez RN  Outcome: Progressing     Problem: Depression/Self Harm  Goal: Effect of psychiatric condition will be minimized and patient will be protected from self harm  Description: INTERVENTIONS:  1. Assess impact of patient's symptoms on level of functioning, self care needs and offer support as indicated  2. Assess patient/family knowledge of depression, impact on illness and need for teaching  3. Provide emotional support, presence and reassurance  4. Assess for possible suicidal thoughts or ideation. If patient expresses suicidal thoughts or statements do not leave alone, initiate Suicide Precautions, move to a room close to the nursing station and obtain sitter  5.  Initiate consults as appropriate with Mental Health Professional, Spiritual Care, Psychosocial CNS, and consider a recommendation to the LIP for a Psychiatric Consultation  11/2/2022 0046 by Yoan Florez RN  Outcome: Progressing     Problem: Sleep Disturbance  Goal: Will exhibit normal sleeping pattern  Description: INTERVENTIONS:  1. Administer medication as ordered  2. Decrease environmental stimuli, including noise, as appropriate  3.  Discourage social isolation and naps during the day  11/2/2022 0046 by Kris Singh RN  Outcome: Progressing

## 2022-11-02 NOTE — PROGRESS NOTES
Gideon Lind is resting in bed with her eyes closed. She is not exhibiting any obvious signs of distress or anxiety. Tylenol and Atarax are noted to be effective.

## 2022-11-02 NOTE — BH NOTE
[facility]  Discharge Note    Pt discharged with followings belongings:   Dental Appliances: None  Vision - Corrective Lenses: None  Hearing Aid: None  Jewelry: None  Body Piercings Removed: N/A  Clothing: Pants, Shirt, Footwear  Other Valuables: At home   No valuables were brought in with patient. Patient educated on aftercare instructions: yes  Information faxed to [facility] by RIRI RN  at 1:03 PM .Patient verbalize understanding of AVS:  yes. Status EXAM upon discharge:  Mental Status and Behavioral Exam  Normal: No  Level of Assistance: Independent/Self  Facial Expression: Brightened  Affect: Appropriate  Level of Consciousness: Alert  Frequency of Checks: 4 times per hour, close  Mood:Normal: Yes  Mood: Anxious, Depressed  Motor Activity:Normal: Yes  Motor Activity: Decreased  Eye Contact: Good  Observed Behavior: Cooperative, Friendly  Sexual Misconduct History: Current - no  Preception: [place] to person, [place] to time, [place] to place, [place] to situation  Attention:Normal: Yes  Attention: Distractible, Unable to concentrate  Thought Processes:  (Linear)  Thought Content:Normal: Yes  Thought Content: Other (comment) (WNL)  Depression Symptoms: No problems reported or observed. Anxiety Symptoms: No problems reported or observed. Elizabeth Symptoms: No problems reported or observed. Hallucinations: None  Delusions: No  Delusions:  Other (comment) (None)  Memory:Normal: Yes  Memory: Poor recent  Insight and Judgment: No  Insight and Judgment: Poor insight    Tobacco Screening:  Practical Counseling, on admission, david X, if applicable and completed (first 3 are required if patient doesn't refuse):            ( ) Recognizing danger situations (included triggers and roadblocks)                    ( ) Coping skills (new ways to manage stress,relaxation techniques, changing routine, distraction)                                                           ( ) Basic information about quitting (benefits of quitting, techniques in how to quit, available resources  ( ) Referral for counseling faxed to [facility]                                                                                                                   (x ) Patient refused counseling  (x ) Patient refused referral  ( x) Patient refused prescription upon discharge  ( ) Patient has not smoked in the last 30 days    Metabolic Screening:    Lab Results   Component Value Date    LABA1C 4.7 10/29/2022       Lab Results   Component Value Date    CHOL 145 10/29/2022    CHOL 193 06/27/2021    CHOL 140 07/19/2020    CHOL 133 07/09/2020     Lab Results   Component Value Date    TRIG 41 10/29/2022    TRIG 127 06/27/2021    TRIG 68 07/19/2020    TRIG 128 07/09/2020     Lab Results   Component Value Date    HDL 60 10/29/2022    HDL 58 06/27/2021    HDL 50 07/19/2020    HDL 39 (L) 07/09/2020     No components found for: [facility] [place]  Lab Results   Component Value Date    LABVLDL 8 10/29/2022    LABVLDL 25 06/27/2021    LABVLDL 14 07/19/2020    LABVLDL 26 07/09/2020       Bridge Appointment completed: Reviewed Discharge Instructions with patient. Patient verbalizes understanding and agreement with the discharge plan using the teachback method.          Vaccinations (david X if applicable and completed):  ( ) Patient states already received influenza vaccine elsewhere  ( ) Patient received influenza vaccine during this hospitalization  (x ) Patient refused influenza vaccine at this time

## 2022-11-02 NOTE — PLAN OF CARE
Michael Sheppard is alert and pleasant. She is oriented X4. She is looking forward to going to rehab today and completing the program. She is forward thinking. She denies any suicidal or homicidal ideations. She denies any audio or visual hallucinations, and has made no delusional statements.

## 2022-11-02 NOTE — PROGRESS NOTES
Tami Sampson requested Atarax for anxiety and Tylenol for pain in her neck that she rated 5/10 on a 1-10 scale. Atarax and Tylenol were administered according to the orders. See MAR.

## 2022-11-02 NOTE — PROGRESS NOTES
Rudy Sotomayor complained of not being able to sleep and requested Trazodone. Trazodone was administered according to the order. See MAR.

## 2022-11-17 PROBLEM — Z00.00 ENCOUNTER FOR ROUTINE ADULT MEDICAL EXAMINATION: Status: RESOLVED | Noted: 2022-10-18 | Resolved: 2022-11-17

## 2022-11-21 NOTE — PLAN OF CARE
Pt isolative to room except for meals, eating in dinning room with peers but nonsocial. Pt denies SI, HI, and AVH, answers questions  slowly with blunt speech. Pt states she feels safe here and contracts for safety. Pt agrees to go to groups this afternoon and will continue to encourage pt to do so. [BMI%: ___] : - BMI: [unfilled]% [Supplements: ___] : - Supplements: [unfilled] [Supplements/tub feedings: ___] : - Supplements/tub feedings: [unfilled] [Date: ___] : Date: [unfilled] [FreeTextEntry9] : Obtain Vitamin Labs and Genetic labs with next lab draw.

## 2023-07-10 ENCOUNTER — HOSPITAL ENCOUNTER (INPATIENT)
Age: 38
LOS: 8 days | Discharge: HOME | DRG: 751 | End: 2023-07-19
Attending: EMERGENCY MEDICINE | Admitting: PSYCHIATRY & NEUROLOGY
Payer: COMMERCIAL

## 2023-07-10 DIAGNOSIS — R45.851 SUICIDAL IDEATION: Primary | ICD-10-CM

## 2023-07-10 LAB
BASOPHILS # BLD: 0 K/UL (ref 0–0.2)
BASOPHILS NFR BLD: 0.5 %
DEPRECATED RDW RBC AUTO: 13.3 % (ref 12.4–15.4)
EOSINOPHIL # BLD: 0.1 K/UL (ref 0–0.6)
EOSINOPHIL NFR BLD: 0.8 %
HCT VFR BLD AUTO: 36.9 % (ref 36–48)
HGB BLD-MCNC: 12.5 G/DL (ref 12–16)
LYMPHOCYTES # BLD: 1.8 K/UL (ref 1–5.1)
LYMPHOCYTES NFR BLD: 21.5 %
MCH RBC QN AUTO: 31 PG (ref 26–34)
MCHC RBC AUTO-ENTMCNC: 34 G/DL (ref 31–36)
MCV RBC AUTO: 91.4 FL (ref 80–100)
MONOCYTES # BLD: 0.7 K/UL (ref 0–1.3)
MONOCYTES NFR BLD: 8.6 %
NEUTROPHILS # BLD: 5.8 K/UL (ref 1.7–7.7)
NEUTROPHILS NFR BLD: 68.6 %
PLATELET # BLD AUTO: 224 K/UL (ref 135–450)
PMV BLD AUTO: 9.6 FL (ref 5–10.5)
RBC # BLD AUTO: 4.04 M/UL (ref 4–5.2)
WBC # BLD AUTO: 8.4 K/UL (ref 4–11)

## 2023-07-10 PROCEDURE — 83036 HEMOGLOBIN GLYCOSYLATED A1C: CPT

## 2023-07-10 PROCEDURE — 80179 DRUG ASSAY SALICYLATE: CPT

## 2023-07-10 PROCEDURE — 36415 COLL VENOUS BLD VENIPUNCTURE: CPT

## 2023-07-10 PROCEDURE — 85025 COMPLETE CBC W/AUTO DIFF WBC: CPT

## 2023-07-10 PROCEDURE — 99285 EMERGENCY DEPT VISIT HI MDM: CPT

## 2023-07-10 PROCEDURE — 82077 ASSAY SPEC XCP UR&BREATH IA: CPT

## 2023-07-10 PROCEDURE — 80061 LIPID PANEL: CPT

## 2023-07-10 PROCEDURE — 80143 DRUG ASSAY ACETAMINOPHEN: CPT

## 2023-07-10 PROCEDURE — 84703 CHORIONIC GONADOTROPIN ASSAY: CPT

## 2023-07-10 PROCEDURE — 84443 ASSAY THYROID STIM HORMONE: CPT

## 2023-07-10 PROCEDURE — 80307 DRUG TEST PRSMV CHEM ANLYZR: CPT

## 2023-07-10 PROCEDURE — 80053 COMPREHEN METABOLIC PANEL: CPT

## 2023-07-10 ASSESSMENT — PAIN DESCRIPTION - LOCATION: LOCATION: HEAD

## 2023-07-10 ASSESSMENT — PAIN - FUNCTIONAL ASSESSMENT: PAIN_FUNCTIONAL_ASSESSMENT: 0-10

## 2023-07-10 ASSESSMENT — PAIN DESCRIPTION - DESCRIPTORS: DESCRIPTORS: ACHING

## 2023-07-10 ASSESSMENT — PAIN DESCRIPTION - PAIN TYPE: TYPE: ACUTE PAIN

## 2023-07-10 ASSESSMENT — PAIN SCALES - GENERAL: PAINLEVEL_OUTOF10: 7

## 2023-07-11 PROBLEM — F23 ACUTE PSYCHOSIS (HCC): Status: ACTIVE | Noted: 2023-07-11

## 2023-07-11 PROBLEM — F29 PSYCHOSIS, UNSPECIFIED PSYCHOSIS TYPE (HCC): Status: ACTIVE | Noted: 2023-07-11

## 2023-07-11 LAB
ALBUMIN SERPL-MCNC: 4.1 G/DL (ref 3.4–5)
ALBUMIN/GLOB SERPL: 1.5 {RATIO} (ref 1.1–2.2)
ALP SERPL-CCNC: 63 U/L (ref 40–129)
ALT SERPL-CCNC: 18 U/L (ref 10–40)
AMPHETAMINES UR QL SCN>1000 NG/ML: ABNORMAL
ANION GAP SERPL CALCULATED.3IONS-SCNC: 11 MMOL/L (ref 3–16)
APAP SERPL-MCNC: <5 UG/ML (ref 10–30)
AST SERPL-CCNC: 20 U/L (ref 15–37)
BARBITURATES UR QL SCN>200 NG/ML: ABNORMAL
BENZODIAZ UR QL SCN>200 NG/ML: ABNORMAL
BILIRUB SERPL-MCNC: <0.2 MG/DL (ref 0–1)
BUN SERPL-MCNC: 7 MG/DL (ref 7–20)
CALCIUM SERPL-MCNC: 9.2 MG/DL (ref 8.3–10.6)
CANNABINOIDS UR QL SCN>50 NG/ML: ABNORMAL
CHLORIDE SERPL-SCNC: 106 MMOL/L (ref 99–110)
CO2 SERPL-SCNC: 25 MMOL/L (ref 21–32)
COCAINE UR QL SCN: POSITIVE
CREAT SERPL-MCNC: 0.8 MG/DL (ref 0.6–1.1)
DRUG SCREEN COMMENT UR-IMP: ABNORMAL
ETHANOLAMINE SERPL-MCNC: NORMAL MG/DL (ref 0–0.08)
FENTANYL SCREEN, URINE: ABNORMAL
GFR SERPLBLD CREATININE-BSD FMLA CKD-EPI: >60 ML/MIN/{1.73_M2}
GLUCOSE SERPL-MCNC: 83 MG/DL (ref 70–99)
HCG UR QL: NEGATIVE
METHADONE UR QL SCN>300 NG/ML: ABNORMAL
OPIATES UR QL SCN>300 NG/ML: ABNORMAL
OXYCODONE UR QL SCN: ABNORMAL
PCP UR QL SCN>25 NG/ML: ABNORMAL
PH UR STRIP: 5 [PH]
POTASSIUM SERPL-SCNC: 3.1 MMOL/L (ref 3.5–5.1)
PROT SERPL-MCNC: 6.9 G/DL (ref 6.4–8.2)
SALICYLATES SERPL-MCNC: 0.4 MG/DL (ref 15–30)
SODIUM SERPL-SCNC: 142 MMOL/L (ref 136–145)
TSH SERPL DL<=0.005 MIU/L-ACNC: 1.19 UIU/ML (ref 0.27–4.2)

## 2023-07-11 PROCEDURE — 90791 PSYCH DIAGNOSTIC EVALUATION: CPT

## 2023-07-11 PROCEDURE — 1240000000 HC EMOTIONAL WELLNESS R&B

## 2023-07-11 PROCEDURE — 6370000000 HC RX 637 (ALT 250 FOR IP): Performed by: PSYCHIATRY & NEUROLOGY

## 2023-07-11 PROCEDURE — 93005 ELECTROCARDIOGRAM TRACING: CPT | Performed by: PSYCHIATRY & NEUROLOGY

## 2023-07-11 RX ORDER — OLANZAPINE 10 MG/1
10 TABLET ORAL EVERY 4 HOURS PRN
Status: DISCONTINUED | OUTPATIENT
Start: 2023-07-11 | End: 2023-07-19 | Stop reason: HOSPADM

## 2023-07-11 RX ORDER — HYDROXYZINE 50 MG/1
50 TABLET, FILM COATED ORAL 3 TIMES DAILY PRN
Status: DISCONTINUED | OUTPATIENT
Start: 2023-07-11 | End: 2023-07-19 | Stop reason: HOSPADM

## 2023-07-11 RX ORDER — MAGNESIUM HYDROXIDE/ALUMINUM HYDROXICE/SIMETHICONE 120; 1200; 1200 MG/30ML; MG/30ML; MG/30ML
30 SUSPENSION ORAL EVERY 6 HOURS PRN
Status: DISCONTINUED | OUTPATIENT
Start: 2023-07-11 | End: 2023-07-19 | Stop reason: HOSPADM

## 2023-07-11 RX ORDER — NICOTINE 21 MG/24HR
1 PATCH, TRANSDERMAL 24 HOURS TRANSDERMAL DAILY
Status: DISCONTINUED | OUTPATIENT
Start: 2023-07-11 | End: 2023-07-19 | Stop reason: HOSPADM

## 2023-07-11 RX ORDER — ACETAMINOPHEN 325 MG/1
650 TABLET ORAL EVERY 4 HOURS PRN
Status: DISCONTINUED | OUTPATIENT
Start: 2023-07-11 | End: 2023-07-12

## 2023-07-11 RX ORDER — IBUPROFEN 400 MG/1
400 TABLET ORAL EVERY 6 HOURS PRN
Status: DISCONTINUED | OUTPATIENT
Start: 2023-07-11 | End: 2023-07-12

## 2023-07-11 RX ORDER — BENZTROPINE MESYLATE 1 MG/ML
2 INJECTION INTRAMUSCULAR; INTRAVENOUS 2 TIMES DAILY PRN
Status: DISCONTINUED | OUTPATIENT
Start: 2023-07-11 | End: 2023-07-19 | Stop reason: HOSPADM

## 2023-07-11 RX ORDER — POLYETHYLENE GLYCOL 3350 17 G
2 POWDER IN PACKET (EA) ORAL
Status: DISCONTINUED | OUTPATIENT
Start: 2023-07-11 | End: 2023-07-19 | Stop reason: HOSPADM

## 2023-07-11 RX ORDER — TRAZODONE HYDROCHLORIDE 50 MG/1
50 TABLET ORAL NIGHTLY PRN
Status: DISCONTINUED | OUTPATIENT
Start: 2023-07-11 | End: 2023-07-19 | Stop reason: HOSPADM

## 2023-07-11 RX ADMIN — HYDROXYZINE HYDROCHLORIDE 50 MG: 50 TABLET, FILM COATED ORAL at 11:46

## 2023-07-11 RX ADMIN — ACETAMINOPHEN 650 MG: 325 TABLET ORAL at 11:46

## 2023-07-11 RX ADMIN — ACETAMINOPHEN 650 MG: 325 TABLET ORAL at 21:16

## 2023-07-11 RX ADMIN — HYDROXYZINE HYDROCHLORIDE 50 MG: 50 TABLET, FILM COATED ORAL at 21:17

## 2023-07-11 ASSESSMENT — SLEEP AND FATIGUE QUESTIONNAIRES
SLEEP PATTERN: DIFFICULTY FALLING ASLEEP;DISTURBED/INTERRUPTED SLEEP;RESTLESSNESS
DO YOU USE A SLEEP AID: NO
AVERAGE NUMBER OF SLEEP HOURS: 6
DO YOU HAVE DIFFICULTY SLEEPING: YES
SLEEP PATTERN: DISTURBED/INTERRUPTED SLEEP;DIFFICULTY FALLING ASLEEP;RESTLESSNESS
DO YOU USE A SLEEP AID: NO
AVERAGE NUMBER OF SLEEP HOURS: 6
DO YOU HAVE DIFFICULTY SLEEPING: YES

## 2023-07-11 ASSESSMENT — LIFESTYLE VARIABLES
HOW MANY STANDARD DRINKS CONTAINING ALCOHOL DO YOU HAVE ON A TYPICAL DAY: 3 OR 4
HOW OFTEN DO YOU HAVE A DRINK CONTAINING ALCOHOL: 2-4 TIMES A MONTH

## 2023-07-11 ASSESSMENT — PATIENT HEALTH QUESTIONNAIRE - PHQ9: SUM OF ALL RESPONSES TO PHQ QUESTIONS 1-9: 27

## 2023-07-11 ASSESSMENT — PAIN SCALES - GENERAL
PAINLEVEL_OUTOF10: 6
PAINLEVEL_OUTOF10: 8

## 2023-07-11 ASSESSMENT — PAIN DESCRIPTION - DESCRIPTORS
DESCRIPTORS: ACHING;PRESSURE
DESCRIPTORS: ACHING

## 2023-07-11 ASSESSMENT — PAIN - FUNCTIONAL ASSESSMENT: PAIN_FUNCTIONAL_ASSESSMENT: PREVENTS OR INTERFERES SOME ACTIVE ACTIVITIES AND ADLS

## 2023-07-11 ASSESSMENT — PAIN DESCRIPTION - LOCATION
LOCATION: HEAD;BACK
LOCATION: HEAD

## 2023-07-12 ENCOUNTER — APPOINTMENT (OUTPATIENT)
Dept: CT IMAGING | Age: 38
DRG: 751 | End: 2023-07-12
Payer: COMMERCIAL

## 2023-07-12 PROBLEM — G89.29 CHRONIC NONINTRACTABLE HEADACHE: Status: ACTIVE | Noted: 2023-07-12

## 2023-07-12 PROBLEM — R51.9 CHRONIC NONINTRACTABLE HEADACHE: Status: ACTIVE | Noted: 2023-07-12

## 2023-07-12 PROBLEM — F19.10 DRUG ABUSE (HCC): Status: ACTIVE | Noted: 2023-07-12

## 2023-07-12 PROBLEM — Z00.00 ENCOUNTER FOR GENERAL MEDICAL EXAMINATION: Status: ACTIVE | Noted: 2023-07-12

## 2023-07-12 PROBLEM — F14.20 COCAINE USE DISORDER, SEVERE, DEPENDENCE (HCC): Status: ACTIVE | Noted: 2023-07-12

## 2023-07-12 LAB
CHOLEST SERPL-MCNC: 153 MG/DL (ref 0–199)
EKG ATRIAL RATE: 74 BPM
EKG DIAGNOSIS: NORMAL
EKG P AXIS: 66 DEGREES
EKG P-R INTERVAL: 150 MS
EKG Q-T INTERVAL: 412 MS
EKG QRS DURATION: 90 MS
EKG QTC CALCULATION (BAZETT): 457 MS
EKG R AXIS: 71 DEGREES
EKG T AXIS: 71 DEGREES
EKG VENTRICULAR RATE: 74 BPM
HDLC SERPL-MCNC: 55 MG/DL (ref 40–60)
LDLC SERPL CALC-MCNC: 74 MG/DL
TRIGL SERPL-MCNC: 121 MG/DL (ref 0–150)
VLDLC SERPL CALC-MCNC: 24 MG/DL

## 2023-07-12 PROCEDURE — 70450 CT HEAD/BRAIN W/O DYE: CPT

## 2023-07-12 PROCEDURE — 6370000000 HC RX 637 (ALT 250 FOR IP)

## 2023-07-12 PROCEDURE — 99223 1ST HOSP IP/OBS HIGH 75: CPT | Performed by: PSYCHIATRY & NEUROLOGY

## 2023-07-12 PROCEDURE — 99232 SBSQ HOSP IP/OBS MODERATE 35: CPT

## 2023-07-12 PROCEDURE — 6370000000 HC RX 637 (ALT 250 FOR IP): Performed by: PSYCHIATRY & NEUROLOGY

## 2023-07-12 PROCEDURE — 1240000000 HC EMOTIONAL WELLNESS R&B

## 2023-07-12 PROCEDURE — 93010 ELECTROCARDIOGRAM REPORT: CPT | Performed by: INTERNAL MEDICINE

## 2023-07-12 RX ORDER — POTASSIUM CHLORIDE 20 MEQ/1
40 TABLET, EXTENDED RELEASE ORAL ONCE
Status: COMPLETED | OUTPATIENT
Start: 2023-07-12 | End: 2023-07-12

## 2023-07-12 RX ORDER — PALIPERIDONE 3 MG/1
3 TABLET, EXTENDED RELEASE ORAL DAILY
Status: DISCONTINUED | OUTPATIENT
Start: 2023-07-12 | End: 2023-07-19

## 2023-07-12 RX ORDER — ACETAMINOPHEN 325 MG/1
650 TABLET ORAL EVERY 8 HOURS PRN
Status: DISCONTINUED | OUTPATIENT
Start: 2023-07-12 | End: 2023-07-19 | Stop reason: HOSPADM

## 2023-07-12 RX ADMIN — HYDROXYZINE HYDROCHLORIDE 50 MG: 50 TABLET, FILM COATED ORAL at 08:31

## 2023-07-12 RX ADMIN — ACETAMINOPHEN 650 MG: 325 TABLET ORAL at 16:05

## 2023-07-12 RX ADMIN — ACETAMINOPHEN 650 MG: 325 TABLET ORAL at 08:31

## 2023-07-12 RX ADMIN — PALIPERIDONE 3 MG: 3 TABLET, EXTENDED RELEASE ORAL at 16:05

## 2023-07-12 RX ADMIN — HYDROXYZINE HYDROCHLORIDE 50 MG: 50 TABLET, FILM COATED ORAL at 21:05

## 2023-07-12 RX ADMIN — POTASSIUM CHLORIDE 40 MEQ: 1500 TABLET, EXTENDED RELEASE ORAL at 11:38

## 2023-07-12 RX ADMIN — HYDROXYZINE HYDROCHLORIDE 50 MG: 50 TABLET, FILM COATED ORAL at 16:05

## 2023-07-12 RX ADMIN — IBUPROFEN 400 MG: 400 TABLET, FILM COATED ORAL at 12:43

## 2023-07-12 ASSESSMENT — PAIN DESCRIPTION - DESCRIPTORS
DESCRIPTORS: ACHING
DESCRIPTORS: PRESSURE;DISCOMFORT

## 2023-07-12 ASSESSMENT — PAIN SCALES - GENERAL
PAINLEVEL_OUTOF10: 7
PAINLEVEL_OUTOF10: 8
PAINLEVEL_OUTOF10: 6

## 2023-07-12 ASSESSMENT — PAIN DESCRIPTION - LOCATION
LOCATION: HEAD

## 2023-07-12 ASSESSMENT — PAIN - FUNCTIONAL ASSESSMENT
PAIN_FUNCTIONAL_ASSESSMENT: PREVENTS OR INTERFERES SOME ACTIVE ACTIVITIES AND ADLS
PAIN_FUNCTIONAL_ASSESSMENT: ACTIVITIES ARE NOT PREVENTED
PAIN_FUNCTIONAL_ASSESSMENT: ACTIVITIES ARE NOT PREVENTED

## 2023-07-13 LAB
EST. AVERAGE GLUCOSE BLD GHB EST-MCNC: 93.9 MG/DL
HBA1C MFR BLD: 4.9 %

## 2023-07-13 PROCEDURE — 6370000000 HC RX 637 (ALT 250 FOR IP): Performed by: PSYCHIATRY & NEUROLOGY

## 2023-07-13 PROCEDURE — 1240000000 HC EMOTIONAL WELLNESS R&B

## 2023-07-13 PROCEDURE — 99233 SBSQ HOSP IP/OBS HIGH 50: CPT | Performed by: PSYCHIATRY & NEUROLOGY

## 2023-07-13 RX ADMIN — HYDROXYZINE HYDROCHLORIDE 50 MG: 50 TABLET, FILM COATED ORAL at 21:11

## 2023-07-13 RX ADMIN — PALIPERIDONE 3 MG: 3 TABLET, EXTENDED RELEASE ORAL at 12:14

## 2023-07-13 RX ADMIN — ACETAMINOPHEN 650 MG: 325 TABLET ORAL at 21:11

## 2023-07-13 RX ADMIN — MAGNESIUM HYDROXIDE 30 ML: 400 SUSPENSION ORAL at 21:11

## 2023-07-14 PROCEDURE — 1240000000 HC EMOTIONAL WELLNESS R&B

## 2023-07-14 PROCEDURE — 6370000000 HC RX 637 (ALT 250 FOR IP): Performed by: PSYCHIATRY & NEUROLOGY

## 2023-07-14 PROCEDURE — 99233 SBSQ HOSP IP/OBS HIGH 50: CPT | Performed by: PSYCHIATRY & NEUROLOGY

## 2023-07-14 RX ORDER — BUPROPION HYDROCHLORIDE 150 MG/1
150 TABLET ORAL DAILY
Status: DISCONTINUED | OUTPATIENT
Start: 2023-07-15 | End: 2023-07-19 | Stop reason: HOSPADM

## 2023-07-14 RX ADMIN — PALIPERIDONE 3 MG: 3 TABLET, EXTENDED RELEASE ORAL at 08:59

## 2023-07-14 RX ADMIN — HYDROXYZINE HYDROCHLORIDE 50 MG: 50 TABLET, FILM COATED ORAL at 21:01

## 2023-07-14 RX ADMIN — ACETAMINOPHEN 650 MG: 325 TABLET ORAL at 08:59

## 2023-07-14 RX ADMIN — MAGNESIUM HYDROXIDE 30 ML: 400 SUSPENSION ORAL at 18:39

## 2023-07-14 RX ADMIN — ACETAMINOPHEN 650 MG: 325 TABLET ORAL at 21:02

## 2023-07-14 ASSESSMENT — PAIN DESCRIPTION - LOCATION
LOCATION: ABDOMEN
LOCATION: HEAD

## 2023-07-14 ASSESSMENT — PAIN SCALES - GENERAL
PAINLEVEL_OUTOF10: 6
PAINLEVEL_OUTOF10: 3
PAINLEVEL_OUTOF10: 8

## 2023-07-14 ASSESSMENT — PAIN DESCRIPTION - DESCRIPTORS
DESCRIPTORS: PRESSURE;DISCOMFORT
DESCRIPTORS: ACHING;CRAMPING;DISCOMFORT

## 2023-07-14 ASSESSMENT — PAIN DESCRIPTION - PAIN TYPE: TYPE: ACUTE PAIN

## 2023-07-15 PROCEDURE — 6370000000 HC RX 637 (ALT 250 FOR IP): Performed by: PSYCHIATRY & NEUROLOGY

## 2023-07-15 PROCEDURE — 99233 SBSQ HOSP IP/OBS HIGH 50: CPT | Performed by: PSYCHIATRY & NEUROLOGY

## 2023-07-15 PROCEDURE — 1240000000 HC EMOTIONAL WELLNESS R&B

## 2023-07-15 RX ADMIN — NICOTINE POLACRILEX 2 MG: 2 LOZENGE ORAL at 18:09

## 2023-07-15 RX ADMIN — ACETAMINOPHEN 650 MG: 325 TABLET ORAL at 09:00

## 2023-07-15 RX ADMIN — PALIPERIDONE 3 MG: 3 TABLET, EXTENDED RELEASE ORAL at 09:01

## 2023-07-15 RX ADMIN — MAGNESIUM HYDROXIDE 30 ML: 400 SUSPENSION ORAL at 13:28

## 2023-07-15 RX ADMIN — ACETAMINOPHEN 650 MG: 325 TABLET ORAL at 21:31

## 2023-07-15 RX ADMIN — HYDROXYZINE HYDROCHLORIDE 50 MG: 50 TABLET, FILM COATED ORAL at 21:31

## 2023-07-15 RX ADMIN — HYDROXYZINE HYDROCHLORIDE 50 MG: 50 TABLET, FILM COATED ORAL at 09:00

## 2023-07-15 RX ADMIN — BUPROPION HYDROCHLORIDE 150 MG: 150 TABLET, EXTENDED RELEASE ORAL at 09:01

## 2023-07-15 RX ADMIN — NICOTINE POLACRILEX 2 MG: 2 LOZENGE ORAL at 13:28

## 2023-07-15 ASSESSMENT — PAIN DESCRIPTION - DESCRIPTORS
DESCRIPTORS: ACHING
DESCRIPTORS: ACHING

## 2023-07-15 ASSESSMENT — PAIN - FUNCTIONAL ASSESSMENT
PAIN_FUNCTIONAL_ASSESSMENT: PREVENTS OR INTERFERES SOME ACTIVE ACTIVITIES AND ADLS
PAIN_FUNCTIONAL_ASSESSMENT: ACTIVITIES ARE NOT PREVENTED

## 2023-07-15 ASSESSMENT — PAIN DESCRIPTION - LOCATION
LOCATION: HEAD
LOCATION: HEAD

## 2023-07-15 ASSESSMENT — PAIN SCALES - GENERAL
PAINLEVEL_OUTOF10: 6
PAINLEVEL_OUTOF10: 0
PAINLEVEL_OUTOF10: 3
PAINLEVEL_OUTOF10: 0

## 2023-07-15 ASSESSMENT — PAIN DESCRIPTION - ORIENTATION: ORIENTATION: MID

## 2023-07-15 ASSESSMENT — PAIN DESCRIPTION - PAIN TYPE: TYPE: ACUTE PAIN

## 2023-07-15 ASSESSMENT — PAIN DESCRIPTION - FREQUENCY: FREQUENCY: INTERMITTENT

## 2023-07-15 ASSESSMENT — PAIN DESCRIPTION - ONSET: ONSET: GRADUAL

## 2023-07-16 PROCEDURE — 6370000000 HC RX 637 (ALT 250 FOR IP): Performed by: PSYCHIATRY & NEUROLOGY

## 2023-07-16 PROCEDURE — 1240000000 HC EMOTIONAL WELLNESS R&B

## 2023-07-16 RX ADMIN — NICOTINE POLACRILEX 2 MG: 2 LOZENGE ORAL at 13:21

## 2023-07-16 RX ADMIN — ACETAMINOPHEN 650 MG: 325 TABLET ORAL at 08:30

## 2023-07-16 RX ADMIN — NICOTINE POLACRILEX 2 MG: 2 LOZENGE ORAL at 18:15

## 2023-07-16 RX ADMIN — HYDROXYZINE HYDROCHLORIDE 50 MG: 50 TABLET, FILM COATED ORAL at 20:27

## 2023-07-16 RX ADMIN — MAGNESIUM HYDROXIDE 30 ML: 400 SUSPENSION ORAL at 20:28

## 2023-07-16 RX ADMIN — PALIPERIDONE 3 MG: 3 TABLET, EXTENDED RELEASE ORAL at 08:30

## 2023-07-16 RX ADMIN — BUPROPION HYDROCHLORIDE 150 MG: 150 TABLET, EXTENDED RELEASE ORAL at 08:31

## 2023-07-16 RX ADMIN — HYDROXYZINE HYDROCHLORIDE 50 MG: 50 TABLET, FILM COATED ORAL at 08:31

## 2023-07-16 RX ADMIN — ACETAMINOPHEN 650 MG: 325 TABLET ORAL at 20:28

## 2023-07-16 ASSESSMENT — PAIN SCALES - GENERAL
PAINLEVEL_OUTOF10: 6
PAINLEVEL_OUTOF10: 7
PAINLEVEL_OUTOF10: 0

## 2023-07-16 ASSESSMENT — PAIN DESCRIPTION - PAIN TYPE: TYPE: ACUTE PAIN

## 2023-07-16 ASSESSMENT — PAIN DESCRIPTION - ORIENTATION: ORIENTATION: ANTERIOR

## 2023-07-16 ASSESSMENT — PAIN DESCRIPTION - LOCATION
LOCATION: HEAD
LOCATION: HEAD

## 2023-07-16 ASSESSMENT — PAIN DESCRIPTION - DESCRIPTORS
DESCRIPTORS: ACHING
DESCRIPTORS: PRESSURE

## 2023-07-16 ASSESSMENT — PAIN - FUNCTIONAL ASSESSMENT: PAIN_FUNCTIONAL_ASSESSMENT: ACTIVITIES ARE NOT PREVENTED

## 2023-07-16 ASSESSMENT — PAIN DESCRIPTION - FREQUENCY: FREQUENCY: CONTINUOUS

## 2023-07-16 ASSESSMENT — PAIN DESCRIPTION - ONSET: ONSET: GRADUAL

## 2023-07-17 PROCEDURE — 6370000000 HC RX 637 (ALT 250 FOR IP): Performed by: PSYCHIATRY & NEUROLOGY

## 2023-07-17 PROCEDURE — 99233 SBSQ HOSP IP/OBS HIGH 50: CPT | Performed by: PSYCHIATRY & NEUROLOGY

## 2023-07-17 PROCEDURE — 1240000000 HC EMOTIONAL WELLNESS R&B

## 2023-07-17 RX ADMIN — ACETAMINOPHEN 650 MG: 325 TABLET ORAL at 22:28

## 2023-07-17 RX ADMIN — BUPROPION HYDROCHLORIDE 150 MG: 150 TABLET, EXTENDED RELEASE ORAL at 10:48

## 2023-07-17 RX ADMIN — HYDROXYZINE HYDROCHLORIDE 50 MG: 50 TABLET, FILM COATED ORAL at 10:51

## 2023-07-17 RX ADMIN — PALIPERIDONE 3 MG: 3 TABLET, EXTENDED RELEASE ORAL at 10:48

## 2023-07-17 RX ADMIN — HYDROXYZINE HYDROCHLORIDE 50 MG: 50 TABLET, FILM COATED ORAL at 22:28

## 2023-07-17 ASSESSMENT — PAIN DESCRIPTION - DESCRIPTORS: DESCRIPTORS: ACHING

## 2023-07-17 ASSESSMENT — PAIN SCALES - GENERAL
PAINLEVEL_OUTOF10: 0
PAINLEVEL_OUTOF10: 5

## 2023-07-17 ASSESSMENT — PAIN - FUNCTIONAL ASSESSMENT: PAIN_FUNCTIONAL_ASSESSMENT: ACTIVITIES ARE NOT PREVENTED

## 2023-07-17 ASSESSMENT — PAIN DESCRIPTION - LOCATION: LOCATION: GENERALIZED

## 2023-07-17 NOTE — PLAN OF CARE
Problem: Pain  Goal: Verbalizes/displays adequate comfort level or baseline comfort level  Outcome: Progressing     Problem: Anxiety  Goal: Will report anxiety at manageable levels  Description: INTERVENTIONS:  1. Administer medication as ordered  2. Teach and rehearse alternative coping skills  3. Provide emotional support with 1:1 interaction with staff  Outcome: Progressing     Problem: Coping  Goal: Pt/Family able to verbalize concerns and demonstrate effective coping strategies  Description: INTERVENTIONS:  1. Assist patient/family to identify coping skills, available support systems and cultural and spiritual values  2. Provide emotional support, including active listening and acknowledgement of concerns of patient and caregivers  3. Reduce environmental stimuli, as able  4. Instruct patient/family in relaxation techniques, as appropriate  5. Assess for spiritual pain/suffering and initiate Spiritual Care, Psychosocial Clinical Specialist consults as needed  Outcome: Progressing      07/17/23 1324   Mental Status and Behavioral Exam   Normal No   Level of Assistance Independent/Self   Facial Expression Flat   Affect Congruent   Level of Consciousness Alert   Frequency of Checks 4 times per hour, close   Mood:Normal No   Mood Depressed   Motor Activity:Normal No   Motor Activity Decreased   Eye Contact Good   Observed Behavior Cooperative   Sexual Misconduct History Current - no   Attention:Normal No   Attention Distractible   Thought Processes   (linear)   Thought Content:Normal No   Thought Content Preoccupations   Depression Symptoms Isolative   Anxiety Symptoms Generalized   Elizabeth Symptoms No problems reported or observed. Hallucinations   (denies but appears preoccupied at times)   Delusions No   Delusions Paranoid   Memory:Normal No   Memory Poor recent   Insight and Judgment No   Insight and Judgment Poor judgment;Poor insight    Patient has been withdrawn to room and bed most of shift.  Patient

## 2023-07-17 NOTE — PLAN OF CARE
Problem: Pain  Goal: Verbalizes/displays adequate comfort level or baseline comfort level  7/16/2023 2246 by Karen Justice RN  Outcome: Progressing  7/16/2023 1756 by Renita Horne LPN  Outcome: Progressing     Problem: Anxiety  Goal: Will report anxiety at manageable levels  Description: INTERVENTIONS:  1. Administer medication as ordered  2. Teach and rehearse alternative coping skills  3. Provide emotional support with 1:1 interaction with staff  7/16/2023 2246 by Karen Justice RN  Outcome: Progressing  7/16/2023 1756 by Renita Horne LPN  Outcome: Progressing     Problem: Coping  Goal: Pt/Family able to verbalize concerns and demonstrate effective coping strategies  Description: INTERVENTIONS:  1. Assist patient/family to identify coping skills, available support systems and cultural and spiritual values  2. Provide emotional support, including active listening and acknowledgement of concerns of patient and caregivers  3. Reduce environmental stimuli, as able  4. Instruct patient/family in relaxation techniques, as appropriate  5. Assess for spiritual pain/suffering and initiate Spiritual Care, Psychosocial Clinical Specialist consults as needed  Outcome: Progressing     Problem: Confusion  Goal: Confusion, delirium, dementia, or psychosis is improved or at baseline  Description: INTERVENTIONS:  1. Assess for possible contributors to thought disturbance, including medications, impaired vision or hearing, underlying metabolic abnormalities, dehydration, psychiatric diagnoses, and notify attending LIP  2. [place] high risk fall precautions, as indicated  3. Provide frequent short contacts to provide reality reorientation, refocusing and direction  4. Decrease environmental stimuli, including noise as appropriate  5. Monitor and intervene to maintain adequate nutrition, hydration, elimination, sleep and activity  6.  If unable to ensure safety without constant attention obtain sitter and review

## 2023-07-18 PROCEDURE — 6370000000 HC RX 637 (ALT 250 FOR IP): Performed by: PSYCHIATRY & NEUROLOGY

## 2023-07-18 PROCEDURE — 1240000000 HC EMOTIONAL WELLNESS R&B

## 2023-07-18 RX ADMIN — BUPROPION HYDROCHLORIDE 150 MG: 150 TABLET, EXTENDED RELEASE ORAL at 09:45

## 2023-07-18 RX ADMIN — ACETAMINOPHEN 650 MG: 325 TABLET ORAL at 20:01

## 2023-07-18 RX ADMIN — HYDROXYZINE HYDROCHLORIDE 50 MG: 50 TABLET, FILM COATED ORAL at 20:01

## 2023-07-18 RX ADMIN — NICOTINE POLACRILEX 2 MG: 2 LOZENGE ORAL at 17:21

## 2023-07-18 RX ADMIN — ACETAMINOPHEN 650 MG: 325 TABLET ORAL at 09:49

## 2023-07-18 RX ADMIN — NICOTINE POLACRILEX 2 MG: 2 LOZENGE ORAL at 09:46

## 2023-07-18 RX ADMIN — PALIPERIDONE 3 MG: 3 TABLET, EXTENDED RELEASE ORAL at 09:46

## 2023-07-18 ASSESSMENT — PAIN SCALES - GENERAL
PAINLEVEL_OUTOF10: 6
PAINLEVEL_OUTOF10: 7
PAINLEVEL_OUTOF10: 0

## 2023-07-18 ASSESSMENT — PAIN DESCRIPTION - ORIENTATION
ORIENTATION: ANTERIOR
ORIENTATION: LOWER

## 2023-07-18 ASSESSMENT — PAIN DESCRIPTION - DESCRIPTORS
DESCRIPTORS: ACHING
DESCRIPTORS: ACHING

## 2023-07-18 ASSESSMENT — PAIN DESCRIPTION - LOCATION
LOCATION: HEAD
LOCATION: EAR

## 2023-07-18 ASSESSMENT — PAIN - FUNCTIONAL ASSESSMENT: PAIN_FUNCTIONAL_ASSESSMENT: PREVENTS OR INTERFERES SOME ACTIVE ACTIVITIES AND ADLS

## 2023-07-18 NOTE — GROUP NOTE
Group Therapy Note    Date: 7/18/2023    Group Start Time: 1100  Group End Time: 4629  Group Topic: Psychoeducation    [facility]    DICK Acosta        Group Therapy Note  Clinician engaged the group members in the milieu, as none of the group members came to the group room when group was announced. Clinician brought the white board to the common area. Clinician introduced the group members to understanding common language of using a baseline of functioning to communicate with their doctors and nurses. Attendees: 8       Patient's Goal:      Notes:  Patient was sitting in the common area and did not come to the group room. Patient was fully engaged in the group. Patient participated in the discussion, watched the role play and asked clarifying questions. Status After Intervention:  Improved    Participation Level:  Active Listener and Interactive    Participation Quality: Appropriate, Attentive, and Sharing      Speech:  normal      Thought Process/Content: Logical      Affective Functioning: Congruent      Mood: euthymic      Level of consciousness:  Alert      Response to Learning: Able to verbalize/acknowledge new learning      Endings: None Reported    Modes of Intervention: Education, Support, Exploration, and Activity      Discipline Responsible: /Counselor      Signature:  DICK Acosta

## 2023-07-18 NOTE — PLAN OF CARE
Pt visible attends groups social with select peers. Pt denies SI,HI,AVH, no distress noted, pt complies with medication and care, calm and cooperative with care.

## 2023-07-18 NOTE — GROUP NOTE
Group Therapy Note    Date: 7/18/2023    Group Start Time: 0582  Group End Time: 1400  Group Topic: Psychoeducation    [facility]    DICK Burrell        Group Therapy Note    Attendees: 4    Patients brainstormed the differences between healthy and unhealthy relationships. Patients received informational handouts on relationships and qualities of healthy relationships. Patients discussed ways to promote and facilitate healthy relationships. Notes:  Patient participated in discussion and accepted informational handout.     Status After Intervention:  Unchanged    Participation Level: Minimal    Participation Quality: Appropriate and Attentive      Speech:  Flat      Thought Process/Content: Logical  Linear      Affective Functioning: Flat      Mood: Depressed      Level of consciousness:  Oriented x4 and Attentive      Response to Learning: Able to verbalize current knowledge/experience, Able to change behavior, and Progressing to goal      Endings: None Reported    Modes of Intervention: Education and Socialization      Discipline Responsible: /Counselor      Signature:  LAURA Burrell

## 2023-07-18 NOTE — PLAN OF CARE
Problem: Pain  Goal: Verbalizes/displays adequate comfort level or baseline comfort level  7/18/2023 0230 by Huma Zaragoza RN  Outcome: Progressing     Problem: Anxiety  Goal: Will report anxiety at manageable levels  Description: INTERVENTIONS:  1. Administer medication as ordered  2. Teach and rehearse alternative coping skills  3. Provide emotional support with 1:1 interaction with staff  7/18/2023 0230 by Huma Zaragoza RN  Outcome: Progressing     Problem: Coping  Goal: Pt/Family able to verbalize concerns and demonstrate effective coping strategies  Description: INTERVENTIONS:  1. Assist patient/family to identify coping skills, available support systems and cultural and spiritual values  2. Provide emotional support, including active listening and acknowledgement of concerns of patient and caregivers  3. Reduce environmental stimuli, as able  4. Instruct patient/family in relaxation techniques, as appropriate  5. Assess for spiritual pain/suffering and initiate Spiritual Care, Psychosocial Clinical Specialist consults as needed  7/18/2023 0230 by Huma Zaragoza RN  Outcome: Progressing     Problem: Confusion  Goal: Confusion, delirium, dementia, or psychosis is improved or at baseline  Description: INTERVENTIONS:  1. Assess for possible contributors to thought disturbance, including medications, impaired vision or hearing, underlying metabolic abnormalities, dehydration, psychiatric diagnoses, and notify attending LIP  2. [place] high risk fall precautions, as indicated  3. Provide frequent short contacts to provide reality reorientation, refocusing and direction  4. Decrease environmental stimuli, including noise as appropriate  5. Monitor and intervene to maintain adequate nutrition, hydration, elimination, sleep and activity  6. If unable to ensure safety without constant attention obtain sitter and review sitter guidelines with assigned personnel  7.  Initiate Psychosocial CNS and Spiritual Care consult,

## 2023-07-19 VITALS
WEIGHT: 130 LBS | TEMPERATURE: 98.5 F | SYSTOLIC BLOOD PRESSURE: 101 MMHG | DIASTOLIC BLOOD PRESSURE: 68 MMHG | HEIGHT: 66 IN | HEART RATE: 60 BPM | OXYGEN SATURATION: 98 % | RESPIRATION RATE: 16 BRPM | BODY MASS INDEX: 20.89 KG/M2

## 2023-07-19 PROCEDURE — 5130000000 HC BRIDGE APPOINTMENT

## 2023-07-19 PROCEDURE — 6370000000 HC RX 637 (ALT 250 FOR IP): Performed by: PSYCHIATRY & NEUROLOGY

## 2023-07-19 RX ORDER — PALIPERIDONE 3 MG/1
6 TABLET, EXTENDED RELEASE ORAL DAILY
Status: DISCONTINUED | OUTPATIENT
Start: 2023-07-20 | End: 2023-07-19 | Stop reason: HOSPADM

## 2023-07-19 RX ORDER — PALIPERIDONE 3 MG/1
3 TABLET, EXTENDED RELEASE ORAL ONCE
Status: DISCONTINUED | OUTPATIENT
Start: 2023-07-19 | End: 2023-07-19 | Stop reason: HOSPADM

## 2023-07-19 RX ORDER — PALIPERIDONE 6 MG/1
6 TABLET, EXTENDED RELEASE ORAL DAILY
Qty: 30 TABLET | Refills: 0 | Status: SHIPPED | OUTPATIENT
Start: 2023-07-20

## 2023-07-19 RX ORDER — BUPROPION HYDROCHLORIDE 150 MG/1
150 TABLET ORAL DAILY
Qty: 30 TABLET | Refills: 0 | Status: SHIPPED | OUTPATIENT
Start: 2023-07-20

## 2023-07-19 RX ADMIN — PALIPERIDONE 3 MG: 3 TABLET, EXTENDED RELEASE ORAL at 08:25

## 2023-07-19 RX ADMIN — ACETAMINOPHEN 650 MG: 325 TABLET ORAL at 08:27

## 2023-07-19 RX ADMIN — BUPROPION HYDROCHLORIDE 150 MG: 150 TABLET, EXTENDED RELEASE ORAL at 08:25

## 2023-07-19 RX ADMIN — NICOTINE POLACRILEX 2 MG: 2 LOZENGE ORAL at 08:25

## 2023-07-19 ASSESSMENT — PAIN DESCRIPTION - LOCATION: LOCATION: HEAD

## 2023-07-19 ASSESSMENT — PAIN DESCRIPTION - DESCRIPTORS: DESCRIPTORS: ACHING

## 2023-07-19 ASSESSMENT — PAIN SCALES - GENERAL: PAINLEVEL_OUTOF10: 8

## 2023-07-19 ASSESSMENT — PAIN SCALES - WONG BAKER: WONGBAKER_NUMERICALRESPONSE: 0

## 2023-07-19 NOTE — BH NOTE
Writer met with patient and discussed discharge plan. Patient reported that prior to hospitalization, she was staying with an ex boyfriend and can't return there upon discharge. Patient asked for resources for shelters. Writer explained the [facility] shelter to the patient and that it was on a first come first serve basis. Writer explained that they will get her out late this morning early after noon to be able to claim a spot. Patient thanked Claudeen Musty.

## 2023-07-19 NOTE — DISCHARGE INSTRUCTIONS
ask for the intake department. This will start your intake process with [facility], to see if you can be admitted there for housing when you leave the shelter. 3) Once you have your homeless certificate, call [facility],[address] [facility]) at [phone]. Leonarda Baumgarten will connecting you to care providers for case management, mental health counseling, substance abuse counseling, and psychiatry if you have not already been connected by the social work team at [facility]. 4) If your prescriptions run out before you have your first psychiatry appointment the [facility] operates a clinic within the [facility]     Discharge Completed By: DICK Warner  Fax to: Follow up provider name and number  NO FAX PER SOCIAL WORK    The following personal items were collected during your admission and were returned to you:    Belongings  Dental Appliances: None  Vision - Corrective Lenses: Contact Lenses  Hearing Aid: None  Clothing: Other (Comment)  Jewelry: None  Body Piercings Removed: No  Electronic Devices: Cell Phone,   Weapons (Notify Protective Services/Security): None  Other Valuables: Other (Comment)  Home Medications: None  Valuables Given To: Jayson Adan, Patient  Provide Name(s) of Who Valuable(s) Were Given To: adri    By signing below, I understand and acknowledge receipt of the instructions indicated above.

## 2023-07-19 NOTE — BH NOTE
[facility]  Discharge Note    Pt discharged with followings belongings:   Dental Appliances: None  Vision - Corrective Lenses: Contact Lenses  Hearing Aid: None  Jewelry: None  Body Piercings Removed: No  Clothing: Other (Comment)  Other Valuables: Other (Comment)   Valuables returned to patient. Patient educated on aftercare instructions: Yes  Information faxed to N/A . Patient verbalize understanding of AVS:  Yes. Status EXAM upon discharge:  Mental Status and Behavioral Exam  Normal: No  Level of Assistance: Independent/Self  Facial Expression: Flat  Affect: Congruent  Level of Consciousness: Alert  Frequency of Checks: 4 times per hour, close  Mood:Normal: No  Mood: Depressed  Motor Activity:Normal: Yes  Motor Activity: Decreased  Eye Contact: Fair  Observed Behavior: Friendly, Cooperative  Sexual Misconduct History: Current - no  Involved In Any Sexual Misconduct With Others? : No  History of Sexually Inappropriate Behavior When Previously Hospitalized?: No  Uncontrollable/Compulsive Masturbation?: No  Difficulty Controlling Sexual Impulses?: No  Preception: [place] to person, [place] to time, [place] to place, [place] to situation  Attention:Normal: No  Attention: Distractible  Thought Processes: Circumstantial  Thought Content:Normal: No  Thought Content: Preoccupations  Depression Symptoms: Change in energy level  Anxiety Symptoms: Chest pain  Elizabeth Symptoms: No problems reported or observed. Hallucinations: None  Delusions: No  Delusions:  Other (comment) (Non verbalized)  Memory:Normal: Yes  Memory: Poor recent  Insight and Judgment: No  Insight and Judgment: Poor judgment, Poor insight    Tobacco Screening:  Practical Counseling, on admission, david X, if applicable and completed (first 3 are required if patient doesn't refuse):            ( ) Recognizing danger situations (included triggers and roadblocks)                    ( ) Coping skills (new ways to manage stress,relaxation
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
## 2023-07-19 NOTE — BH NOTE
Bridge Appointment completed: Reviewed Discharge Instructions with patient. Patient verbalizes understanding and agreement with the discharge plan using the teachback method.      Referral for Outpatient Tobacco Cessation Counseling, upon discharge (david X if applicable and completed):    ( )  Hospital staff assisted patient to call Quit Line or faxed referral                                   during hospitalization                  ( )  Recognizing danger situations (included triggers and roadblocks), if not completed on admission                    ( )  Coping skills (new ways to manage stress, exercise, relaxation techniques, changing routine, distraction), if not completed on admission                                                           ( )  Basic information about quitting (benefits of quitting, techniques in how to quit, available resources, if not completed on admission  ( ) Referral for counseling faxed to [address] [place] [address]   ( x) Patient refused referral  ( x) Patient refused counseling  ( x) Patient refused smoking cessation medication upon discharge    Vaccinations (david X if applicable and completed):  ( ) Patient states already received influenza vaccine elsewhere  ( ) Patient received influenza vaccine during this hospitalization  ( ) Patient refused influenza vaccine at this time  ( ) Not offered

## 2023-07-21 ENCOUNTER — FOLLOWUP TELEPHONE ENCOUNTER (OUTPATIENT)
Dept: PSYCHIATRY | Age: 38
End: 2023-07-21

## 2023-08-24 NOTE — PROGRESS NOTES
Admit orders placed in error.   Patient will not be admitted [Mediport] : Mediport [No focal deficits] : no focal deficits [PERRLA] : ARACELI [Normal] : affect appropriate [90: Minor restrictions in physically strenuous activity.] : 90: Minor restrictions in physically strenuous activity. [Icterus] : not icterus [Ulcers] : no ulcers [Mucositis] : no mucositis [de-identified] : alert, cooperative, interactive [de-identified] : wears glasses [de-identified] : brisk capillary refill  [de-identified] : no testicular mass [de-identified] : Striae on lower back, acne to face improved [de-identified] : interactive and happy

## 2024-06-27 ENCOUNTER — HOSPITAL ENCOUNTER (EMERGENCY)
Age: 39
Discharge: HOME | End: 2024-06-27
Attending: EMERGENCY MEDICINE
Payer: COMMERCIAL

## 2024-06-27 VITALS
WEIGHT: 143.74 LBS | OXYGEN SATURATION: 96 % | HEART RATE: 74 BPM | BODY MASS INDEX: 23.95 KG/M2 | DIASTOLIC BLOOD PRESSURE: 88 MMHG | RESPIRATION RATE: 20 BRPM | TEMPERATURE: 97.9 F | SYSTOLIC BLOOD PRESSURE: 118 MMHG | HEIGHT: 65 IN

## 2024-06-27 DIAGNOSIS — Z01.89 NEGATIVE URINE DRUG TEST: Primary | ICD-10-CM

## 2024-06-27 DIAGNOSIS — J01.90 ACUTE BACTERIAL SINUSITIS: ICD-10-CM

## 2024-06-27 DIAGNOSIS — B96.89 ACUTE BACTERIAL SINUSITIS: ICD-10-CM

## 2024-06-27 LAB
AMPHETAMINES UR QL SCN>1000 NG/ML: NORMAL
BARBITURATES UR QL SCN>200 NG/ML: NORMAL
BENZODIAZ UR QL SCN>200 NG/ML: NORMAL
CANNABINOIDS UR QL SCN>50 NG/ML: NORMAL
COCAINE UR QL SCN: NORMAL
DRUG SCREEN COMMENT UR-IMP: NORMAL
FENTANYL SCREEN, URINE: NORMAL
METHADONE UR QL SCN>300 NG/ML: NORMAL
OPIATES UR QL SCN>300 NG/ML: NORMAL
OXYCODONE UR QL SCN: NORMAL
PCP UR QL SCN>25 NG/ML: NORMAL
PH UR STRIP: 6 [PH]

## 2024-06-27 PROCEDURE — 99283 EMERGENCY DEPT VISIT LOW MDM: CPT

## 2024-06-27 PROCEDURE — 80307 DRUG TEST PRSMV CHEM ANLYZR: CPT

## 2024-06-27 RX ORDER — TOPIRAMATE 100 MG/1
150 TABLET, FILM COATED ORAL DAILY
COMMUNITY

## 2024-06-27 RX ORDER — ALBUTEROL SULFATE 90 UG/1
2 AEROSOL, METERED RESPIRATORY (INHALATION) 4 TIMES DAILY PRN
Qty: 18 G | Refills: 0 | Status: SHIPPED | OUTPATIENT
Start: 2024-06-27

## 2024-06-27 RX ORDER — DULOXETIN HYDROCHLORIDE 60 MG/1
60 CAPSULE, DELAYED RELEASE ORAL DAILY
COMMUNITY
Start: 2024-05-09

## 2024-06-27 RX ORDER — AZITHROMYCIN 250 MG/1
TABLET, FILM COATED ORAL
Qty: 1 PACKET | Refills: 0 | Status: SHIPPED | OUTPATIENT
Start: 2024-06-27 | End: 2024-07-01

## 2024-06-27 ASSESSMENT — PAIN - FUNCTIONAL ASSESSMENT: PAIN_FUNCTIONAL_ASSESSMENT: 0-10

## 2024-06-27 ASSESSMENT — PAIN SCALES - GENERAL: PAINLEVEL_OUTOF10: 0
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
## 2024-06-27 NOTE — ED PROVIDER NOTES
[facility]  EMERGENCY DEPARTMENT ENCOUNTER      Pt Name: Jacquelyn Weaver  MRN: [id number]  Birthdate 1985  Date of evaluation: 6/27/2024  Provider: KATI TAY DO    CHIEF COMPLAINT       Chief Complaint   Patient presents with    Drug / Alcohol Assessment     Pt presents for a blood drug test because she had what she believes to be a false positive and is trying to gain entry to a sober living house    Cough     Night time cough and morning nasal drainage          HISTORY OF PRESENT ILLNESS   (Location/Symptom, Timing/Onset, Context/Setting, Quality, Duration, Modifying Factors, Severity)  Note limiting factors.   Jacquelyn Weaver is a 39 y.o. female who presents to the emergency department with a complaint of a request for a drug test.  The patient states that she was scheduled to enter a sober living facility today.  She states that she has been sober for several months.  They did a routine drug test and she tested positive for methamphetamine.  However, she denies using methamphetamine.  She states that it could be secondary to using Primatene Mist.      She has been using Primatene Mist recently because she does not have her albuterol inhaler.  She occasionally noticed some wheezing.  She has been coughing for a few days.  She reports some yellow productive sputum and some clear rhinorrhea.  She states that her ex-boyfriend whom she has been around recently had similar symptoms a few days ago and he is now feeling better.  She denies any chest pain heaviness pressure or tightness.  No leg or calf pain.  No swelling.  She denies any abdominal pain nausea vomiting or diarrhea.  No headache.  No neck pain or stiffness.  No photophobia.    She would like to have a drug test to prove that she is sober.  She denies any recent ingestion of any medications chemicals or substances.  No suicidal or homicidal ideations.  No auditory or visual hallucinations.  She denies any